# Patient Record
Sex: FEMALE | Race: WHITE | NOT HISPANIC OR LATINO | Employment: OTHER | URBAN - METROPOLITAN AREA
[De-identification: names, ages, dates, MRNs, and addresses within clinical notes are randomized per-mention and may not be internally consistent; named-entity substitution may affect disease eponyms.]

---

## 2020-04-01 DIAGNOSIS — J44.9 CHRONIC OBSTRUCTIVE PULMONARY DISEASE, UNSPECIFIED COPD TYPE (HCC): Primary | ICD-10-CM

## 2020-04-01 DIAGNOSIS — J44.9 CHRONIC OBSTRUCTIVE PULMONARY DISEASE, UNSPECIFIED COPD TYPE (HCC): ICD-10-CM

## 2020-04-01 RX ORDER — BUDESONIDE 1 MG/2ML
1 INHALANT ORAL 2 TIMES DAILY
Qty: 360 ML | Refills: 1 | Status: SHIPPED | OUTPATIENT
Start: 2020-04-01 | End: 2020-04-01 | Stop reason: SDUPTHER

## 2020-04-01 RX ORDER — IPRATROPIUM BROMIDE AND ALBUTEROL SULFATE 2.5; .5 MG/3ML; MG/3ML
3 SOLUTION RESPIRATORY (INHALATION) EVERY 6 HOURS PRN
Qty: 300 VIAL | Refills: 1 | Status: SHIPPED | OUTPATIENT
Start: 2020-04-01 | End: 2022-03-22 | Stop reason: SDUPTHER

## 2020-04-01 RX ORDER — BUDESONIDE 1 MG/2ML
1 INHALANT ORAL 2 TIMES DAILY
Qty: 360 ML | Refills: 1 | Status: SHIPPED | OUTPATIENT
Start: 2020-04-01 | End: 2020-04-01

## 2020-04-01 RX ORDER — BUDESONIDE 0.5 MG/2ML
0.5 INHALANT ORAL 2 TIMES DAILY
Qty: 360 ML | Refills: 1 | Status: SHIPPED | OUTPATIENT
Start: 2020-04-01 | End: 2020-09-03

## 2020-04-01 RX ORDER — IPRATROPIUM BROMIDE AND ALBUTEROL SULFATE 2.5; .5 MG/3ML; MG/3ML
SOLUTION RESPIRATORY (INHALATION)
OUTPATIENT
Start: 2020-04-01

## 2020-04-01 RX ORDER — IPRATROPIUM BROMIDE AND ALBUTEROL SULFATE 2.5; .5 MG/3ML; MG/3ML
3 SOLUTION RESPIRATORY (INHALATION) EVERY 6 HOURS PRN
Qty: 300 VIAL | Refills: 1 | Status: SHIPPED | OUTPATIENT
Start: 2020-04-01 | End: 2020-04-01 | Stop reason: SDUPTHER

## 2020-04-01 RX ORDER — FORMOTEROL FUMARATE 20 UG/2ML
20 SOLUTION RESPIRATORY (INHALATION) 2 TIMES DAILY
Qty: 180 VIAL | Refills: 1 | Status: SHIPPED | OUTPATIENT
Start: 2020-04-01 | End: 2022-03-22 | Stop reason: SDUPTHER

## 2020-04-01 RX ORDER — FORMOTEROL FUMARATE 20 UG/2ML
20 SOLUTION RESPIRATORY (INHALATION) 2 TIMES DAILY
Qty: 180 VIAL | Refills: 1 | Status: SHIPPED | OUTPATIENT
Start: 2020-04-01 | End: 2020-04-01 | Stop reason: SDUPTHER

## 2020-06-28 DIAGNOSIS — I10 ESSENTIAL HYPERTENSION: Primary | ICD-10-CM

## 2020-06-29 RX ORDER — AMLODIPINE BESYLATE 5 MG/1
TABLET ORAL
Qty: 90 TABLET | Refills: 1 | Status: SHIPPED | OUTPATIENT
Start: 2020-06-29 | End: 2021-01-04 | Stop reason: SDUPTHER

## 2020-08-26 RX ORDER — CITALOPRAM 10 MG/1
TABLET ORAL
COMMUNITY
Start: 2020-05-22 | End: 2022-03-22 | Stop reason: SDUPTHER

## 2020-08-26 RX ORDER — CLOPIDOGREL BISULFATE 75 MG/1
TABLET ORAL
COMMUNITY
Start: 2020-08-12

## 2020-08-26 RX ORDER — TIMOLOL MALEATE 5 MG/ML
SOLUTION/ DROPS OPHTHALMIC
COMMUNITY
Start: 2020-06-24

## 2020-08-26 RX ORDER — ROSUVASTATIN CALCIUM 20 MG/1
TABLET, COATED ORAL
COMMUNITY
Start: 2020-05-22

## 2020-08-27 ENCOUNTER — OFFICE VISIT (OUTPATIENT)
Dept: FAMILY MEDICINE CLINIC | Facility: CLINIC | Age: 80
End: 2020-08-27
Payer: MEDICARE

## 2020-08-27 VITALS
DIASTOLIC BLOOD PRESSURE: 80 MMHG | HEART RATE: 81 BPM | OXYGEN SATURATION: 80 % | HEIGHT: 62 IN | SYSTOLIC BLOOD PRESSURE: 138 MMHG | WEIGHT: 189 LBS | TEMPERATURE: 98.9 F | BODY MASS INDEX: 34.78 KG/M2 | RESPIRATION RATE: 18 BRPM

## 2020-08-27 DIAGNOSIS — C34.92 NON-SMALL CELL CANCER OF LEFT LUNG (HCC): ICD-10-CM

## 2020-08-27 DIAGNOSIS — E53.8 B12 DEFICIENCY: ICD-10-CM

## 2020-08-27 DIAGNOSIS — F33.41 RECURRENT MAJOR DEPRESSIVE DISORDER, IN PARTIAL REMISSION (HCC): ICD-10-CM

## 2020-08-27 DIAGNOSIS — J44.9 CHRONIC OBSTRUCTIVE PULMONARY DISEASE, UNSPECIFIED COPD TYPE (HCC): ICD-10-CM

## 2020-08-27 DIAGNOSIS — E78.5 HYPERLIPIDEMIA, UNSPECIFIED HYPERLIPIDEMIA TYPE: Primary | ICD-10-CM

## 2020-08-27 DIAGNOSIS — Z79.899 OTHER LONG TERM (CURRENT) DRUG THERAPY: ICD-10-CM

## 2020-08-27 DIAGNOSIS — I10 ESSENTIAL HYPERTENSION: ICD-10-CM

## 2020-08-27 DIAGNOSIS — G47.33 OSA ON CPAP: ICD-10-CM

## 2020-08-27 DIAGNOSIS — Z99.89 OSA ON CPAP: ICD-10-CM

## 2020-08-27 DIAGNOSIS — E55.9 VITAMIN D DEFICIENCY: ICD-10-CM

## 2020-08-27 DIAGNOSIS — R73.03 PREDIABETES: ICD-10-CM

## 2020-08-27 PROCEDURE — 99214 OFFICE O/P EST MOD 30 MIN: CPT | Performed by: FAMILY MEDICINE

## 2020-08-27 PROCEDURE — 1160F RVW MEDS BY RX/DR IN RCRD: CPT | Performed by: FAMILY MEDICINE

## 2020-08-27 PROCEDURE — 3079F DIAST BP 80-89 MM HG: CPT | Performed by: FAMILY MEDICINE

## 2020-08-27 PROCEDURE — 3075F SYST BP GE 130 - 139MM HG: CPT | Performed by: FAMILY MEDICINE

## 2020-08-27 PROCEDURE — 3008F BODY MASS INDEX DOCD: CPT | Performed by: FAMILY MEDICINE

## 2020-08-27 PROCEDURE — 4040F PNEUMOC VAC/ADMIN/RCVD: CPT | Performed by: FAMILY MEDICINE

## 2020-08-27 PROCEDURE — 1036F TOBACCO NON-USER: CPT | Performed by: FAMILY MEDICINE

## 2020-08-27 RX ORDER — OMEGA-3S/DHA/EPA/FISH OIL/D3 300MG-1000
400 CAPSULE ORAL DAILY
COMMUNITY
End: 2021-01-27 | Stop reason: ALTCHOICE

## 2020-08-27 RX ORDER — RIBOFLAVIN (VITAMIN B2) 100 MG
100 TABLET ORAL DAILY
COMMUNITY

## 2020-08-27 RX ORDER — CHLORAL HYDRATE 500 MG
1000 CAPSULE ORAL DAILY
COMMUNITY

## 2020-08-27 RX ORDER — ASPIRIN 81 MG/1
81 TABLET, CHEWABLE ORAL DAILY
COMMUNITY
End: 2021-01-27 | Stop reason: ALTCHOICE

## 2020-08-27 NOTE — PROGRESS NOTES
Assessment/Plan:    1  Hyperlipidemia, unspecified hyperlipidemia type  -     TSH, 3rd generation with Free T4 reflex; Future; Expected date: 08/27/2020    2  Essential hypertension  -     Lipid panel; Future; Expected date: 08/27/2020  -     Comprehensive metabolic panel; Future; Expected date: 08/27/2020  -     CBC; Future; Expected date: 08/27/2020  -     Hemoglobin A1C; Future; Expected date: 08/27/2020  -     NT-BNP PRO; Future    3  Chronic obstructive pulmonary disease, unspecified COPD type ([place] [address] )    4  JULIO on CPAP    5  Recurrent major depressive disorder, in partial remission ([place] [address] )    6  Non-small cell cancer of left lung ([place] [address] )    7  Prediabetes    8  B12 deficiency  -     Vitamin B12; Future; Expected date: 08/27/2020    9  Vitamin D deficiency  -     Vitamin D 25 hydroxy; Future; Expected date: 08/27/2020    10  Other long term (current) drug therapy   -     Hemoglobin A1C; Future; Expected date: 08/27/2020         Subjective:      Patient ID: Cody Jung is a 79 y o  female  HPI       copd: 2L NC x 2012 about 2 yrs now cont  keeping around 92% nebs bid no recent steroid  turned up to 3L today to 5 with portable     hld; crestor 20mg    HTN: could not take losartan 25mg or lisinopril 10mg gave norvasc 5mg currently    right shoulder pain    anxiety: on celexa 10mg    Cardio: heart is fine sees [facility] and just had stenting of the BL LE now on plavix    Glaucoma: demco    saw [facility]: Thony lobectomy for non Small cell Lung cancer s/p chemo and angelco - and [facility]    diverticulosis: seen by geremias - takes imodium prn last flair up years ago  taking airborne daily    vit D vit E and fishoil [id number]        The following portions of the patient's history were reviewed and updated as appropriate: allergies, current medications, past family history, past medical history, past social history, past surgical history and problem list     Review of Systems   Constitutional: Positive for unexpected weight change  Negative for fever  HENT: Negative for nosebleeds and trouble swallowing  Eyes: Negative for visual disturbance  Respiratory: Positive for shortness of breath  Negative for chest tightness  Cardiovascular: Negative for chest pain, palpitations and leg swelling  Gastrointestinal: Positive for nausea  Negative for abdominal pain, constipation and diarrhea  Endocrine: Negative for cold intolerance  Genitourinary: Negative for dysuria and urgency  Musculoskeletal: Negative for joint swelling and myalgias  Skin: Negative for rash  Neurological: Positive for weakness  Negative for tremors, seizures and syncope  Hematological: Does not bruise/bleed easily  Psychiatric/Behavioral: Negative for hallucinations and suicidal ideas  The patient is nervous/anxious  Objective:      /80 (BP Location: Left arm, Patient Position: Sitting, Cuff Size: Large)   Pulse 81   Temp 98 9 °F (37 2 °C) (Tympanic)   Resp 18   Ht 5' 2" (1 575 m)   Wt 85 7 kg (189 lb)   SpO2 (!) 80% Comment: has to keep her oxygen on 5 (portable)  BMI 34 57 kg/m²     No visits with results within 2 Week(s) from this visit  Latest known visit with results is:   No results found for any previous visit  Physical Exam  Vitals signs and nursing note reviewed  Constitutional:       Appearance: She is well-developed  Comments: 3L NC    HENT:      Head: Normocephalic and atraumatic  Neck:      Musculoskeletal: Normal range of motion and neck supple  Cardiovascular:      Rate and Rhythm: Normal rate and regular rhythm  Heart sounds: Normal heart sounds  No murmur  Pulmonary:      Effort: Pulmonary effort is normal       Breath sounds: Normal breath sounds  No wheezing or rales  Abdominal:      General: Bowel sounds are normal  There is no distension  Palpations: Abdomen is soft  Tenderness: There is no abdominal tenderness  Musculoskeletal: Normal range of motion           General: No tenderness  Lymphadenopathy:      Cervical: No cervical adenopathy  Skin:     General: Skin is warm and dry  Capillary Refill: Capillary refill takes less than 2 seconds  Findings: No rash  Neurological:      Mental Status: She is alert and oriented to person, place, and time  Cranial Nerves: No cranial nerve deficit  Sensory: No sensory deficit  Motor: No abnormal muscle tone  Psychiatric:         Behavior: Behavior normal          Thought Content: Thought content normal          Judgment: Judgment normal            BMI Counseling: Body mass index is 34 57 kg/m²  The BMI is above normal  Nutrition recommendations include decreasing portion sizes, encouraging healthy choices of fruits and vegetables and limiting drinks that contain sugar  Exercise recommendations include moderate physical activity 150 minutes/week  No pharmacotherapy was ordered  Falls Plan of Care: balance, strength, and gait training instructions were provided  Medications that increase falls were reviewed         Brain MD Karen  [place] [id number]

## 2020-09-03 ENCOUNTER — APPOINTMENT (EMERGENCY)
Dept: RADIOLOGY | Facility: HOSPITAL | Age: 80
End: 2020-09-03
Payer: MEDICARE

## 2020-09-03 ENCOUNTER — APPOINTMENT (OUTPATIENT)
Dept: VASCULAR ULTRASOUND | Facility: HOSPITAL | Age: 80
End: 2020-09-03
Payer: MEDICARE

## 2020-09-03 ENCOUNTER — HOSPITAL ENCOUNTER (OUTPATIENT)
Facility: HOSPITAL | Age: 80
Setting detail: OBSERVATION
Discharge: HOME | End: 2020-09-04
Admitting: INTERNAL MEDICINE
Payer: MEDICARE

## 2020-09-03 ENCOUNTER — APPOINTMENT (EMERGENCY)
Dept: CT IMAGING | Facility: HOSPITAL | Age: 80
End: 2020-09-03
Payer: MEDICARE

## 2020-09-03 DIAGNOSIS — I50.9 CONGESTIVE HEART FAILURE, UNSPECIFIED HF CHRONICITY, UNSPECIFIED HEART FAILURE TYPE (HCC): ICD-10-CM

## 2020-09-03 DIAGNOSIS — Z99.89 OSA ON CPAP: ICD-10-CM

## 2020-09-03 DIAGNOSIS — J44.1 COPD EXACERBATION (HCC): Primary | ICD-10-CM

## 2020-09-03 DIAGNOSIS — G47.33 OSA ON CPAP: ICD-10-CM

## 2020-09-03 LAB
ALBUMIN SERPL BCP-MCNC: 4.4 G/DL (ref 3.4–4.8)
ALP SERPL-CCNC: 42.4 U/L (ref 35–140)
ALT SERPL W P-5'-P-CCNC: 16 U/L (ref 5–54)
ANION GAP SERPL CALCULATED.3IONS-SCNC: 7 MMOL/L (ref 4–13)
AST SERPL W P-5'-P-CCNC: 17 U/L (ref 15–41)
BASE EXCESS BLDA CALC-SCNC: 4 MMOL/L (ref -2–3)
BASE EXCESS BLDA CALC-SCNC: 5 MMOL/L (ref -2–3)
BASOPHILS # BLD AUTO: 0.02 THOUSANDS/ΜL (ref 0–0.1)
BASOPHILS NFR BLD AUTO: 0 % (ref 0–1)
BILIRUB SERPL-MCNC: 0.54 MG/DL (ref 0.3–1.2)
BNP SERPL-MCNC: 216.6 PG/ML (ref 1–100)
BUN SERPL-MCNC: 14 MG/DL (ref 6–20)
CA-I BLD-SCNC: 1.32 MMOL/L (ref 1.12–1.32)
CA-I BLD-SCNC: 1.36 MMOL/L (ref 1.12–1.32)
CALCIUM SERPL-MCNC: 9.9 MG/DL (ref 8.4–10.2)
CHLORIDE SERPL-SCNC: 102 MMOL/L (ref 96–108)
CO2 SERPL-SCNC: 33 MMOL/L (ref 22–33)
CREAT SERPL-MCNC: 0.74 MG/DL (ref 0.4–1.1)
D DIMER PPP FEU-MCNC: 0.55 MG/L FEU (ref 0.19–0.49)
EOSINOPHIL # BLD AUTO: 0.14 THOUSAND/ΜL (ref 0–0.61)
EOSINOPHIL NFR BLD AUTO: 2 % (ref 0–6)
ERYTHROCYTE [DISTWIDTH] IN BLOOD BY AUTOMATED COUNT: 13.9 % (ref 11.6–15.1)
FIO2 GAS DIL.REBREATH: 32 L
FIO2 GAS DIL.REBREATH: 75 L
GFR SERPL CREATININE-BSD FRML MDRD: 76 ML/MIN/1.73SQ M
GLUCOSE SERPL-MCNC: 146 MG/DL (ref 65–140)
GLUCOSE SERPL-MCNC: 153 MG/DL (ref 65–140)
GLUCOSE SERPL-MCNC: 182 MG/DL (ref 65–140)
HCO3 BLDA-SCNC: 31.4 MMOL/L (ref 22–28)
HCO3 BLDA-SCNC: 34.4 MMOL/L (ref 22–28)
HCT VFR BLD AUTO: 44.4 % (ref 34.8–46.1)
HCT VFR BLD CALC: 40 % (ref 34.8–46.1)
HCT VFR BLD CALC: 42 % (ref 34.8–46.1)
HGB BLD-MCNC: 14.6 G/DL (ref 11.5–15.4)
HGB BLDA-MCNC: 13.6 G/DL (ref 11.5–15.4)
HGB BLDA-MCNC: 14.3 G/DL (ref 11.5–15.4)
IMM GRANULOCYTES # BLD AUTO: 0.02 THOUSAND/UL (ref 0–0.2)
IMM GRANULOCYTES NFR BLD AUTO: 0 % (ref 0–2)
LYMPHOCYTES # BLD AUTO: 0.86 THOUSANDS/ΜL (ref 0.6–4.47)
LYMPHOCYTES NFR BLD AUTO: 12 % (ref 14–44)
MCH RBC QN AUTO: 33.3 PG (ref 26.8–34.3)
MCHC RBC AUTO-ENTMCNC: 32.9 G/DL (ref 31.4–37.4)
MCV RBC AUTO: 101 FL (ref 82–98)
MONOCYTES # BLD AUTO: 0.52 THOUSAND/ΜL (ref 0.17–1.22)
MONOCYTES NFR BLD AUTO: 7 % (ref 4–12)
NEUTROPHILS # BLD AUTO: 5.52 THOUSANDS/ΜL (ref 1.85–7.62)
NEUTS SEG NFR BLD AUTO: 79 % (ref 43–75)
PCO2 BLD: 33 MMOL/L (ref 21–32)
PCO2 BLD: 37 MMOL/L (ref 21–32)
PCO2 BLD: 372 MM HG
PCO2 BLD: 58.3 MM HG (ref 36–44)
PCO2 BLD: 62 MM HG
PCO2 BLD: 75.1 MM HG (ref 36–44)
PCO2 BLDA: 57.1 MM HG
PCO2 BLDA: 73.8 MM HG
PH BLD: 7.27 [PH] (ref 7.35–7.45)
PH BLD: 7.28 [PH]
PH BLD: 7.34 [PH] (ref 7.35–7.45)
PH BLD: 7.35 [PH]
PLATELET # BLD AUTO: 209 THOUSANDS/UL (ref 149–390)
PMV BLD AUTO: 9.4 FL (ref 8.9–12.7)
PO2 BLD: 374 MM HG (ref 75–129)
PO2 BLD: 64 MM HG (ref 75–129)
POTASSIUM BLD-SCNC: 4.3 MMOL/L (ref 3.5–5.3)
POTASSIUM BLD-SCNC: 4.4 MMOL/L (ref 3.5–5.3)
POTASSIUM SERPL-SCNC: 4.8 MMOL/L (ref 3.5–5)
PROT SERPL-MCNC: 7.6 G/DL (ref 6.4–8.3)
RBC # BLD AUTO: 4.38 MILLION/UL (ref 3.81–5.12)
SAO2 % BLD FROM PO2: 100 % (ref 60–85)
SAO2 % BLD FROM PO2: 90 % (ref 60–85)
SODIUM BLD-SCNC: 139 MMOL/L (ref 136–145)
SODIUM BLD-SCNC: 142 MMOL/L (ref 136–145)
SODIUM SERPL-SCNC: 142 MMOL/L (ref 133–145)
SPECIMEN SOURCE: ABNORMAL
SPECIMEN SOURCE: ABNORMAL
TROPONIN I SERPL-MCNC: <0.03 NG/ML (ref 0–0.07)
WBC # BLD AUTO: 7.08 THOUSAND/UL (ref 4.31–10.16)

## 2020-09-03 PROCEDURE — 99220 PR INITIAL OBSERVATION CARE/DAY 70 MINUTES: CPT | Performed by: INTERNAL MEDICINE

## 2020-09-03 PROCEDURE — 84484 ASSAY OF TROPONIN QUANT: CPT

## 2020-09-03 PROCEDURE — 36600 WITHDRAWAL OF ARTERIAL BLOOD: CPT

## 2020-09-03 PROCEDURE — 93970 EXTREMITY STUDY: CPT

## 2020-09-03 PROCEDURE — 82947 ASSAY GLUCOSE BLOOD QUANT: CPT

## 2020-09-03 PROCEDURE — 94760 N-INVAS EAR/PLS OXIMETRY 1: CPT

## 2020-09-03 PROCEDURE — 85025 COMPLETE CBC W/AUTO DIFF WBC: CPT

## 2020-09-03 PROCEDURE — 94640 AIRWAY INHALATION TREATMENT: CPT

## 2020-09-03 PROCEDURE — 82803 BLOOD GASES ANY COMBINATION: CPT

## 2020-09-03 PROCEDURE — 99223 1ST HOSP IP/OBS HIGH 75: CPT | Performed by: INTERNAL MEDICINE

## 2020-09-03 PROCEDURE — 99285 EMERGENCY DEPT VISIT HI MDM: CPT

## 2020-09-03 PROCEDURE — 84295 ASSAY OF SERUM SODIUM: CPT

## 2020-09-03 PROCEDURE — 82330 ASSAY OF CALCIUM: CPT

## 2020-09-03 PROCEDURE — 93005 ELECTROCARDIOGRAM TRACING: CPT

## 2020-09-03 PROCEDURE — 96374 THER/PROPH/DIAG INJ IV PUSH: CPT

## 2020-09-03 PROCEDURE — 71045 X-RAY EXAM CHEST 1 VIEW: CPT

## 2020-09-03 PROCEDURE — 94664 DEMO&/EVAL PT USE INHALER: CPT

## 2020-09-03 PROCEDURE — 36415 COLL VENOUS BLD VENIPUNCTURE: CPT

## 2020-09-03 PROCEDURE — 71275 CT ANGIOGRAPHY CHEST: CPT

## 2020-09-03 PROCEDURE — 80053 COMPREHEN METABOLIC PANEL: CPT

## 2020-09-03 PROCEDURE — 94660 CPAP INITIATION&MGMT: CPT

## 2020-09-03 PROCEDURE — 1124F ACP DISCUSS-NO DSCNMKR DOCD: CPT

## 2020-09-03 PROCEDURE — 85379 FIBRIN DEGRADATION QUANT: CPT

## 2020-09-03 PROCEDURE — 83880 ASSAY OF NATRIURETIC PEPTIDE: CPT

## 2020-09-03 PROCEDURE — G1004 CDSM NDSC: HCPCS

## 2020-09-03 PROCEDURE — 84132 ASSAY OF SERUM POTASSIUM: CPT

## 2020-09-03 PROCEDURE — 93970 EXTREMITY STUDY: CPT | Performed by: SURGERY

## 2020-09-03 PROCEDURE — 85014 HEMATOCRIT: CPT

## 2020-09-03 RX ORDER — ASPIRIN 81 MG/1
81 TABLET, CHEWABLE ORAL DAILY
Status: DISCONTINUED | OUTPATIENT
Start: 2020-09-03 | End: 2020-09-04 | Stop reason: HOSPADM

## 2020-09-03 RX ORDER — IPRATROPIUM BROMIDE AND ALBUTEROL SULFATE 2.5; .5 MG/3ML; MG/3ML
SOLUTION RESPIRATORY (INHALATION)
Status: COMPLETED
Start: 2020-09-03 | End: 2020-09-03

## 2020-09-03 RX ORDER — LEVALBUTEROL 1.25 MG/.5ML
1.25 SOLUTION, CONCENTRATE RESPIRATORY (INHALATION) EVERY 6 HOURS PRN
Status: DISCONTINUED | OUTPATIENT
Start: 2020-09-03 | End: 2020-09-03

## 2020-09-03 RX ORDER — TERBUTALINE SULFATE 1 MG/ML
1 INJECTION, SOLUTION SUBCUTANEOUS ONCE
Status: COMPLETED | OUTPATIENT
Start: 2020-09-03 | End: 2020-09-03

## 2020-09-03 RX ORDER — SODIUM CHLORIDE FOR INHALATION 0.9 %
3 VIAL, NEBULIZER (ML) INHALATION EVERY 6 HOURS PRN
Status: DISCONTINUED | OUTPATIENT
Start: 2020-09-03 | End: 2020-09-03

## 2020-09-03 RX ORDER — METHYLPREDNISOLONE SODIUM SUCCINATE 125 MG/2ML
60 INJECTION, POWDER, LYOPHILIZED, FOR SOLUTION INTRAMUSCULAR; INTRAVENOUS EVERY 12 HOURS SCHEDULED
Status: DISCONTINUED | OUTPATIENT
Start: 2020-09-03 | End: 2020-09-04 | Stop reason: HOSPADM

## 2020-09-03 RX ORDER — LEVALBUTEROL 1.25 MG/.5ML
1.25 SOLUTION, CONCENTRATE RESPIRATORY (INHALATION)
Status: DISCONTINUED | OUTPATIENT
Start: 2020-09-03 | End: 2020-09-04 | Stop reason: HOSPADM

## 2020-09-03 RX ORDER — METHYLPREDNISOLONE SOD SUCC 125 MG
1 VIAL (EA) INJECTION ONCE
Status: COMPLETED | OUTPATIENT
Start: 2020-09-03 | End: 2020-09-03

## 2020-09-03 RX ORDER — ATORVASTATIN CALCIUM 40 MG/1
40 TABLET, FILM COATED ORAL
Status: DISCONTINUED | OUTPATIENT
Start: 2020-09-03 | End: 2020-09-04 | Stop reason: HOSPADM

## 2020-09-03 RX ORDER — AMLODIPINE BESYLATE 5 MG/1
5 TABLET ORAL DAILY
Status: DISCONTINUED | OUTPATIENT
Start: 2020-09-03 | End: 2020-09-04 | Stop reason: HOSPADM

## 2020-09-03 RX ORDER — TIMOLOL MALEATE 5 MG/ML
1 SOLUTION/ DROPS OPHTHALMIC 2 TIMES DAILY
Status: DISCONTINUED | OUTPATIENT
Start: 2020-09-03 | End: 2020-09-04 | Stop reason: HOSPADM

## 2020-09-03 RX ORDER — TIMOLOL MALEATE 5 MG/ML
1 SOLUTION/ DROPS OPHTHALMIC DAILY
Status: DISCONTINUED | OUTPATIENT
Start: 2020-09-03 | End: 2020-09-03

## 2020-09-03 RX ORDER — CEFTRIAXONE 1 G/50ML
1000 INJECTION, SOLUTION INTRAVENOUS EVERY 24 HOURS
Status: DISCONTINUED | OUTPATIENT
Start: 2020-09-03 | End: 2020-09-04 | Stop reason: HOSPADM

## 2020-09-03 RX ORDER — FUROSEMIDE 10 MG/ML
20 INJECTION INTRAMUSCULAR; INTRAVENOUS ONCE
Status: COMPLETED | OUTPATIENT
Start: 2020-09-03 | End: 2020-09-03

## 2020-09-03 RX ORDER — IPRATROPIUM BROMIDE AND ALBUTEROL SULFATE 2.5; .5 MG/3ML; MG/3ML
3 SOLUTION RESPIRATORY (INHALATION)
Status: DISCONTINUED | OUTPATIENT
Start: 2020-09-03 | End: 2020-09-03

## 2020-09-03 RX ORDER — POLYETHYLENE GLYCOL 3350 17 G/17G
17 POWDER, FOR SOLUTION ORAL DAILY
Status: DISCONTINUED | OUTPATIENT
Start: 2020-09-03 | End: 2020-09-04 | Stop reason: HOSPADM

## 2020-09-03 RX ORDER — CLOPIDOGREL BISULFATE 75 MG/1
75 TABLET ORAL DAILY
Status: DISCONTINUED | OUTPATIENT
Start: 2020-09-03 | End: 2020-09-04 | Stop reason: HOSPADM

## 2020-09-03 RX ADMIN — CLOPIDOGREL BISULFATE 75 MG: 75 TABLET ORAL at 16:10

## 2020-09-03 RX ADMIN — TIMOLOL MALEATE 1 DROP: 5 SOLUTION OPHTHALMIC at 20:08

## 2020-09-03 RX ADMIN — NITROGLYCERIN 0.5 INCH: 20 OINTMENT TOPICAL at 11:25

## 2020-09-03 RX ADMIN — METHYLPREDNISOLONE SODIUM SUCCINATE 60 MG: 125 INJECTION, POWDER, FOR SOLUTION INTRAMUSCULAR; INTRAVENOUS at 20:08

## 2020-09-03 RX ADMIN — CEFTRIAXONE 1000 MG: 1 INJECTION, SOLUTION INTRAVENOUS at 16:09

## 2020-09-03 RX ADMIN — LEVALBUTEROL HYDROCHLORIDE 1.25 MG: 1.25 SOLUTION, CONCENTRATE RESPIRATORY (INHALATION) at 19:47

## 2020-09-03 RX ADMIN — IPRATROPIUM BROMIDE AND ALBUTEROL SULFATE 3 ML: 2.5; .5 SOLUTION RESPIRATORY (INHALATION) at 10:57

## 2020-09-03 RX ADMIN — IPRATROPIUM BROMIDE AND ALBUTEROL SULFATE 3 ML: 2.5; .5 SOLUTION RESPIRATORY (INHALATION) at 13:53

## 2020-09-03 RX ADMIN — ATORVASTATIN CALCIUM 40 MG: 40 TABLET, FILM COATED ORAL at 16:10

## 2020-09-03 RX ADMIN — IPRATROPIUM BROMIDE 0.5 MG: 0.5 SOLUTION RESPIRATORY (INHALATION) at 19:47

## 2020-09-03 RX ADMIN — FUROSEMIDE 20 MG: 10 INJECTION, SOLUTION INTRAMUSCULAR; INTRAVENOUS at 11:25

## 2020-09-03 RX ADMIN — IODIXANOL 100 ML: 320 INJECTION, SOLUTION INTRAVASCULAR at 13:29

## 2020-09-03 RX ADMIN — AMLODIPINE BESYLATE 5 MG: 5 TABLET ORAL at 16:10

## 2020-09-03 RX ADMIN — AZITHROMYCIN MONOHYDRATE 500 MG: 500 INJECTION, POWDER, LYOPHILIZED, FOR SOLUTION INTRAVENOUS at 17:52

## 2020-09-03 RX ADMIN — ASPIRIN 81 MG 81 MG: 81 TABLET ORAL at 16:10

## 2020-09-03 RX ADMIN — ENOXAPARIN SODIUM 40 MG: 40 INJECTION SUBCUTANEOUS at 16:11

## 2020-09-03 NOTE — ED NOTES
Patient assisted onto bedpan, reported feeling the need to pass her bowels  Patient did not have a BM, stated she only passed gas  Soiled linens with urine, sheets changed, patient cleansed, and brief and purewick applied  Patient repositioned in bed, be in low and locked position, call bell within reach        Rylee Khan RN  09/03/20 [id number]

## 2020-09-03 NOTE — CONSULTS
Consultation - Pulmonary Medicine   Nahun Morocho 79 y o  female MRN: [id number]  Unit/Bed#: -01 Encounter: [id number]      Assessment:  1  Acute on chronic hypoxic and hypercarbic respiratory failure secondary to exacerbation of COPD  The patient has been on home oxygen at 3 liters/minute  Benefited from BiPAP support  PH has improved in pCO2 has come down  She is a chronic CO2 retainer  Continue oxygen supplementation  BiPAP p r n  [place] [employer] and at night  2  Acute exacerbation of COPD most likely precipitated by acute tracheobronchitis:  Currently doing much better  Continue intravenous Solu-Medrol and nebulized bronchodilators  Continue ceftriaxone and azithromycin pending cultures  Check sputum Gram stain and culture  The CT scan shows no evidence of pneumonia  3  Obstructive sleep apnea:  Previously diagnosed obstructive sleep apnea on CPAP 9 cm of water  Continue BiPAP support for now  She would need oxygen supplementation with positive airway pressure therapy  4   History of left upper lobe non-small cell lung cancer:  Status post wedge resection chemotherapy and radiation therapy  Has been cancer free since 5 years  I had a long discussion with her and answered all her questions  Plan:   As above  Continue oxygen supplementation nebulized bronchodilators and empiric antibiotic coverage and IV Solu-Medrol  BiPAP p r n  And at night  Spoke to Dr Madai Selby the hospitalist and her team     History of Present Illness   Physician Requesting Consult: Michele Joyner MD  Reason for Consult / Principal Problem:  Acute on chronic respiratory failure  Hx and PE limited by:  None    HPI: Mariel Bahena is a 79y o  year old female who presents with worsening shortness of breath, cough with phlegm and wheezing since 5 days  She was diagnosed with moderately severe COPD emphysema from her previous smoking, though she quit more than 15 years back    Her usual exercise tolerance is about 200 ft on level ground and she uses 3 liters/minute of oxygen supplementation  She uses  nebulized ipratropium and albuterol about 4 times a day  Her PFT showed moderately severe airflow obstruction with severe diffusion defect  She was previously on budesonide nebulization as well  She has been feeling increasingly short of breath since 5 days and felt even worse this morning  She called the EMS and they give her IV Solu-Medrol done beautifully in  In the emergency room she was found to be severely short of breath and was placed on BiPAP support  Her pCO2 was elevated at 72 and her pH was 7 27 on arterial blood gas  She was also given nebulizer treatment and was empirically started on ceftriaxone and azithromycin for COPD exacerbation  She admitted productive cough with occasional yellow color  She also mentioned increasing wheezing  No fever or chills  No hoarseness of voice or dysphagia  No chest pain or palpitations  After being placed on BiPAP she improved over the next 2 hours and her repeat ABG showed improvement in pH to 7 34 with pCO2 down to 58  She has been subsequently started on nasal cannula oxygen and is feeling much better  Her CT angiogram was reviewed and showed no pulmonary embolism but showed bilateral significant emphysematous changes  She also has some fibrotic changes at both lung bases right more than the left PE  She is status post left upper lobe wedge resection  She has previous history of obstructive sleep apnea and has been on CPAP therapy at 9 cm of water  She states that she uses the CPAP every night and is comfortable with the mask and pressure  She has no significant daytime sleepiness or tiredness  She was diagnosed with left upper lobe non-small cell lung cancer and was underwent wedge resection by Dr Elsa Reich about 9 years back  She had subsequent radiation and chemotherapy  She has been cancer free for 5 years    She follows with   [facility]     She was a smoker for about 35 years 1 pack per day  She quit 15 years back  She has family history of lung cancer in her father who was also a smoker  Consults    Review of Systems   Constitutional: Negative for activity change, appetite change, chills and fever  HENT: Negative for congestion, hearing loss, postnasal drip, rhinorrhea, sneezing, sore throat, trouble swallowing and voice change  Eyes: Negative for visual disturbance  Respiratory: Positive for cough, shortness of breath and wheezing  Negative for chest tightness and stridor  Gastrointestinal: Positive for diarrhea  Negative for abdominal pain, constipation, nausea and vomiting  Endocrine: Negative for polyuria  Genitourinary: Positive for urgency  Negative for difficulty urinating and frequency  Musculoskeletal: Negative for arthralgias, gait problem and joint swelling  Skin: Negative for rash  Allergic/Immunologic: Negative for environmental allergies  Neurological: Negative for dizziness, speech difficulty and headaches  Psychiatric/Behavioral: Negative for agitation and sleep disturbance  The patient is nervous/anxious          Historical Information   Past Medical History:   Diagnosis Date    Chronic pain     [facility]ch[facility] obstructive pulmonary disease) ([facility] )     Hyperlipidemia     Hypertension     Lung cancer ([facility] )     JULIO on CPAP     [facility]peripheral [facility]     Leg     Past Surgical History:   Procedure Laterality Date    APPENDECTOMY      COLONOSCOPY  2018    repeat 2023    HYSTERECTOMY      LUNG CANCER SURGERY Left     wedge reseection, Dr Juan David Otto History   Social History     Substance and Sexual Activity   Alcohol Use Not Currently     Social History     Substance and Sexual Activity   Drug Use Not Currently     E-Cigarette/Vaping    E-Cigarette Use Never User      E-Cigarette/Vaping Substances     Social History     Tobacco Use   Smoking Status Former Smoker    Packs/day: 1 50    Years: 35 00    Pack years: 52 50   Smokeless Tobacco Never Used     Occupational History:  No history of exposure to chemicals or asbestos    Family History: Father had lung cancer; was a smoker  Meds/Allergies   all current active meds have been reviewed    Allergies   Allergen Reactions    Baclofen     Lisinopril     Losartan     Naproxen     Zinc Acetate        Objective   Vitals: Blood pressure 148/75, pulse 74, temperature 98 3 °F (36 8 °C), temperature source Tympanic, resp  rate 22, height 5' 2" (1 575 m), weight 84 4 kg (186 lb), SpO2 92 %  ,Body mass index is 34 02 kg/m²  No intake or output data in the 24 hours ending 09/03/20 1848  Invasive Devices     Peripheral Intravenous Line            Peripheral IV 09/03/20 Left;Ventral (anterior) Forearm less than 1 day    Peripheral IV 09/03/20 Right Antecubital less than 1 day                Physical Exam  Vitals signs reviewed  Constitutional:       General: She is not in acute distress  Appearance: She is obese  She is ill-appearing  She is not toxic-appearing  Interventions: She is not intubated  HENT:      Head: Normocephalic  Neck:      Musculoskeletal: Neck supple  Thyroid: No thyromegaly  Trachea: No tracheal deviation  Cardiovascular:      Rate and Rhythm: Normal rate and regular rhythm  Heart sounds: No murmur  Pulmonary:      Effort: Pulmonary effort is normal  No tachypnea, accessory muscle usage or respiratory distress  She is not intubated  Breath sounds: Examination of the right-lower field reveals rales  Examination of the left-lower field reveals rales  Rales (Occasional coarse inspiratory crackles at both lung bases) present  No decreased breath sounds, wheezing or rhonchi  Chest:      Chest wall: No deformity or tenderness  Abdominal:      General: Bowel sounds are normal       Palpations: Abdomen is soft  Tenderness: There is no abdominal tenderness     Musculoskeletal: Right lower leg: No edema  Left lower leg: No edema  Lymphadenopathy:      Cervical: No cervical adenopathy  Skin:     Coloration: Skin is not cyanotic or pale  Findings: No ecchymosis  Neurological:      Mental Status: She is alert and oriented to person, place, and time  Psychiatric:         Mood and Affect: Mood normal  Mood is not anxious  Behavior: Behavior normal  Behavior is not agitated  Lab Results: I have personally reviewed pertinent lab results  Imaging Studies: I have personally reviewed pertinent reports  EKG, Pathology, and Other Studies: I have personally reviewed pertinent reports  VTE Prophylaxis: Fondaparinux (Arixtra) and Enoxaparin (Lovenox)    Code Status: Level 1 - Full Code  Advance Directive and Living Will:      Power of :    POLST:      Counseling/Coordination of Care: Total floor / unit time spent today 45 minutes  Greater than 50% of total time was spent with the patient and / or family counseling and / or coordination of care  A description of the counseling / coordination of care: COPD sleep apnea, acute on chronic respiratory failure     Thank you for allowing me to participate in the care of the patient

## 2020-09-03 NOTE — ED PROVIDER NOTES
History  Chief Complaint   Patient presents with    Shortness of Breath     pt presents to ER with resp distress from EMS given 125 solumedrol and  25 terbutaline put on CPAP prior to arrival  Pt known COPD'er     54-year-old female history of hypertension COPD coronary artery disease glaucoma brought in by EMS secondary to increased respiratory distress this morning  Patient was found at her home and monitor severe respiratory distress pulse ox was low diaphoretic despite her home O2  Patient is placed on CPAP patient received terbutaline IM as well as Solu-Medrol IV patient did improve clinically with this on arrival she is still having some respiratory distress but was mild in nature  Patient states that the she has been having a cough and she has had some tightness in her chest   Patient denies any nausea or vomiting  Patient claims compliance with her home medications  Prior to Admission Medications   Prescriptions Last Dose Informant Patient Reported? Taking?    Ascorbic Acid (vitamin C) 100 MG tablet   Yes Yes   Sig: Take 100 mg by mouth daily   Multiple Vitamins-Minerals (AIR[place]ORNE GUMMIES PO)   Yes Yes   Sig: Take by mouth   Omega-3 Fatty Acids (fish oil) 1,000 mg   Yes Yes   Sig: Take 1,000 mg by mouth daily   amLODIPine (NORVASC) 5 mg tablet   No Yes   Sig: TAKE 1 TABLET BY MOUTH EVERY DAY   aspirin 81 mg chewable tablet   Yes Yes   Sig: Chew 81 mg daily   cholecalciferol (VITAMIN D3) 400 units tablet   Yes Yes   Sig: Take 400 Units by mouth daily   citalopram (CeleXA) 10 mg tablet   Yes Yes   Sig: TK 1 T PO D   clopidogrel (PLAVIX) 75 mg tablet   Yes Yes   Sig: TK 1 T PO D   co-enzyme Q-10 30 MG capsule   Yes Yes   Sig: Take 30 mg by mouth 3 (three) times a day   formoterol (Perforomist) 20 MCG/2ML nebulizer solution   No Yes   Sig: Take 1 vial (20 mcg total) by nebulization 2 (two) times a day icd 10 code J44 9   ipratropium-albuterol (DUO-NEB) 0 5-2 5 mg/3 mL nebulizer solution   No Yes Sig: Take 1 vial (3 mL total) by nebulization every 6 (six) hours as needed for wheezing or shortness of breath ICD 10 J44 9   rosuvastatin (CRESTOR) 20 MG tablet   Yes Yes   Sig: TK 1 T PO QHS   timolol (TIMOPTIC) 0 5 % ophthalmic solution   Yes No   Sig: INT 1 GTT IN OU BID      Facility-Administered Medications: None       Past Medical History:   Diagnosis Date    Chronic pain     COPD (chronic obstructive pulmonary disease) (HCC)     Hyperlipidemia     Hypertension     Lung cancer ([facility] )     JULIO on CPAP     PAD (peripheral artery disease) ([facility] )     Leg       Past Surgical History:   Procedure Laterality Date    APPENDECTOMY      COLONOSCOPY  2018    repeat 2023    HYSTERECTOMY      LUNG CANCER SURGERY Left     wedge reseection, Dr Jay Ayon       Family History   Problem Relation Age of Onset    Colon cancer Father     Heart disease Sister     Lung cancer Daughter      I have reviewed and agree with the history as documented  E-Cigarette/Vaping    E-Cigarette Use Never User      E-Cigarette/Vaping Substances     Social History     Tobacco Use    Smoking status: Former Smoker     Packs/day: 1 50     Years: 35 00     Pack years: 52 50    Smokeless tobacco: Never Used   Substance Use Topics    Alcohol use: Not Currently    Drug use: Not Currently       Review of Systems   Constitutional: Positive for diaphoresis  Negative for chills and fever  HENT: Negative for congestion  Eyes: Negative for visual disturbance  Respiratory: Positive for chest tightness and shortness of breath  Cardiovascular: Negative for chest pain  Gastrointestinal: Positive for nausea  Negative for abdominal pain  Endocrine: Negative for cold intolerance  Genitourinary: Negative for frequency  Musculoskeletal: Negative for gait problem  Skin: Negative for rash  Neurological: Positive for weakness  Negative for dizziness  Psychiatric/Behavioral: Negative for behavioral problems and confusion  Physical Exam  Physical Exam  Vitals signs and nursing note reviewed  Constitutional:       Appearance: She is well-developed  She is diaphoretic  HENT:      Head: Normocephalic and atraumatic  Eyes:      Conjunctiva/sclera: Conjunctivae normal       Pupils: Pupils are equal, round, and reactive to light  Neck:      Musculoskeletal: Normal range of motion and neck supple  Cardiovascular:      Rate and Rhythm: Normal rate and regular rhythm  Heart sounds: Normal heart sounds  Pulmonary:      Effort: Pulmonary effort is normal  Tachypnea present  Breath sounds: Examination of the right-lower field reveals rales  Examination of the left-lower field reveals rales  Rales present  Abdominal:      General: Bowel sounds are normal       Palpations: Abdomen is soft  Musculoskeletal: Normal range of motion  Skin:     General: Skin is warm  Capillary Refill: Capillary refill takes less than 2 seconds  Neurological:      Mental Status: She is alert and oriented to person, place, and time     Psychiatric:         Behavior: Behavior normal          Vital Signs  ED Triage Vitals   Temperature Pulse Respirations Blood Pressure SpO2   09/03/20 1109 09/03/20 1045 09/03/20 1045 09/03/20 1045 09/03/20 1045   97 9 °F (36 6 °C) 72 (!) 24 (!) 172/74 93 %      Temp Source Heart Rate Source Patient Position - Orthostatic VS BP Location FiO2 (%)   09/03/20 1109 09/03/20 1045 09/03/20 1045 09/03/20 1045 --   Tympanic Monitor Lying Right arm       Pain Score       --                  Vitals:    09/03/20 1045 09/03/20 1131 09/03/20 1233 09/03/20 1350   BP: (!) 172/74 (!) 169/113 (!) 190/62 158/62   Pulse: 72 70 66 78   Patient Position - Orthostatic VS: Lying Lying Lying Lying         Visual Acuity      ED Medications  Medications   ipratropium-albuterol (DUO-NEB) 0 5-2 5 mg/3 mL inhalation solution 3 mL (3 mL Nebulization Given 9/3/20 1353)   terbutaline (FOR EMS ONLY) (BRETHINE) injection 1 each (0 each Does not apply Given to EMS 9/3/20 1106)   methylPREDNISolone sodium succinate (FOR EMS ONLY) (Solu-MEDROL) 125 MG injection 125 mg (0 mg Does not apply Given to EMS 9/3/20 1045)   ipratropium-albuterol (DUO-NEB) 0 5-2 5 mg/3 mL inhalation solution **ADS Override Pull** (3 mL  Given 9/3/20 1057)   furosemide (LASIX) injection 20 mg (20 mg Intravenous Given 9/3/20 1125)   nitroglycerin (NITRO-BID) 2 % TD ointment 0 5 inch (0 5 inches Topical Given 9/3/20 1125)   iodixanol (VISIPAQUE) 320 MG/ML injection 100 mL (100 mL Intravenous Given 9/3/20 1329)       Diagnostic Studies  Results Reviewed     Procedure Component Value Units Date/Time    D-dimer, quantitative [[id number]]  (Abnormal) Collected:  09/03/20 1309    Lab Status:  Final result Specimen:  Blood from Arm, Left Updated:  09/03/20 1352     D-Dimer, Quant  0 55 mg/L U     Comprehensive metabolic panel [[id number]]  (Abnormal) Collected:  09/03/20 1201    Lab Status:  Final result Specimen:  Blood from Arm, Right Updated:  09/03/20 1223     Sodium 142 mmol/L      Potassium 4 8 mmol/L      Chloride 102 mmol/L      CO2 33 mmol/L      ANION GAP 7 mmol/L      BUN 14 mg/dL      Creatinine 0 74 mg/dL      Glucose 146 mg/dL      Calcium 9 9 mg/dL      AST 17 U/L      ALT 16 U/L      Alkaline Phosphatase 42 4 U/L      Total Protein 7 6 g/dL      Albumin 4 4 g/dL      Total Bilirubin 0 54 mg/dL      eGFR 76 ml/min/1 73sq m     Narrative:       [place] guidelines for Chronic Kidney Disease (CKD):     Stage 1 with normal or high GFR (GFR > 90 mL/min/1 73 square meters)    Stage 2 Mild CKD (GFR = 60-89 mL/min/1 73 square meters)    Stage 3A Moderate CKD (GFR = 45-59 mL/min/1 73 square meters)    Stage 3B Moderate CKD (GFR = 30-44 mL/min/1 73 square meters)    Stage 4 Severe CKD (GFR = 15-29 mL/min/1 73 square meters)    Stage 5 End Stage CKD (GFR <15 mL/min/1 73 square meters)  Note: GFR calculation is accurate only with a steady state creatinine    B-Type Natriuretic Peptide [facility] ONLY) [[id number]]  (Abnormal) Collected:  09/03/20 1058    Lab Status:  Final result Specimen:  Blood from Arm, Right Updated:  09/03/20 1149      6 pg/mL     Troponin I [[id number]]  (Normal) Collected:  09/03/20 1058    Lab Status:  Final result Specimen:  Blood from Arm, Right Updated:  09/03/20 1129     Troponin I <0 03 ng/mL     CBC and differential [[id number]]  (Abnormal) Collected:  09/03/20 1058    Lab Status:  Final result Specimen:  Blood from Arm, Right Updated:  09/03/20 1116     WBC 7 08 Thousand/uL      RBC 4 38 Million/uL      Hemoglobin 14 6 g/dL      Hematocrit 44 4 %       fL      MCH 33 3 pg      MCHC 32 9 g/dL      RDW 13 9 %      MPV 9 4 fL      Platelets 206 Thousands/uL      Neutrophils Relative 79 %      Immat GRANS % 0 %      Lymphocytes Relative 12 %      Monocytes Relative 7 %      Eosinophils Relative 2 %      Basophils Relative 0 %      Neutrophils Absolute 5 52 Thousands/µL      Immature Grans Absolute 0 02 Thousand/uL      Lymphocytes Absolute 0 86 Thousands/µL      Monocytes Absolute 0 52 Thousand/µL      Eosinophils Absolute 0 14 Thousand/µL      Basophils Absolute 0 02 Thousands/µL     POCT Blood Gas (CG8+) [[id number]]  (Abnormal) Collected:  09/03/20 1110    Lab Status:  Final result Specimen:  Arterial Updated:  09/03/20 1115     pH, Art i-STAT 7 270     pH, i-STAT Temp Corrected 7 275     pCO2, Art i-STAT 75 1 mm HG      pCO2, i-STAT TC 73 8 mm HG      pO2, ART i-STAT 374 0 mm HG      pO2, i-STAT  mm HG      BE, i-STAT 5 mmol/L      HCO3, Art i-STAT 34 4 mmol/L      CO2, i-STAT 37 mmol/L      O2 Sat, i-STAT 100 %      SODIUM, I-STAT 142 mmol/l      Potassium, i-STAT 4 4 mmol/L      Calcium, Ionized i-STAT 1 36 mmol/L      Hct, i-STAT 40 %      Hgb, i-STAT 13 6 g/dl      Glucose, i-STAT 153 mg/dl      POC FIO2 75 L      Specimen Type ARTERIAL                 CTA ED chest PE Study   Final Result by Real Padilla MD (09/03 1342)      Moderate emphysema  Postoperative changes  Workstation performed: [id number]         XR chest 1 view portable   ED Interpretation by Desirae Méndez MD (09/03 1117)   Bilaterale pulmonary interstitial infiltrates  Cardiomegaly  Final Result by Philomena Lawson DO (09/03 1238)      Mild left apical scarring  Workstation performed: [id number]         VAS lower limb venous duplex study, unilateral/limited    (Results Pending)              Procedures  Procedures         ED Course       US AUDIT      Most Recent Value   Initial Alcohol Screen: US AUDIT-C    1  How often do you have a drink containing alcohol?  0 Filed at: 09/03/2020 1047   2  How many drinks containing alcohol do you have on a typical day you are drinking? 0 Filed at: 09/03/2020 1047   3a  Male UNDER 65: How often do you have five or more drinks on one occasion? 0 Filed at: 09/03/2020 1047   3b  FEMALE Any Age, or MALE 65+: How often do you have 4 or more drinks on one occassion? 0 Filed at: 09/03/2020 1047   Audit-C Score  0 Filed at: 09/03/2020 1047                  LEIF/DAST-10      Most Recent Value   How many times in the past year have you    Used an illegal drug or used a prescription medication for non-medical reasons?   Never Filed at: 09/03/2020 1048                    Wells' Criteria for PE      Most Recent Value   Wells' Criteria for PE   Clinical signs and symptoms of DVT     PE is primary diagnosis or equally likely  3 Filed at: 09/03/2020 1307   HR >100  1 5 Filed at: 09/03/2020 1307   Immobilization at least 3 days or Surgery in the previous 4 weeks  0 Filed at: 09/03/2020 1307   Previous, objectively diagnosed PE or DVT  0 Filed at: 09/03/2020 1307   Hemoptysis  0 Filed at: 09/03/2020 1307   Malignancy with treatment within 6 months or palliative  0 Filed at: 09/03/2020 1307   Wells' Criteria Total  4 5 Filed at: 09/03/2020 1307 MDM  Number of Diagnoses or Management Options  Congestive heart failure, unspecified HF chronicity, unspecified heart failure type ([place] [id number] ):   COPD exacerbation [facility]):   Diagnosis management comments: Patient is monitored in the emergency department workup demonstrated EKG no acute ST T wave abnormalities sinus rhythm patient did have chest x-ray which demonstrated interstitial changes possible early congestive heart failure patient's BNP was slightly elevated patient arrived on CPAP and when placed on room air on this her pulse ox dropped down into the 70 she became diaphoretic more short of breath issues placed onto BiPAP  10/575% of O2 and she responded well to this  Patient was treated with Lasix 20 mg IV she was also given nitro paste 1/2 inch anterior chest wall secondary to her hypertension associated with the symptoms and clinical presentation  Patient improved over the next hour so was able to be weaned down to 3 L nasal cannula  Patient has CTA of the chest no PE was noted    Plan will be to admit the patient to tele observation to the medical team for cardiac rule out as well as treatment of her COPD exasperation and monitoring for any further deterioration congestive heart failure with Cardiology consultation        Disposition  Final diagnoses:   COPD exacerbation ([place] [id number] )   Congestive heart failure, unspecified HF chronicity, unspecified heart failure type ([place] [id number] )     Time reflects when diagnosis was documented in both MDM as applicable and the Disposition within this note     Time User Action Codes Description Comment    9/3/2020  1:58 PM Pearletha Angelucci Add [J44 1] COPD exacerbation ([place] [id number] )     9/3/2020  1:59 PM Pearletha Angelucci Add [I50 9] Congestive heart failure, unspecified HF chronicity, unspecified heart failure type [facility])       ED Disposition     ED Disposition Condition Date/Time Comment    Admit Stable Thu Sep 3, 2020  1:58 PM Case was discussed with Hospitalist and the patient's admission status was agreed to be Admission Status: observation status to the service of Dr Aster Ordoñez   Follow-up Information    None         Patient's Medications   Discharge Prescriptions    No medications on file     No discharge procedures on file      PDMP Review     None          ED Provider  Electronically Signed by           Halie Collado MD  09/03/20 7694

## 2020-09-03 NOTE — PLAN OF CARE
Problem: Potential for Falls  Goal: Patient will remain free of falls  Description: INTERVENTIONS:  - Assess patient frequently for physical needs  -  Identify cognitive and physical deficits and behaviors that affect risk of falls  -  [place] fall precautions as indicated by assessment   - Educate patient/family on patient safety including physical limitations  - Instruct patient to call for assistance with activity based on assessment  - Modify environment to reduce risk of injury  - Consider OT/PT consult to assist with strengthening/mobility  Outcome: Progressing     Problem: PAIN - ADULT  Goal: Verbalizes/displays adequate comfort level or baseline comfort level  Description: Interventions:  - Encourage patient to monitor pain and request assistance  - Assess pain using appropriate pain scale  - Administer analgesics based on type and severity of pain and evaluate response  - Implement non-pharmacological measures as appropriate and evaluate response  - Consider cultural and social influences on pain and pain management  - Notify physician/advanced practitioner if interventions unsuccessful or patient reports new pain  Outcome: Progressing     Problem: SAFETY ADULT  Goal: Patient will remain free of falls  Description: INTERVENTIONS:  - Assess patient frequently for physical needs  -  Identify cognitive and physical deficits and behaviors that affect risk of falls    -  [place] fall precautions as indicated by assessment   - Educate patient/family on patient safety including physical limitations  - Instruct patient to call for assistance with activity based on assessment  - Modify environment to reduce risk of injury  - Consider OT/PT consult to assist with strengthening/mobility  Outcome: Progressing  Goal: Maintain or return to baseline ADL function  Description: INTERVENTIONS:  -  Assess patient's ability to carry out ADLs; assess patient's baseline for ADL function and identify physical deficits which impact ability to perform ADLs (bathing, care of mouth/teeth, toileting, grooming, dressing, etc )  - Assess/evaluate cause of self-care deficits   - Assess range of motion  - Assess patient's mobility; develop plan if impaired  - Assess patient's need for assistive devices and provide as appropriate  - Encourage maximum independence but intervene and supervise when necessary  - Involve family in performance of ADLs  - Assess for home care needs following discharge   - Consider OT consult to assist with ADL evaluation and planning for discharge  - Provide patient education as appropriate  Outcome: Progressing  Goal: Maintain or return mobility status to optimal level  Description: INTERVENTIONS:  - Assess patient's baseline mobility status (ambulation, transfers, stairs, etc )    - Identify cognitive and physical deficits and behaviors that affect mobility  - Identify mobility aids required to assist with transfers and/or ambulation (gait belt, sit-to-stand, lift, walker, cane, etc )  - [place] fall precautions as indicated by assessment  - Record patient progress and toleration of activity level on Mobility SBAR; progress patient to next Phase/Stage  - Instruct patient to call for assistance with activity based on assessment  - Consider rehabilitation consult to assist with strengthening/weightbearing, etc   Outcome: Progressing     Problem: DISCHARGE PLANNING  Goal: Discharge to home or other facility with appropriate resources  Description: INTERVENTIONS:  - Identify barriers to discharge w/patient and caregiver  - Arrange for needed discharge resources and transportation as appropriate  - Identify discharge learning needs (meds, wound care, etc )  - Arrange for interpretive services to assist at discharge as needed  - Refer to Case Management Department for coordinating discharge planning if the patient needs post-hospital services based on physician/advanced practitioner order or complex needs related to functional status, cognitive ability, or social support system  Outcome: Progressing     Problem: Knowledge Deficit  Goal: Patient/family/caregiver demonstrates understanding of disease process, treatment plan, medications, and discharge instructions  Description: Complete learning assessment and assess knowledge base    Interventions:  - Provide teaching at level of understanding  - Provide teaching via preferred learning methods  Outcome: Progressing     Problem: RESPIRATORY - ADULT  Goal: Achieves optimal ventilation and oxygenation  Description: INTERVENTIONS:  - Assess for changes in respiratory status  - Assess for changes in mentation and behavior  - Position to facilitate oxygenation and minimize respiratory effort  - Oxygen administered by appropriate delivery if ordered  - Initiate smoking cessation education as indicated  - Encourage broncho-pulmonary hygiene including cough, deep breathe, Incentive Spirometry  - Assess the need for suctioning and aspirate as needed  - Assess and instruct to report SOB or any respiratory difficulty  - Respiratory Therapy support as indicated  Outcome: Progressing

## 2020-09-03 NOTE — H&P
History & Physical - SOD      PATIENT INFORMATION      Mil San 79 y o  female MRN: [id number]  Unit/Bed#: -01 Encounter: [id number]  Admitting Physician: Kavya Wagner MD  PCP: Berta Bronson MD  Date of Admission:  09/03/20      ASSESSMENTS & PLAN       1  Acute on chronic hypoxic hypercapnic respiratory failure secondary to COPD exacerbation  · Presented with increased work of breathing, increased cough, sputum production and purulence  · Tachypneic, hypoxic on presentation, showing acute on chronic respiratory acidosis on ABG required BiPAP  · Continue BiPAP p r n -oxygen for saturation 88-92%  · Images reviewed  · PE ruled out-emphysematous changes on the CT scan  · Continue bronchodilator nebs  · Continue Solu-Medrol 60 b i d   · Empiric ceftriaxone as a stroke-because of increased purulence of sputum, worsening cough, SOB  · For sputum cultures, Gram stain  · Bedside PFT      2  Hypertensive urgency  · Contineu Amlodipine 5 mg QD &     3  Hyperlipidemia, hypertension, JULIO, PAD, Non-small Cell Lung cancer, CAD? · Continue Statin, Aspirin, Plavix, CPAP QHS  VTE Pharmacologic Prophylaxis: Enoxaparin (Lovenox)   VTE Mechanical Prophylaxis: SCD's      CHIEF COMPLAINT      SOB      HISTORY OF PRESENT ILLNESS      Patient is a 79-year-old pleasant female with past medical history of COPD on 3 L of oxygen at home, CAD, hypertension, hyperlipidemia obstructive sleep apnea on a CPAP machine present to the ER with a chief complaint of acute onset of shortness of breath found to be in resp distress by the EMS given 125 mg Solu-Medrol with terbutaline through  the CPAP prior to presentation  Patient reported worsening cough frequency with increased sputum production and purulence  Associated wheezing sounds from her chest   Associated diaphoresis despite increasing her oxygen  Patient was put on BiPAP in the ER with significant improvement      Denies any chest pain, palpitation, lower extremity swelling, syncope, fever, chills, nausea, vomiting, dizziness, lightheadedness, headache, blurry vision, bowel habits changes/urinary symptoms  Upon presentation to the ER patient was saturating a higher 80s , blood pressure 172/74  Respiratory rate of 24 per minute  EKG: Sinus rhythm  Review of patient blood work shows initial ABG consistant with respiratory acidosis with pCO2 of 75 unknown baseline status  Patient was put on BiPAP  ABG for about 2 hours with repeat ABG shows pH of 7 34 and pCO2 of 58  Patient reports significant improvement in her breathing status after using the BiPAP  Patient had normal electrolytes but noted to have chronic elevated bicarbonate 33 likely due to chronic hypercapnic respiratory failure  Normal kidney function  BNP of 269- neg troponin  Normal CBC  D-dimer of 550 was negative CT angio for any PE but advanced emphysematous changes  Patient was given Solu-Medrol, bronchodilator nebs in the ER  REVIEW OF SYSTEMS      ROS: A complete 12 point ROS is negative other than that noted in the HPI      PAST MEDICAL & SURGICAL HISTORY      Past Medical History:   Diagnosis Date    Chronic pain     COPD (chronic obstructive pulmonary disease) (HCC)     Hyperlipidemia     Hypertension     Lung cancer ([facility] [address] )     JULIO on CPAP     PAD (peripheral artery disease) (HCC)     Leg       Past Surgical History:   Procedure Laterality Date    APPENDECTOMY      COLONOSCOPY  2018    repeat 2023    HYSTERECTOMY      LUNG CANCER SURGERY Left     wedge reseection, Dr Jaxon Guevara     Prior to Admission medications    Medication Sig Start Date End Date Taking?  Authorizing Provider   amLODIPine (NORVASC) 5 mg tablet TAKE 1 TABLET BY MOUTH EVERY DAY 6/29/20  Yes Vicenta Walsh MD   Ascorbic Acid (vitamin C) 100 MG tablet Take 100 mg by mouth daily   Yes Historical Provider, MD   aspirin 81 mg chewable tablet Chew 81 mg daily   Yes Historical Provider, MD   cholecalciferol (VITAMIN D3) 400 units tablet Take 400 Units by mouth daily   Yes Historical Provider, MD   citalopram (CeleXA) 10 mg tablet TK 1 T PO D 5/22/20  Yes Historical Provider, MD   clopidogrel (PLAVIX) 75 mg tablet TK 1 T PO D 8/12/20  Yes Historical Provider, MD   co-enzyme Q-10 30 MG capsule Take 30 mg by mouth 3 (three) times a day   Yes Historical Provider, MD   formoterol (Perforomist) 20 MCG/2ML nebulizer solution Take 1 vial (20 mcg total) by nebulization 2 (two) times a day icd 10 code J44 9 4/1/20  Yes Saeid Hernadez MD   ipratropium-albuterol (DUO-NEB) 0 5-2 5 mg/3 mL nebulizer solution Take 1 vial (3 mL total) by nebulization every 6 (six) hours as needed for wheezing or shortness of breath ICD 10 J44 9 4/1/20  Yes Saeid Hernadez MD   Multiple Vitamins-Minerals (AIRBORNE GUMMIES PO) Take by mouth   Yes Historical Provider, MD   Omega-3 Fatty Acids (fish oil) 1,000 mg Take 1,000 mg by mouth daily   Yes Historical Provider, MD   rosuvastatin (CRESTOR) 20 MG tablet TK 1 T PO QHS 5/22/20  Yes Historical Provider, MD   timolol (TIMOPTIC) 0 5 % ophthalmic solution INT 1 GTT IN OU BID 6/24/20   Historical Provider, MD   budesonide (PULMICORT) 0 5 mg/2 mL nebulizer solution Take 1 vial (0 5 mg total) by nebulization 2 (two) times a day j44 9 4/1/20 9/3/20  Saeid Hernadez MD         Allergies:    Allergies   Allergen Reactions    Baclofen     Lisinopril     Losartan     Naproxen     Zinc Acetate          SOCIAL HISTORY      Substance Use History:   Social History     Substance and Sexual Activity   Alcohol Use Not Currently     Social History     Tobacco Use   Smoking Status Former Smoker    Packs/day: 1 50    Years: 35 00    Pack years: 52 50   Smokeless Tobacco Never Used     Social History     Substance and Sexual Activity   Drug Use Not Currently         FAMILY HISTORY      Family History   Problem Relation Age of Onset    Colon cancer Father     Heart disease Sister     Lung cancer Daughter        PHYSICAL EXAM      Vitals:   Blood Pressure: 158/62 (09/03/20 1350)  Pulse: 78 (09/03/20 1350)  Temperature: 97 9 °F (36 6 °C) (09/03/20 1109)  Temp Source: Tympanic (09/03/20 1109)  Respirations: (!) 26 (09/03/20 1350)  SpO2: 94 % (09/03/20 1350)      GENERAL: NAD  HEENT:  NC/AT, PERRL, EOMI, MMM, no scleral icterus  CARDIAC:  RRR, +S1/S2, no m/g/r, no S3/S4 heard  PULMONARY:  Decreased air movement bilaterally  Scattered wheezes  ABDOMEN:  Soft, NT/ND, +BS, no rebound/guarding/rigidity  Extremities:  2+ Pulses in DP/PT  No edema, cyanosis, or clubbing  NEUROLOGIC:  Alert/oriented x3  No motor or sensory deficits  SKIN:  No rashes or erythema      ADDITIONAL DATA     Lab Results:     Results from last 7 days   Lab Units 09/03/20  1351  09/03/20  1058   WBC Thousand/uL  --   --  7 08   HEMOGLOBIN g/dL  --   --  14 6   I STAT HEMOGLOBIN g/dl 14 3   < >  --    HEMATOCRIT %  --   --  44 4   HEMATOCRIT, ISTAT % 42   < >  --    PLATELETS Thousands/uL  --   --  209   NEUTROS PCT %  --   --  79*   LYMPHS PCT %  --   --  12*   MONOS PCT %  --   --  7   EOS PCT %  --   --  2    < > = values in this interval not displayed  Results from last 7 days   Lab Units 09/03/20  1351 09/03/20  1201   POTASSIUM mmol/L  --  4 8   CHLORIDE mmol/L  --  102   CO2 mmol/L  --  33   CO2, I-STAT mmol/L 33*  --    BUN mg/dL  --  14   CREATININE mg/dL  --  0 74   CALCIUM mg/dL  --  9 9   ALK PHOS U/L  --  42 4   ALT U/L  --  16   AST U/L  --  17   GLUCOSE, ISTAT mg/dl 182*  --            Imaging:     Xr Chest 1 View Portable    Result Date: 9/3/2020  Narrative: CHEST INDICATION:   dyspnea  COMPARISON:  4/28/2011 EXAM PERFORMED/VIEWS:  XR CHEST PORTABLE FINDINGS: Stable calcification of the aortic arch  No effusions  No congestive failure  No pneumothorax  Mild scarring within the left apex with improved airspace disease compared to prior remote x-ray  Osseous structures appear within normal limits for patient age  Impression: Mild left apical scarring  Workstation performed: [id number]     Cta Ed Chest Pe Study    Result Date: 9/3/2020  Narrative: CTA - CHEST WITH IV CONTRAST - PULMONARY ANGIOGRAM INDICATION:   PE suspected, intermediate prob, neg D-dimer  COMPARISON: Head CT June 24, 2011 TECHNIQUE: CTA examination of the chest was performed using angiographic technique according to a protocol specifically tailored to evaluate for pulmonary embolism  Axial, sagittal, and coronal 2D reformatted images were created from the source data and  submitted for interpretation  In addition, coronal 3D MIP postprocessing was performed on the acquisition scanner  Radiation dose length product (DLP) for this visit:  424 7 mGy-cm   This examination, like all CT scans performed in the [facility], was performed utilizing techniques to minimize radiation dose exposure, including the use of iterative reconstruction and automated exposure control  IV Contrast:  100 mL of iodixanol (VISIPAQUE)  FINDINGS: PULMONARY ARTERIAL TREE:  No pulmonary embolus is seen  LUNGS:  Moderate centrilobular emphysema  Status post wedge resection of a left upper lobe pulmonary nodule  No endobronchial lesion  Mural defect in the anterior and left lateral tracheal wall likely mucus given its appearance  PLEURA:  Unremarkable  HEART/GREAT VESSELS:  Atherosclerotic changes are noted in thoracic aorta and coronary arteries  MEDIASTINUM AND DREA:  Unremarkable  CHEST WALL AND LOWER NECK:   Unremarkable  VISUALIZED STRUCTURES IN THE UPPER ABDOMEN:  Unremarkable  OSSEOUS STRUCTURES:  No acute fracture or destructive osseous lesion  Impression: Moderate emphysema  Postoperative changes  Workstation performed: [id number]       EKG, Pathology, and Other Studies Reviewed on Admission:   · EKG: Reviewed      Code Status: Level 1 - Full Code  Admission Status: LINA Robison    Internal Medicine PGY-3  9/3/2020 2:57 PM      Total Time for Visit, including Counseling / Coordination of Care: 1 hour total time spent admitting patient  Greater than 50% of this total time spent on direct patient counseling and coordination of care     ** Please Note: This note is constructed using a voice recognition dictation system   **

## 2020-09-04 VITALS
TEMPERATURE: 96.7 F | BODY MASS INDEX: 34.11 KG/M2 | SYSTOLIC BLOOD PRESSURE: 177 MMHG | HEART RATE: 79 BPM | HEIGHT: 62 IN | DIASTOLIC BLOOD PRESSURE: 77 MMHG | WEIGHT: 185.39 LBS | RESPIRATION RATE: 20 BRPM | OXYGEN SATURATION: 92 %

## 2020-09-04 LAB
ANION GAP SERPL CALCULATED.3IONS-SCNC: 9 MMOL/L (ref 4–13)
ATRIAL RATE: 65 BPM
ATRIAL RATE: 67 BPM
BUN SERPL-MCNC: 24 MG/DL (ref 6–20)
CALCIUM SERPL-MCNC: 9.3 MG/DL (ref 8.4–10.2)
CHLORIDE SERPL-SCNC: 99 MMOL/L (ref 96–108)
CO2 SERPL-SCNC: 33 MMOL/L (ref 22–33)
CREAT SERPL-MCNC: 0.8 MG/DL (ref 0.4–1.1)
ERYTHROCYTE [DISTWIDTH] IN BLOOD BY AUTOMATED COUNT: 13.5 % (ref 11.6–15.1)
GFR SERPL CREATININE-BSD FRML MDRD: 69 ML/MIN/1.73SQ M
GLUCOSE P FAST SERPL-MCNC: 203 MG/DL (ref 70–100)
GLUCOSE SERPL-MCNC: 203 MG/DL (ref 65–140)
HCT VFR BLD AUTO: 40.7 % (ref 34.8–46.1)
HGB BLD-MCNC: 12.9 G/DL (ref 11.5–15.4)
MCH RBC QN AUTO: 32.1 PG (ref 26.8–34.3)
MCHC RBC AUTO-ENTMCNC: 31.7 G/DL (ref 31.4–37.4)
MCV RBC AUTO: 101 FL (ref 82–98)
P AXIS: 24 DEGREES
P AXIS: 51 DEGREES
PLATELET # BLD AUTO: 209 THOUSANDS/UL (ref 149–390)
PMV BLD AUTO: 9.9 FL (ref 8.9–12.7)
POTASSIUM SERPL-SCNC: 4.6 MMOL/L (ref 3.5–5)
PR INTERVAL: 170 MS
PR INTERVAL: 182 MS
PROCALCITONIN SERPL-MCNC: <0.05 NG/ML
QRS AXIS: 49 DEGREES
QRS AXIS: 79 DEGREES
QRSD INTERVAL: 101 MS
QRSD INTERVAL: 97 MS
QT INTERVAL: 409 MS
QT INTERVAL: 433 MS
QTC INTERVAL: 432 MS
QTC INTERVAL: 451 MS
RBC # BLD AUTO: 4.02 MILLION/UL (ref 3.81–5.12)
SODIUM SERPL-SCNC: 141 MMOL/L (ref 133–145)
T WAVE AXIS: 74 DEGREES
T WAVE AXIS: 89 DEGREES
VENTRICULAR RATE: 65 BPM
VENTRICULAR RATE: 67 BPM
WBC # BLD AUTO: 6.36 THOUSAND/UL (ref 4.31–10.16)

## 2020-09-04 PROCEDURE — 93005 ELECTROCARDIOGRAM TRACING: CPT

## 2020-09-04 PROCEDURE — 93010 ELECTROCARDIOGRAM REPORT: CPT | Performed by: INTERNAL MEDICINE

## 2020-09-04 PROCEDURE — 94640 AIRWAY INHALATION TREATMENT: CPT

## 2020-09-04 PROCEDURE — 84145 PROCALCITONIN (PCT): CPT | Performed by: INTERNAL MEDICINE

## 2020-09-04 PROCEDURE — 85027 COMPLETE CBC AUTOMATED: CPT | Performed by: INTERNAL MEDICINE

## 2020-09-04 PROCEDURE — 99226 PR SBSQ OBSERVATION CARE/DAY 35 MINUTES: CPT | Performed by: INTERNAL MEDICINE

## 2020-09-04 PROCEDURE — 94760 N-INVAS EAR/PLS OXIMETRY 1: CPT

## 2020-09-04 PROCEDURE — 94668 MNPJ CHEST WALL SBSQ: CPT

## 2020-09-04 PROCEDURE — 94664 DEMO&/EVAL PT USE INHALER: CPT

## 2020-09-04 PROCEDURE — 99232 SBSQ HOSP IP/OBS MODERATE 35: CPT | Performed by: INTERNAL MEDICINE

## 2020-09-04 PROCEDURE — 80048 BASIC METABOLIC PNL TOTAL CA: CPT | Performed by: INTERNAL MEDICINE

## 2020-09-04 PROCEDURE — 99217 PR OBSERVATION CARE DISCHARGE MANAGEMENT: CPT | Performed by: INTERNAL MEDICINE

## 2020-09-04 RX ORDER — AZITHROMYCIN 250 MG/1
TABLET, FILM COATED ORAL
Qty: 6 TABLET | Refills: 0 | Status: SHIPPED | OUTPATIENT
Start: 2020-09-04 | End: 2020-09-08

## 2020-09-04 RX ORDER — AZITHROMYCIN 500 MG/1
500 TABLET, FILM COATED ORAL EVERY 24 HOURS
Status: DISCONTINUED | OUTPATIENT
Start: 2020-09-04 | End: 2020-09-04 | Stop reason: HOSPADM

## 2020-09-04 RX ORDER — PREDNISONE 50 MG/1
50 TABLET ORAL DAILY
Qty: 5 TABLET | Refills: 0 | Status: SHIPPED | OUTPATIENT
Start: 2020-09-04 | End: 2020-09-30 | Stop reason: ALTCHOICE

## 2020-09-04 RX ORDER — POLYETHYLENE GLYCOL 3350 17 G/17G
17 POWDER, FOR SOLUTION ORAL DAILY
Qty: 14 EACH | Refills: 0 | Status: SHIPPED | OUTPATIENT
Start: 2020-09-05 | End: 2020-09-30 | Stop reason: ALTCHOICE

## 2020-09-04 RX ADMIN — IPRATROPIUM BROMIDE 0.5 MG: 0.5 SOLUTION RESPIRATORY (INHALATION) at 08:19

## 2020-09-04 RX ADMIN — LEVALBUTEROL HYDROCHLORIDE 1.25 MG: 1.25 SOLUTION, CONCENTRATE RESPIRATORY (INHALATION) at 08:19

## 2020-09-04 RX ADMIN — LEVALBUTEROL HYDROCHLORIDE 1.25 MG: 1.25 SOLUTION, CONCENTRATE RESPIRATORY (INHALATION) at 13:50

## 2020-09-04 RX ADMIN — IPRATROPIUM BROMIDE 0.5 MG: 0.5 SOLUTION RESPIRATORY (INHALATION) at 02:29

## 2020-09-04 RX ADMIN — LEVALBUTEROL HYDROCHLORIDE 1.25 MG: 1.25 SOLUTION, CONCENTRATE RESPIRATORY (INHALATION) at 02:29

## 2020-09-04 RX ADMIN — TIMOLOL MALEATE 1 DROP: 5 SOLUTION OPHTHALMIC at 09:04

## 2020-09-04 RX ADMIN — AMLODIPINE BESYLATE 5 MG: 5 TABLET ORAL at 08:58

## 2020-09-04 RX ADMIN — IPRATROPIUM BROMIDE 0.5 MG: 0.5 SOLUTION RESPIRATORY (INHALATION) at 13:50

## 2020-09-04 RX ADMIN — ASPIRIN 81 MG 81 MG: 81 TABLET ORAL at 08:59

## 2020-09-04 RX ADMIN — METHYLPREDNISOLONE SODIUM SUCCINATE 60 MG: 125 INJECTION, POWDER, FOR SOLUTION INTRAMUSCULAR; INTRAVENOUS at 08:58

## 2020-09-04 RX ADMIN — ENOXAPARIN SODIUM 40 MG: 40 INJECTION SUBCUTANEOUS at 08:59

## 2020-09-04 RX ADMIN — CLOPIDOGREL BISULFATE 75 MG: 75 TABLET ORAL at 08:58

## 2020-09-04 NOTE — ASSESSMENT & PLAN NOTE
· Presented with increased work of breathing, increased cough, sputum production and purulence  · Tachypneic, hypoxic on presentation, showing acute on chronic respiratory acidosis on ABG required BiPAP  · Continue BiPAP p r n -oxygen for saturation 88-92%  Curretly off BIPAP only 3l NC  · PE ruled out-emphysematous changes on the CT scan  · Continue bronchodilator nebs  · Continue Solu-Medrol 60 b i d   · Empiric ceftriaxone/azithromycin because of increased purulence of sputum, worsening cough, SOB  · Follow sputum cultures, Gram stain  · Follow procalcitonin  · Bedside PFT

## 2020-09-04 NOTE — NURSING NOTE
AVS and medication instructions reviewed in detail with pt and her daughter, both verbalized understanding  Tele box, IV access removed  No vaccine given at discharge  COPD/CHF discharge education given, pt and daughter verbalized understanding  Pt left unit via wheelchair with RN and daughter

## 2020-09-04 NOTE — PROGRESS NOTES
Progress Note - Pulmonary   Daivd Grills 79 y o  female MRN: [id number]  Unit/Bed#: -01 Encounter: [id number]    Assessment:  1  Acute on chronic hypoxic and hypercarbic respiratory failure:  Currently improved  Continue oxygen supplementation at 3 liters/minute  Use BiPAP p r n  Rosa Alderson Change to nasal pillows if available  2   Acute exacerbation of COPD likely precipitated by acute tracheobronchitis:  Currently doing well  Start tapering steroid  Continue nebulized bronchodilators  Discontinue antibiotics see final cultures are negative  3   Obstructive sleep apnea:  Continue CPAP therapy  Patient went to get her own machine and nasal pillows from home to use while in the hospital     Plan:  As above  Continue DVT prophylaxis with Lovenox  Patient advised to follow up in my pulmonary office after discharge  Card given  Discussed with the hospitalist team     Thank you for allowing me to participate in the care of the patient  Chief Complaint:   Shortness of breath; worsening respiratory failure    Subjective:   Shortness of breath has improved significantly  No significant cough or phlegm  Did not sleep well sleep disturbed  Was tried on CPAP with a fullface mask  Could not tolerate because of claustrophobia she uses nasal pillows at home  No fever or chills    Objective:     Vitals: Blood pressure 140/62, pulse 63, temperature (!) 96 6 °F (35 9 °C), temperature source Tympanic, resp  rate 20, height 5' 2" (1 575 m), weight 84 1 kg (185 lb 6 3 oz), SpO2 91 %  ,Body mass index is 33 91 kg/m²        Intake/Output Summary (Last 24 hours) at 9/4/2020 1015  Last data filed at 9/3/2020 2150  Gross per 24 hour   Intake 240 ml   Output 300 ml   Net -60 ml       Invasive Devices     Peripheral Intravenous Line            Peripheral IV 09/03/20 Left;Ventral (anterior) Forearm less than 1 day    Peripheral IV 09/03/20 Right Antecubital less than 1 day                Physical Exam:  On clinical examination, the patient is hemodynamically stable and afebrile  Comfortable on nasal can oxygen supplementation  Not in any distress  Obese  HEENT:  No conjunctival pallor no cyanosis  Neck:  No jugular venous distention  Heart:  S1-S2 heard  Chest:  Air entry present bilaterally  Occasional crackles at both lung bases posteriorly  No significant rhonchi  Abdomen:  Soft and nontender bowel sounds audible  Neuro:  Awake alert oriented  Extremities:  No clubbing no edema      Labs: Venous Doppler negative for DVT  No leukocytosis  Has elevated D-dimer  CT angiogram negative for PE  Imaging and other studies: I have personally reviewed pertinent reports

## 2020-09-04 NOTE — UTILIZATION REVIEW
Initial Clinical Review    Admission: Date/Time/Statement:   Admission Orders (From admission, onward)     Ordered        09/03/20 1400  Place in Observation (expected length of stay for this patient is less than two midnights)  Once                   Orders Placed This Encounter   Procedures    Place in Observation (expected length of stay for this patient is less than two midnights)     Standing Status:   Standing     Number of Occurrences:   1     Order Specific Question:   Admitting Physician     Answer:   Celeste Prather     Order Specific Question:   Level of Care     Answer:   Med Surg [16]     ED Arrival Information     Expected Arrival Acuity Means of Arrival Escorted By Service Admission Type    - 9/3/2020 10:41 [facility] EMS Hospitalist Emergency    Arrival Complaint    SOB        Chief Complaint   Patient presents with    Shortness of Breath     pt presents to ER with resp distress from EMS given 125 solumedrol and  25 terbutaline put on CPAP prior to arrival  Pt known COPD'er     Assessment/Plan: 67-year-old female history of hypertension COPD coronary artery disease glaucoma brought in by EMS secondary to increased respiratory distress this morning  Patient was found at her home and monitor severe respiratory distress pulse ox was low diaphoretic despite her home O2 uses 3 L NC at home   Patient is placed on CPAP patient received terbutaline IM as well as Solu-Medrol IV patient did improve clinically with this on arrival she is still having some respiratory distress but was mild in nature  Patient states that the she has been having a cough and she has had some tightness in her chest   Patient denies any nausea or vomiting    Patient claims compliance with her home medications            ED Triage Vitals   Temperature Pulse Respirations Blood Pressure SpO2   09/03/20 1109 09/03/20 1045 09/03/20 1045 09/03/20 1045 09/03/20 1045   97 9 °F (36 6 °C) 72 (!) 24 (!) 172/74 93 %      Temp Source Heart Rate Source Patient Position - Orthostatic VS BP Location FiO2 (%)   09/03/20 1109 09/03/20 1045 09/03/20 1045 09/03/20 1045 --   Tympanic Monitor Lying Right arm       Pain Score       09/03/20 1350       No Pain          Wt Readings from Last 1 Encounters:   09/04/20 84 1 kg (185 lb 6 3 oz)     Additional Vital Signs:   Pertinent Labs/Diagnostic Test Results:       Results from last 7 days   Lab Units 09/04/20  0619 09/03/20  1351 09/03/20  1110 09/03/20  1058   WBC Thousand/uL 6 36  --   --  7 08   HEMOGLOBIN g/dL 12 9  --   --  14 6   I STAT HEMOGLOBIN g/dl  --  14 3 13 6  --    HEMATOCRIT % 40 7  --   --  44 4   HEMATOCRIT, ISTAT %  --  42 40  --    PLATELETS Thousands/uL 209  --   --  209   NEUTROS ABS Thousands/µL  --   --   --  5 52         Results from last 7 days   Lab Units 09/04/20  0619 09/03/20  1351 09/03/20  1201 09/03/20  1110   SODIUM mmol/L 141  --  142  --    POTASSIUM mmol/L 4 6  --  4 8  --    CHLORIDE mmol/L 99  --  102  --    CO2 mmol/L 33  --  33  --    CO2, I-STAT mmol/L  --  33*  --  37*   ANION GAP mmol/L 9  --  7  --    BUN mg/dL 24*  --  14  --    CREATININE mg/dL 0 80  --  0 74  --    EGFR ml/min/1 73sq m 69  --  76  --    CALCIUM mg/dL 9 3  --  9 9  --    CALCIUM, IONIZED, ISTAT mmol/L  --  1 32  --  1 36*     Results from last 7 days   Lab Units 09/03/20  1201   AST U/L 17   ALT U/L 16   ALK PHOS U/L 42 4   TOTAL PROTEIN g/dL 7 6   ALBUMIN g/dL 4 4   TOTAL BILIRUBIN mg/dL 0 54     Results from last 7 days   Lab Units 09/04/20  0619 09/03/20  1201   GLUCOSE RANDOM mg/dL 203* 146*     Results from last 7 days   Lab Units 09/03/20  1351 09/03/20  1110   I STAT BASE EXC mmol/L 4* 5*   I STAT O2 SAT % 90* 100*   ISTAT PH ART  7 338* 7 270*   I STAT ART PCO2 mm HG 58 3* 75 1*   I STAT ART PO2 mm HG 64 0* 374 0*   I STAT ART HCO3 mmol/L 31 4* 34 4*         Results from last 7 days   Lab Units 09/03/20  1058   TROPONIN I ng/mL <0 03     Results from last 7 days   Lab Units 09/03/20  1309   D-DIMER QUANTITATIVE mg/L FEU 0 55*     Results from last 7 days   Lab Units 09/03/20  1058   BNP pg/mL 216 6*     CTA chest PE study   09-03-20  Moderate emphysema  Postoperative changes  CXR 09-03-20  Mild left apical scarring       EKG  09-03-20  Sinus rhythm  Nonspecific T abnrm, anterolateral leads     ED Treatment:   Medication Administration from 09/03/2020 1040 to 09/03/2020 1455       Date/Time Order Dose Route Action     09/03/2020 1353 ipratropium-albuterol (DUO-NEB) 0 5-2 5 mg/3 mL inhalation solution 3 mL 3 mL Nebulization Given     09/03/2020 1057 ipratropium-albuterol (DUO-NEB) 0 5-2 5 mg/3 mL inhalation solution **ADS Override Pull** 3 mL  Given     09/03/2020 1125 furosemide (LASIX) injection 20 mg 20 mg Intravenous Given     09/03/2020 1125 nitroglycerin (NITRO-BID) 2 % TD ointment 0 5 inch 0 5 inch Topical Given     09/03/2020 1329 iodixanol (VISIPAQUE) 320 MG/ML injection 100 mL 100 mL Intravenous Given        Past Medical History:   Diagnosis Date    Chronic pain     [facility]     Hyperlipidemia     Hypertension     Lung cancer ([place] [address] )     JULIO on CPAP     PAD (peripheral artery disease) ([facility])     Leg     Present on Admission:   COPD exacerbation ([place] [address] )   Hypertensive Urgency      Admitting Diagnosis: SOB (shortness of breath) [R06 02]  COPD exacerbation ([facility]) [J44 1]  Congestive heart failure, unspecified HF chronicity, unspecified heart failure type ([place] [address] ) [I50 9]  Age/Sex: 79 y o  female  Admission Orders:  Scheduled Medications:  amLODIPine, 5 mg, Oral, Daily  aspirin, 81 mg, Oral, Daily  atorvastatin, 40 mg, Oral, Daily With Dinner  azithromycin, 500 mg, Intravenous, Q24H  cefTRIAXone, 1,000 mg, Intravenous, Q24H  clopidogrel, 75 mg, Oral, Daily  enoxaparin, 40 mg, Subcutaneous, Daily  ipratropium, 0 5 mg, Nebulization, Q6H  levalbuterol, 1 25 mg, Nebulization, Q6H  methylPREDNISolone sodium succinate, 60 mg, Intravenous, Q12H [facility]  polyethylene glycol, 17 g, Oral, Daily  timolol, 1 drop, Both Eyes, BID      Continuous IV Infusions:     PRN Meds:       IP CONSULT TO PULMONOLOGY    Network Utilization Review Department  [email or web address]  ATTENTION: Please call with any questions or concerns to [phone] and carefully listen to the prompts so that you are directed to the right person  All voicemails are confidential   Annie Aragon all requests for admission clinical reviews, approved or denied determinations and any other requests to dedicated fax number below belonging to the campus where the patient is receiving treatment   List of dedicated fax numbers for the Facilities:  [address] DENIALS (Administrative/Medical Necessity) [phone]   [address] (Maternity/NICU/Pediatrics) [phone]   David Valles [phone]   Jose Delay [phone]   Susie Sitter [phone]   Mikaela Sanfordnd [phone]   [address] [phone]   [facility]  [phone]   [address] [facility] [address]  [address] [facility] [address] [facility] [phone]

## 2020-09-04 NOTE — PROGRESS NOTES
Progress Note - Bhavesh Ibanez 1940, 79 y o  female MRN: [id number]    Unit/Bed#: -01 Encounter: [id number]    Primary Care Provider: Maricel Daniel MD   Date and time admitted to hospital: 9/3/2020 10:41 AM        * COPD exacerbation ([address] )  Assessment & Plan  · Presented with increased work of breathing, increased cough, sputum production and purulence  · Tachypneic, hypoxic on presentation, showing acute on chronic respiratory acidosis on ABG required BiPAP  · Continue BiPAP p r n -oxygen for saturation 88-92%  Curretly off BIPAP only 3l NC  · PE ruled out-emphysematous changes on the CT scan  · Continue bronchodilator nebs  · Continue Solu-Medrol 60 b i d   · Empiric ceftriaxone/azithromycin because of increased purulence of sputum, worsening cough, SOB  · Follow sputum cultures, Gram stain  · Follow procalcitonin  · Bedside PFT    JULIO on CPAP  Assessment & Plan  · Continue CPAP at night    Hypertensive Urgency  Assessment & Plan  · Initially patient presented with hypertensive urgency  ·  currently blood pressure is better controlled continue treatment with amlodipine 5 mg daily        VTE Pharmacologic Prophylaxis:   Pharmacologic: Enoxaparin (Lovenox)  Mechanical VTE Prophylaxis in Place: Yes    Current Length of Stay: 0 day(s)    Current Patient Status: Observation       Code Status: Level 1 - Full Code      Subjective:   Patient was seen and evaluated by me at bedside today  During this morning the patient was resting in bed  She did not seem in acute respiratory distress although she remains tachypneic  Patient reports improvement of the shortness of breath, she was breathing comfortably with 3 L nasal cannula with a saturation of 95% which is around patient's baseline  Denies any chest pain, fever, increasing cough or sputum production  She does not have any further complaints  The daughter will bring the CPAP machine from home  As per the nurse no overnight events    Objective: Vitals:   Temp (24hrs), Av 1 °F (36 7 °C), Min:97 9 °F (36 6 °C), Max:98 3 °F (36 8 °C)    Temp:  [97 9 °F (36 6 °C)-98 3 °F (36 8 °C)] 98 3 °F (36 8 °C)  HR:  [66-78] 74  Resp:  [22-30] 22  BP: (148-190)/() 148/75  SpO2:  [88 %-100 %] 95 %  Body mass index is 34 02 kg/m²  Input and Output Summary (last 24 hours): Intake/Output Summary (Last 24 hours) at 2020 0036  Last data filed at 9/3/2020 2150  Gross per 24 hour   Intake 240 ml   Output 300 ml   Net -60 ml       Physical Exam:     Physical Exam  Constitutional:       Appearance: Normal appearance  She is obese  HENT:      Head: Normocephalic and atraumatic  Eyes:      Pupils: Pupils are equal, round, and reactive to light  Neck:      Musculoskeletal: Normal range of motion  Cardiovascular:      Rate and Rhythm: Normal rate and regular rhythm  Pulses: Normal pulses  Heart sounds: Normal heart sounds  No murmur  No gallop  Pulmonary:      Effort: Pulmonary effort is normal       Breath sounds: Rhonchi present  Comments: Decreased air entry bilaterally, patient reminds tachypneic week a superficial respiration  Abdominal:      General: Abdomen is flat  Bowel sounds are normal       Palpations: Abdomen is soft  Tenderness: There is no abdominal tenderness  Musculoskeletal: Normal range of motion  Skin:     General: Skin is warm  Capillary Refill: Capillary refill takes less than 2 seconds  Coloration: Skin is not pale  Findings: No erythema or rash  Neurological:      General: No focal deficit present  Mental Status: She is alert and oriented to person, place, and time               Additional Data:      Labs:    Results from last 7 days   Lab Units 20  1351  20  1058   WBC Thousand/uL  --   --  7 08   HEMOGLOBIN g/dL  --   --  14 6   I STAT HEMOGLOBIN g/dl 14 3   < >  --    HEMATOCRIT %  --   --  44 4   HEMATOCRIT, ISTAT % 42   < >  --    PLATELETS Thousands/uL  --   -- 209   NEUTROS PCT %  --   --  79*   LYMPHS PCT %  --   --  12*   MONOS PCT %  --   --  7   EOS PCT %  --   --  2    < > = values in this interval not displayed  Results from last 7 days   Lab Units 09/03/20  1351 09/03/20  1201   POTASSIUM mmol/L  --  4 8   CHLORIDE mmol/L  --  102   CO2 mmol/L  --  33   CO2, I-STAT mmol/L 33*  --    BUN mg/dL  --  14   CREATININE mg/dL  --  0 74   CALCIUM mg/dL  --  9 9   ALK PHOS U/L  --  42 4   ALT U/L  --  16   AST U/L  --  17   GLUCOSE, ISTAT mg/dl 182*  --            * I Have Reviewed All Lab Data Listed Above  * Additional Pertinent Lab Tests Reviewed: Felix [address] Admission Reviewed    Imaging:    Recent Cultures (last 7 days):           Last 24 Hours Medication List:   Current Facility-Administered Medications   Medication Dose Route Frequency Provider Last Rate    amLODIPine  5 mg Oral Daily Bruce Gleason MD      aspirin  81 mg Oral Daily Bruce Gleason MD      atorvastatin  40 mg Oral Daily With Ester Stark MD      azithromycin  500 mg Intravenous Q24H Bruce Gleason  mg (09/03/20 1752)    cefTRIAXone  1,000 mg Intravenous Q24H Bruce Gleason MD 1,000 mg (09/03/20 1609)    clopidogrel  75 mg Oral Daily Bruce Gleason MD      enoxaparin  40 mg Subcutaneous Daily Bruce Gleason MD      ipratropium  0 5 mg Nebulization Q6H Saleem Echeverria MD      levalbuterol  1 25 mg Nebulization Q6H Saleem Echeverria MD      methylPREDNISolone sodium succinate  60 mg Intravenous Q12H [address] Bruce Gleason MD      polyethylene glycol  17 g Oral Daily Dominguez Peralta MD      timolol  1 drop Both Eyes BID Dominguez Peralta MD          Today, Patient Was Seen By: Cosme Quintana MD    ** Please Note: This note has been constructed using a voice recognition system   **

## 2020-09-04 NOTE — ASSESSMENT & PLAN NOTE
· Initially patient presented with hypertensive urgency  ·  currently blood pressure is better controlled continue treatment with amlodipine 5 mg daily

## 2020-09-04 NOTE — PLAN OF CARE
Problem: Potential for Falls  Goal: Patient will remain free of falls  Description: INTERVENTIONS:  - Assess patient frequently for physical needs  -  Identify cognitive and physical deficits and behaviors that affect risk of falls    -  [place] fall precautions as indicated by assessment   - Educate patient/family on patient safety including physical limitations  - Instruct patient to call for assistance with activity based on assessment  - Modify environment to reduce risk of injury  - Consider OT/PT consult to assist with strengthening/mobility  Outcome: Progressing   Call bell, bed alarm frequent rounding,   Problem: RESPIRATORY - ADULT  Goal: Achieves optimal ventilation and oxygenation  Description: INTERVENTIONS:  - Assess for changes in respiratory status  - Assess for changes in mentation and behavior  - Position to facilitate oxygenation and minimize respiratory effort  - Oxygen administered by appropriate delivery if ordered  - Initiate smoking cessation education as indicated  - Encourage broncho-pulmonary hygiene including cough, deep breathe, Incentive Spirometry  - Assess the need for suctioning and aspirate as needed  - Assess and instruct to report SOB or any respiratory difficulty  - Respiratory Therapy support as indicated  Outcome: Progressing   Oxygen, IV steriods,

## 2020-09-04 NOTE — PROGRESS NOTES
The azithromycin has been converted to Oral per [facility] IV-to-PO Auto-Conversion Protocol for Adults as approved by the Pharmacy and Therapeutics Committee  The patient met all eligible criteria:  3 Age = 25years old   2) Received at least one dose of the IV form   3) Receiving at least one other scheduled oral/enteral medication   4) Tolerating an oral/enteral diet   and did not have any exclusions:   1) Critical care patient   2) Active GI bleed (IF assessing H2RAs or PPIs)   3) Continuous tube feeding (IF assessing cipro, doxycycline, levofloxacin, minocycline, rifampin, or voriconazole)   4) Receiving PO vancomycin (IF assessing metronidazole)   5) Persistent nausea and/or vomiting   6) Ileus or gastrointestinal obstruction   7) Kenny/nasogastric tube set for continuous suction   8) Specific order not to automatically convert to PO (in the order's comments or if discussed in the most recent Infectious Disease or primary team's progress notes)

## 2020-09-04 NOTE — NURSING NOTE
Patient on 3L NC-SPO2 92%:  walked to bathroom in room and back to bed -SPO 90%  Walked to bathroom  And back to bed second time SPO@ 84%  Patient sat on edge of bed and did not appear to be in respiratory distress  After 30 seconds SPO2 began to climb and lauren to 90% after 130 seconds

## 2020-09-04 NOTE — SOCIAL WORK
LOS: 1 GMLOS: N/A    Pt is not a 30 day readmission or a bundle  Pt admitted under observation for COPD exacerbation  SW met with pt and her dtr to complete assessment  Pt confirms  Marlene Pan ([phone]) and dtr Merlin Nice ([phone]) are listed as emergency contacts  Pt and her  are both retired  They live in a [address] with 1st floor set-up  Bathroom has tub/shower with chair available  Pt owns a RW or SPC but does not use either for ambulation  Pt is independent with self-care tasks  She drives but dtr also assists as needed  Pt denies hx of in-home services, SNF admissions, MH dx, or substance use  Pt has hx of smoking but quit 30 years ago  Pt does have home O2 (3LNC) supplied through [address]  Portable tank available in room  Pt also has a CPAP at home supplied through [address]  PCP is Dr Vicenta Walsh  Pt states she has a Pulmonologist named Dr Kristy Beatty  Preferred pharmacy is [facility] [employer]  When medically cleared for discharge dtr to transport home  No questions or concerns from pt or dtr at this time  Pt is very eager for discharge

## 2020-09-05 NOTE — DISCHARGE SUMMARY
Discharge Summary - Flora Lozano 79 y o  female MRN: [id number]    Unit/Bed#: -01 Encounter: [id number]    Admission Date:   Admission Orders (From admission, onward)     Ordered        09/03/20 1400  Place in Observation (expected length of stay for this patient is less than two midnights)  Once                     Admitting Diagnosis: SOB (shortness of breath) [R06 02]  COPD exacerbation (HCC) [J44 1]  Congestive heart failure, unspecified HF chronicity, unspecified heart failure type ([address] ) [I50 9]    HPI:   Patient is a 65-year-old pleasant female with past medical history of COPD on 3 L of oxygen at home, CAD, hypertension, hyperlipidemia obstructive sleep apnea on a CPAP machine present to the ER with a chief complaint of acute onset of shortness of breath found to be in resp distress by the EMS given 125 mg Solu-Medrol with terbutaline through  the CPAP prior to presentation      Patient reported worsening cough frequency with increased sputum production and purulence  Associated wheezing sounds from her chest   Associated diaphoresis despite increasing her oxygen  Patient was put on BiPAP in the ER with significant improvement      Denies any chest pain, palpitation, lower extremity swelling, syncope, fever, chills, nausea, vomiting, dizziness, lightheadedness, headache, blurry vision, bowel habits changes/urinary symptoms      Upon presentation to the ER patient was saturating a higher 80s , blood pressure 172/74  Respiratory rate of 24 per minute  EKG: Sinus rhythm  Review of patient blood work shows initial ABG consistant with respiratory acidosis with pCO2 of 75 unknown baseline status  Patient was put on BiPAP  ABG for about 2 hours with repeat ABG shows pH of 7 34 and pCO2 of 58  Patient reports significant improvement in her breathing status after using the BiPAP    Patient had normal electrolytes but noted to have chronic elevated bicarbonate 33 likely due to chronic hypercapnic respiratory failure  Normal kidney function  BNP of 269- neg troponin  Normal CBC  D-dimer of 550 was negative CT angio for any PE but advanced emphysematous changes  Patient was given Solu-Medrol, bronchodilator nebs in the ER  Procedures Performed: No orders of the defined types were placed in this encounter  Summary of Hospital Course:   Patient was admitted for COPD exacerbation and treated with IV steroids  She was initially on BiPAP was taken off shortly after admission  She showed gradual improvement  Pulmonary service consulted  D-dimer slightly elevated  CT a negative for PE  Lower extremity duplex ultrasound also negative for DVT  Patient is given prednisone 50 mg once a day for 5 days and  azithromycin on discharge  Patient is advised to follow-up with her primary care physician as well as pulmonology as outpatient  Patient is stable for discharge    Significant Findings, Care, Treatment and Services Provided:  CTA, venous duplex ultrasound    Complications: none    Discharge Diagnosis:  Acute on chronic hypoxic hypercapnic respiratory failure, COPD exacerbation    Resolved Problems  Date Reviewed: 9/4/2020    None          Condition at Discharge: good         Discharge instructions/Information to patient and family:   See after visit summary for information provided to patient and family  Provisions for Follow-Up Care:  See after visit summary for information related to follow-up care and any pertinent home health orders  PCP: Mai Thorpe MD    Disposition: Home    Planned Readmission: No      Discharge Statement   I spent 45 minutes discharging the patient  This time was spent on the day of discharge  I had direct contact with the patient on the day of discharge  Additional documentation is required if more than 30 minutes were spent on discharge  Discharge Medications:  See after visit summary for reconciled discharge medications provided to patient and family

## 2020-09-23 ENCOUNTER — OFFICE VISIT (OUTPATIENT)
Dept: PULMONOLOGY | Facility: CLINIC | Age: 80
End: 2020-09-23
Payer: MEDICARE

## 2020-09-23 VITALS
SYSTOLIC BLOOD PRESSURE: 128 MMHG | TEMPERATURE: 97.8 F | OXYGEN SATURATION: 95 % | HEIGHT: 61 IN | DIASTOLIC BLOOD PRESSURE: 70 MMHG | HEART RATE: 63 BPM | WEIGHT: 187.13 LBS | BODY MASS INDEX: 35.33 KG/M2

## 2020-09-23 DIAGNOSIS — J44.9 CHRONIC OBSTRUCTIVE PULMONARY DISEASE, UNSPECIFIED COPD TYPE (HCC): Primary | ICD-10-CM

## 2020-09-23 DIAGNOSIS — I10 ESSENTIAL HYPERTENSION: ICD-10-CM

## 2020-09-23 DIAGNOSIS — G47.33 OSA (OBSTRUCTIVE SLEEP APNEA): ICD-10-CM

## 2020-09-23 DIAGNOSIS — Z23 INFLUENZA VACCINE NEEDED: ICD-10-CM

## 2020-09-23 DIAGNOSIS — C34.92 NON-SMALL CELL CANCER OF LEFT LUNG (HCC): ICD-10-CM

## 2020-09-23 DIAGNOSIS — J96.11 CHRONIC HYPOXEMIC RESPIRATORY FAILURE (HCC): ICD-10-CM

## 2020-09-23 PROCEDURE — G0008 ADMIN INFLUENZA VIRUS VAC: HCPCS | Performed by: INTERNAL MEDICINE

## 2020-09-23 PROCEDURE — 90662 IIV NO PRSV INCREASED AG IM: CPT | Performed by: INTERNAL MEDICINE

## 2020-09-23 PROCEDURE — 99214 OFFICE O/P EST MOD 30 MIN: CPT | Performed by: INTERNAL MEDICINE

## 2020-09-23 RX ORDER — BUDESONIDE 0.5 MG/2ML
0.5 INHALANT ORAL 2 TIMES DAILY
Qty: 60 VIAL | Refills: 0 | Status: SHIPPED | OUTPATIENT
Start: 2020-09-23 | End: 2022-03-22 | Stop reason: SDUPTHER

## 2020-09-23 RX ORDER — ALBUTEROL SULFATE 90 UG/1
AEROSOL, METERED RESPIRATORY (INHALATION)
COMMUNITY

## 2020-09-23 NOTE — ASSESSMENT & PLAN NOTE
She has obstructive sleep apnea and has been on CPAP therapy with oxygen supplementation at 3 L/m  She had a CPAP titration study in February 2016 which showed a CPAP pressure requirement of 9 cm and was using nasal pillows  She states that she is using the CPAP every night and that she is comfortable with the mask and pressure  She has no significant daytime sleepiness or morning headache  Her sleep is not disturbed  Her CPAP compliance records are excellent The [employer] is her DME  On clinical examination, she is obese    I have advised her to continue with CPAP therapy as before  I had a long discussion with her and her  and answered all their questions

## 2020-09-23 NOTE — LETTER
September 23, 2020     Coby [place], [address] [facility]    Patient: Jed Ceballos   YOB: 1940   Date of Visit: 9/23/2020       Dear Dr Apple Boyer: Thank you for referring Amanda Og to me for evaluation  Below are my notes for this consultation  If you have questions, please do not hesitate to call me  I look forward to following your patient along with you  Sincerely,        Antonio Lopez MD        CC: No Recipients  Antonio Lopez MD  9/23/2020 11:13 AM  Sign when Signing Visit  Assessment/Plan:    COPD (chronic obstructive pulmonary disease) [facility])  Mrs Amanda Og hasCOPD for more than 15 years and is currently on home oxygen therapy at 3 L/m for chronic hypoxemic respiratory failure  She has shortness of breath on exertion and her exercise tolerance is about 200 feet  She was on treatment with nebulized budesonide, albuterol and ipratropium  She was a smoker for a long time for about 35 years smoking 1-1/2 packs per day  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  She quit smoking 15 years back  On clinical examination she was saturating 90% on 5 L of oxygen  Her chest auscultation revealed bilateral basilar inspiratory coarse crackles  Her previous CT scan from 2015 showed bilateral extensive emphysematous changes  Her high-resolution CT scan of the chest showed bilateral emphysematous changes and mild bronchiectasis  Her PFT showed moderately severe airflow obstruction with severe uncorrected diffusion defect  she is currently doing well  She was admitted to [facility] recently with acute exacerbation from which she has recovered  I have advised her to continue on nebulized albuterol  and ipratropium  I have restarted her on budesonide nebulizer  I reminded her to gargle to help after using budesonide  I had a long discussion with her and I have answered all their questions        JULIO (obstructive sleep apnea)  She has obstructive sleep apnea and has been on CPAP therapy with oxygen supplementation at 3 L/m  She had a CPAP titration study in February 2016 which showed a CPAP pressure requirement of 9 cm and was using nasal pillows  She states that she is using the CPAP every night and that she is comfortable with the mask and pressure  She has no significant daytime sleepiness or morning headache  Her sleep is not disturbed  Her CPAP compliance records are excellent The [address] is her DME  On clinical examination, she is obese    I have advised her to continue with CPAP therapy as before  I had a long discussion with her and her  and answered all their questions  Chronic hypoxemic respiratory failure (HCC)  chronic hypoxemic respiratory failure  She has chronic hypoxemic respiratory failure  She is on continuous oxygen supplementation 3 LPM during rest and 4 LPM during exertion  She is with the [address]  She has a oxygen conserving device which she is not keen to use  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)  She was diagnosed with non-small cell lung cancer 7 years back and had underwent wedge resection by Dr Saundra Rubin  She also received chemotherapy  She has been remaining cancer free  She follows with Dr Katiuska Christine from oncology  HTN (hypertension)  She has history of hypertension and has been on treatment with amlodipine       Diagnoses and all orders for this visit:    Chronic obstructive pulmonary disease, unspecified COPD type ([facility] )  -     budesonide (PULMICORT) 0 5 mg/2 mL nebulizer solution; Take 1 vial (0 5 mg total) by nebulization 2 (two) times a day Rinse mouth after use      JULIO (obstructive sleep apnea)  -     Ambulatory referral to Pulmonology    Chronic hypoxemic respiratory failure (HCC)    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)    Essential hypertension    Influenza vaccine needed  -     influenza vaccine, high-dose, PF 0 7 mL (FLUZONE HIGH-DOSE)    Other orders  -     albuterol (ProAir HFA) 90 mcg/act inhaler; ProAir HFA 90 mcg/actuation aerosol inhaler          Subjective:      Patient ID: Yg Dumont is a 79 y o  female  Mrs Pascale Sanchez was admitted last month [facility] with acute exacerbation of her COPD and was treated with nebulized bronchodilators and IV Solu-Medrol  She has been subsequently discharged home on oxygen supplementation at 3 liters/minute during rest and 4 liters/minute during exertion  She was previously on oxygen supplementation  Currently her exercise tolerance is about 200 ft  She has occasional cough in the morning which is productive of whitish phlegm  No wheezing no chest pain or palpitations  No swelling of feet  She is not on any diuretic therapy  She has no hoarseness of voice or dysphagia  She uses nebulized albuterol and ipratropium 3 times a day  She does not use any other inhaler other than rescue albuterol which she has in hand  She has no fever or chills  She has obstructive sleep apnea and has been on CPAP therapy  She uses it every night and is comfortable with the mask and pressure she has no significant daytime sleepiness or morning headache  She is very motivated to continue on CPAP therapy  She has been getting the supplies regularly  I reviewed her CPAP compliance records and they are excellent  Her residual AHI is only 1 3  No significant amount of leak  She is obese and understands the need for weight reduction  She has history of hypertension and has been on treatment  She is on amlodipine  She had partial left lung resection for non-small cell lung cancer  She follows with Dr Sarath Cantu  The following portions of the patient's history were reviewed and updated as appropriate: allergies, current medications, past family history, past medical history, past social history, past surgical history and problem list     Review of Systems   Constitutional: Negative for activity change, fatigue and fever  HENT: Negative for congestion, hearing loss, rhinorrhea, sore throat, trouble swallowing and voice change  Eyes: Negative for visual disturbance (glaucoma)  Respiratory: Positive for shortness of breath  Negative for chest tightness and wheezing  Cardiovascular: Negative for chest pain, palpitations and leg swelling  Gastrointestinal: Positive for rectal pain (diverticulitis)  Negative for constipation, nausea and vomiting  Endocrine: Negative for polyuria  Genitourinary: Positive for frequency  Negative for dysuria and urgency  Musculoskeletal: Negative for arthralgias and joint swelling  Skin: Negative for rash  Neurological: Negative for dizziness, speech difficulty, light-headedness and headaches  Psychiatric/Behavioral: Positive for sleep disturbance  Negative for confusion  The patient is not nervous/anxious  Objective:      /70 (BP Location: Left arm, Patient Position: Sitting, Cuff Size: Large)   Pulse 63   Temp 97 8 °F (36 6 °C) (Tympanic)   Ht 5' 1" (1 549 m)   Wt 84 9 kg (187 lb 2 oz)   SpO2 95% Comment: 5 liters  BMI 35 36 kg/m²          Physical Exam  Vitals signs reviewed  Constitutional:       General: She is not in acute distress  Appearance: She is obese  She is not ill-appearing or toxic-appearing  HENT:      Head: Normocephalic  Eyes:      General: No scleral icterus  Conjunctiva/sclera: Conjunctivae normal    Neck:      Musculoskeletal: No neck rigidity or muscular tenderness  Cardiovascular:      Rate and Rhythm: Normal rate and regular rhythm  Heart sounds: Normal heart sounds  No murmur  Pulmonary:      Effort: No respiratory distress  Breath sounds: No stridor  No wheezing, rhonchi or rales  Abdominal:      General: Bowel sounds are normal  There is no distension  Palpations: Abdomen is soft  Tenderness: There is no abdominal tenderness  Musculoskeletal:         General: No deformity        Right lower leg: No edema  Left lower leg: No edema  Lymphadenopathy:      Cervical: No cervical adenopathy  Skin:     General: Skin is warm and dry  Coloration: Skin is not jaundiced or pale  Neurological:      Mental Status: She is alert and oriented to person, place, and time  Gait: Gait normal    Psychiatric:         Mood and Affect: Mood normal          Behavior: Behavior normal          Thought Content:  Thought content normal          Judgment: Judgment normal

## 2020-09-23 NOTE — ASSESSMENT & PLAN NOTE
She was diagnosed with non-small cell lung cancer 7 years back and had underwent wedge resection by Dr Yennifer Sánchez  She also received chemotherapy  She has been remaining cancer free  She follows with Dr Teetee Rowell from oncology

## 2020-09-23 NOTE — PROGRESS NOTES
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  She quit smoking 15 years back  On clinical examination she was saturating 90% on 5 L of oxygen  Her chest auscultation revealed bilateral basilar inspiratory coarse crackles  Her previous CT scan from 2015 showed bilateral extensive emphysematous changes  Her high-resolution CT scan of the chest showed bilateral emphysematous changes and mild bronchiectasis  Her PFT showed moderately severe airflow obstruction with severe uncorrected diffusion defect  she is currently doing well  She was admitted to [facility] recently with acute exacerbation from which she has recovered  I have advised her to continue on nebulized albuterol  and ipratropium  I have restarted her on budesonide nebulizer  I reminded her to gargle to help after using budesonide  I had a long discussion with her and I have answered all their questions  JULIO (obstructive sleep apnea)  She has obstructive sleep apnea and has been on CPAP therapy with oxygen supplementation at 3 L/m  She had a CPAP titration study in February 2016 which showed a CPAP pressure requirement of 9 cm and was using nasal pillows  She states that she is using the CPAP every night and that she is comfortable with the mask and pressure  She has no significant daytime sleepiness or morning headache  Her sleep is not disturbed  Her CPAP compliance records are excellent [employer] [address] is her DME  On clinical examination, she is obese    I have advised her to continue with CPAP therapy as before  I had a long discussion with her and her  and answered all their questions  Chronic hypoxemic respiratory failure (HCC)  chronic hypoxemic respiratory failure  She has chronic hypoxemic respiratory failure  She is on continuous oxygen supplementation 3 LPM during rest and 4 LPM during exertion  She is with the [address]  She has a oxygen conserving device which she is not keen to use  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)  She was diagnosed with non-small cell lung cancer 7 years back and had underwent wedge resection by Dr Tavo Hirsch  She also received chemotherapy  She has been remaining cancer free  She follows with Dr Karla Litten from oncology  HTN (hypertension)  She has history of hypertension and has been on treatment with amlodipine       Diagnoses and all orders for this visit:    Chronic obstructive pulmonary disease, unspecified COPD type ([facility] [address] )  -     budesonide (PULMICORT) 0 5 mg/2 mL nebulizer solution; Take 1 vial (0 5 mg total) by nebulization 2 (two) times a day Rinse mouth after use  JULIO (obstructive sleep apnea)  -     Ambulatory referral to Pulmonology    Chronic hypoxemic respiratory failure (HCC)    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)    Essential hypertension    Influenza vaccine needed  -     influenza vaccine, high-dose, PF 0 7 mL (FLUZONE HIGH-DOSE)    Other orders  -     albuterol (ProAir HFA) 90 mcg/act inhaler; ProAir HFA 90 mcg/actuation aerosol inhaler          Subjective:      Patient ID: Mil San is a 79 y o  female  Mrs Drake Going was admitted last month [facility] with acute exacerbation of her COPD and was treated with nebulized bronchodilators and IV Solu-Medrol  She has been subsequently discharged home on oxygen supplementation at 3 liters/minute during rest and 4 liters/minute during exertion  She was previously on oxygen supplementation    Currently her exercise tolerance is about 200 ft  She has occasional cough in the morning which is productive of whitish phlegm  No wheezing no chest pain or palpitations  No swelling of feet  She is not on any diuretic therapy  She has no hoarseness of voice or dysphagia  She uses nebulized albuterol and ipratropium 3 times a day  She does not use any other inhaler other than rescue albuterol which she has in hand  She has no fever or chills  She has obstructive sleep apnea and has been on CPAP therapy  She uses it every night and is comfortable with the mask and pressure she has no significant daytime sleepiness or morning headache  She is very motivated to continue on CPAP therapy  She has been getting the supplies regularly  I reviewed her CPAP compliance records and they are excellent  Her residual AHI is only 1 3  No significant amount of leak  She is obese and understands the need for weight reduction  She has history of hypertension and has been on treatment  She is on amlodipine  She had partial left lung resection for non-small cell lung cancer  She follows with Dr Karla Litten  The following portions of the patient's history were reviewed and updated as appropriate: allergies, current medications, past family history, past medical history, past social history, past surgical history and problem list     Review of Systems   Constitutional: Negative for activity change, fatigue and fever  HENT: Negative for congestion, hearing loss, rhinorrhea, sore throat, trouble swallowing and voice change  Eyes: Negative for visual disturbance (glaucoma)  Respiratory: Positive for shortness of breath  Negative for chest tightness and wheezing  Cardiovascular: Negative for chest pain, palpitations and leg swelling  Gastrointestinal: Positive for rectal pain (diverticulitis)  Negative for constipation, nausea and vomiting  Endocrine: Negative for polyuria  Genitourinary: Positive for frequency   Negative for dysuria and urgency  Musculoskeletal: Negative for arthralgias and joint swelling  Skin: Negative for rash  Neurological: Negative for dizziness, speech difficulty, light-headedness and headaches  Psychiatric/Behavioral: Positive for sleep disturbance  Negative for confusion  The patient is not nervous/anxious  Objective:      /70 (BP Location: Left arm, Patient Position: Sitting, Cuff Size: Large)   Pulse 63   Temp 97 8 °F (36 6 °C) (Tympanic)   Ht 5' 1" (1 549 m)   Wt 84 9 kg (187 lb 2 oz)   SpO2 95% Comment: 5 liters  BMI 35 36 kg/m²          Physical Exam  Vitals signs reviewed  Constitutional:       General: She is not in acute distress  Appearance: She is obese  She is not ill-appearing or toxic-appearing  HENT:      Head: Normocephalic  Eyes:      General: No scleral icterus  Conjunctiva/sclera: Conjunctivae normal    Neck:      Musculoskeletal: No neck rigidity or muscular tenderness  Cardiovascular:      Rate and Rhythm: Normal rate and regular rhythm  Heart sounds: Normal heart sounds  No murmur  Pulmonary:      Effort: No respiratory distress  Breath sounds: No stridor  No wheezing, rhonchi or rales  Abdominal:      General: Bowel sounds are normal  There is no distension  Palpations: Abdomen is soft  Tenderness: There is no abdominal tenderness  Musculoskeletal:         General: No deformity  Right lower leg: No edema  Left lower leg: No edema  Lymphadenopathy:      Cervical: No cervical adenopathy  Skin:     General: Skin is warm and dry  Coloration: Skin is not jaundiced or pale  Neurological:      Mental Status: She is alert and oriented to person, place, and time  Gait: Gait normal    Psychiatric:         Mood and Affect: Mood normal          Behavior: Behavior normal          Thought Content:  Thought content normal          Judgment: Judgment normal

## 2020-09-23 NOTE — ASSESSMENT & PLAN NOTE
chronic hypoxemic respiratory failure  She has chronic hypoxemic respiratory failure  She is on continuous oxygen supplementation 3 LPM during rest and 4 LPM during exertion  She is with the [employer]  She has a oxygen conserving device which she is not keen to use

## 2020-09-23 NOTE — ASSESSMENT & PLAN NOTE
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  She quit smoking 15 years back  On clinical examination she was saturating 90% on 5 L of oxygen  Her chest auscultation revealed bilateral basilar inspiratory coarse crackles  Her previous CT scan from 2015 showed bilateral extensive emphysematous changes  Her high-resolution CT scan of the chest showed bilateral emphysematous changes and mild bronchiectasis  Her PFT showed moderately severe airflow obstruction with severe uncorrected diffusion defect  she is currently doing well  She was admitted to [facility] recently with acute exacerbation from which she has recovered  I have advised her to continue on nebulized albuterol  and ipratropium  I have restarted her on budesonide nebulizer  I reminded her to gargle to help after using budesonide  I had a long discussion with her and I have answered all their questions

## 2020-09-30 ENCOUNTER — OFFICE VISIT (OUTPATIENT)
Dept: FAMILY MEDICINE CLINIC | Facility: CLINIC | Age: 80
End: 2020-09-30
Payer: MEDICARE

## 2020-09-30 VITALS
OXYGEN SATURATION: 94 % | SYSTOLIC BLOOD PRESSURE: 142 MMHG | DIASTOLIC BLOOD PRESSURE: 76 MMHG | RESPIRATION RATE: 16 BRPM | BODY MASS INDEX: 35.3 KG/M2 | WEIGHT: 187 LBS | HEIGHT: 61 IN | HEART RATE: 75 BPM | TEMPERATURE: 98.7 F

## 2020-09-30 DIAGNOSIS — I10 ESSENTIAL HYPERTENSION: ICD-10-CM

## 2020-09-30 DIAGNOSIS — N32.81 OAB (OVERACTIVE BLADDER): ICD-10-CM

## 2020-09-30 DIAGNOSIS — J44.9 CHRONIC OBSTRUCTIVE PULMONARY DISEASE, UNSPECIFIED COPD TYPE (HCC): ICD-10-CM

## 2020-09-30 DIAGNOSIS — Z00.00 WELL ADULT EXAM: Primary | ICD-10-CM

## 2020-09-30 DIAGNOSIS — F33.41 RECURRENT MAJOR DEPRESSIVE DISORDER, IN PARTIAL REMISSION (HCC): ICD-10-CM

## 2020-09-30 DIAGNOSIS — E78.5 HYPERLIPIDEMIA, UNSPECIFIED HYPERLIPIDEMIA TYPE: ICD-10-CM

## 2020-09-30 PROCEDURE — G0439 PPPS, SUBSEQ VISIT: HCPCS | Performed by: FAMILY MEDICINE

## 2020-09-30 PROCEDURE — 99214 OFFICE O/P EST MOD 30 MIN: CPT | Performed by: FAMILY MEDICINE

## 2020-09-30 NOTE — PATIENT INSTRUCTIONS
Medicare Preventive Visit Patient Instructions  Thank you for completing your Welcome to Medicare Visit or Medicare Annual Wellness Visit today  Your next wellness visit will be due in one year (9/30/2021)  The screening/preventive services that you may require over the next 5-10 years are detailed below  Some tests may not apply to you based off risk factors and/or age  Screening tests ordered at today's visit but not completed yet may show as past due  Also, please note that scanned in results may not display below  Preventive Screenings:  Service Recommendations Previous Testing/Comments   Colorectal Cancer Screening  * Colonoscopy    * Fecal Occult Blood Test (FOBT)/Fecal Immunochemical Test (FIT)  * Fecal DNA/Cologuard Test  * Flexible Sigmoidoscopy Age: 54-65 years old   Colonoscopy: every 10 years (may be performed more frequently if at higher risk)  OR  FOBT/FIT: every 1 year  OR  Cologuard: every 3 years  OR  Sigmoidoscopy: every 5 years  Screening may be recommended earlier than age 48 if at higher risk for colorectal cancer  Also, an individualized decision between you and your healthcare provider will decide whether screening between the ages of 74-80 would be appropriate  Colonoscopy: Not on file  FOBT/FIT: Not on file  Cologuard: Not on file  Sigmoidoscopy: Not on file         Breast Cancer Screening Age: 36 years old  Frequency: every 1-2 years  Not required if history of left and right mastectomy Mammogram: Not on file       Cervical Cancer Screening Between the ages of 21-29, pap smear recommended once every 3 years  Between the ages of 33-67, can perform pap smear with HPV co-testing every 5 years     Recommendations may differ for women with a history of total hysterectomy, cervical cancer, or abnormal pap smears in past  Pap Smear: Not on file    Screening Not Indicated   Hepatitis C Screening Once for adults born between [facility]  More frequently in patients at high risk for Hepatitis C Hep C Antibody: Not on file       Diabetes Screening 1-2 times per year if you're at risk for diabetes or have pre-diabetes Fasting glucose: 203 mg/dL   A1C: No results in last 5 years    Screening Current   Cholesterol Screening Once every 5 years if you don't have a lipid disorder  May order more often based on risk factors  Lipid panel: Not on file    Screening Not Indicated  History Lipid Disorder     Other Preventive Screenings Covered by Medicare:  1  Abdominal Aortic Aneurysm (AAA) Screening: covered once if your at risk  You're considered to be at risk if you have a family history of AAA  2  Lung Cancer Screening: covers low dose CT scan once per year if you meet all of the following conditions: (1) Age 50-69; (2) No signs or symptoms of lung cancer; (3) Current smoker or have quit smoking within the last 15 years; (4) You have a tobacco smoking history of at least 30 pack years (packs per day multiplied by number of years you smoked); (5) You get a written order from a healthcare provider  3  Glaucoma Screening: covered annually if you're considered high risk: (1) You have diabetes OR (2) Family history of glaucoma OR (3)  aged 48 and older OR (3)  American aged 72 and older  3  Osteoporosis Screening: covered every 2 years if you meet one of the following conditions: (1) You're estrogen deficient and at risk for osteoporosis based off medical history and other findings; (2) Have a vertebral abnormality; (3) On glucocorticoid therapy for more than 3 months; (4) Have primary hyperparathyroidism; (5) On osteoporosis medications and need to assess response to drug therapy  · Last bone density test (DXA Scan): Not on file  5  HIV Screening: covered annually if you're between the age of 12-76  Also covered annually if you are younger than 13 and older than 72 with risk factors for HIV infection   For pregnant patients, it is covered up to 3 times per pregnancy  Immunizations:  Immunization Recommendations   Influenza Vaccine Annual influenza vaccination during flu season is recommended for all persons aged >= 6 months who do not have contraindications   Pneumococcal Vaccine (Prevnar and Pneumovax)  * Prevnar = PCV13  * Pneumovax = PPSV23   Adults 25-60 years old: 1-3 doses may be recommended based on certain risk factors  Adults 72 years old: Prevnar (PCV13) vaccine recommended followed by Pneumovax (PPSV23) vaccine  If already received PPSV23 since turning 65, then PCV13 recommended at least one year after PPSV23 dose  Hepatitis B Vaccine 3 dose series if at intermediate or high risk (ex: diabetes, end stage renal disease, liver disease)   Tetanus (Td) Vaccine - COST NOT COVERED BY MEDICARE PART B Following completion of primary series, a booster dose should be given every 10 years to maintain immunity against tetanus  Td may also be given as tetanus wound prophylaxis  Tdap Vaccine - COST NOT COVERED BY MEDICARE PART B Recommended at least once for all adults  For pregnant patients, recommended with each pregnancy  Shingles Vaccine (Shingrix) - COST NOT COVERED BY MEDICARE PART B  2 shot series recommended in those aged 48 and above     Health Maintenance Due:  There are no preventive care reminders to display for this patient  Immunizations Due:  There are no preventive care reminders to display for this patient  Advance Directives   What are advance directives? Advance directives are legal documents that state your wishes and plans for medical care  These plans are made ahead of time in case you lose your ability to make decisions for yourself  Advance directives can apply to any medical decision, such as the treatments you want, and if you want to donate organs  What are the types of advance directives? There are many types of advance directives, and each state has rules about how to use them   You may choose a combination of any of the following:  · Living will: This is a written record of the treatment you want  You can also choose which treatments you do not want, which to limit, and which to stop at a certain time  This includes surgery, medicine, IV fluid, and tube feedings  · Durable power of  for healthcare [place] [facility]): This is a written record that states who you want to make healthcare choices for you when you are unable to make them for yourself  This person, called a proxy, is usually a family member or a friend  You may choose more than 1 proxy  · Do not resuscitate (DNR) order:  A DNR order is used in case your heart stops beating or you stop breathing  It is a request not to have certain forms of treatment, such as CPR  A DNR order may be included in other types of advance directives  · Medical directive: This covers the care that you want if you are in a coma, near death, or unable to make decisions for yourself  You can list the treatments you want for each condition  Treatment may include pain medicine, surgery, blood transfusions, dialysis, IV or tube feedings, and a ventilator (breathing machine)  · Values history: This document has questions about your views, beliefs, and how you feel and think about life  This information can help others choose the care that you would choose  Why are advance directives important? An advance directive helps you control your care  Although spoken wishes may be used, it is better to have your wishes written down  Spoken wishes can be misunderstood, or not followed  Treatments may be given even if you do not want them  An advance directive may make it easier for your family to make difficult choices about your care  Urinary Incontinence   Urinary incontinence (UI)  is when you lose control of your bladder  UI develops because your bladder cannot store or empty urine properly  The 3 most common types of UI are stress incontinence, urge incontinence, or both    Medicines:   · May be given to help strengthen your bladder control  Report any side effects of medication to your healthcare provider  Do pelvic muscle exercises often:  Your pelvic muscles help you stop urinating  Squeeze these muscles tight for 5 seconds, then relax for 5 seconds  Gradually work up to squeezing for 10 seconds  Do 3 sets of 15 repetitions a day, or as directed  This will help strengthen your pelvic muscles and improve bladder control  Train your bladder:  Go to the bathroom at set times, such as every 2 hours, even if you do not feel the urge to go  You can also try to hold your urine when you feel the urge to go  For example, hold your urine for 5 minutes when you feel the urge to go  As that becomes easier, hold your urine for 10 minutes  Self-care:   · Keep a UI record  Write down how often you leak urine and how much you leak  Make a note of what you were doing when you leaked urine  · Drink liquids as directed  You may need to limit the amount of liquid you drink to help control your urine leakage  Do not drink any liquid right before you go to bed  Limit or do not have drinks that contain caffeine or alcohol  · Prevent constipation  Eat a variety of high-fiber foods  Good examples are high-fiber cereals, beans, vegetables, and whole-grain breads  Walking is the best way to trigger your intestines to have a bowel movement  · Exercise regularly and maintain a healthy weight  Weight loss and exercise will decrease pressure on your bladder and help you control your leakage  · Use a catheter as directed  to help empty your bladder  A catheter is a tiny, plastic tube that is put into your bladder to drain your urine  · Go to behavior therapy as directed  Behavior therapy may be used to help you learn to control your urge to urinate      Weight Management   Why it is important to manage your weight:  Being overweight increases your risk of health conditions such as heart disease, high blood pressure, type 2 diabetes, and certain types of cancer  It can also increase your risk for osteoarthritis, sleep apnea, and other respiratory problems  Aim for a slow, steady weight loss  Even a small amount of weight loss can lower your risk of health problems  How to lose weight safely:  A safe and healthy way to lose weight is to eat fewer calories and get regular exercise  You can lose up about 1 pound a week by decreasing the number of calories you eat by 500 calories each day  Healthy meal plan for weight management:  A healthy meal plan includes a variety of foods, contains fewer calories, and helps you stay healthy  A healthy meal plan includes the following:  · Eat whole-grain foods more often  A healthy meal plan should contain fiber  Fiber is the part of grains, fruits, and vegetables that is not broken down by your body  Whole-grain foods are healthy and provide extra fiber in your diet  Some examples of whole-grain foods are whole-wheat breads and pastas, oatmeal, brown rice, and bulgur  · Eat a variety of vegetables every day  Include dark, leafy greens such as spinach, kale, martin greens, and mustard greens  Eat yellow and orange vegetables such as carrots, sweet potatoes, and winter squash  · Eat a variety of fruits every day  Choose fresh or canned fruit (canned in its own juice or light syrup) instead of juice  Fruit juice has very little or no fiber  · Eat low-fat dairy foods  Drink fat-free (skim) milk or 1% milk  Eat fat-free yogurt and low-fat cottage cheese  Try low-fat cheeses such as mozzarella and other reduced-fat cheeses  · Choose meat and other protein foods that are low in fat  Choose beans or other legumes such as split peas or lentils  Choose fish, skinless poultry (chicken or turkey), or lean cuts of red meat (beef or pork)  Before you cook meat or poultry, cut off any visible fat  · Use less fat and oil  Try baking foods instead of frying them   Add less fat, such as margarine, sour cream, regular salad dressing and mayonnaise to foods  Eat fewer high-fat foods  Some examples of high-fat foods include french fries, doughnuts, ice cream, and cakes  · Eat fewer sweets  Limit foods and drinks that are high in sugar  This includes candy, cookies, regular soda, and sweetened drinks  Exercise:  Exercise at least 30 minutes per day on most days of the week  Some examples of exercise include walking, biking, dancing, and swimming  You can also fit in more physical activity by taking the stairs instead of the elevator or parking farther away from stores  Ask your healthcare provider about the best exercise plan for you  © Copyright [employer] 2018 Information is for End User's use only and may not be sold, redistributed or otherwise used for commercial purposes   All illustrations and images included in CareNotes® are the copyrighted property of A [employer] A M , [employer]  or [address] [place] [address]

## 2020-09-30 NOTE — PROGRESS NOTES
Assessment/Plan:    1  Well adult exam    2  OAB (overactive bladder)  -     Mirabegron ER 50 MG TB24; Take 1 tablet (50 mg total) by mouth daily    3  Chronic obstructive pulmonary disease, unspecified COPD type ([address] )    4  Essential hypertension    5  Hyperlipidemia, unspecified hyperlipidemia type    6  Recurrent major depressive disorder, in partial remission (HCC)         Subjective:      Patient ID: Shelby Alicea is a 79 y o  female  HPI       copd: 2L NC x 2012 about 2 yrs now cont  keeping around 92% nebs bid no recent steroid  turned up to 3L today to 5 with portable     hld; crestor 20mg    HTN: could not take losartan 25mg or lisinopril 10mg gave norvasc 5mg currently    right shoulder pain    anxiety: on celexa 10mg    Cardio: heart is fine sees Chanda Hendrix and just had stenting of the BL LE now on plavix    Glaucoma: lukasz    saw Sierra: Thony lobectomy for non Small cell Lung cancer s/p chemo and angelco - and [facility]    diverticulosis: seen by geremias - takes imodium prn last flair up years ago  taking airborne daily    vit D vit E and fishoil [id number]    Increased urinary frequency     The following portions of the patient's history were reviewed and updated as appropriate: allergies, current medications, past family history, past medical history, past social history, past surgical history and problem list     Review of Systems   Constitutional: Positive for unexpected weight change  Negative for fever  HENT: Negative for nosebleeds and trouble swallowing  Eyes: Negative for visual disturbance  Respiratory: Positive for shortness of breath  Negative for chest tightness  Cardiovascular: Negative for chest pain, palpitations and leg swelling  Gastrointestinal: Positive for nausea  Negative for abdominal pain, constipation and diarrhea  Endocrine: Negative for cold intolerance  Genitourinary: Negative for dysuria and urgency  Musculoskeletal: Negative for joint swelling and myalgias     Skin: Negative for rash  Neurological: Positive for weakness  Negative for tremors, seizures and syncope  Hematological: Does not bruise/bleed easily  Psychiatric/Behavioral: Negative for hallucinations and suicidal ideas  The patient is nervous/anxious  Objective:      /76 (BP Location: Right arm, Patient Position: Sitting, Cuff Size: Large)   Pulse 75   Temp 98 7 °F (37 1 °C) (Tympanic)   Resp 16   Ht 5' 1" (1 549 m)   Wt 84 8 kg (187 lb)   SpO2 94%   BMI 35 33 kg/m²     No visits with results within 2 Week(s) from this visit     Latest known visit with results is:   Admission on 09/03/2020, Discharged on 09/04/2020   Component Date Value    WBC 09/03/2020 7 08     RBC 09/03/2020 4 38     Hemoglobin 09/03/2020 14 6     Hematocrit 09/03/2020 44 4     MCV 09/03/2020 101*    MCH 09/03/2020 33 3     MCHC 09/03/2020 32 9     RDW 09/03/2020 13 9     MPV 09/03/2020 9 4     Platelets 50/24/8281 209     Neutrophils Relative 09/03/2020 79*    Immat GRANS % 09/03/2020 0     Lymphocytes Relative 09/03/2020 12*    Monocytes Relative 09/03/2020 7     Eosinophils Relative 09/03/2020 2     Basophils Relative 09/03/2020 0     Neutrophils Absolute 09/03/2020 5 52     Immature Grans Absolute 09/03/2020 0 02     Lymphocytes Absolute 09/03/2020 0 86     Monocytes Absolute 09/03/2020 0 52     Eosinophils Absolute 09/03/2020 0 14     Basophils Absolute 09/03/2020 0 02     Sodium 09/03/2020 142     Potassium 09/03/2020 4 8     Chloride 09/03/2020 102     CO2 09/03/2020 33     ANION GAP 09/03/2020 7     BUN 09/03/2020 14     Creatinine 09/03/2020 0 74     Glucose 09/03/2020 146*    Calcium 09/03/2020 9 9     AST 09/03/2020 17     ALT 09/03/2020 16     Alkaline Phosphatase 09/03/2020 42 4     Total Protein 09/03/2020 7 6     Albumin 09/03/2020 4 4     Total Bilirubin 09/03/2020 0 54     eGFR 09/03/2020 76     Troponin I 09/03/2020 <0 03     BNP 09/03/2020 216 6*    pH, Art i-STAT 09/03/2020 7 270*    pH, i-STAT Temp Corrected 09/03/2020 7 275     pCO2, Art i-STAT 09/03/2020 75 1*    pCO2, i-STAT TC 09/03/2020 73 8     pO2, ART i-STAT 09/03/2020 374 0*    pO2, i-STAT TC 09/03/2020 372     BE, i-STAT 09/03/2020 5*    HCO3, Art i-STAT 09/03/2020 34 4*    CO2, i-STAT 09/03/2020 37*    O2 Sat, i-STAT 09/03/2020 100*    SODIUM, I-STAT 09/03/2020 142     Potassium, i-STAT 09/03/2020 4 4     Calcium, Ionized i-STAT 09/03/2020 1 36*    Hct, i-STAT 09/03/2020 40     Hgb, i-STAT 09/03/2020 13 6     Glucose, i-STAT 09/03/2020 153*    POC FIO2 09/03/2020 75     Specimen Type 09/03/2020 ARTERIAL     D-Dimer, Quant  09/03/2020 0 55*    pH, Art i-STAT 09/03/2020 7 338*    pH, i-STAT Temp Corrected 09/03/2020 7  346     pCO2, Art i-STAT 09/03/2020 58 3*    pCO2, i-STAT TC 09/03/2020 57 1     pO2, ART i-STAT 09/03/2020 64 0*    pO2, i-STAT TC 09/03/2020 62     BE, i-STAT 09/03/2020 4*    HCO3, Art i-STAT 09/03/2020 31 4*    CO2, i-STAT 09/03/2020 33*    O2 Sat, i-STAT 09/03/2020 90*    SODIUM, I-STAT 09/03/2020 139     Potassium, i-STAT 09/03/2020 4 3     Calcium, Ionized i-STAT 09/03/2020 1 32     Hct, i-STAT 09/03/2020 42     Hgb, i-STAT 09/03/2020 14 3     Glucose, i-STAT 09/03/2020 182*    POC FIO2 09/03/2020 32     Specimen Type 09/03/2020 ARTERIAL     Procalcitonin 09/04/2020 <0 05     WBC 09/04/2020 6 36     RBC 09/04/2020 4 02     Hemoglobin 09/04/2020 12 9     Hematocrit 09/04/2020 40 7     MCV 09/04/2020 101*    MCH 09/04/2020 32 1     MCHC 09/04/2020 31 7     RDW 09/04/2020 13 5     Platelets 69/48/6795 209     MPV 09/04/2020 9 9     Sodium 09/04/2020 141     Potassium 09/04/2020 4 6     Chloride 09/04/2020 99     CO2 09/04/2020 33     ANION GAP 09/04/2020 9     BUN 09/04/2020 24*    Creatinine 09/04/2020 0 80     Glucose 09/04/2020 203*    Glucose, Fasting 09/04/2020 203*    Calcium 09/04/2020 9 3     eGFR 09/04/2020 69     Ventricular Rate 09/03/2020 67     Atrial Rate 09/03/2020 67     [place] Interval 09/03/2020 170     QRSD Interval 09/03/2020 97     QT Interval 09/03/2020 409     QTC Interval 09/03/2020 432     P Axis 09/03/2020 51     QRS Axis 09/03/2020 79     T Wave Axis 09/03/2020 89     Ventricular Rate 09/04/2020 65     Atrial Rate 09/04/2020 65     W[place] Interval 09/04/2020 182     QRSD Interval 09/04/2020 101     QT Interval 09/04/2020 433     QTC Interval 09/04/2020 451     P Axis 09/04/2020 24     QRS Axis 09/04/2020 49     T Wave Axis 09/04/2020 74           Physical Exam  Vitals signs and nursing note reviewed  Constitutional:       Appearance: She is well-developed  Comments: 3L NC    HENT:      Head: Normocephalic and atraumatic  Neck:      Musculoskeletal: Normal range of motion and neck supple  Cardiovascular:      Rate and Rhythm: Normal rate and regular rhythm  Heart sounds: Normal heart sounds  No murmur  Pulmonary:      Effort: Pulmonary effort is normal       Breath sounds: Normal breath sounds  No wheezing or rales  Abdominal:      General: Bowel sounds are normal  There is no distension  Palpations: Abdomen is soft  Tenderness: There is no abdominal tenderness  Musculoskeletal: Normal range of motion  General: No tenderness  Lymphadenopathy:      Cervical: No cervical adenopathy  Skin:     General: Skin is warm and dry  Capillary Refill: Capillary refill takes less than 2 seconds  Findings: No rash  Neurological:      Mental Status: She is alert and oriented to person, place, and time  Cranial Nerves: No cranial nerve deficit  Sensory: No sensory deficit  Motor: No abnormal muscle tone  Psychiatric:         Behavior: Behavior normal          Thought Content: Thought content normal          Judgment: Judgment normal            BMI Counseling: Body mass index is 35 33 kg/m²   The BMI is above normal  Nutrition recommendations include decreasing portion sizes, encouraging healthy choices of fruits and vegetables and limiting drinks that contain sugar  Exercise recommendations include moderate physical activity 150 minutes/week  No pharmacotherapy was ordered  Falls Plan of Care: balance, strength, and gait training instructions were provided  Medications that increase falls were reviewed         Colon with 2018 geremias  No more mammo  Bone density - [place] [id number]

## 2020-09-30 NOTE — PROGRESS NOTES
Assessment and Plan:     Problem List Items Addressed This Visit        Respiratory    COPD (chronic obstructive pulmonary disease) ([facility] )       Cardiovascular and Mediastinum    HTN (hypertension)       Other    Hyperlipidemia    Recurrent major depressive disorder, in partial remission ([facility] )      Other Visit Diagnoses     Well adult exam    -  Primary    OAB (overactive bladder)        Relevant Medications    Mirabegron ER 50 MG TB24           Preventive health issues were discussed with patient, and age appropriate screening tests were ordered as noted in patient's After Visit Summary  Personalized health advice and appropriate referrals for health education or preventive services given if needed, as noted in patient's After Visit Summary       History of Present Illness:     Patient presents for Medicare Annual Wellness visit    Patient Care Team:  Arnoldo Calix MD as PCP - General (Family Medicine)     Problem List:     Patient Active Problem List   Diagnosis    JULIO (obstructive sleep apnea)    COPD (chronic obstructive pulmonary disease) ([facility] )    HTN (hypertension)    Hyperlipidemia    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)    Recurrent major depressive disorder, in partial remission ([facility] )    Chronic hypoxemic respiratory failure ([facility] )    PAD (peripheral artery disease) ([facility] )      Past Medical and Surgical History:     Past Medical History:   Diagnosis Date    Back problem     Chronic pain     COPD (chronic obstructive pulmonary disease) ([facility] )     High blood pressure     Hyperlipidemia     Hypertension     Lung cancer ([facility] )     Left Lung cancer; wedge resection     JULIO on CPAP     PAD (peripheral artery disease) ([facility] )     Leg     Past Surgical History:   Procedure Laterality Date    APPENDECTOMY      COLONOSCOPY  2018    repeat 2023    HYSTERECTOMY      LUNG CANCER SURGERY Left     wedge reseection, Dr Sridhar Moran      Family History:     Family History   Problem Relation Age of Onset    Colon cancer Father     Heart disease Sister     Lung cancer Daughter       Social History:        Social History     Socioeconomic History    Marital status: /Civil Union     Spouse name: None    Number of children: 4    Years of education: 15    Highest education level: None   Occupational History    Occupation: Retired -    Social Needs    Financial resource strain: None    Food insecurity     Worry: None     Inability: None    Transportation needs     Medical: None     Non-medical: None   Tobacco Use    Smoking status: Former Smoker     Packs/day: 1 50     Years: 35 00     Pack years: 52 50     Types: Cigarettes    Smokeless tobacco: Never Used    Tobacco comment: Has smoked since age:   14 - As per [place]    Substance and Sexual Activity    Alcohol use: Not Currently     Comment: Alcohol intake:   None - As per [place] Drug use: Not Currently     Comment: Illicit drugs:   Denied - As per [place] Sexual activity: None   Lifestyle    Physical activity     Days per week: None     Minutes per session: None    Stress: None   Relationships    Social connections     Talks on phone: None     Gets together: None     Attends Hindu service: None     Active member of club or organization: None     Attends meetings of clubs or organizations: None     Relationship status: None    Intimate partner violence     Fear of current or ex partner: None     Emotionally abused: None     Physically abused: None     Forced sexual activity: None   Other Topics Concern    None   Social History Narrative    Most recent tobacco use screenin2020    Do you currently or have you served in the [employer]: No    Were you activated, into active duty, as a member of the [employer] or as a Reservist: No    Occupation: Retired;     Exercise level: None    Has smoked since age: 14    Alcohol intake: None    Caffeine intake: Occasional    Chewing tobacco: none    Marital status:     Illicit drugs: Denied    Diet: Cardiac    Seat belts used routinely: Yes    Sexual orientation: Heterosexual    Smoke alarm in home: Yes    Advance directive: No    General stress level: Medium    Salt Intake: HTN Diet    Sunscreen used routinely: Yes    Has the Patient had a mammogram to screen for breast cancer within 24 months: No    Guns present in home: No    Single or multi-level home/work: single level home    Live alone or with others: with others    Number of children: 4    Presence of domestic violence: No    Are you currently employed: No    Asbestos exposure: No    TB exposure: No    Environmental exposure: No    Animal exposure: Yes    Hard of hearing or deaf in one or both ears: No    Legally blind in one or both eyes: No    has cpap, oxygen and nebulizer-[employer]     - As per [place]       Medications and Allergies:     Current Outpatient Medications   Medication Sig Dispense Refill    albuterol (ProAir HFA) 90 mcg/act inhaler ProAir HFA 90 mcg/actuation aerosol inhaler      amLODIPine (NORVASC) 5 mg tablet TAKE 1 TABLET BY MOUTH EVERY DAY 90 tablet 1    Ascorbic Acid (vitamin C) 100 MG tablet Take 100 mg by mouth daily      aspirin 81 mg chewable tablet Chew 81 mg daily      budesonide (PULMICORT) 0 5 mg/2 mL nebulizer solution Take 1 vial (0 5 mg total) by nebulization 2 (two) times a day Rinse mouth after use   60 vial 0    cholecalciferol (VITAMIN D3) 400 units tablet Take 400 Units by mouth daily      citalopram (CeleXA) 10 mg tablet TK 1 T PO D      clopidogrel (PLAVIX) 75 mg tablet TK 1 T PO D      co-enzyme Q-10 30 MG capsule Take 30 mg by mouth 3 (three) times a day      formoterol (Perforomist) 20 MCG/2ML nebulizer solution Take 1 vial (20 mcg total) by nebulization 2 (two) times a day icd 10 code J44 9 180 vial 1    ipratropium-albuterol (DUO-NEB) 0 5-2 5 mg/3 mL nebulizer solution Take 1 vial (3 mL total) by nebulization every 6 (six) hours as needed for wheezing or shortness of breath ICD 10 J44 9 300 vial 1    Multiple Vitamins-Minerals (AIRBORNE GUMMIES PO) Take by mouth      Omega-3 Fatty Acids (fish oil) 1,000 mg Take 1,000 mg by mouth daily      rosuvastatin (CRESTOR) 20 MG tablet TK 1 T PO QHS      timolol (TIMOPTIC) 0 5 % ophthalmic solution INT 1 GTT IN OU BID      Mirabegron ER 50 MG TB24 Take 1 tablet (50 mg total) by mouth daily 30 tablet 2     No current facility-administered medications for this visit  Allergies   Allergen Reactions    Baclofen     Lisinopril     Losartan     Naproxen     Zinc Acetate       Immunizations:     Immunization History   Administered Date(s) Administered    DTaP 08/01/2018    Influenza, high dose seasonal 0 7 mL 09/23/2020    Pneumococcal Conjugate 13-Valent 10/01/2015    Pneumococcal Polysaccharide PPV23 01/11/2010      Health Maintenance: There are no preventive care reminders to display for this patient  There are no preventive care reminders to display for this patient  Medicare Health Risk Assessment:     /76 (BP Location: Right arm, Patient Position: Sitting, Cuff Size: Large)   Pulse 75   Temp 98 7 °F (37 1 °C) (Tympanic)   Resp 16   Ht 5' 1" (1 549 m)   Wt 84 8 kg (187 lb)   SpO2 94%   BMI 35 33 kg/m²      Benji Costa is here for her Subsequent Wellness visit  Last Medicare Wellness visit information reviewed, patient interviewed and updates made to the record today  Health Risk Assessment:   Patient rates overall health as poor  Patient feels that their physical health rating is much worse  Eyesight was rated as same  Hearing was rated as same  Patient feels that their emotional and mental health rating is same  Pain experienced in the last 7 days has been none  Patient states that she has experienced no weight loss or gain in last 6 months  Fall Risk Screening:    In the past year, patient has experienced: no history of falling in past year      Urinary Incontinence Screening:   Patient has leaked urine accidently in the last six months  Home Safety:  Patient has trouble with stairs inside or outside of their home  Patient has working smoke alarms and has no working carbon monoxide detector  Home safety hazards include: none  Nutrition:   Current diet is Regular  Medications:   Patient is currently taking over-the-counter supplements  OTC medications include: see medication list  Patient is able to manage medications  Activities of Daily Living (ADLs)/Instrumental Activities of Daily Living (IADLs):   Walk and transfer into and out of bed and chair?: Yes  Dress and groom yourself?: Yes    Bathe or shower yourself?: Yes    Feed yourself?  Yes  Do your laundry/housekeeping?: Yes  Manage your money, pay your bills and track your expenses?: Yes  Make your own meals?: Yes    Do your own shopping?: Yes    Previous Hospitalizations:   Any hospitalizations or ED visits within the last 12 months?: Yes    How many hospitalizations have you had in the last year?: 1-2    Hospitalization Comments: Patient had a flare up of COPD    Advance Care Planning:   Living will: No    Durable POA for healthcare: No    Advanced directive: No    Five wishes given: Yes      Cognitive Screening:   Provider or family/friend/caregiver concerned regarding cognition?: No    PREVENTIVE SCREENINGS      Cardiovascular Screening:    General: Screening Not Indicated and History Lipid Disorder    Due for: Lipid Panel      Diabetes Screening:     General: Screening Current      Breast Cancer Screening:     General: Screening Not Indicated      Cervical Cancer Screening:    General: Screening Not Indicated      Lung Cancer Screening:     General: Screening Not Indicated and History Lung Cancer    dipilito plan was colon 25'   Dejah Whitfield MD

## 2020-10-06 ENCOUNTER — APPOINTMENT (OUTPATIENT)
Dept: LAB | Facility: CLINIC | Age: 80
End: 2020-10-06
Payer: MEDICARE

## 2020-10-06 ENCOUNTER — TRANSCRIBE ORDERS (OUTPATIENT)
Dept: LAB | Facility: CLINIC | Age: 80
End: 2020-10-06

## 2020-10-06 DIAGNOSIS — E55.9 VITAMIN D DEFICIENCY: ICD-10-CM

## 2020-10-06 DIAGNOSIS — E53.8 B12 DEFICIENCY: ICD-10-CM

## 2020-10-06 DIAGNOSIS — Z79.899 OTHER LONG TERM (CURRENT) DRUG THERAPY: ICD-10-CM

## 2020-10-06 DIAGNOSIS — E78.5 HYPERLIPIDEMIA, UNSPECIFIED HYPERLIPIDEMIA TYPE: ICD-10-CM

## 2020-10-06 DIAGNOSIS — I10 ESSENTIAL HYPERTENSION: ICD-10-CM

## 2020-10-06 LAB
25(OH)D3 SERPL-MCNC: 42 NG/ML (ref 30–100)
ALBUMIN SERPL BCP-MCNC: 3.9 G/DL (ref 3.5–5)
ALP SERPL-CCNC: 45 U/L (ref 46–116)
ALT SERPL W P-5'-P-CCNC: 22 U/L (ref 12–78)
ANION GAP SERPL CALCULATED.3IONS-SCNC: -5 MMOL/L (ref 4–13)
AST SERPL W P-5'-P-CCNC: 6 U/L (ref 5–45)
BILIRUB SERPL-MCNC: 0.53 MG/DL (ref 0.2–1)
BUN SERPL-MCNC: 20 MG/DL (ref 5–25)
CALCIUM SERPL-MCNC: 9.8 MG/DL (ref 8.3–10.1)
CHLORIDE SERPL-SCNC: 108 MMOL/L (ref 100–108)
CHOLEST SERPL-MCNC: 138 MG/DL (ref 50–200)
CO2 SERPL-SCNC: 32 MMOL/L (ref 21–32)
CREAT SERPL-MCNC: 0.94 MG/DL (ref 0.6–1.3)
ERYTHROCYTE [DISTWIDTH] IN BLOOD BY AUTOMATED COUNT: 14.5 % (ref 11.6–15.1)
EST. AVERAGE GLUCOSE BLD GHB EST-MCNC: 120 MG/DL
GFR SERPL CREATININE-BSD FRML MDRD: 57 ML/MIN/1.73SQ M
GLUCOSE P FAST SERPL-MCNC: 135 MG/DL (ref 65–99)
HBA1C MFR BLD: 5.8 %
HCT VFR BLD AUTO: 43 % (ref 34.8–46.1)
HDLC SERPL-MCNC: 41 MG/DL
HGB BLD-MCNC: 13.6 G/DL (ref 11.5–15.4)
LDLC SERPL CALC-MCNC: 58 MG/DL (ref 0–100)
MCH RBC QN AUTO: 33.3 PG (ref 26.8–34.3)
MCHC RBC AUTO-ENTMCNC: 31.6 G/DL (ref 31.4–37.4)
MCV RBC AUTO: 105 FL (ref 82–98)
NONHDLC SERPL-MCNC: 97 MG/DL
NT-PROBNP SERPL-MCNC: 251 PG/ML
PLATELET # BLD AUTO: 219 THOUSANDS/UL (ref 149–390)
PMV BLD AUTO: 11.7 FL (ref 8.9–12.7)
POTASSIUM SERPL-SCNC: 4.6 MMOL/L (ref 3.5–5.3)
PROT SERPL-MCNC: 7.1 G/DL (ref 6.4–8.2)
RBC # BLD AUTO: 4.09 MILLION/UL (ref 3.81–5.12)
SODIUM SERPL-SCNC: 135 MMOL/L (ref 136–145)
TRIGL SERPL-MCNC: 194 MG/DL
TSH SERPL DL<=0.05 MIU/L-ACNC: 0.81 UIU/ML (ref 0.36–3.74)
VIT B12 SERPL-MCNC: >6000 PG/ML (ref 100–900)
WBC # BLD AUTO: 4 THOUSAND/UL (ref 4.31–10.16)

## 2020-10-06 PROCEDURE — 82607 VITAMIN B-12: CPT

## 2020-10-06 PROCEDURE — 82306 VITAMIN D 25 HYDROXY: CPT

## 2020-10-06 PROCEDURE — 83880 ASSAY OF NATRIURETIC PEPTIDE: CPT

## 2020-10-06 PROCEDURE — 84443 ASSAY THYROID STIM HORMONE: CPT

## 2020-10-06 PROCEDURE — 85027 COMPLETE CBC AUTOMATED: CPT

## 2020-10-06 PROCEDURE — 80053 COMPREHEN METABOLIC PANEL: CPT

## 2020-10-06 PROCEDURE — 36415 COLL VENOUS BLD VENIPUNCTURE: CPT

## 2020-10-06 PROCEDURE — 80061 LIPID PANEL: CPT

## 2020-10-06 PROCEDURE — 83036 HEMOGLOBIN GLYCOSYLATED A1C: CPT

## 2020-10-07 DIAGNOSIS — I10 ESSENTIAL HYPERTENSION: Primary | ICD-10-CM

## 2020-11-09 ENCOUNTER — LAB (OUTPATIENT)
Dept: LAB | Facility: CLINIC | Age: 80
End: 2020-11-09
Payer: MEDICARE

## 2020-11-09 DIAGNOSIS — I10 ESSENTIAL HYPERTENSION: ICD-10-CM

## 2020-11-09 LAB
ANION GAP SERPL CALCULATED.3IONS-SCNC: 2 MMOL/L (ref 4–13)
BUN SERPL-MCNC: 20 MG/DL (ref 5–25)
CALCIUM SERPL-MCNC: 10.2 MG/DL (ref 8.3–10.1)
CHLORIDE SERPL-SCNC: 106 MMOL/L (ref 100–108)
CO2 SERPL-SCNC: 35 MMOL/L (ref 21–32)
CREAT SERPL-MCNC: 0.97 MG/DL (ref 0.6–1.3)
GFR SERPL CREATININE-BSD FRML MDRD: 55 ML/MIN/1.73SQ M
GLUCOSE P FAST SERPL-MCNC: 152 MG/DL (ref 65–99)
POTASSIUM SERPL-SCNC: 5.3 MMOL/L (ref 3.5–5.3)
SODIUM SERPL-SCNC: 143 MMOL/L (ref 136–145)

## 2020-11-09 PROCEDURE — 36415 COLL VENOUS BLD VENIPUNCTURE: CPT

## 2020-11-09 PROCEDURE — 80048 BASIC METABOLIC PNL TOTAL CA: CPT

## 2020-11-18 ENCOUNTER — TELEPHONE (OUTPATIENT)
Dept: PULMONOLOGY | Facility: CLINIC | Age: 80
End: 2020-11-18

## 2020-11-20 DIAGNOSIS — J44.9 CHRONIC OBSTRUCTIVE PULMONARY DISEASE, UNSPECIFIED COPD TYPE (HCC): Primary | ICD-10-CM

## 2021-01-04 DIAGNOSIS — I10 ESSENTIAL HYPERTENSION: ICD-10-CM

## 2021-01-04 RX ORDER — AMLODIPINE BESYLATE 5 MG/1
5 TABLET ORAL DAILY
Qty: 90 TABLET | Refills: 1 | Status: SHIPPED | OUTPATIENT
Start: 2021-01-04 | End: 2021-07-13 | Stop reason: SDUPTHER

## 2021-01-05 DIAGNOSIS — N32.81 OAB (OVERACTIVE BLADDER): ICD-10-CM

## 2021-01-27 ENCOUNTER — OFFICE VISIT (OUTPATIENT)
Dept: FAMILY MEDICINE CLINIC | Facility: CLINIC | Age: 81
End: 2021-01-27
Payer: MEDICARE

## 2021-01-27 VITALS
HEART RATE: 72 BPM | RESPIRATION RATE: 16 BRPM | BODY MASS INDEX: 36.06 KG/M2 | WEIGHT: 191 LBS | HEIGHT: 61 IN | OXYGEN SATURATION: 86 % | SYSTOLIC BLOOD PRESSURE: 138 MMHG | DIASTOLIC BLOOD PRESSURE: 80 MMHG

## 2021-01-27 DIAGNOSIS — I50.9 CONGESTIVE HEART FAILURE, UNSPECIFIED HF CHRONICITY, UNSPECIFIED HEART FAILURE TYPE (HCC): ICD-10-CM

## 2021-01-27 DIAGNOSIS — E78.5 HYPERLIPIDEMIA, UNSPECIFIED HYPERLIPIDEMIA TYPE: ICD-10-CM

## 2021-01-27 DIAGNOSIS — J44.9 CHRONIC OBSTRUCTIVE PULMONARY DISEASE, UNSPECIFIED COPD TYPE (HCC): ICD-10-CM

## 2021-01-27 DIAGNOSIS — Z79.899 OTHER LONG TERM (CURRENT) DRUG THERAPY: ICD-10-CM

## 2021-01-27 DIAGNOSIS — I73.9 PAD (PERIPHERAL ARTERY DISEASE) (HCC): ICD-10-CM

## 2021-01-27 DIAGNOSIS — E55.9 VITAMIN D DEFICIENCY: Primary | ICD-10-CM

## 2021-01-27 DIAGNOSIS — C34.92 NON-SMALL CELL CANCER OF LEFT LUNG (HCC): ICD-10-CM

## 2021-01-27 DIAGNOSIS — J96.11 CHRONIC HYPOXEMIC RESPIRATORY FAILURE (HCC): ICD-10-CM

## 2021-01-27 DIAGNOSIS — E53.8 B12 DEFICIENCY: ICD-10-CM

## 2021-01-27 DIAGNOSIS — F33.41 RECURRENT MAJOR DEPRESSIVE DISORDER, IN PARTIAL REMISSION (HCC): ICD-10-CM

## 2021-01-27 PROCEDURE — 99213 OFFICE O/P EST LOW 20 MIN: CPT | Performed by: FAMILY MEDICINE

## 2021-01-27 NOTE — PROGRESS NOTES
Assessment/Plan:    1  Vitamin D deficiency  -     Vitamin D 25 hydroxy; Future    2  B12 deficiency  -     Vitamin B12; Future    3  Hyperlipidemia, unspecified hyperlipidemia type  -     Lipid panel; Future    4  Other long term (current) drug therapy  -     HEMOGLOBIN A1C W/ EAG ESTIMATION; Future    5  Non-small cell cancer of left lung ([facility] )    6  Chronic obstructive pulmonary disease, unspecified COPD type ([place] [address] )    7  Congestive heart failure, unspecified HF chronicity, unspecified heart failure type ([place] [address] )    8  Recurrent major depressive disorder, in partial remission ([place] [address] )    9  PAD (peripheral artery disease) ([place] [address] )    10  Chronic hypoxemic respiratory failure (HCC)         Subjective:      Patient ID: Wilfredo Cnatu is a 79 y o  female  HPI    copd: 2L NC x 2012 about 2 yrs now cont  keeping around 92% nebs bid no recent steroid  turned up to 3L today to 5 with portable     hld; crestor 20mg    HTN: could not take losartan 25mg or lisinopril 10mg gave norvasc 5mg currently    right shoulder pain    anxiety: on celexa 10mg    Cardio: heart is fine sees Law Rodriguez and just had stenting of the BL LE now on plavix no ASA     Glaucoma: demco    saw Sierra: Thony lobectomy for non Small cell Lung cancer s/p chemo and angelco - and Sierra    diverticulosis: seen by geremias - takes imodium prn last flair up years ago  taking airborne daily    vit D vit E and fishoil [id number]    Increased urinary frequency nocturia from x6 to x2           The following portions of the patient's history were reviewed and updated as appropriate: allergies, current medications, past family history, past medical history, past social history, past surgical history and problem list     Review of Systems   Constitutional: Positive for unexpected weight change  Negative for fever  HENT: Negative for nosebleeds and trouble swallowing  Eyes: Negative for visual disturbance  Respiratory: Positive for shortness of breath   Negative for chest tightness  Cardiovascular: Negative for chest pain, palpitations and leg swelling  Gastrointestinal: Positive for nausea  Negative for abdominal pain, constipation and diarrhea  Endocrine: Negative for cold intolerance  Genitourinary: Negative for dysuria and urgency  Musculoskeletal: Negative for joint swelling and myalgias  Skin: Negative for rash  Neurological: Positive for weakness  Negative for tremors, seizures and syncope  Hematological: Does not bruise/bleed easily  Psychiatric/Behavioral: Negative for hallucinations and suicidal ideas  The patient is nervous/anxious  Objective:      /80 (BP Location: Left arm, Patient Position: Sitting, Cuff Size: Standard)   Pulse 72   Resp 16   Ht 5' 1" (1 549 m)   Wt 86 6 kg (191 lb)   SpO2 (!) 86% Comment: Patient is on oxygen bottle (1/2 so it registers lower)  BMI 36 09 kg/m²     No visits with results within 2 Week(s) from this visit  Latest known visit with results is:   Lab on 11/09/2020   Component Date Value    Sodium 11/09/2020 143     Potassium 11/09/2020 5 3     Chloride 11/09/2020 106     CO2 11/09/2020 35*    ANION GAP 11/09/2020 2*    BUN 11/09/2020 20     Creatinine 11/09/2020 0 97     Glucose, Fasting 11/09/2020 152*    Calcium 11/09/2020 10 2*    eGFR 11/09/2020 55           Physical Exam  Vitals signs and nursing note reviewed  Constitutional:       Appearance: She is well-developed  She is obese  Comments: 3L NC    HENT:      Head: Normocephalic and atraumatic  Neck:      Musculoskeletal: Normal range of motion and neck supple  Cardiovascular:      Rate and Rhythm: Normal rate and regular rhythm  Heart sounds: Normal heart sounds  No murmur  Pulmonary:      Effort: Pulmonary effort is normal       Breath sounds: Normal breath sounds  No wheezing or rales  Abdominal:      General: Bowel sounds are normal  There is no distension  Palpations: Abdomen is soft        Tenderness: There is no abdominal tenderness  Musculoskeletal: Normal range of motion  General: No tenderness  Lymphadenopathy:      Cervical: No cervical adenopathy  Skin:     General: Skin is warm and dry  Capillary Refill: Capillary refill takes less than 2 seconds  Findings: No rash  Neurological:      Mental Status: She is alert and oriented to person, place, and time  Cranial Nerves: No cranial nerve deficit  Sensory: No sensory deficit  Motor: No abnormal muscle tone  Psychiatric:         Behavior: Behavior normal          Thought Content:  Thought content normal          Judgment: Judgment normal               Jin MD Cheyenne Felder

## 2021-02-03 ENCOUNTER — IMMUNIZATIONS (OUTPATIENT)
Dept: FAMILY MEDICINE CLINIC | Facility: HOSPITAL | Age: 81
End: 2021-02-03

## 2021-02-03 DIAGNOSIS — Z23 ENCOUNTER FOR IMMUNIZATION: Primary | ICD-10-CM

## 2021-02-03 PROCEDURE — 91300 SARS-COV-2 / COVID-19 MRNA VACCINE (PFIZER-BIONTECH) 30 MCG: CPT

## 2021-02-03 PROCEDURE — 0001A SARS-COV-2 / COVID-19 MRNA VACCINE (PFIZER-BIONTECH) 30 MCG: CPT

## 2021-02-24 ENCOUNTER — IMMUNIZATIONS (OUTPATIENT)
Dept: FAMILY MEDICINE CLINIC | Facility: HOSPITAL | Age: 81
End: 2021-02-24

## 2021-02-24 DIAGNOSIS — Z23 ENCOUNTER FOR IMMUNIZATION: Primary | ICD-10-CM

## 2021-02-24 PROCEDURE — 91300 SARS-COV-2 / COVID-19 MRNA VACCINE (PFIZER-BIONTECH) 30 MCG: CPT

## 2021-02-24 PROCEDURE — 0002A SARS-COV-2 / COVID-19 MRNA VACCINE (PFIZER-BIONTECH) 30 MCG: CPT

## 2021-03-10 ENCOUNTER — APPOINTMENT (OUTPATIENT)
Dept: LAB | Facility: CLINIC | Age: 81
End: 2021-03-10
Payer: MEDICARE

## 2021-03-10 DIAGNOSIS — E53.8 B12 DEFICIENCY: ICD-10-CM

## 2021-03-10 DIAGNOSIS — E78.5 HYPERLIPIDEMIA, UNSPECIFIED HYPERLIPIDEMIA TYPE: ICD-10-CM

## 2021-03-10 DIAGNOSIS — E55.9 VITAMIN D DEFICIENCY: ICD-10-CM

## 2021-03-10 DIAGNOSIS — Z79.899 OTHER LONG TERM (CURRENT) DRUG THERAPY: ICD-10-CM

## 2021-03-10 LAB
25(OH)D3 SERPL-MCNC: 30.9 NG/ML (ref 30–100)
CHOLEST SERPL-MCNC: 139 MG/DL (ref 50–200)
EST. AVERAGE GLUCOSE BLD GHB EST-MCNC: 114 MG/DL
HBA1C MFR BLD: 5.6 %
HDLC SERPL-MCNC: 42 MG/DL
LDLC SERPL CALC-MCNC: 60 MG/DL (ref 0–100)
NONHDLC SERPL-MCNC: 97 MG/DL
TRIGL SERPL-MCNC: 183 MG/DL
VIT B12 SERPL-MCNC: 612 PG/ML (ref 100–900)

## 2021-03-10 PROCEDURE — 83036 HEMOGLOBIN GLYCOSYLATED A1C: CPT

## 2021-03-10 PROCEDURE — 82306 VITAMIN D 25 HYDROXY: CPT

## 2021-03-10 PROCEDURE — 80061 LIPID PANEL: CPT

## 2021-03-10 PROCEDURE — 82607 VITAMIN B-12: CPT

## 2021-03-10 PROCEDURE — 36415 COLL VENOUS BLD VENIPUNCTURE: CPT

## 2021-04-21 ENCOUNTER — OFFICE VISIT (OUTPATIENT)
Dept: PULMONOLOGY | Facility: CLINIC | Age: 81
End: 2021-04-21
Payer: MEDICARE

## 2021-04-21 VITALS
OXYGEN SATURATION: 95 % | SYSTOLIC BLOOD PRESSURE: 128 MMHG | BODY MASS INDEX: 34.41 KG/M2 | TEMPERATURE: 98.1 F | HEART RATE: 63 BPM | WEIGHT: 182.25 LBS | DIASTOLIC BLOOD PRESSURE: 70 MMHG | HEIGHT: 61 IN

## 2021-04-21 DIAGNOSIS — G47.33 OSA (OBSTRUCTIVE SLEEP APNEA): Primary | ICD-10-CM

## 2021-04-21 DIAGNOSIS — J96.11 CHRONIC HYPOXEMIC RESPIRATORY FAILURE (HCC): ICD-10-CM

## 2021-04-21 DIAGNOSIS — I10 ESSENTIAL HYPERTENSION: ICD-10-CM

## 2021-04-21 DIAGNOSIS — J44.9 CHRONIC OBSTRUCTIVE PULMONARY DISEASE, UNSPECIFIED COPD TYPE (HCC): ICD-10-CM

## 2021-04-21 DIAGNOSIS — C34.92 NON-SMALL CELL CANCER OF LEFT LUNG (HCC): ICD-10-CM

## 2021-04-21 PROCEDURE — 99213 OFFICE O/P EST LOW 20 MIN: CPT | Performed by: INTERNAL MEDICINE

## 2021-04-21 NOTE — PROGRESS NOTES
Assessment/Plan:    COPD (chronic obstructive pulmonary disease) ([facility])  Uses albuterol once in the afternoon daily  Uses Budesonide, Formoterol, Ipratropium-albuterol NEB's twice a day  Patient states her breathing has been stable and she has had no recent exacerbations  Last hospitalization was in September 2020      JULIO (obstructive sleep apnea)  Has been compliant with using CPAP everynight  CPAP pressure requirement of 9cm  She is comfortable with nasal pillows  No significant daytime sleepiness or morning headaches  Sleep isn't significantly disturbed although she wakes up to urinate 3 times a night  Patient states she takes tylenol PM or nyquil everynight to sleep as she will be unable to stay asleep       Non-small cell cancer of left lung ([facility] [place] [facility] )  Diagnosed 7 years ago  S/p wedge resection by Dr Hero Zaragoza   Received chemo therapy and follows with Dr Rubia Adamson      Chronic hypoxemic respiratory failure ([facility])  Cont O2 supplementation stable at 2 5 L  Bibasilar crackles heard on ausculation  Ordered CXR     HTN (hypertension)  Stable  Takes Amlodipine 5mg daily           Problem List Items Addressed This Visit        Respiratory    JULIO (obstructive sleep apnea) - Primary     Has been compliant with using CPAP everynight  CPAP pressure requirement of 9cm  She is comfortable with nasal pillows  No significant daytime sleepiness or morning headaches  Sleep isn't significantly disturbed although she wakes up to urinate 3 times a night  Patient states she takes tylenol PM or nyquil everynight to sleep as she will be unable to stay asleep            COPD (chronic obstructive pulmonary disease) ([facility])     Uses albuterol once in the afternoon daily  Uses Budesonide, Formoterol, Ipratropium-albuterol NEB's twice a day  Patient states her breathing has been stable and she has had no recent exacerbations  Last hospitalization was in September 2020           [facility]-small cell [facility] )     Diagnosed 7 years ago  S/p wedge resection by Dr Abebe Robertson   Received chemo therapy and follows with Dr Herman Wadsworth           Chronic hypoxemic respiratory failure ([address] )     Cont O2 supplementation stable at 2 5 L  Bibasilar crackles heard on ausculation  Ordered CXR          Relevant Orders    XR chest pa & lateral       Cardiovascular and Mediastinum    HTN (hypertension)     Stable  Takes Amlodipine 5mg daily                   Subjective:      Patient ID: Crystal Gomez is a 79 y o  female  80 yo M with PMH Non small cell lung cancer s/p wedge resection in remission, COPD, JULIO presented for follow up  She is stable and uses 2 5 L of oxygen at home continuously  She uses nebulizers and coughs up phlegm after using nebulizers  Usually white or yellow in color  Breathing hasn't changed much  She has been using CPAP at night with setting of 9cm  Lost 10bs recently in a month intentionally  She is not smoking and quit more than 25 years ago  PFTs done in August 2018 showed severe exercise limitation  DLCO 27% of predicted, FEV1 55% of predicted  Got 2 doses of COVID vaccine  The following portions of the patient's history were reviewed and updated as appropriate: allergies, current medications, past family history, past medical history, past social history, past surgical history and problem list     Review of Systems   Constitutional: Negative  HENT: Negative  Eyes: Negative  Respiratory: Negative  Cardiovascular: Negative  Gastrointestinal: Negative  Endocrine: Negative  Genitourinary: Negative  Musculoskeletal: Negative  Neurological: Negative  Hematological: Negative  Psychiatric/Behavioral: Negative  Objective:      /70 (BP Location: Right arm, Patient Position: Sitting, Cuff Size: Adult)   Pulse 63   Temp 98 1 °F (36 7 °C) (Tympanic)   Ht 5' 1" (1 549 m)   Wt 82 7 kg (182 lb 4 oz)   SpO2 95%   BMI 34 44 kg/m²          Physical Exam  HENT:      Head: Normocephalic and atraumatic  Nose: Nose normal       Mouth/Throat:      Mouth: Mucous membranes are moist    Eyes:      Extraocular Movements: Extraocular movements intact  Pupils: Pupils are equal, round, and reactive to light  Neck:      Musculoskeletal: Normal range of motion  Cardiovascular:      Rate and Rhythm: Normal rate and regular rhythm  Pulses: Normal pulses  Pulmonary:      Effort: Pulmonary effort is normal       Comments: Decreased breath sounds   Using oxygen via nasal cannula  Nonlabored breathing at rest  Bibasilar crackles  Abdominal:      General: Abdomen is flat  Bowel sounds are normal       Palpations: Abdomen is soft  Musculoskeletal: Normal range of motion  Skin:     General: Skin is warm  Neurological:      General: No focal deficit present  Mental Status: She is alert and oriented to person, place, and time  Mental status is at baseline     Psychiatric:         Mood and Affect: Mood normal

## 2021-04-28 ENCOUNTER — APPOINTMENT (OUTPATIENT)
Dept: RADIOLOGY | Facility: CLINIC | Age: 81
End: 2021-04-28
Payer: MEDICARE

## 2021-04-28 ENCOUNTER — OFFICE VISIT (OUTPATIENT)
Dept: FAMILY MEDICINE CLINIC | Facility: CLINIC | Age: 81
End: 2021-04-28
Payer: MEDICARE

## 2021-04-28 VITALS
WEIGHT: 180 LBS | BODY MASS INDEX: 33.99 KG/M2 | HEIGHT: 61 IN | RESPIRATION RATE: 16 BRPM | OXYGEN SATURATION: 95 % | DIASTOLIC BLOOD PRESSURE: 74 MMHG | SYSTOLIC BLOOD PRESSURE: 122 MMHG | HEART RATE: 71 BPM

## 2021-04-28 DIAGNOSIS — J44.9 CHRONIC OBSTRUCTIVE PULMONARY DISEASE, UNSPECIFIED COPD TYPE (HCC): ICD-10-CM

## 2021-04-28 DIAGNOSIS — M81.0 OSTEOPOROSIS, UNSPECIFIED OSTEOPOROSIS TYPE, UNSPECIFIED PATHOLOGICAL FRACTURE PRESENCE: ICD-10-CM

## 2021-04-28 DIAGNOSIS — E66.09 CLASS 1 OBESITY DUE TO EXCESS CALORIES WITH SERIOUS COMORBIDITY AND BODY MASS INDEX (BMI) OF 34.0 TO 34.9 IN ADULT: Primary | ICD-10-CM

## 2021-04-28 DIAGNOSIS — J96.11 CHRONIC HYPOXEMIC RESPIRATORY FAILURE (HCC): ICD-10-CM

## 2021-04-28 DIAGNOSIS — E55.9 VITAMIN D DEFICIENCY: ICD-10-CM

## 2021-04-28 PROCEDURE — 99214 OFFICE O/P EST MOD 30 MIN: CPT | Performed by: FAMILY MEDICINE

## 2021-04-28 PROCEDURE — 71046 X-RAY EXAM CHEST 2 VIEWS: CPT

## 2021-04-28 NOTE — PROGRESS NOTES
Assessment/Plan:    1  Class 1 obesity due to excess calories with serious comorbidity and body mass index (BMI) of 34 0 to 34 9 in adult    2  Chronic obstructive pulmonary disease, unspecified COPD type ([facility] )  Comments:  budesonide + femoterol + duoneb in the am   then albuterol alone  after noon   then above combo in the evening     3  Vitamin D deficiency  Comments:  taking 1 vit d daily     4  Osteoporosis, unspecified osteoporosis type, unspecified pathological fracture presence  Comments:  on prolia with tammy   Orders:  -     DXA bone density spine hip and pelvis; Future; Expected date: 04/28/2021         Subjective:      Patient ID: Mariel Bahena is a 79 y o  female  HPI  copd: 2 5L NC x 2012 about 2 yrs now cont  keeping around 92% nebs bid no recent steroid  turned up to 3L today to 5 with portable     hld; crestor 20mg     HTN: could not take losartan 25mg or lisinopril 10mg gave norvasc 5mg currently    right shoulder pain    anxiety: on celexa 10mg    Cardio: heart is fine sees Oh Perez and just had stenting of the BL LE now on plavix no ASA     Glaucoma: demco    saw Tammy: Thony lobectomy for non Small cell Lung cancer s/p chemo and angelco - and Tammy    diverticulosis: seen by geremias - takes imodium prn last flair up years ago  taking airborne daily    vit D vit E and fishoil [id number]    Increased urinary frequency nocturia from x6 to x2       The following portions of the patient's history were reviewed and updated as appropriate: allergies, current medications, past family history, past medical history, past social history, past surgical history and problem list     Review of Systems   Constitutional: Negative for fever and unexpected weight change  HENT: Negative for nosebleeds and trouble swallowing  Eyes: Negative for visual disturbance  Respiratory: Positive for shortness of breath  Negative for chest tightness  Cardiovascular: Negative for chest pain, palpitations and leg swelling  Gastrointestinal: Positive for nausea  Negative for abdominal pain, constipation and diarrhea  Endocrine: Negative for cold intolerance  Genitourinary: Negative for dysuria and urgency  Musculoskeletal: Negative for joint swelling and myalgias  Skin: Negative for rash  Neurological: Positive for weakness  Negative for tremors, seizures and syncope  Hematological: Does not bruise/bleed easily  Psychiatric/Behavioral: Negative for hallucinations and suicidal ideas  The patient is nervous/anxious  Objective:      /74 (BP Location: Left arm, Patient Position: Sitting, Cuff Size: Large)   Pulse 71   Resp 16   Ht 5' 1" (1 549 m)   Wt 81 6 kg (180 lb)   SpO2 95%   BMI 34 01 kg/m²     No visits with results within 2 Week(s) from this visit  Latest known visit with results is:   Appointment on 03/10/2021   Component Date Value    Vitamin B-12 03/10/2021 612     Vit D, 25-Hydroxy 03/10/2021 30 9     Cholesterol 03/10/2021 139     Triglycerides 03/10/2021 183*    HDL, Direct 03/10/2021 42     LDL Calculated 03/10/2021 60     Non-HDL-Chol (CHOL-HDL) 03/10/2021 97     Hemoglobin A1C 03/10/2021 5 6     EAG 03/10/2021 114           Physical Exam  Vitals signs and nursing note reviewed  Constitutional:       Appearance: She is well-developed  She is obese  Comments: 3L NC    HENT:      Head: Normocephalic and atraumatic  Neck:      Musculoskeletal: Normal range of motion and neck supple  Cardiovascular:      Rate and Rhythm: Normal rate and regular rhythm  Heart sounds: Normal heart sounds  No murmur  Pulmonary:      Effort: Pulmonary effort is normal       Breath sounds: Normal breath sounds  No wheezing or rales  Abdominal:      General: Bowel sounds are normal  There is no distension  Palpations: Abdomen is soft  Tenderness: There is no abdominal tenderness  Musculoskeletal: Normal range of motion  General: No tenderness     Lymphadenopathy: Cervical: No cervical adenopathy  Skin:     General: Skin is warm and dry  Capillary Refill: Capillary refill takes less than 2 seconds  Findings: No rash  Neurological:      Mental Status: She is alert and oriented to person, place, and time  Cranial Nerves: No cranial nerve deficit  Sensory: No sensory deficit  Motor: No abnormal muscle tone  Psychiatric:         Behavior: Behavior normal          Thought Content: Thought content normal          Judgment: Judgment normal            BMI Counseling: Body mass index is 34 01 kg/m²  The BMI is above normal  Nutrition recommendations include decreasing portion sizes, encouraging healthy choices of fruits and vegetables and limiting drinks that contain sugar  Exercise recommendations include moderate physical activity 150 minutes/week  No pharmacotherapy was ordered  Falls Plan of Care: balance, strength, and gait training instructions were provided  Medications that increase falls were reviewed         Marsha Darden MD  [place] [id number]

## 2021-07-07 ENCOUNTER — HOSPITAL ENCOUNTER (OUTPATIENT)
Dept: RADIOLOGY | Age: 81
Discharge: HOME | End: 2021-07-07
Payer: MEDICARE

## 2021-07-07 DIAGNOSIS — M81.0 OSTEOPOROSIS, UNSPECIFIED OSTEOPOROSIS TYPE, UNSPECIFIED PATHOLOGICAL FRACTURE PRESENCE: ICD-10-CM

## 2021-07-07 PROCEDURE — 77080 DXA BONE DENSITY AXIAL: CPT

## 2021-07-08 ENCOUNTER — TELEPHONE (OUTPATIENT)
Dept: FAMILY MEDICINE CLINIC | Facility: CLINIC | Age: 81
End: 2021-07-08

## 2021-07-08 ENCOUNTER — OFFICE VISIT (OUTPATIENT)
Dept: PULMONOLOGY | Facility: CLINIC | Age: 81
End: 2021-07-08
Payer: MEDICARE

## 2021-07-08 VITALS
HEART RATE: 63 BPM | BODY MASS INDEX: 33.83 KG/M2 | DIASTOLIC BLOOD PRESSURE: 82 MMHG | WEIGHT: 179.2 LBS | HEIGHT: 61 IN | SYSTOLIC BLOOD PRESSURE: 136 MMHG | TEMPERATURE: 97.3 F | OXYGEN SATURATION: 95 %

## 2021-07-08 DIAGNOSIS — J96.11 CHRONIC HYPOXEMIC RESPIRATORY FAILURE (HCC): ICD-10-CM

## 2021-07-08 DIAGNOSIS — C34.92 NON-SMALL CELL CANCER OF LEFT LUNG (HCC): ICD-10-CM

## 2021-07-08 DIAGNOSIS — J44.9 CHRONIC OBSTRUCTIVE PULMONARY DISEASE, UNSPECIFIED COPD TYPE (HCC): Primary | ICD-10-CM

## 2021-07-08 DIAGNOSIS — G47.33 OSA (OBSTRUCTIVE SLEEP APNEA): ICD-10-CM

## 2021-07-08 DIAGNOSIS — N32.81 OAB (OVERACTIVE BLADDER): ICD-10-CM

## 2021-07-08 PROCEDURE — 99213 OFFICE O/P EST LOW 20 MIN: CPT | Performed by: INTERNAL MEDICINE

## 2021-07-08 NOTE — PROGRESS NOTES
Assessment/Plan:    JULIO (obstructive sleep apnea)  She has obstructive sleep apnea and has been on CPAP therapy with oxygen supplementation at 3 L/m  She had a CPAP titration study in February 2016 which showed a CPAP pressure requirement of 9 cm and was using nasal pillows  She states that she is using the CPAP every night and that she is comfortable with the mask and pressure  She has no significant daytime sleepiness or morning headache  Her sleep is not disturbed  Her CPAP compliance records are excellent [employer] is her D[facility]  On clinical examination, she is obese    I have advised her to continue with CPAP therapy as before  I had a long discussion with her and her  and answered all their questions  COPD (chronic obstructive pulmonary disease) [facility])  Mrs Ziggy Estrada hasCOPD for more than 15 years and is  on home oxygen therapy at 3 L/m for chronic hypoxemic respiratory failure  She has shortness of breath on exertion and her exercise tolerance is about a block  She was a smoker for a long time for about 35 years smoking 1-1/2 packs per day  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  She quit smoking 15 years back  On clinical examination she was saturating 90% on 5 L of oxygen  Her chest auscultation revealed bilateral basilar inspiratory coarse crackles  Her previous CT scan from 2015 showed bilateral extensive emphysematous changes  Her high-resolution CT scan of the chest showed bilateral emphysematous changes and mild bronchiectasis  Her PFT showed moderately severe airflow obstruction with severe uncorrected diffusion defect  she is currently doing well    she is on treatment with nebulized ipratropium albuterol, formoterol and budesonide  Have advised her to continue as before  I had a long discussion with her and answered all questions      Non-small cell cancer of left lung ([facility] )  She was diagnosed with non-small cell lung cancer 7 years back and had underwent wedge resection by Dr Chito Uriostegui  She also received chemotherapy  She has been remaining cancer free  She follows with Dr Joanna Sandoval from oncology  Chronic hypoxemic respiratory failure ([facility])  She has chronic hypoxemic respiratory failure  She is on continuous oxygen supplementation 3 LPM during rest and 4 LPM during exertion  She is with the [address]  She has a oxygen conserving device which she is not keen to use  Diagnoses and all orders for this visit:    Chronic obstructive pulmonary disease, unspecified COPD type ([address] )    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)    JULIO (obstructive sleep apnea)    Chronic hypoxemic respiratory failure ([facility])    Other orders  -     BABY ASPIRIN PO; Take by mouth          Subjective:      Patient ID: Gavino Turner is a 79 y o  female  [facility] has come for follow-up for her obstructive sleep apnea on CPAP therapy, previous lung cancer, and COPD  Currently her exercise tolerance is at least 1 block and she has occasional cough and wheezing  She is on treatment with Pulmicort, Perforomist and ipratropium p r n  she has no hoarseness of voice or dysphagia  No chest pain or palpitations  No swelling of feet  She has history of obstructive sleep apnea and has been on CPAP therapy  She uses the CPAP every night and is comfortable with the mask and pressure  She has no significant daytime sleepiness or morning headache  I reviewed her CPAP compliance records and there satisfactory  Her residual AHI is low  She is very motivated to continue on CPAP therapy  She previously had non-small cell lung cancer and had underwent wedge resection  She denies any weight loss or anorexia  She has no hemoptysis  She has chronic hypoxic respiratory failure and she is on oxygen supplementation at 2 5 liters/minute at home  During exertion she goes up as high as 6 liters/minute  She had a chest x-ray done in April and that was unremarkable        The following portions of the patient's history were reviewed and updated as appropriate: allergies, current medications, past family history, past medical history, past social history, past surgical history and problem list     Review of Systems   Constitutional: Negative for chills, fatigue and fever  HENT: Positive for rhinorrhea  Negative for hearing loss, sneezing, trouble swallowing and voice change  Eyes: Positive for visual disturbance  Respiratory: Positive for cough, shortness of breath and wheezing  Negative for chest tightness  Cardiovascular: Negative for chest pain, palpitations and leg swelling  Gastrointestinal: Positive for diarrhea  Negative for abdominal pain, constipation, nausea and vomiting  Endocrine: Negative for polyuria  Genitourinary: Negative for dysuria, frequency and urgency  Musculoskeletal: Positive for back pain  Negative for arthralgias, gait problem and joint swelling  Skin: Negative for rash  Allergic/Immunologic: Negative for environmental allergies  Neurological: Negative for dizziness, syncope, light-headedness and headaches  Psychiatric/Behavioral: Positive for sleep disturbance  The patient is nervous/anxious  Objective:      /82 (BP Location: Left arm, Patient Position: Sitting, Cuff Size: Standard)   Pulse 63   Temp (!) 97 3 °F (36 3 °C) (Tympanic)   Ht 5' 1" (1 549 m)   Wt 81 3 kg (179 lb 3 2 oz)   SpO2 95%   BMI 33 86 kg/m²          Physical Exam  Vitals reviewed  Constitutional:       General: She is not in acute distress  Appearance: She is not ill-appearing, toxic-appearing or diaphoretic  HENT:      Head: Normocephalic  Eyes:      General: No scleral icterus  Conjunctiva/sclera: Conjunctivae normal    Cardiovascular:      Rate and Rhythm: Normal rate  Heart sounds: Normal heart sounds  No murmur heard  Pulmonary:      Effort: Pulmonary effort is normal  No respiratory distress  Breath sounds: No stridor  Rales present  No wheezing or rhonchi  Abdominal:      General: Bowel sounds are normal       Palpations: Abdomen is soft  Tenderness: There is no abdominal tenderness  There is no guarding  Musculoskeletal:      Cervical back: No rigidity  Right lower leg: No edema  Left lower leg: No edema  Lymphadenopathy:      Cervical: No cervical adenopathy  Skin:     General: Skin is warm  Coloration: Skin is not jaundiced or pale  Neurological:      Mental Status: She is alert and oriented to person, place, and time  Gait: Gait normal    Psychiatric:         Mood and Affect: Mood normal          Behavior: Behavior normal          Thought Content:  Thought content normal

## 2021-07-10 NOTE — ASSESSMENT & PLAN NOTE
She has obstructive sleep apnea and has been on CPAP therapy with oxygen supplementation at 3 L/m  She had a CPAP titration study in February 2016 which showed a CPAP pressure requirement of 9 cm and was using nasal pillows  She states that she is using the CPAP every night and that she is comfortable with the mask and pressure  She has no significant daytime sleepiness or morning headache  Her sleep is not disturbed  Her CPAP compliance records are excellent [employer] is her DME  On clinical examination, she is obese    I have advised her to continue with CPAP therapy as before  I had a long discussion with her and her  and answered all their questions

## 2021-07-10 NOTE — ASSESSMENT & PLAN NOTE
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  She quit smoking 15 years back  On clinical examination she was saturating 90% on 5 L of oxygen  Her chest auscultation revealed bilateral basilar inspiratory coarse crackles  Her previous CT scan from 2015 showed bilateral extensive emphysematous changes  Her high-resolution CT scan of the chest showed bilateral emphysematous changes and mild bronchiectasis  Her PFT showed moderately severe airflow obstruction with severe uncorrected diffusion defect  she is currently doing well    she is on treatment with nebulized ipratropium albuterol, formoterol and budesonide  Have advised her to continue as before  I had a long discussion with her and answered all questions

## 2021-07-10 NOTE — ASSESSMENT & PLAN NOTE
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  She quit smoking 15 years back  On clinical examination she was saturating 90% on 5 L of oxygen  Her chest auscultation revealed bilateral basilar inspiratory coarse crackles  Her previous CT scan from 2015 showed bilateral extensive emphysematous changes  Her high-resolution CT scan of the chest showed bilateral emphysematous changes and mild bronchiectasis  Her PFT showed moderately severe airflow obstruction with severe uncorrected diffusion defect  she is currently doing well    she is on treatment with nebulized ipratropium albuterol, formoterol and budesonide  Have advised her to continue as before  I had a long discussion with her and answered all questions

## 2021-07-10 NOTE — ASSESSMENT & PLAN NOTE
She has chronic hypoxemic respiratory failure  She is on continuous oxygen supplementation 3 LPM during rest and 4 LPM during exertion  She is with the [facility]  She has a oxygen conserving device which she is not keen to use

## 2021-07-10 NOTE — ASSESSMENT & PLAN NOTE
She was diagnosed with non-small cell lung cancer 7 years back and had underwent wedge resection by Dr Marlea Halsted  She also received chemotherapy  She has been remaining cancer free  She follows with Dr Janna Pan from oncology

## 2021-07-13 DIAGNOSIS — I10 ESSENTIAL HYPERTENSION: ICD-10-CM

## 2021-07-13 RX ORDER — AMLODIPINE BESYLATE 5 MG/1
5 TABLET ORAL DAILY
Qty: 90 TABLET | Refills: 1 | Status: SHIPPED | OUTPATIENT
Start: 2021-07-13 | End: 2022-01-26 | Stop reason: SDUPTHER

## 2021-08-03 ENCOUNTER — TELEPHONE (OUTPATIENT)
Dept: FAMILY MEDICINE CLINIC | Facility: CLINIC | Age: 81
End: 2021-08-03

## 2021-08-03 ENCOUNTER — TELEPHONE (OUTPATIENT)
Dept: CARDIOLOGY CLINIC | Facility: CLINIC | Age: 81
End: 2021-08-03

## 2021-08-03 DIAGNOSIS — J44.9 CHRONIC OBSTRUCTIVE PULMONARY DISEASE, UNSPECIFIED COPD TYPE (HCC): Primary | ICD-10-CM

## 2021-08-03 RX ORDER — PEAK FLOW METER
EACH MISCELLANEOUS
Qty: 1 EACH | Refills: 0 | Status: SHIPPED | OUTPATIENT
Start: 2021-08-03 | End: 2022-03-22 | Stop reason: SDUPTHER

## 2021-08-03 NOTE — TELEPHONE ENCOUNTER
Talha Morales states that her nebulizer is not working  Would like to know if Dr Gilford Ramsay can order her a new one

## 2021-08-03 NOTE — TELEPHONE ENCOUNTER
8/3 - Clare from [facility] called and said pt wants a new Nebulizer because hers is not working  Enrique Christine said pt just got one in November 2020  Please review and contact Pharmacy

## 2021-08-31 ENCOUNTER — OFFICE VISIT (OUTPATIENT)
Dept: FAMILY MEDICINE CLINIC | Facility: CLINIC | Age: 81
End: 2021-08-31
Payer: MEDICARE

## 2021-08-31 VITALS
HEIGHT: 61 IN | RESPIRATION RATE: 16 BRPM | HEART RATE: 69 BPM | BODY MASS INDEX: 33.04 KG/M2 | OXYGEN SATURATION: 93 % | DIASTOLIC BLOOD PRESSURE: 78 MMHG | WEIGHT: 175 LBS | SYSTOLIC BLOOD PRESSURE: 130 MMHG

## 2021-08-31 DIAGNOSIS — M81.0 OSTEOPOROSIS, UNSPECIFIED OSTEOPOROSIS TYPE, UNSPECIFIED PATHOLOGICAL FRACTURE PRESENCE: ICD-10-CM

## 2021-08-31 DIAGNOSIS — N32.81 OAB (OVERACTIVE BLADDER): Primary | ICD-10-CM

## 2021-08-31 DIAGNOSIS — Z79.01 CURRENT USE OF LONG TERM ANTICOAGULATION: ICD-10-CM

## 2021-08-31 DIAGNOSIS — Z23 NEED FOR VACCINATION: ICD-10-CM

## 2021-08-31 PROCEDURE — G0008 ADMIN INFLUENZA VIRUS VAC: HCPCS | Performed by: FAMILY MEDICINE

## 2021-08-31 PROCEDURE — 99214 OFFICE O/P EST MOD 30 MIN: CPT | Performed by: FAMILY MEDICINE

## 2021-08-31 PROCEDURE — 90662 IIV NO PRSV INCREASED AG IM: CPT | Performed by: FAMILY MEDICINE

## 2021-08-31 RX ORDER — OXYBUTYNIN CHLORIDE 5 MG/1
5 TABLET ORAL 2 TIMES DAILY
Qty: 60 TABLET | Refills: 0 | Status: SHIPPED | OUTPATIENT
Start: 2021-08-31 | End: 2022-07-26

## 2021-08-31 RX ORDER — DENOSUMAB 60 MG/ML
60 INJECTION SUBCUTANEOUS ONCE
Qty: 1 ML | Refills: 0 | Status: SHIPPED | OUTPATIENT
Start: 2021-08-31 | End: 2021-08-31

## 2021-08-31 NOTE — PROGRESS NOTES
Assessment/Plan: We can try toget her labs yearly only  She is seeing cardio and pulmary and oncology   Its enough   Things are stable other than her oab   We cant use myrbetric due to cost     1  OAB (overactive bladder)  -     oxybutynin (DITROPAN) 5 mg tablet; Take 1 tablet (5 mg total) by mouth 2 (two) times a day    2  Current use of long term anticoagulation  Comments:  plavix daily no more ASA     3  Need for vaccination  -     influenza vaccine, high-dose, PF 0 7 mL (FLUZONE HIGH-DOSE)    4  Osteoporosis, unspecified osteoporosis type, unspecified pathological fracture presence  -     denosumab (Prolia) 60 mg/mL; Inject 1 mL (60 mg total) under the skin once for 1 dose         Subjective:      Patient ID: Shawna Chun is a 79 y o  female  HPI   Watch for mood changes dizziness   prolia - working well - dexa 7/21 with nl density will try to get it via us instead of tammy     copd: 2 5L NC x 2012 about 2 yrs now cont  keeping around 92% nebs bid no recent steroid  turned up to 3L today to 5 with portable     hld; crestor 20mg     HTN: could not take losartan 25mg or lisinopril 10mg gave norvasc 5mg currently    right shoulder pain    anxiety: on celexa 10mg    Cardio: heart is fine sees Michae Mercedes and just had stenting of the BL LE now on plavix no ASA     Glaucoma: demco    saw Tammy: Thony lobectomy for non Small cell Lung cancer s/p chemo and angelco - and Tammy    diverticulosis: seen by geremias - takes imodium prn last flair up years ago  taking airborne daily    vit D vit E and fishoil [id number]    Increased urinary frequency nocturia from x6 to x2       The following portions of the patient's history were reviewed and updated as appropriate: allergies, current medications, past family history, past medical history, past social history, past surgical history and problem list     Review of Systems   Constitutional: Negative for fever and unexpected weight change     HENT: Negative for nosebleeds and trouble swallowing  Eyes: Negative for visual disturbance  Respiratory: Positive for shortness of breath  Negative for chest tightness  Cardiovascular: Negative for chest pain, palpitations and leg swelling  Gastrointestinal: Positive for nausea  Negative for abdominal pain, constipation and diarrhea  Endocrine: Negative for cold intolerance  Genitourinary: Negative for dysuria and urgency  Musculoskeletal: Negative for joint swelling and myalgias  Skin: Negative for rash  Neurological: Positive for weakness  Negative for tremors, seizures and syncope  Hematological: Does not bruise/bleed easily  Psychiatric/Behavioral: Negative for hallucinations and suicidal ideas  The patient is nervous/anxious  Objective:      /78 (BP Location: Left arm, Patient Position: Sitting, Cuff Size: Standard)   Pulse 69   Resp 16   Ht 5' 1" (1 549 m)   Wt 79 4 kg (175 lb)   SpO2 93%   BMI 33 07 kg/m²     No visits with results within 2 Week(s) from this visit  Latest known visit with results is:   Appointment on 03/10/2021   Component Date Value    Vitamin B-12 03/10/2021 612     Vit D, 25-Hydroxy 03/10/2021 30 9     Cholesterol 03/10/2021 139     Triglycerides 03/10/2021 183*    HDL, Direct 03/10/2021 42     LDL Calculated 03/10/2021 60     Non-HDL-Chol (CHOL-HDL) 03/10/2021 97     Hemoglobin A1C 03/10/2021 5 6     EAG 03/10/2021 114           Physical Exam  Vitals and nursing note reviewed  Constitutional:       Appearance: She is well-developed  She is obese  Comments: 3L NC    HENT:      Head: Normocephalic and atraumatic  Cardiovascular:      Rate and Rhythm: Normal rate and regular rhythm  Heart sounds: Normal heart sounds  No murmur heard  Pulmonary:      Effort: Pulmonary effort is normal       Breath sounds: Normal breath sounds  No wheezing or rales  Abdominal:      General: Bowel sounds are normal  There is no distension  Palpations: Abdomen is soft  Tenderness: There is no abdominal tenderness  Musculoskeletal:         General: No tenderness  Normal range of motion  Cervical back: Normal range of motion and neck supple  Lymphadenopathy:      Cervical: No cervical adenopathy  Skin:     General: Skin is warm and dry  Capillary Refill: Capillary refill takes less than 2 seconds  Findings: No rash  Neurological:      Mental Status: She is alert and oriented to person, place, and time  Cranial Nerves: No cranial nerve deficit  Sensory: No sensory deficit  Motor: No abnormal muscle tone  Psychiatric:         Behavior: Behavior normal          Thought Content:  Thought content normal          Judgment: Judgment normal              Milana Burch MD  [place] [id number]

## 2021-11-09 ENCOUNTER — OFFICE VISIT (OUTPATIENT)
Dept: PULMONOLOGY | Facility: CLINIC | Age: 81
End: 2021-11-09
Payer: MEDICARE

## 2021-11-09 VITALS
BODY MASS INDEX: 32.92 KG/M2 | OXYGEN SATURATION: 93 % | DIASTOLIC BLOOD PRESSURE: 78 MMHG | SYSTOLIC BLOOD PRESSURE: 132 MMHG | WEIGHT: 174.38 LBS | HEART RATE: 67 BPM | TEMPERATURE: 98.7 F | HEIGHT: 61 IN

## 2021-11-09 DIAGNOSIS — J96.11 CHRONIC HYPOXEMIC RESPIRATORY FAILURE (HCC): Primary | ICD-10-CM

## 2021-11-09 DIAGNOSIS — C34.92 NON-SMALL CELL CANCER OF LEFT LUNG (HCC): ICD-10-CM

## 2021-11-09 DIAGNOSIS — G47.33 OSA (OBSTRUCTIVE SLEEP APNEA): ICD-10-CM

## 2021-11-09 DIAGNOSIS — J44.9 CHRONIC OBSTRUCTIVE PULMONARY DISEASE, UNSPECIFIED COPD TYPE (HCC): ICD-10-CM

## 2021-11-09 PROCEDURE — 99214 OFFICE O/P EST MOD 30 MIN: CPT | Performed by: INTERNAL MEDICINE

## 2021-11-17 ENCOUNTER — HOSPITAL ENCOUNTER (OUTPATIENT)
Dept: CT IMAGING | Facility: HOSPITAL | Age: 81
Discharge: HOME | End: 2021-11-17
Attending: INTERNAL MEDICINE
Payer: MEDICARE

## 2021-11-17 DIAGNOSIS — J96.11 CHRONIC HYPOXEMIC RESPIRATORY FAILURE (HCC): ICD-10-CM

## 2021-11-17 DIAGNOSIS — J44.9 CHRONIC OBSTRUCTIVE PULMONARY DISEASE, UNSPECIFIED COPD TYPE (HCC): ICD-10-CM

## 2021-11-17 PROCEDURE — G1004 CDSM NDSC: HCPCS

## 2021-11-17 PROCEDURE — 71250 CT THORAX DX C-: CPT

## 2021-11-20 ENCOUNTER — IMMUNIZATIONS (OUTPATIENT)
Dept: FAMILY MEDICINE CLINIC | Facility: HOSPITAL | Age: 81
End: 2021-11-20

## 2021-11-20 DIAGNOSIS — Z23 ENCOUNTER FOR IMMUNIZATION: Primary | ICD-10-CM

## 2021-11-20 PROCEDURE — 91300 COVID-19 PFIZER VACC 0.3 ML: CPT

## 2021-11-20 PROCEDURE — 0001A COVID-19 PFIZER VACC 0.3 ML: CPT

## 2021-11-30 ENCOUNTER — APPOINTMENT (OUTPATIENT)
Dept: LAB | Facility: CLINIC | Age: 81
End: 2021-11-30
Payer: MEDICARE

## 2021-11-30 ENCOUNTER — OFFICE VISIT (OUTPATIENT)
Dept: FAMILY MEDICINE CLINIC | Facility: CLINIC | Age: 81
End: 2021-11-30
Payer: MEDICARE

## 2021-11-30 VITALS
OXYGEN SATURATION: 83 % | BODY MASS INDEX: 33.42 KG/M2 | DIASTOLIC BLOOD PRESSURE: 74 MMHG | HEIGHT: 61 IN | HEART RATE: 73 BPM | WEIGHT: 177 LBS | SYSTOLIC BLOOD PRESSURE: 124 MMHG | RESPIRATION RATE: 16 BRPM

## 2021-11-30 DIAGNOSIS — N18.31 STAGE 3A CHRONIC KIDNEY DISEASE (HCC): ICD-10-CM

## 2021-11-30 DIAGNOSIS — Z00.00 WELL ADULT EXAM: Primary | ICD-10-CM

## 2021-11-30 DIAGNOSIS — E04.1 THYROID NODULE: ICD-10-CM

## 2021-11-30 DIAGNOSIS — L30.9 ACUTE DERMATITIS: ICD-10-CM

## 2021-11-30 LAB — TSH SERPL DL<=0.05 MIU/L-ACNC: 1.19 UIU/ML (ref 0.36–3.74)

## 2021-11-30 PROCEDURE — G0439 PPPS, SUBSEQ VISIT: HCPCS | Performed by: FAMILY MEDICINE

## 2021-11-30 PROCEDURE — 99214 OFFICE O/P EST MOD 30 MIN: CPT | Performed by: FAMILY MEDICINE

## 2021-11-30 PROCEDURE — 86376 MICROSOMAL ANTIBODY EACH: CPT

## 2021-11-30 PROCEDURE — 36415 COLL VENOUS BLD VENIPUNCTURE: CPT

## 2021-11-30 PROCEDURE — 84443 ASSAY THYROID STIM HORMONE: CPT

## 2021-11-30 PROCEDURE — 86800 THYROGLOBULIN ANTIBODY: CPT

## 2021-11-30 PROCEDURE — 84445 ASSAY OF TSI GLOBULIN: CPT

## 2021-11-30 PROCEDURE — 1123F ACP DISCUSS/DSCN MKR DOCD: CPT | Performed by: FAMILY MEDICINE

## 2021-12-01 LAB
THYROGLOB AB SERPL-ACNC: <1 IU/ML (ref 0–0.9)
THYROPEROXIDASE AB SERPL-ACNC: 13 IU/ML (ref 0–34)
TSI SER-ACNC: <0.1 IU/L (ref 0–0.55)

## 2021-12-03 ENCOUNTER — HOSPITAL ENCOUNTER (OUTPATIENT)
Dept: ULTRASOUND IMAGING | Facility: HOSPITAL | Age: 81
Discharge: HOME | End: 2021-12-03
Payer: MEDICARE

## 2021-12-03 DIAGNOSIS — E04.1 THYROID NODULE: ICD-10-CM

## 2021-12-03 PROCEDURE — 76536 US EXAM OF HEAD AND NECK: CPT

## 2022-01-24 ENCOUNTER — HOSPITAL ENCOUNTER (OUTPATIENT)
Dept: RADIOLOGY | Facility: HOSPITAL | Age: 82
Discharge: HOME | End: 2022-01-24

## 2022-01-24 DIAGNOSIS — E04.1 THYROID NODULE: ICD-10-CM

## 2022-01-26 DIAGNOSIS — I10 ESSENTIAL HYPERTENSION: ICD-10-CM

## 2022-01-26 RX ORDER — AMLODIPINE BESYLATE 5 MG/1
5 TABLET ORAL DAILY
Qty: 90 TABLET | Refills: 0 | Status: SHIPPED | OUTPATIENT
Start: 2022-01-26 | End: 2022-05-09 | Stop reason: SDUPTHER

## 2022-02-16 ENCOUNTER — OFFICE VISIT (OUTPATIENT)
Dept: PULMONOLOGY | Facility: CLINIC | Age: 82
End: 2022-02-16
Payer: MEDICARE

## 2022-02-16 ENCOUNTER — HOSPITAL ENCOUNTER (EMERGENCY)
Facility: HOSPITAL | Age: 82
Discharge: HOME | End: 2022-02-16
Attending: EMERGENCY MEDICINE
Payer: MEDICARE

## 2022-02-16 ENCOUNTER — APPOINTMENT (EMERGENCY)
Dept: RADIOLOGY | Facility: HOSPITAL | Age: 82
End: 2022-02-16
Payer: MEDICARE

## 2022-02-16 ENCOUNTER — APPOINTMENT (EMERGENCY)
Dept: CT IMAGING | Facility: HOSPITAL | Age: 82
End: 2022-02-16
Payer: MEDICARE

## 2022-02-16 VITALS
DIASTOLIC BLOOD PRESSURE: 59 MMHG | RESPIRATION RATE: 20 BRPM | OXYGEN SATURATION: 95 % | TEMPERATURE: 98.3 F | WEIGHT: 177 LBS | SYSTOLIC BLOOD PRESSURE: 153 MMHG | BODY MASS INDEX: 33.44 KG/M2 | HEART RATE: 77 BPM

## 2022-02-16 VITALS
HEART RATE: 81 BPM | SYSTOLIC BLOOD PRESSURE: 158 MMHG | DIASTOLIC BLOOD PRESSURE: 88 MMHG | OXYGEN SATURATION: 69 % | RESPIRATION RATE: 24 BRPM
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
## 2022-02-16 DIAGNOSIS — R29.898 WEAKNESS OF BOTH LOWER EXTREMITIES: Primary | ICD-10-CM

## 2022-02-16 DIAGNOSIS — J96.11 CHRONIC HYPOXEMIC RESPIRATORY FAILURE (HCC): Primary | ICD-10-CM

## 2022-02-16 DIAGNOSIS — C34.92 NON-SMALL CELL CANCER OF LEFT LUNG (HCC): ICD-10-CM

## 2022-02-16 DIAGNOSIS — R29.6 RECURRENT FALLS: ICD-10-CM

## 2022-02-16 DIAGNOSIS — J44.9 CHRONIC OBSTRUCTIVE PULMONARY DISEASE, UNSPECIFIED COPD TYPE (HCC): ICD-10-CM

## 2022-02-16 DIAGNOSIS — R91.8 MULTIPLE LUNG NODULES: ICD-10-CM

## 2022-02-16 DIAGNOSIS — S80.211A ABRASION OF RIGHT KNEE, INITIAL ENCOUNTER: ICD-10-CM

## 2022-02-16 DIAGNOSIS — G47.33 OSA (OBSTRUCTIVE SLEEP APNEA): ICD-10-CM

## 2022-02-16 PROBLEM — E66.811 CLASS 1 OBESITY DUE TO EXCESS CALORIES WITH BODY MASS INDEX (BMI) OF 33.0 TO 33.9 IN ADULT: Status: ACTIVE | Noted: 2022-02-16

## 2022-02-16 PROBLEM — E66.09 CLASS 1 OBESITY DUE TO EXCESS CALORIES WITH BODY MASS INDEX (BMI) OF 33.0 TO 33.9 IN ADULT: Status: ACTIVE | Noted: 2022-02-16

## 2022-02-16 LAB
2HR DELTA HS TROPONIN: -1 NG/L
ALBUMIN SERPL BCP-MCNC: 4.1 G/DL (ref 3.4–4.8)
ALP SERPL-CCNC: 37.3 U/L (ref 35–140)
ALT SERPL W P-5'-P-CCNC: 9 U/L (ref 5–54)
ANION GAP SERPL CALCULATED.3IONS-SCNC: 4 MMOL/L (ref 4–13)
AST SERPL W P-5'-P-CCNC: 11 U/L (ref 15–41)
ATRIAL RATE: 62 BPM
BASOPHILS # BLD AUTO: 0.01 THOUSANDS/ΜL (ref 0–0.1)
BASOPHILS NFR BLD AUTO: 0 % (ref 0–1)
BILIRUB SERPL-MCNC: 0.54 MG/DL (ref 0.3–1.2)
BUN SERPL-MCNC: 19 MG/DL (ref 6–20)
CALCIUM SERPL-MCNC: 10 MG/DL (ref 8.4–10.2)
CARDIAC TROPONIN I PNL SERPL HS: 7 NG/L
CARDIAC TROPONIN I PNL SERPL HS: 8 NG/L
CHLORIDE SERPL-SCNC: 101 MMOL/L (ref 96–108)
CO2 SERPL-SCNC: 36 MMOL/L (ref 22–33)
CREAT SERPL-MCNC: 0.72 MG/DL (ref 0.4–1.1)
EOSINOPHIL # BLD AUTO: 0.1 THOUSAND/ΜL (ref 0–0.61)
EOSINOPHIL NFR BLD AUTO: 2 % (ref 0–6)
ERYTHROCYTE [DISTWIDTH] IN BLOOD BY AUTOMATED COUNT: 13.7 % (ref 11.6–15.1)
GFR SERPL CREATININE-BSD FRML MDRD: 78 ML/MIN/1.73SQ M
GLUCOSE SERPL-MCNC: 134 MG/DL (ref 65–140)
HCT VFR BLD AUTO: 44.6 % (ref 34.8–46.1)
HGB BLD-MCNC: 13.8 G/DL (ref 11.5–15.4)
IMM GRANULOCYTES # BLD AUTO: 0.02 THOUSAND/UL (ref 0–0.2)
IMM GRANULOCYTES NFR BLD AUTO: 0 % (ref 0–2)
LYMPHOCYTES # BLD AUTO: 0.46 THOUSANDS/ΜL (ref 0.6–4.47)
LYMPHOCYTES NFR BLD AUTO: 10 % (ref 14–44)
MCH RBC QN AUTO: 31.7 PG (ref 26.8–34.3)
MCHC RBC AUTO-ENTMCNC: 30.9 G/DL (ref 31.4–37.4)
MCV RBC AUTO: 103 FL (ref 82–98)
MONOCYTES # BLD AUTO: 0.31 THOUSAND/ΜL (ref 0.17–1.22)
MONOCYTES NFR BLD AUTO: 7 % (ref 4–12)
NEUTROPHILS # BLD AUTO: 3.66 THOUSANDS/ΜL (ref 1.85–7.62)
NEUTS SEG NFR BLD AUTO: 81 % (ref 43–75)
NRBC BLD AUTO-RTO: 0 /100 WBCS
P AXIS: 29 DEGREES
PLATELET # BLD AUTO: 225 THOUSANDS/UL (ref 149–390)
PMV BLD AUTO: 9.2 FL (ref 8.9–12.7)
POTASSIUM SERPL-SCNC: 4.6 MMOL/L (ref 3.5–5)
PR INTERVAL: 164 MS
PROT SERPL-MCNC: 7.6 G/DL (ref 6.4–8.3)
QRS AXIS: 38 DEGREES
QRSD INTERVAL: 103 MS
QT INTERVAL: 413 MS
QTC INTERVAL: 416 MS
RBC # BLD AUTO: 4.35 MILLION/UL (ref 3.81–5.12)
SODIUM SERPL-SCNC: 141 MMOL/L (ref 133–145)
T WAVE AXIS: 59 DEGREES
VENTRICULAR RATE: 61 BPM
WBC # BLD AUTO: 4.56 THOUSAND/UL (ref 4.31–10.16)

## 2022-02-16 PROCEDURE — 93005 ELECTROCARDIOGRAM TRACING: CPT

## 2022-02-16 PROCEDURE — 99285 EMERGENCY DEPT VISIT HI MDM: CPT | Performed by: PHYSICIAN ASSISTANT

## 2022-02-16 PROCEDURE — 93010 ELECTROCARDIOGRAM REPORT: CPT | Performed by: INTERNAL MEDICINE

## 2022-02-16 PROCEDURE — 84484 ASSAY OF TROPONIN QUANT: CPT | Performed by: PHYSICIAN ASSISTANT

## 2022-02-16 PROCEDURE — 36415 COLL VENOUS BLD VENIPUNCTURE: CPT | Performed by: PHYSICIAN ASSISTANT

## 2022-02-16 PROCEDURE — 99215 OFFICE O/P EST HI 40 MIN: CPT | Performed by: INTERNAL MEDICINE

## 2022-02-16 PROCEDURE — 71045 X-RAY EXAM CHEST 1 VIEW: CPT

## 2022-02-16 PROCEDURE — 99285 EMERGENCY DEPT VISIT HI MDM: CPT

## 2022-02-16 PROCEDURE — 80053 COMPREHEN METABOLIC PANEL: CPT | Performed by: PHYSICIAN ASSISTANT

## 2022-02-16 PROCEDURE — 85025 COMPLETE CBC W/AUTO DIFF WBC: CPT | Performed by: PHYSICIAN ASSISTANT

## 2022-02-16 PROCEDURE — 70450 CT HEAD/BRAIN W/O DYE: CPT

## 2022-02-16 PROCEDURE — G1004 CDSM NDSC: HCPCS

## 2022-02-16 NOTE — ASSESSMENT & PLAN NOTE
She has been having recurrent falls over the past 2 months  She felt today on the way to our office, and this was associated with loss of consciousness  Currently she denies any significant headache nausea or vomiting  She is awake alert and oriented  She has an abrasion over the right kneecap  She needed to be further evaluated and we persuaded her to go to the emergency room at [facility]   We informed the ER staff

## 2022-02-16 NOTE — ED PROVIDER NOTES
History  Chief Complaint   Patient presents with    Syncope     Pt was at internal medicine, had a brief syncopal episode  Pt reports this has happened 4 times over the last 2 months  Pt had 69% oxygen in the office, found her home oxygen bottle empty  Pt placed on oxygen and sat increased to 97% on 2L  Pt did hit head over right eyebrow, is on blood thinner  Pt with Past Medical History: Chronic pain, COPD, HTN, Hyperlipidemia, Left Lung cancer, non small cell; wedge resection, JULIO on CPAP, PAD   Past Surgical History: APPENDECTOMY, COLONOSCOPY, HYSTERECTOMY, Left wedge resection, Dr Guevara Marker    Was at pulmonologist office for check up for periorbital redness and edema, from CPAP machine, but fell down outside their office today and she did not know really what happened, but pt states did not actually have +LOC  Pt states her knees gave out and fell onto knees  She has an abrasion on the right knee and she feels unsteady on standing up, states bumped her head on sidewalk, while trying to get up  Pt states has been having ongoing falls over the past 2 or 3 months, stats her legs get weak and she falls  Pt denies fever, no cp, no HA, no dizziness, no prodromal sx prior to fall, except legs feel weak, no abd pain, no NVD, no urinary complaints, has been eating/drinking  Has been ambulatory since  Her previous CT scan showed evidence of emphysema and mild bronchiectasis  She was also found to have multiple lung nodules  she also had thyroid nodules  She uses 3 LPM of oxygen at rest and up to 6 L with exertion  Tetanus UTD 2018            Prior to Admission Medications   Prescriptions Last Dose Informant Patient Reported? Taking?    Ascorbic Acid (vitamin C) 100 MG tablet  Self Yes No   Sig: Take 100 mg by mouth daily   Multiple Vitamins-Minerals (AIRBORNE GUMMIES PO)  Self Yes No   Sig: Take by mouth   Nebulizers (Vios LC Sprint) MISC   No No   Sig: USE AS DIRECTED   Omega-3 Fatty Acids (fish oil) 1,000 mg  Self Yes No   Sig: Take 1,000 mg by mouth daily   albuterol (ProAir HFA) 90 mcg/act inhaler  Self Yes No   Sig: ProAir HFA 90 mcg/actuation aerosol inhaler   amLODIPine (NORVASC) 5 mg tablet   No No   Sig: Take 1 tablet (5 mg total) by mouth daily   budesonide (PULMICORT) 0 5 mg/2 mL nebulizer solution   No No   Sig: Take 1 vial (0 5 mg total) by nebulization 2 (two) times a day Rinse mouth after use     citalopram (CeleXA) 10 mg tablet  Self Yes No   Sig: TK 1 T PO D   clopidogrel (PLAVIX) 75 mg tablet  Self Yes No   Sig: TK 1 T PO D   co-enzyme Q-10 30 MG capsule  Self Yes No   Sig: Take 30 mg by mouth 3 (three) times a day   denosumab (Prolia) 60 mg/mL   No No   Sig: Inject 1 mL (60 mg total) under the skin once for 1 dose   formoterol (Perforomist) 20 MCG/2ML nebulizer solution  Self No No   Sig: Take 1 vial (20 mcg total) by nebulization 2 (two) times a day icd 10 code J44 9   ipratropium-albuterol (DUO-NEB) 0 5-2 5 mg/3 mL nebulizer solution  Self No No   Sig: Take 1 vial (3 mL total) by nebulization every 6 (six) hours as needed for wheezing or shortness of breath ICD 10 J44 9   oxybutynin (DITROPAN) 5 mg tablet   No No   Sig: Take 1 tablet (5 mg total) by mouth 2 (two) times a day   rosuvastatin (CRESTOR) 20 MG tablet  Self Yes No   Sig: TK 1 T PO QHS   timolol (TIMOPTIC) 0 5 % ophthalmic solution  Self Yes No   Sig: INT 1 GTT IN OU BID      Facility-Administered Medications: None       Past Medical History:   Diagnosis Date    Back problem     Chronic pain     COPD (chronic obstructive pulmonary disease) (HCC)     High blood pressure     Hyperlipidemia     Hypertension     Lung cancer (HCC)     Left Lung cancer; wedge resection     JULIO on CPAP     PAD (peripheral artery disease) (HCC)     Leg       Past Surgical History:   Procedure Laterality Date    APPENDECTOMY      COLONOSCOPY  2018    repeat 2023    HYSTERECTOMY      LUNG CANCER SURGERY Left     wedge reseection, Dr Sridhar Moran Family History   Problem Relation Age of Onset    Colon cancer Father     Heart disease Sister     Lung cancer Daughter      I have reviewed and agree with the history as documented  E-Cigarette/Vaping    E-Cigarette Use Never User      E-Cigarette/Vaping Substances     Social History     Tobacco Use    Smoking status: Former Smoker     Packs/day: 1 50     Years: 35 00     Pack years: 52 50     Types: Cigarettes    Smokeless tobacco: Never Used    Tobacco comment: Has smoked since age:   14 - As per [employer]    Vaping Use    Vaping Use: Never used   Substance Use Topics    Alcohol use: Not Currently     Comment: Alcohol intake:   None - As per Myra Dad Drug use: Not Currently     Comment: Illicit drugs:   Denied - As per [employer]        Review of Systems   Constitutional: Negative for chills and fever  HENT: Negative for ear pain, hearing loss, sore throat and trouble swallowing  Eyes: Negative for visual disturbance  Respiratory: Positive for shortness of breath and wheezing  Negative for cough  Cardiovascular: Negative for chest pain and leg swelling  Gastrointestinal: Negative for abdominal pain, diarrhea and vomiting  Genitourinary: Negative for dysuria and frequency  Musculoskeletal: Positive for gait problem  Negative for arthralgias and myalgias  Skin: Positive for wound  Negative for pallor  Neurological: Positive for weakness  Negative for dizziness and headaches  Psychiatric/Behavioral: Negative for behavioral problems  All other systems reviewed and are negative  Physical Exam  Physical Exam  Vitals and nursing note reviewed  Constitutional:       General: She is not in acute distress  Appearance: She is well-developed  She is obese  HENT:      Head: Normocephalic        Comments: Mild tenderness, slight raised, erythema noted to right frontal scalp     Right Ear: External ear normal       Left Ear: External ear normal       Nose: Nose normal  Mouth/Throat:      Mouth: Mucous membranes are moist       Pharynx: Oropharynx is clear  Eyes:      Conjunctiva/sclera: Conjunctivae normal    Cardiovascular:      Rate and Rhythm: Normal rate and regular rhythm  Pulmonary:      Effort: Pulmonary effort is normal       Breath sounds: Normal breath sounds  Abdominal:      General: Bowel sounds are normal       Palpations: Abdomen is soft  Musculoskeletal:         General: Tenderness and signs of injury present  No swelling  Normal range of motion  Cervical back: Normal range of motion and neck supple  No tenderness  Lymphadenopathy:      Cervical: No cervical adenopathy  Skin:     General: Skin is warm and dry  Findings: Lesion present  Comments: + 2cm superficial flap laceration noted to right inferior knee, FROM   Neurological:      Mental Status: She is alert and oriented to person, place, and time     Psychiatric:         Behavior: Behavior normal          Vital Signs  ED Triage Vitals   Temperature Pulse Respirations Blood Pressure SpO2   02/16/22 1045 02/16/22 1042 02/16/22 1042 02/16/22 1045 02/16/22 1045   98 3 °F (36 8 °C) 65 18 167/71 94 %      Temp Source Heart Rate Source Patient Position - Orthostatic VS BP Location FiO2 (%)   02/16/22 1045 02/16/22 1042 -- -- --   Oral Monitor         Pain Score       02/16/22 1045       No Pain           Vitals:    02/16/22 1042 02/16/22 1045 02/16/22 1123 02/16/22 1317   BP:  167/71 137/52 153/59   Pulse: 65  63 77         Visual Acuity      ED Medications  Medications - No data to display    Diagnostic Studies  Results Reviewed     Procedure Component Value Units Date/Time    HS Troponin I 2hr [[id number]]  (Normal) Collected: 02/16/22 1317    Lab Status: Final result Specimen: Blood from Arm, Right Updated: 02/16/22 1352     hs TnI 2hr 7 ng/L      Delta 2hr hsTnI -1 ng/L     HS Troponin I 4hr [[id number]]     Lab Status: No result Specimen: Blood     HS Troponin 0hr (reflex protocol) [[id number]]  (Normal) Collected: 02/16/22 1115    Lab Status: Final result Specimen: Blood from Arm, Right Updated: 02/16/22 1147     hs TnI 0hr 8 ng/L     Comprehensive metabolic panel [[id number]]  (Abnormal) Collected: 02/16/22 1115    Lab Status: Final result Specimen: Blood from Arm, Right Updated: 02/16/22 1141     Sodium 141 mmol/L      Potassium 4 6 mmol/L      Chloride 101 mmol/L      CO2 36 mmol/L      ANION GAP 4 mmol/L      BUN 19 mg/dL      Creatinine 0 72 mg/dL      Glucose 134 mg/dL      Calcium 10 0 mg/dL      AST 11 U/L      ALT 9 U/L      Alkaline Phosphatase 37 3 U/L      Total Protein 7 6 g/dL      Albumin 4 1 g/dL      Total Bilirubin 0 54 mg/dL      eGFR 78 ml/min/1 73sq m     Narrative:      National Kidney Disease Foundation guidelines for Chronic Kidney Disease (CKD):     Stage 1 with normal or high GFR (GFR > 90 mL/min/1 73 square meters)    Stage 2 Mild CKD (GFR = 60-89 mL/min/1 73 square meters)    Stage 3A Moderate CKD (GFR = 45-59 mL/min/1 73 square meters)    Stage 3B Moderate CKD (GFR = 30-44 mL/min/1 73 square meters)    Stage 4 Severe CKD (GFR = 15-29 mL/min/1 73 square meters)    Stage 5 End Stage CKD (GFR <15 mL/min/1 73 square meters)  Note: GFR calculation is accurate only with a steady state creatinine    CBC and differential [[id number]]  (Abnormal) Collected: 02/16/22 1115    Lab Status: Final result Specimen: Blood from Arm, Right Updated: 02/16/22 1120     WBC 4 56 Thousand/uL      RBC 4 35 Million/uL      Hemoglobin 13 8 g/dL      Hematocrit 44 6 %       fL      MCH 31 7 pg      MCHC 30 9 g/dL      RDW 13 7 %      MPV 9 2 fL      Platelets 002 Thousands/uL      nRBC 0 /100 WBCs      Neutrophils Relative 81 %      Immat GRANS % 0 %      Lymphocytes Relative 10 %      Monocytes Relative 7 %      Eosinophils Relative 2 %      Basophils Relative 0 %      Neutrophils Absolute 3 66 Thousands/µL      Immature Grans Absolute 0 02 Thousand/uL      Lymphocytes Absolute 0 46 Thousands/µL      Monocytes Absolute 0 31 Thousand/µL      Eosinophils Absolute 0 10 Thousand/µL      Basophils Absolute 0 01 Thousands/µL                  CT head without contrast   Final Result by Sonia Ramon MD (02/16 1225)      No acute intracranial abnormality  Workstation performed: [id number]         XR chest 1 view portable   ED Interpretation by Alfredo Yeboah PA-C (02/16 1202)   Chronic changes, CM, no change compared to old      Final Result by Lindsey Mack MD (02/16 6096)   Mild cardiomegaly      No acute cardiopulmonary disease  Findings are stable            Workstation performed: [id number]                    Procedures  ECG 12 Lead Documentation Only    Date/Time: 2/16/2022 12:21 PM  Performed by: Alfredo Yeboah PA-C  Authorized by: Alfredo Yeboah PA-C     Indications / Diagnosis:  ?syncope  ECG reviewed by me, the ED Provider: yes    Patient location:  ED  Previous ECG:     Comparison to cardiac monitor: Yes    Interpretation:     Interpretation: normal    Rate:     ECG rate:  61    ECG rate assessment: normal    Rhythm:     Rhythm: sinus rhythm    Comments:      No acute ischemic changes             ED Course  ED Course as of 02/16/22 1412   Wed Feb 16, 2022   1201 hs TnI 0hr: 8                                             MDM  Number of Diagnoses or Management Options  Abrasion of right knee, initial encounter  Fall, initial encounter  Weakness of both lower extremities  Diagnosis management comments: Abrasion cleaned, edges aligned, mepitel dressing placed to tack down wound and bandaid placed  No acute cause of leg weakness, likely deconditioning, pt also possibly transiently hypoxic, better when she wears her O2, is supposed to wear 3L normal up to 6 L with exertion    PT improved and stable for dc       Amount and/or Complexity of Data Reviewed  Clinical lab tests: ordered and reviewed  Tests in the radiology section of CPT®: ordered and reviewed  Review and summarize past medical records: yes        Disposition  Final diagnoses:   Weakness of both lower extremities   Fall, initial encounter   Abrasion of right knee, initial encounter     Time reflects when diagnosis was documented in both MDM as applicable and the Disposition within this note     Time User Action Codes Description Comment    2/16/2022  1:53 PM Chel Ashford Add [[id number]] Weakness of both lower extremities     2/16/2022  1:53 PM Chris Foremanela [[id number]  [id number]] Fall, initial encounter     2/16/2022  1:53 PM Chel Ashford Add [[id number]] Abrasion of right knee, initial encounter       ED Disposition     ED Disposition Condition Date/Time Comment    Discharge Stable Wed Feb 16, 2022  1:53  W  [facility] discharge to home/self care  Follow-up Information     Follow up With Specialties Details Why Contact Info    Lebron Dial MD Family Medicine  As needed [address] [place] [address] [facility]  [phone]            Patient's Medications   Discharge Prescriptions    No medications on file       No discharge procedures on file      PDMP Review     None          ED Provider  Electronically Signed by           Paul Ramon PA-C  02/16/22 0319

## 2022-02-16 NOTE — ASSESSMENT & PLAN NOTE
She has obstructive sleep apnea and has been on CPAP therapy with oxygen supplementation at 3 L/m  She had a CPAP titration study in February 2016 which showed a CPAP pressure requirement of 9 cm and was using nasal pillows  She states that she is using the CPAP every night and that she is comfortable with the mask and pressure  She has no significant daytime sleepiness or morning headache  Her sleep is not disturbed  Her CPAP compliance records are excellent The [employer] is her DME  On clinical examination, she is obese     Currently she is not using CPAP and I have advised her to hold off the CPAP therapy for now  Likely her mask is not fitting properly and she may may be having significant amount of leak  She may  need a mask fit/repeat study  We have also advised her to register her CPAP to get a new machine  I spoke to the patient and her friend, and answered all their questions

## 2022-02-16 NOTE — ASSESSMENT & PLAN NOTE
She has chronic hypoxemic respiratory failure  She is on continuous oxygen supplementation 3 LPM during rest and up to 6 LPM during exertion  She is with the Normal

## 2022-02-16 NOTE — PROGRESS NOTES
Assessment/Plan:    COPD (chronic obstructive pulmonary disease) 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  She quit smoking 15 years back  On clinical examination she was saturating well with oxygen supplementation  Her chest auscultation revealed bilateral basilar inspiratory coarse crackles  Her previous CT scan from 2015 showed bilateral extensive emphysematous changes  Her high-resolution CT scan of the chest showed bilateral emphysematous changes and mild bronchiectasis  Her PFT showed moderately severe airflow obstruction with severe uncorrected diffusion defect  she was doing well    she is on treatment with nebulized ipratropium albuterol, formoterol and budesonide  I had a long discussion with her and answered all questions  Chronic hypoxemic respiratory failure (HCC)  She has chronic hypoxemic respiratory failure  She is on continuous oxygen supplementation 3 LPM during rest and up to 6 LPM during exertion  She is with the [address]  JULIO (obstructive sleep apnea)  She has obstructive sleep apnea and has been on CPAP therapy with oxygen supplementation at 3 L/m  She had a CPAP titration study in February 2016 which showed a CPAP pressure requirement of 9 cm and was using nasal pillows  She states that she is using the CPAP every night and that she is comfortable with the mask and pressure  She has no significant daytime sleepiness or morning headache  Her sleep is not disturbed  Her CPAP compliance records are excellent The [address] is her DME  On clinical examination, she is obese     Currently she is not using CPAP and I have advised her to hold off the CPAP therapy for now  Likely her mask is not fitting properly and she may may be having significant amount of leak  She may  need a mask fit/repeat study  We have also advised her to register her CPAP to get a new machine  I spoke to the patient and her friend, and answered all their questions  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)  She was diagnosed with non-small cell lung cancer 7 years back and had underwent wedge resection by Dr Sylvie Pulido  She also received chemotherapy  She has been remaining cancer free  She follows with Dr Venetta Mohs from oncology        Recurrent falls  She has been having recurrent falls over the past 2 months  She felt today on the way to our office, and this was associated with loss of consciousness  Currently she denies any significant headache nausea or vomiting  She is awake alert and oriented  She has an abrasion over the right kneecap  She needed to be further evaluated and we persuaded her to go to the emergency room at [facility]   We informed the ER staff  Multiple lung nodules  Her previous CT scan of the chest showed multiple calcified granuloma, scarring and lung nodules  These need to be followed up  She was also found to have thyroid nodules  Class 1 obesity due to excess calories with body mass index (BMI) of 33 0 to 33 9 in adult  She has mild obesity and understands the need for weight reduction  Diagnoses and all orders for this visit:    Chronic hypoxemic respiratory failure (HCC)    Chronic obstructive pulmonary disease, unspecified COPD type (HCC)    JULIO (obstructive sleep apnea)    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)    Recurrent falls    Multiple lung nodules          Subjective:      Patient ID: Montez Merida is a 79 y o  female  Ivet Merino has history of obstructive sleep apnea, COPD chronic respiratory failure, non-small cell lung cancer and obesity  She was using CPAP regularly and was getting benefit from CPAP therapy    However over the past 2 months she has been noting swelling around the eyes and this had happened 3 or 4 times according to her friend who brought her here  She has a recalled [employer] machine  She stated that her mouth and no gets dry and she has headache in the morning when see wakes up  She showed me some pictures which showed some periorbital redness and edema  She has not been using the CPAP for 2-3 days now and currently her swelling around the eyes have almost disappeared  He denies any current headache or dizziness  However she fell down outside our office today and she did not know really what happened  According to her friend she has been doing this over broke the past 2 or 3 months and had happened about few times  It is not sure whether she hit her head  She has an abrasion on the right knee and she feels unsteady on standing up  I saw her in the office in a wheelchair  She has shortness of breath on exertion and occasional nonproductive cough and no wheezing  She has been on Pulmicort, and albuterol and ipratropium nebulizer treatment for her COPD  Her previous CT scan showed evidence of emphysema and mild bronchiectasis  She was also found to have multiple lung nodules  she also had thyroid nodules  She uses 3 LPM of oxygen at rest and up to 6 L    during exertion  I spoke to the EMS personnel  primary symptoms  Associated symptoms include coughing and headaches  Pertinent negatives include no abdominal pain, chest pain, chills, fever, myalgias, nausea, sore throat or vomiting  The following portions of the patient's history were reviewed and updated as appropriate: allergies, current medications, past family history, past medical history, past social history, past surgical history and problem list     Review of Systems   Constitutional: Positive for appetite change  Negative for chills and fever  HENT: Positive for ear pain and sneezing   Negative for hearing loss, postnasal drip, sore throat, trouble swallowing and voice change  Eyes: Negative for visual disturbance  Respiratory: Positive for cough and shortness of breath  Negative for chest tightness, wheezing and stridor  Cardiovascular: Negative for chest pain, palpitations and leg swelling  Gastrointestinal: Negative for abdominal pain, constipation, diarrhea, nausea and vomiting  Genitourinary: Negative for dysuria and urgency  Musculoskeletal: Positive for gait problem  Negative for myalgias  Skin: Positive for wound  Negative for pallor  Abrasion over right knee following fall  Mild bleeding initially which has settled  She can extend her knee  Allergic/Immunologic: Negative for environmental allergies  Neurological: Positive for dizziness, syncope, light-headedness and headaches  Psychiatric/Behavioral: Positive for sleep disturbance  Negative for agitation  The patient is nervous/anxious  Objective:      /88 (BP Location: Right arm) Comment: vitals taken by Agnes BALLARD   Pulse 81 Comment: vitals taken by Richar Chaidez  Resp (!) 24 Comment: vitals taken by Agnes BALLARD   SpO2 (!) 69% Comment: vitals taken by Richar Chaidez  Physical Exam  Vitals reviewed  Constitutional:       General: She is not in acute distress  Appearance: She is ill-appearing  She is not toxic-appearing or diaphoretic  HENT:      Head: Normocephalic  Mouth/Throat:      Mouth: Mucous membranes are moist    Eyes:      General: No scleral icterus  Conjunctiva/sclera: Conjunctivae normal       Comments: Mild edema around right eye   Cardiovascular:      Rate and Rhythm: Normal rate  Heart sounds: Normal heart sounds  No murmur heard  Pulmonary:      Effort: Pulmonary effort is normal  No respiratory distress  Breath sounds: No stridor  Rales (Bilateral coarse inspiratory crackles at lung bases) present  No wheezing or rhonchi     Abdominal:      General: Bowel sounds are normal  Palpations: Abdomen is soft  Tenderness: There is no abdominal tenderness  There is no guarding  Musculoskeletal:      Cervical back: No rigidity  Right lower leg: No edema  Left lower leg: No edema  Comments: Abrasion over right kneecap  Mild bleeding which subsequently settled  The patient can not extend the right knee   Lymphadenopathy:      Cervical: No cervical adenopathy  Skin:     Coloration: Skin is not jaundiced or pale  Findings: No rash  Neurological:      Mental Status: She is alert and oriented to person, place, and time  Gait: Gait abnormal (Currently using wheelchair  Has ambulatory dysfunction)  Psychiatric:         Mood and Affect: Mood normal          Behavior: Behavior normal          Thought Content: Thought content normal          Judgment: Judgment normal          Answers for HPI/ROS submitted by the patient on 2/15/2022  Do you have difficulty breathing?: Yes  Chronicity: new  When did you first notice your symptoms?: more than 1 month ago  How often do your symptoms occur?: every several days  Since you first noticed this problem, how has it changed?: gradually improving  Do you have shortness of breath that occurs with effort or exertion?: Yes  Do you have ear congestion?: Yes  Do you have heartburn?: No  Do you have fatigue?: Yes  Do you have nasal congestion?: No  Do you have shortness of breath when lying flat?: Yes  Do you have shortness of breath when you wake up?: No  Do you have sweats?: Yes  Have you experienced weight loss?: No  Which of the following makes your symptoms worse?: nothing  Which of the following makes your symptoms better?: cold air, rest    I spent 40 minutes of time taking care of this patient with multiple medical problems and who also fell down in front of our office  The patient was seen initially in the reception area by me and subsequently in the examination room    The majority of this time was spent directly with the patient counseling as well as coordinating care

## 2022-02-16 NOTE — DISCHARGE INSTRUCTIONS
Use Tylenol every 4 hours or Motrin every 6 hours; you can alternate the 2 medications taking something every 3 hours for pain  Follow-up with your doctor in next few days

## 2022-02-16 NOTE — ASSESSMENT & PLAN NOTE
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  She quit smoking 15 years back  On clinical examination she was saturating well with oxygen supplementation  Her chest auscultation revealed bilateral basilar inspiratory coarse crackles  Her previous CT scan from 2015 showed bilateral extensive emphysematous changes  Her high-resolution CT scan of the chest showed bilateral emphysematous changes and mild bronchiectasis  Her PFT showed moderately severe airflow obstruction with severe uncorrected diffusion defect  she was doing well    she is on treatment with nebulized ipratropium albuterol, formoterol and budesonide  I had a long discussion with her and answered all questions

## 2022-02-16 NOTE — ASSESSMENT & PLAN NOTE
She was diagnosed with non-small cell lung cancer 7 years back and had underwent wedge resection by Dr Jay Ayon  She also received chemotherapy  She has been remaining cancer free   She follows with Dr Mirlande Penn from oncology

## 2022-02-16 NOTE — ASSESSMENT & PLAN NOTE
Her previous CT scan of the chest showed multiple calcified granuloma, scarring and lung nodules  These need to be followed up  She was also found to have thyroid nodules

## 2022-02-24 ENCOUNTER — TELEPHONE (OUTPATIENT)
Dept: PULMONOLOGY | Facility: CLINIC | Age: 82
End: 2022-02-24

## 2022-02-24 NOTE — TELEPHONE ENCOUNTER
Alena from [employer] called and LM 2/23/22 asking for fax number so she can send over a document for Dr Hétcor Gann to sign and date

## 2022-03-16 ENCOUNTER — OFFICE VISIT (OUTPATIENT)
Dept: PULMONOLOGY | Facility: CLINIC | Age: 82
End: 2022-03-16
Payer: MEDICARE

## 2022-03-16 VITALS
TEMPERATURE: 97.1 F | OXYGEN SATURATION: 96 % | SYSTOLIC BLOOD PRESSURE: 137 MMHG | HEIGHT: 61 IN | BODY MASS INDEX: 32.47 KG/M2 | DIASTOLIC BLOOD PRESSURE: 85 MMHG | HEART RATE: 66 BPM | WEIGHT: 172 LBS

## 2022-03-16 DIAGNOSIS — R91.8 MULTIPLE LUNG NODULES: ICD-10-CM

## 2022-03-16 DIAGNOSIS — G47.33 OSA (OBSTRUCTIVE SLEEP APNEA): Primary | ICD-10-CM

## 2022-03-16 DIAGNOSIS — C34.92 NON-SMALL CELL CANCER OF LEFT LUNG (HCC): ICD-10-CM

## 2022-03-16 DIAGNOSIS — J44.9 CHRONIC OBSTRUCTIVE PULMONARY DISEASE, UNSPECIFIED COPD TYPE (HCC): ICD-10-CM

## 2022-03-16 DIAGNOSIS — E66.09 CLASS 1 OBESITY DUE TO EXCESS CALORIES WITH SERIOUS COMORBIDITY AND BODY MASS INDEX (BMI) OF 33.0 TO 33.9 IN ADULT: ICD-10-CM

## 2022-03-16 DIAGNOSIS — J96.11 CHRONIC HYPOXEMIC RESPIRATORY FAILURE (HCC): ICD-10-CM

## 2022-03-16 PROCEDURE — 99214 OFFICE O/P EST MOD 30 MIN: CPT | Performed by: INTERNAL MEDICINE

## 2022-03-16 RX ORDER — OLOPATADINE HYDROCHLORIDE 2 MG/ML
1 SOLUTION/ DROPS OPHTHALMIC AS NEEDED
COMMUNITY

## 2022-03-16 NOTE — PROGRESS NOTES
Assessment/Plan:    JULIO (obstructive sleep apnea)  She has obstructive sleep apnea and has been on CPAP therapy with oxygen supplementation at 3 L/m  She had a CPAP titration study in February 2016 which showed a CPAP pressure requirement of 9 cm and was using nasal pillows  She was using the CPAP every night and was comfortable with the mask and pressure  [employer] is her DME  On clinical examination, she is obese     Currently she is not using CPAP after she had significant leak resulting in swelling around the eye  She was using nasal pillows  She needs a mask fit  Her sleep is disturbed currently  she is awaiting a replacement for her CPAP machine after the recent recall  I spoke to the patient and her friend, and answered all their questions  COPD (chronic obstructive pulmonary disease) [facility])  Mrs Doug Hampton hasCOPD for more ZYAZ 48 years and is  on home oxygen therapy at 3 L/m for chronic hypoxemic respiratory failure  She has shortness of breath on exertion and her exercise tolerance is about a block    She was a smoker for a long time for about 35 years smoking 1-1/2 packs per day  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  She quit smoking 15 years back  On clinical examination she was saturating well with oxygen supplementation  Her chest auscultation revealed bilateral basilar inspiratory coarse crackles  Her previous CT scan from 2015 showed bilateral extensive emphysematous changes  Her high-resolution CT scan of the chest showed bilateral emphysematous changes and mild bronchiectasis  Her PFT showed moderately severe airflow obstruction with severe uncorrected diffusion defect  she was doing well    she is on treatment with nebulized ipratropium albuterol, formoterol and budesonide   I had a long discussion with her and answered all questions      Non-small cell cancer of left lung ([address] )  She was diagnosed with non-small cell lung cancer 7 years back and had underwent wedge resection by Dr Hero Zaragoza  She also received chemotherapy  She has been remaining cancer free  She follows with Dr Rubia Adamson from oncology  Class 1 obesity due to excess calories with body mass index (BMI) of 33 0 to 33 9 in adult  She is obese and understands the need for weight reduction  Multiple lung nodules  Her previous CT scan of the chest showed multiple calcified granuloma, scarring and lung nodules  These need to be followed up  She was also found to have thyroid nodules    Chronic hypoxemic respiratory failure ([facility] )  She has chronic hypoxemic respiratory failure  She is on continuous oxygen supplementation 3 LPM during rest and up to 6 LPM during exertion  She is with the [facility] and all orders for this visit:    JULIO (obstructive sleep apnea)  -     Mask fitting only; Future    Chronic obstructive pulmonary disease, unspecified COPD type ([facility] )    Chronic hypoxemic respiratory failure (HCC)    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)    Multiple lung nodules    Class 1 obesity due to excess calories with serious comorbidity and body mass index (BMI) of 33 0 to 33 9 in adult    Other orders  -     olopatadine HCl (PATADAY) 0 2 % opth drops; Administer 1 drop to both eyes as needed (only if itchy eyes)          Subjective:      Patient ID: Reeves Cogan is a 79 y o  female  Diana Conte came for follow-up for her chronic hypoxic respiratory failure on oxygen supplementation, COPD and obstructive sleep apnea  Currently her exercise tolerance is less than half a block and she has cough with clear phlegm  She has no significant wheezing  She has been on treatment with Perforomist and Pulmicort  Her previous CT scan from November 2021 showed bilateral lung nodules largest being 5 mm in size  She did not have any evidence of interstitial lung disease  Her previous chest auscultation had revealed bilateral coarse crackles    He has been on obstructive sleep apnea treatment with CPAP  However she was having eye swelling and she was feeling unsteady on her feet after using CPAP using nasal pillows  This is most likely secondary to leak from nasal pillows and she has stopped using the nasal pillows and CPAP  She however has snoring and her sleep is disturbed  She would benefit from CPAP therapy  She needs a mask fit and she is being referred to [facility] for the same  She has a recalled [employer] machine and is awaiting replacement  She is obese and understands the need for weight reduction  The following portions of the patient's history were reviewed and updated as appropriate: allergies, current medications, past family history, past medical history, past social history, past surgical history and problem list     Review of Systems   Constitutional: Positive for fatigue  Negative for appetite change, chills and fever  HENT: Positive for hearing loss, rhinorrhea and sneezing  Eyes: Negative for visual disturbance  Respiratory: Positive for cough and shortness of breath  Negative for chest tightness and wheezing  Cardiovascular: Negative for chest pain, palpitations and leg swelling  Gastrointestinal: Positive for diarrhea  Negative for abdominal pain, constipation, nausea and vomiting  Genitourinary: Positive for urgency  Negative for dysuria and frequency  Musculoskeletal: Negative for arthralgias and gait problem  Skin: Negative for rash  Allergic/Immunologic: Negative for environmental allergies  Neurological: Negative for dizziness, syncope, light-headedness and headaches  Psychiatric/Behavioral: Positive for sleep disturbance  Negative for agitation  The patient is not nervous/anxious            Objective:      /85 (BP Location: Left arm, Patient Position: Sitting, Cuff Size: Adult)   Pulse 66   Temp (!) 97 1 °F (36 2 °C) (Tympanic)   Ht 5' 1" (1 549 m)   Wt 78 kg (172 lb)   SpO2 96%   BMI 32 50 kg/m²          Physical Exam  Vitals reviewed  Constitutional:       General: She is not in acute distress  Appearance: She is obese  She is not ill-appearing, toxic-appearing or diaphoretic  HENT:      Head: Normocephalic  Mouth/Throat:      Mouth: Mucous membranes are moist    Eyes:      General: No scleral icterus  Conjunctiva/sclera: Conjunctivae normal    Cardiovascular:      Rate and Rhythm: Normal rate  Heart sounds: Normal heart sounds  No murmur heard  Pulmonary:      Effort: Pulmonary effort is normal  No respiratory distress  Breath sounds: Normal breath sounds  No stridor  No wheezing, rhonchi or rales  Chest:      Chest wall: No tenderness  Abdominal:      General: Bowel sounds are normal       Palpations: Abdomen is soft  Tenderness: There is no abdominal tenderness  There is no guarding  Musculoskeletal:      Cervical back: No rigidity  Right lower leg: No edema  Left lower leg: No edema  Lymphadenopathy:      Cervical: No cervical adenopathy  Skin:     Coloration: Skin is not jaundiced or pale  Findings: No rash  Neurological:      Mental Status: She is alert and oriented to person, place, and time  Gait: Gait normal    Psychiatric:         Mood and Affect: Mood normal          Behavior: Behavior normal          Thought Content: Thought content normal          Judgment: Judgment normal        I spent 30 minutes taking care of this patient with multiple pulmonary issues  The majority of this time was spent directly with the patient counseling as well as coordinating care

## 2022-03-18 NOTE — ASSESSMENT & PLAN NOTE
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  She quit smoking 15 years back  On clinical examination she was saturating well with oxygen supplementation  Her chest auscultation revealed bilateral basilar inspiratory coarse crackles  Her previous CT scan from 2015 showed bilateral extensive emphysematous changes  Her high-resolution CT scan of the chest showed bilateral emphysematous changes and mild bronchiectasis  Her PFT showed moderately severe airflow obstruction with severe uncorrected diffusion defect  she was doing well    she is on treatment with nebulized ipratropium albuterol, formoterol and budesonide   I had a long discussion with her and answered all questions

## 2022-03-18 NOTE — ASSESSMENT & PLAN NOTE
She was diagnosed with non-small cell lung cancer 7 years back and had underwent wedge resection by Dr Speedy Philippe  She also received chemotherapy  She has been remaining cancer free  She follows with Dr Haroldo Moore from oncology

## 2022-03-18 NOTE — ASSESSMENT & PLAN NOTE
She has obstructive sleep apnea and has been on CPAP therapy with oxygen supplementation at 3 L/m  She had a CPAP titration study in February 2016 which showed a CPAP pressure requirement of 9 cm and was using nasal pillows  She was using the CPAP every night and was comfortable with the mask and pressure  [employer] is her DME  On clinical examination, she is obese     Currently she is not using CPAP after she had significant leak resulting in swelling around the eye  She was using nasal pillows  She needs a mask fit  Her sleep is disturbed currently  she is awaiting a replacement for her CPAP machine after the recent recall  I spoke to the patient and her friend, and answered all their questions

## 2022-03-18 NOTE — ASSESSMENT & PLAN NOTE
She has chronic hypoxemic respiratory failure  She is on continuous oxygen supplementation 3 LPM during rest and up to 6 LPM during exertion   She is with the [address]

## 2022-03-22 ENCOUNTER — OFFICE VISIT (OUTPATIENT)
Dept: FAMILY MEDICINE CLINIC | Facility: CLINIC | Age: 82
End: 2022-03-22
Payer: MEDICARE

## 2022-03-22 VITALS
HEART RATE: 66 BPM | HEIGHT: 61 IN | WEIGHT: 179 LBS | BODY MASS INDEX: 33.79 KG/M2 | RESPIRATION RATE: 16 BRPM | SYSTOLIC BLOOD PRESSURE: 130 MMHG | OXYGEN SATURATION: 95 % | DIASTOLIC BLOOD PRESSURE: 80 MMHG

## 2022-03-22 DIAGNOSIS — I50.9 CONGESTIVE HEART FAILURE, UNSPECIFIED HF CHRONICITY, UNSPECIFIED HEART FAILURE TYPE (HCC): ICD-10-CM

## 2022-03-22 DIAGNOSIS — E55.9 VITAMIN D DEFICIENCY: ICD-10-CM

## 2022-03-22 DIAGNOSIS — I73.9 PAD (PERIPHERAL ARTERY DISEASE) (HCC): ICD-10-CM

## 2022-03-22 DIAGNOSIS — N18.31 STAGE 3A CHRONIC KIDNEY DISEASE (HCC): ICD-10-CM

## 2022-03-22 DIAGNOSIS — Z79.899 OTHER LONG TERM (CURRENT) DRUG THERAPY: ICD-10-CM

## 2022-03-22 DIAGNOSIS — F33.41 RECURRENT MAJOR DEPRESSIVE DISORDER, IN PARTIAL REMISSION (HCC): ICD-10-CM

## 2022-03-22 DIAGNOSIS — J44.9 CHRONIC OBSTRUCTIVE PULMONARY DISEASE, UNSPECIFIED COPD TYPE (HCC): ICD-10-CM

## 2022-03-22 DIAGNOSIS — E53.8 B12 DEFICIENCY: ICD-10-CM

## 2022-03-22 DIAGNOSIS — R73.03 PREDIABETES: ICD-10-CM

## 2022-03-22 DIAGNOSIS — E78.2 MIXED HYPERLIPIDEMIA: ICD-10-CM

## 2022-03-22 DIAGNOSIS — R45.1 AGITATION: Primary | ICD-10-CM

## 2022-03-22 PROCEDURE — 99214 OFFICE O/P EST MOD 30 MIN: CPT | Performed by: FAMILY MEDICINE

## 2022-03-22 RX ORDER — BUDESONIDE 0.5 MG/2ML
0.5 INHALANT ORAL 2 TIMES DAILY
Qty: 120 ML | Refills: 0 | Status: SHIPPED | OUTPATIENT
Start: 2022-03-22 | End: 2022-03-25 | Stop reason: SDUPTHER

## 2022-03-22 RX ORDER — HYDROXYZINE PAMOATE 25 MG/1
25 CAPSULE ORAL 3 TIMES DAILY PRN
Qty: 30 CAPSULE | Refills: 1 | Status: SHIPPED | OUTPATIENT
Start: 2022-03-22 | End: 2022-07-26

## 2022-03-22 RX ORDER — IPRATROPIUM BROMIDE AND ALBUTEROL SULFATE 2.5; .5 MG/3ML; MG/3ML
3 SOLUTION RESPIRATORY (INHALATION) EVERY 6 HOURS PRN
Qty: 300 ML | Refills: 1 | Status: SHIPPED | OUTPATIENT
Start: 2022-03-22 | End: 2022-03-25 | Stop reason: SDUPTHER

## 2022-03-22 RX ORDER — PEAK FLOW METER
EACH MISCELLANEOUS
Qty: 1 EACH | Refills: 0 | Status: SHIPPED | OUTPATIENT
Start: 2022-03-22

## 2022-03-22 RX ORDER — FORMOTEROL FUMARATE 20 UG/2ML
20 SOLUTION RESPIRATORY (INHALATION) 2 TIMES DAILY
Qty: 180 ML | Refills: 1 | Status: SHIPPED | OUTPATIENT
Start: 2022-03-22 | End: 2022-03-25 | Stop reason: SDUPTHER

## 2022-03-22 RX ORDER — CITALOPRAM 10 MG/1
10 TABLET ORAL DAILY
Qty: 90 TABLET | Refills: 1 | Status: SHIPPED | OUTPATIENT
Start: 2022-03-22

## 2022-03-22 NOTE — PROGRESS NOTES
Assessment/Plan:  Feel more on edge her  and daughter says   She notices it too   She has not been on the celexa   It was on her med list   We can just restart it   Add in hydrox for prn till it works   Cont with copd meds and oxgyen 24 7   Run labs also       1  Agitation  -     citalopram (CeleXA) 10 mg tablet; Take 1 tablet (10 mg total) by mouth daily  -     hydrOXYzine pamoate (VISTARIL) 25 mg capsule; Take 1 capsule (25 mg total) by mouth 3 (three) times a day as needed for anxiety    2  Chronic obstructive pulmonary disease, unspecified COPD type ([facility])  -     budesonide (PULMICORT) 0 5 mg/2 mL nebulizer solution; Take 2 mL (0 5 mg total) by nebulization 2 (two) times a day Rinse mouth after use  -     formoterol (Perforomist) 20 MCG/2ML nebulizer solution; Take 2 mL (20 mcg total) by nebulization 2 (two) times a day icd 10 code J44 9  -     ipratropium-albuterol (DUO-NEB) 0 5-2 5 mg/3 mL nebulizer solution; Take 3 mL by nebulization every 6 (six) hours as needed for wheezing or shortness of breath ICD 10 J44 9  -     Nebulizers (Vios LC Sprint) MISC; Use as directed    3  Congestive heart failure, unspecified HF chronicity, unspecified heart failure type ([place] [address] )    4  Recurrent major depressive disorder, in partial remission ([facility] )    5  PAD (peripheral artery disease) ([facility])    6  Stage 3a chronic kidney disease ([facility] )    7  Mixed hyperlipidemia  -     Lipid Panel with Direct LDL reflex; Future    8  Prediabetes  -     HEMOGLOBIN A1C W/ EAG ESTIMATION; Future    9  Other long term (current) drug therapy  -     HEMOGLOBIN A1C W/ EAG ESTIMATION; Future    10  Vitamin D deficiency  -     Vitamin D 25 hydroxy; Future    11  B12 deficiency  -     Vitamin B12; Future         Subjective:      Patient ID: Bhavesh Ibanez is a 79 y o  female      HPI   Here for follow up on chronic issues and mood change    needs her help more and wants it to be done on his time not hers   She is a bit more snappy     The following portions of the patient's history were reviewed and updated as appropriate: allergies, current medications, past family history, past medical history, past social history, past surgical history and problem list     Review of Systems   Constitutional: Negative for fever and unexpected weight change  HENT: Negative for nosebleeds and trouble swallowing  Eyes: Negative for visual disturbance  Respiratory: Positive for shortness of breath  Negative for chest tightness  Cardiovascular: Negative for chest pain, palpitations and leg swelling  Gastrointestinal: Positive for nausea  Negative for abdominal pain, constipation and diarrhea  Endocrine: Negative for cold intolerance  Genitourinary: Negative for dysuria and urgency  Musculoskeletal: Negative for joint swelling and myalgias  Skin: Positive for rash  Neurological: Positive for weakness  Negative for tremors, seizures and syncope  Hematological: Does not bruise/bleed easily  Psychiatric/Behavioral: Positive for agitation and behavioral problems  Negative for hallucinations and suicidal ideas  The patient is nervous/anxious  Objective:      /80 (BP Location: Left arm, Patient Position: Sitting, Cuff Size: Large)   Pulse 66   Resp 16   Ht 5' 1" (1 549 m)   Wt 81 2 kg (179 lb)   SpO2 95%   BMI 33 82 kg/m²     No visits with results within 2 Week(s) from this visit     Latest known visit with results is:   Admission on 02/16/2022, Discharged on 02/16/2022   Component Date Value    Ventricular Rate 02/16/2022 61     Atrial Rate 02/16/2022 62     WA Interval 02/16/2022 164     QRSD Interval 02/16/2022 103     QT Interval 02/16/2022 413     QTC Interval 02/16/2022 416     P Axis 02/16/2022 29     QRS Axis 02/16/2022 38     T Wave Axis 02/16/2022 59     WBC 02/16/2022 4 56     RBC 02/16/2022 4 35     Hemoglobin 02/16/2022 13 8     Hematocrit 02/16/2022 44 6     MCV 02/16/2022 103*    MCH 02/16/2022 31 7  MCHC 02/16/2022 30 9*    RDW 02/16/2022 13 7     MPV 02/16/2022 9 2     Platelets 04/21/7942 225     nRBC 02/16/2022 0     Neutrophils Relative 02/16/2022 81*    Immat GRANS % 02/16/2022 0     Lymphocytes Relative 02/16/2022 10*    Monocytes Relative 02/16/2022 7     Eosinophils Relative 02/16/2022 2     Basophils Relative 02/16/2022 0     Neutrophils Absolute 02/16/2022 3 66     Immature Grans Absolute 02/16/2022 0 02     Lymphocytes Absolute 02/16/2022 0 46*    Monocytes Absolute 02/16/2022 0 31     Eosinophils Absolute 02/16/2022 0 10     Basophils Absolute 02/16/2022 0 01     Sodium 02/16/2022 141     Potassium 02/16/2022 4 6     Chloride 02/16/2022 101     CO2 02/16/2022 36*    ANION GAP 02/16/2022 4     BUN 02/16/2022 19     Creatinine 02/16/2022 0 72     Glucose 02/16/2022 134     Calcium 02/16/2022 10 0     AST 02/16/2022 11*    ALT 02/16/2022 9     Alkaline Phosphatase 02/16/2022 37 3     Total Protein 02/16/2022 7 6     Albumin 02/16/2022 4 1     Total Bilirubin 02/16/2022 0 54     eGFR 02/16/2022 78     hs TnI 0hr 02/16/2022 8     hs TnI 2hr 02/16/2022 7     Delta 2hr hsTnI 02/16/2022 -1           Physical Exam  Vitals and nursing note reviewed  Constitutional:       Appearance: She is well-developed  She is obese  Comments: 3L NC    HENT:      Head: Normocephalic and atraumatic  Cardiovascular:      Rate and Rhythm: Normal rate and regular rhythm  Heart sounds: Normal heart sounds  No murmur heard  Pulmonary:      Effort: Pulmonary effort is normal       Breath sounds: Normal breath sounds  No wheezing or rales  Abdominal:      General: Bowel sounds are normal  There is no distension  Palpations: Abdomen is soft  Tenderness: There is no abdominal tenderness  Musculoskeletal:         General: No tenderness  Normal range of motion  Cervical back: Normal range of motion and neck supple     Lymphadenopathy:      Cervical: No cervical adenopathy  Skin:     General: Skin is warm and dry  Capillary Refill: Capillary refill takes less than 2 seconds  Findings: Erythema and rash present  Neurological:      Mental Status: She is alert and oriented to person, place, and time  Cranial Nerves: No cranial nerve deficit  Sensory: No sensory deficit  Motor: No abnormal muscle tone  Psychiatric:         Behavior: Behavior normal          Thought Content:  Thought content normal          Judgment: Judgment normal              Lebron Dial MD  [place] [address]

## 2022-03-30 ENCOUNTER — APPOINTMENT (OUTPATIENT)
Dept: LAB | Facility: CLINIC | Age: 82
End: 2022-03-30
Payer: MEDICARE

## 2022-03-30 DIAGNOSIS — E78.2 MIXED HYPERLIPIDEMIA: ICD-10-CM

## 2022-03-30 DIAGNOSIS — E53.8 B12 DEFICIENCY: ICD-10-CM

## 2022-03-30 DIAGNOSIS — Z79.899 OTHER LONG TERM (CURRENT) DRUG THERAPY: ICD-10-CM

## 2022-03-30 DIAGNOSIS — R73.03 PREDIABETES: ICD-10-CM

## 2022-03-30 DIAGNOSIS — E55.9 VITAMIN D DEFICIENCY: ICD-10-CM

## 2022-03-30 LAB
25(OH)D3 SERPL-MCNC: 66 NG/ML (ref 30–100)
CHOLEST SERPL-MCNC: 155 MG/DL
EST. AVERAGE GLUCOSE BLD GHB EST-MCNC: 117 MG/DL
HBA1C MFR BLD: 5.7 %
HDLC SERPL-MCNC: 41 MG/DL
LDLC SERPL CALC-MCNC: 69 MG/DL (ref 0–100)
TRIGL SERPL-MCNC: 227 MG/DL
VIT B12 SERPL-MCNC: 644 PG/ML (ref 100–900)

## 2022-03-30 PROCEDURE — 36415 COLL VENOUS BLD VENIPUNCTURE: CPT

## 2022-03-30 PROCEDURE — 80061 LIPID PANEL: CPT

## 2022-03-30 PROCEDURE — 82607 VITAMIN B-12: CPT

## 2022-03-30 PROCEDURE — 83036 HEMOGLOBIN GLYCOSYLATED A1C: CPT

## 2022-03-30 PROCEDURE — 82306 VITAMIN D 25 HYDROXY: CPT

## 2022-04-08 DIAGNOSIS — J44.9 CHRONIC OBSTRUCTIVE PULMONARY DISEASE, UNSPECIFIED COPD TYPE (HCC): ICD-10-CM

## 2022-04-08 RX ORDER — IPRATROPIUM BROMIDE AND ALBUTEROL SULFATE 2.5; .5 MG/3ML; MG/3ML
3 SOLUTION RESPIRATORY (INHALATION) EVERY 6 HOURS PRN
Qty: 300 ML | Refills: 0 | Status: SHIPPED | OUTPATIENT
Start: 2022-04-08

## 2022-04-27 ENCOUNTER — APPOINTMENT (OUTPATIENT)
Dept: LAB | Facility: CLINIC | Age: 82
End: 2022-04-27
Payer: MEDICARE

## 2022-04-27 ENCOUNTER — TRANSCRIBE ORDERS (OUTPATIENT)
Dept: LAB | Facility: CLINIC | Age: 82
End: 2022-04-27

## 2022-04-27 DIAGNOSIS — E78.00 PURE HYPERCHOLESTEROLEMIA: ICD-10-CM

## 2022-04-27 DIAGNOSIS — I25.119 ATHEROSCLEROSIS OF NATIVE CORONARY ARTERY WITH ANGINA PECTORIS, UNSPECIFIED WHETHER NATIVE OR TRANSPLANTED HEART (HCC): ICD-10-CM

## 2022-04-27 DIAGNOSIS — I10 ESSENTIAL HYPERTENSION, MALIGNANT: ICD-10-CM

## 2022-04-27 DIAGNOSIS — E78.00 PURE HYPERCHOLESTEROLEMIA: Primary | ICD-10-CM

## 2022-04-27 DIAGNOSIS — Z79.899 ENCOUNTER FOR LONG-TERM (CURRENT) USE OF OTHER MEDICATIONS: ICD-10-CM

## 2022-04-27 LAB
ANION GAP SERPL CALCULATED.3IONS-SCNC: 3 MMOL/L (ref 4–13)
BUN SERPL-MCNC: 19 MG/DL (ref 5–25)
CALCIUM SERPL-MCNC: 9.4 MG/DL (ref 8.3–10.1)
CHLORIDE SERPL-SCNC: 104 MMOL/L (ref 100–108)
CO2 SERPL-SCNC: 33 MMOL/L (ref 21–32)
CREAT SERPL-MCNC: 0.88 MG/DL (ref 0.6–1.3)
GFR SERPL CREATININE-BSD FRML MDRD: 61 ML/MIN/1.73SQ M
GLUCOSE SERPL-MCNC: 241 MG/DL (ref 65–140)
NT-PROBNP SERPL-MCNC: 377 PG/ML
POTASSIUM SERPL-SCNC: 4.6 MMOL/L (ref 3.5–5.3)
SODIUM SERPL-SCNC: 140 MMOL/L (ref 136–145)

## 2022-04-27 PROCEDURE — 80048 BASIC METABOLIC PNL TOTAL CA: CPT

## 2022-04-27 PROCEDURE — 83880 ASSAY OF NATRIURETIC PEPTIDE: CPT

## 2022-04-27 PROCEDURE — 36415 COLL VENOUS BLD VENIPUNCTURE: CPT

## 2022-05-05 DIAGNOSIS — J44.9 CHRONIC OBSTRUCTIVE PULMONARY DISEASE, UNSPECIFIED COPD TYPE (HCC): Primary | ICD-10-CM

## 2022-05-09 DIAGNOSIS — I10 ESSENTIAL HYPERTENSION: ICD-10-CM

## 2022-05-09 RX ORDER — AMLODIPINE BESYLATE 5 MG/1
5 TABLET ORAL DAILY
Qty: 90 TABLET | Refills: 0 | Status: SHIPPED | OUTPATIENT
Start: 2022-05-09

## 2022-05-18 DIAGNOSIS — G47.33 OSA (OBSTRUCTIVE SLEEP APNEA): Primary | ICD-10-CM

## 2022-05-31 ENCOUNTER — TELEPHONE (OUTPATIENT)
Dept: PULMONOLOGY | Facility: CLINIC | Age: 82
End: 2022-05-31

## 2022-05-31 NOTE — TELEPHONE ENCOUNTER
Kasey Terry from [facility], called asking about appt notes for this pt regarding a CPAP script that was sent in   Fax # is [phone]

## 2022-06-08 ENCOUNTER — OFFICE VISIT (OUTPATIENT)
Dept: PULMONOLOGY | Facility: CLINIC | Age: 82
End: 2022-06-08
Payer: MEDICARE

## 2022-06-08 VITALS
OXYGEN SATURATION: 80 % | HEIGHT: 61 IN | TEMPERATURE: 98 F | HEART RATE: 66 BPM | SYSTOLIC BLOOD PRESSURE: 138 MMHG | WEIGHT: 171.4 LBS | BODY MASS INDEX: 32.36 KG/M2 | DIASTOLIC BLOOD PRESSURE: 60 MMHG

## 2022-06-08 DIAGNOSIS — C34.92 NON-SMALL CELL CANCER OF LEFT LUNG (HCC): ICD-10-CM

## 2022-06-08 DIAGNOSIS — R91.8 MULTIPLE LUNG NODULES: ICD-10-CM

## 2022-06-08 DIAGNOSIS — I50.9 CONGESTIVE HEART FAILURE, UNSPECIFIED HF CHRONICITY, UNSPECIFIED HEART FAILURE TYPE (HCC): ICD-10-CM

## 2022-06-08 DIAGNOSIS — J44.9 CHRONIC OBSTRUCTIVE PULMONARY DISEASE, UNSPECIFIED COPD TYPE (HCC): ICD-10-CM

## 2022-06-08 DIAGNOSIS — G47.33 OSA (OBSTRUCTIVE SLEEP APNEA): Primary | ICD-10-CM

## 2022-06-08 PROCEDURE — 99214 OFFICE O/P EST MOD 30 MIN: CPT | Performed by: INTERNAL MEDICINE

## 2022-06-08 NOTE — ASSESSMENT & PLAN NOTE
She was diagnosed with non-small cell lung cancer 7 years back and had underwent wedge resection by Dr Carmela Pedraza  She also received chemotherapy  She has been remaining cancer free  She follows with Dr Annie Olmos from oncology

## 2022-06-08 NOTE — ASSESSMENT & PLAN NOTE
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  She quit smoking 15 years back  On clinical examination she was saturating well with oxygen supplementation  Her chest auscultation revealed bilateral basilar inspiratory coarse crackles  Her previous CT scan from 2015 showed bilateral extensive emphysematous changes  Her high-resolution CT scan of the chest showed bilateral emphysematous changes and mild bronchiectasis  Her PFT showed moderately severe airflow obstruction with severe uncorrected diffusion defect  she was doing well    she is on treatment with nebulized ipratropium albuterol, formoterol and budesonide   I had a long discussion with her and answered all questions

## 2022-06-08 NOTE — ASSESSMENT & PLAN NOTE
She has obstructive sleep apnea and has been on CPAP therapy with oxygen supplementation at 3 L/m  She had a CPAP titration study in February 2016 which showed a CPAP pressure requirement of 9 cm and was using nasal pillows  She was using the CPAP every night and was comfortable with the mask and pressure  [employer] is her DME  On clinical examination, she is obese  She was awaiting a replacement for her CPAP machine after the recent recall  she got a new machine but that also developed problem  She is awaiting a replacement of this  Meanwhile she is using her 's old machine  She is feeling comfortable on that  She has no significant daytime sleepiness or morning headache  I spoke to the patient and answered all their questions

## 2022-06-08 NOTE — ASSESSMENT & PLAN NOTE
Her previous CT scan of the chest showed multiple calcified granuloma, scarring and lung nodules   These need to be followed up  Ryder Naranjo was also found to have thyroid nodules    Her CT scan from November 2021 showed multiple stable lung nodules

## 2022-07-20 ENCOUNTER — RA CDI HCC (OUTPATIENT)
Dept: OTHER | Facility: HOSPITAL | Age: 82
End: 2022-07-20

## 2022-07-20 NOTE — PROGRESS NOTES
I13 0  [facility]  coding opportunities          Chart Reviewed number of suggestions sent to Provider: 1     Patients Insurance     Medicare Insurance: [employer]

## 2022-07-26 ENCOUNTER — OFFICE VISIT (OUTPATIENT)
Dept: FAMILY MEDICINE CLINIC | Facility: CLINIC | Age: 82
End: 2022-07-26
Payer: MEDICARE

## 2022-07-26 VITALS
WEIGHT: 169 LBS | RESPIRATION RATE: 16 BRPM | SYSTOLIC BLOOD PRESSURE: 124 MMHG | HEIGHT: 61 IN | HEART RATE: 71 BPM | BODY MASS INDEX: 31.91 KG/M2 | OXYGEN SATURATION: 97 % | DIASTOLIC BLOOD PRESSURE: 76 MMHG

## 2022-07-26 DIAGNOSIS — J96.11 CHRONIC HYPOXEMIC RESPIRATORY FAILURE (HCC): ICD-10-CM

## 2022-07-26 DIAGNOSIS — R73.03 PREDIABETES: ICD-10-CM

## 2022-07-26 DIAGNOSIS — F33.41 RECURRENT MAJOR DEPRESSIVE DISORDER, IN PARTIAL REMISSION (HCC): ICD-10-CM

## 2022-07-26 DIAGNOSIS — G47.33 OSA (OBSTRUCTIVE SLEEP APNEA): Primary | ICD-10-CM

## 2022-07-26 DIAGNOSIS — E78.2 MIXED HYPERLIPIDEMIA: ICD-10-CM

## 2022-07-26 PROCEDURE — 99214 OFFICE O/P EST MOD 30 MIN: CPT | Performed by: FAMILY MEDICINE

## 2022-07-26 NOTE — PROGRESS NOTES
Assessment/Plan:    1  JULIO (obstructive sleep apnea)  Comments:  better with resmed     2  Chronic hypoxemic respiratory failure (HCC)  Comments:  no change in requirements   Orders:  -     Basic metabolic panel; Future  -     HEMOGLOBIN A1C W/ EAG ESTIMATION; Future  -     Lipid Panel with Direct LDL reflex; Future  -     CBC and differential; Future    3  Recurrent major depressive disorder, in partial remission ([facility] )  Comments:  stable on celexa     4  Mixed hyperlipidemia  Comments:  fishoil Trigs >150   Orders:  -     Basic metabolic panel; Future  -     Lipid Panel with Direct LDL reflex; Future  -     CBC and differential; Future    5  Prediabetes  Comments:  less than 6 0   Orders:  -     Basic metabolic panel; Future  -     HEMOGLOBIN A1C W/ EAG ESTIMATION; Future  -     Lipid Panel with Direct LDL reflex; Future  -     CBC and differential; Future       Subjective:      Patient ID: Gloria Rosas is a 79 y o  female  HPI  Here to go over chronic issues and labs / imaging studies if applicable  To go over meds and vitamins       The following portions of the patient's history were reviewed and updated as appropriate: allergies, current medications, past family history, past medical history, past social history, past surgical history and problem list     Review of Systems   Constitutional: Negative for fever and unexpected weight change  HENT: Negative for nosebleeds and trouble swallowing  Eyes: Negative for visual disturbance  Respiratory: Positive for shortness of breath  Negative for chest tightness  Cardiovascular: Negative for chest pain, palpitations and leg swelling  Gastrointestinal: Negative for abdominal pain, constipation, diarrhea and nausea  Endocrine: Negative for cold intolerance  Genitourinary: Negative for dysuria and urgency  Musculoskeletal: Negative for joint swelling and myalgias  Skin: Negative for rash     Neurological: Negative for tremors, seizures, syncope and weakness  Hematological: Does not bruise/bleed easily  Psychiatric/Behavioral: Negative for agitation, behavioral problems, hallucinations and suicidal ideas  The patient is nervous/anxious  Objective:      /76 (BP Location: Left arm, Patient Position: Sitting, Cuff Size: Standard)   Pulse 71   Resp 16   Ht 5' 1" (1 549 m)   Wt 76 7 kg (169 lb)   SpO2 97%   BMI 31 93 kg/m²     No visits with results within 2 Week(s) from this visit  Latest known visit with results is:   Appointment on 04/27/2022   Component Date Value    Sodium 04/27/2022 140     Potassium 04/27/2022 4 6     Chloride 04/27/2022 104     CO2 04/27/2022 33 (A)    ANION GAP 04/27/2022 3 (A)    BUN 04/27/2022 19     Creatinine 04/27/2022 0 88     Glucose 04/27/2022 241 (A)    Calcium 04/27/2022 9 4     eGFR 04/27/2022 61     NT-proBNP 04/27/2022 377           Physical Exam  Vitals and nursing note reviewed  Constitutional:       Appearance: She is well-developed  She is obese  Comments: 3L NC    HENT:      Head: Normocephalic and atraumatic  Cardiovascular:      Rate and Rhythm: Normal rate and regular rhythm  Heart sounds: Normal heart sounds  No murmur heard  Pulmonary:      Effort: Pulmonary effort is normal       Breath sounds: Normal breath sounds  No wheezing or rales  Abdominal:      General: Bowel sounds are normal  There is no distension  Palpations: Abdomen is soft  Tenderness: There is no abdominal tenderness  Musculoskeletal:         General: No tenderness  Normal range of motion  Cervical back: Normal range of motion and neck supple  Lymphadenopathy:      Cervical: No cervical adenopathy  Skin:     General: Skin is warm and dry  Capillary Refill: Capillary refill takes less than 2 seconds  Findings: No erythema or rash  Neurological:      Mental Status: She is alert and oriented to person, place, and time        Cranial Nerves: No cranial nerve deficit  Sensory: No sensory deficit  Motor: No abnormal muscle tone  Psychiatric:         Behavior: Behavior normal          Thought Content: Thought content normal          Judgment: Judgment normal          BMI Counseling: Body mass index is 31 93 kg/m²  The BMI is above normal  Nutrition recommendations include decreasing portion sizes, encouraging healthy choices of fruits and vegetables, decreasing fast food intake, consuming healthier snacks and limiting drinks that contain sugar  Exercise recommendations include exercising 3-5 times per week  No pharmacotherapy was ordered  Rationale for BMI follow-up plan is due to patient being overweight or obese  Falls Plan of Care: balance, strength, and gait training instructions were provided  Medications that increase falls were reviewed         Thalia Easton MD  [place] [address]

## 2022-08-03 ENCOUNTER — APPOINTMENT (OUTPATIENT)
Dept: LAB | Facility: CLINIC | Age: 82
End: 2022-08-03
Payer: MEDICARE

## 2022-08-03 DIAGNOSIS — R73.03 PREDIABETES: ICD-10-CM

## 2022-08-03 DIAGNOSIS — E78.2 MIXED HYPERLIPIDEMIA: ICD-10-CM

## 2022-08-03 DIAGNOSIS — J96.11 CHRONIC HYPOXEMIC RESPIRATORY FAILURE (HCC): ICD-10-CM

## 2022-08-03 LAB
ANION GAP SERPL CALCULATED.3IONS-SCNC: 6 MMOL/L (ref 4–13)
BASOPHILS # BLD AUTO: 0.01 THOUSANDS/ΜL (ref 0–0.1)
BASOPHILS NFR BLD AUTO: 0 % (ref 0–1)
BUN SERPL-MCNC: 21 MG/DL (ref 5–25)
CALCIUM SERPL-MCNC: 9.8 MG/DL (ref 8.3–10.1)
CHLORIDE SERPL-SCNC: 108 MMOL/L (ref 96–108)
CHOLEST SERPL-MCNC: 143 MG/DL
CO2 SERPL-SCNC: 28 MMOL/L (ref 21–32)
CREAT SERPL-MCNC: 0.98 MG/DL (ref 0.6–1.3)
EOSINOPHIL # BLD AUTO: 0.13 THOUSAND/ΜL (ref 0–0.61)
EOSINOPHIL NFR BLD AUTO: 3 % (ref 0–6)
ERYTHROCYTE [DISTWIDTH] IN BLOOD BY AUTOMATED COUNT: 13.7 % (ref 11.6–15.1)
EST. AVERAGE GLUCOSE BLD GHB EST-MCNC: 114 MG/DL
GFR SERPL CREATININE-BSD FRML MDRD: 53 ML/MIN/1.73SQ M
GLUCOSE P FAST SERPL-MCNC: 123 MG/DL (ref 65–99)
HBA1C MFR BLD: 5.6 %
HCT VFR BLD AUTO: 48.2 % (ref 34.8–46.1)
HDLC SERPL-MCNC: 43 MG/DL
HGB BLD-MCNC: 15.4 G/DL (ref 11.5–15.4)
IMM GRANULOCYTES # BLD AUTO: 0.01 THOUSAND/UL (ref 0–0.2)
IMM GRANULOCYTES NFR BLD AUTO: 0 % (ref 0–2)
LDLC SERPL CALC-MCNC: 61 MG/DL (ref 0–100)
LYMPHOCYTES # BLD AUTO: 0.82 THOUSANDS/ΜL (ref 0.6–4.47)
LYMPHOCYTES NFR BLD AUTO: 20 % (ref 14–44)
MCH RBC QN AUTO: 33.3 PG (ref 26.8–34.3)
MCHC RBC AUTO-ENTMCNC: 32 G/DL (ref 31.4–37.4)
MCV RBC AUTO: 104 FL (ref 82–98)
MONOCYTES # BLD AUTO: 0.34 THOUSAND/ΜL (ref 0.17–1.22)
MONOCYTES NFR BLD AUTO: 8 % (ref 4–12)
NEUTROPHILS # BLD AUTO: 2.81 THOUSANDS/ΜL (ref 1.85–7.62)
NEUTS SEG NFR BLD AUTO: 69 % (ref 43–75)
NRBC BLD AUTO-RTO: 0 /100 WBCS
PLATELET # BLD AUTO: 230 THOUSANDS/UL (ref 149–390)
PMV BLD AUTO: 10.2 FL (ref 8.9–12.7)
POTASSIUM SERPL-SCNC: 4.9 MMOL/L (ref 3.5–5.3)
RBC # BLD AUTO: 4.62 MILLION/UL (ref 3.81–5.12)
SODIUM SERPL-SCNC: 142 MMOL/L (ref 135–147)
TRIGL SERPL-MCNC: 197 MG/DL
WBC # BLD AUTO: 4.12 THOUSAND/UL (ref 4.31–10.16)

## 2022-08-03 PROCEDURE — 80061 LIPID PANEL: CPT

## 2022-08-03 PROCEDURE — 36415 COLL VENOUS BLD VENIPUNCTURE: CPT

## 2022-08-03 PROCEDURE — 83036 HEMOGLOBIN GLYCOSYLATED A1C: CPT

## 2022-08-03 PROCEDURE — 80048 BASIC METABOLIC PNL TOTAL CA: CPT

## 2022-08-03 PROCEDURE — 85025 COMPLETE CBC W/AUTO DIFF WBC: CPT

## 2022-08-08 ENCOUNTER — TELEPHONE (OUTPATIENT)
Dept: FAMILY MEDICINE CLINIC | Facility: CLINIC | Age: 82
End: 2022-08-08

## 2022-08-08 NOTE — TELEPHONE ENCOUNTER
----- Message from Vicenta Walsh MD sent at 8/5/2022  3:12 PM EDT -----  A1c is good   Kidneys are stable  Trigs are up and down but the rest of the cholesterols arefine   Cbc is stable     No changes at this time

## 2022-09-07 ENCOUNTER — TELEPHONE (OUTPATIENT)
Dept: FAMILY MEDICINE CLINIC | Facility: CLINIC | Age: 82
End: 2022-09-07

## 2022-09-07 DIAGNOSIS — G47.00 INSOMNIA, UNSPECIFIED TYPE: Primary | ICD-10-CM

## 2022-09-07 RX ORDER — DOXEPIN HYDROCHLORIDE 10 MG/1
CAPSULE ORAL
Qty: 45 CAPSULE | Refills: 0 | Status: SHIPPED | OUTPATIENT
Start: 2022-09-07 | End: 2022-10-15 | Stop reason: SDUPTHER

## 2022-09-07 NOTE — TELEPHONE ENCOUNTER
Kim Torres called concerned that Davion isn't sleeping and wanted to know if there is anything that can be done  She said it's been going on for about 3 weeks  She either has difficulty falling asleep or doesn't stay asleep for long

## 2022-09-20 ENCOUNTER — OFFICE VISIT (OUTPATIENT)
Dept: PULMONOLOGY | Facility: CLINIC | Age: 82
End: 2022-09-20
Payer: MEDICARE

## 2022-09-20 VITALS
SYSTOLIC BLOOD PRESSURE: 138 MMHG | TEMPERATURE: 97.6 F | BODY MASS INDEX: 31.15 KG/M2 | DIASTOLIC BLOOD PRESSURE: 86 MMHG | HEIGHT: 61 IN | OXYGEN SATURATION: 98 % | HEART RATE: 67 BPM | WEIGHT: 165 LBS

## 2022-09-20 DIAGNOSIS — Z23 IMMUNIZATION DUE: ICD-10-CM

## 2022-09-20 DIAGNOSIS — J96.11 CHRONIC HYPOXEMIC RESPIRATORY FAILURE (HCC): ICD-10-CM

## 2022-09-20 DIAGNOSIS — C34.92 NON-SMALL CELL CANCER OF LEFT LUNG (HCC): ICD-10-CM

## 2022-09-20 DIAGNOSIS — J44.9 CHRONIC OBSTRUCTIVE PULMONARY DISEASE, UNSPECIFIED COPD TYPE (HCC): Primary | ICD-10-CM

## 2022-09-20 DIAGNOSIS — G47.33 OSA (OBSTRUCTIVE SLEEP APNEA): ICD-10-CM

## 2022-09-20 DIAGNOSIS — R91.8 MULTIPLE LUNG NODULES: ICD-10-CM

## 2022-09-20 PROCEDURE — 96372 THER/PROPH/DIAG INJ SC/IM: CPT | Performed by: INTERNAL MEDICINE

## 2022-09-20 PROCEDURE — G0008 ADMIN INFLUENZA VIRUS VAC: HCPCS | Performed by: INTERNAL MEDICINE

## 2022-09-20 PROCEDURE — 99214 OFFICE O/P EST MOD 30 MIN: CPT | Performed by: INTERNAL MEDICINE

## 2022-09-20 PROCEDURE — 90662 IIV NO PRSV INCREASED AG IM: CPT | Performed by: INTERNAL MEDICINE

## 2022-09-20 NOTE — ASSESSMENT & PLAN NOTE
Her previous CT scan of the chest showed multiple calcified granuloma, scarring and lung nodules   These need to be followed up especially with her history of lung cancer in the past   She was also found to have thyroid nodules  Her CT scan from November 2021 showed multiple stable lung nodules  I have ordered a follow-up CT scan

## 2022-09-20 NOTE — ASSESSMENT & PLAN NOTE
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  She quit smoking 15 years back  On clinical examination she was saturating well with oxygen supplementation  Her chest auscultation revealed bilateral basilar inspiratory coarse crackles  Her previous CT scan from 2015 showed bilateral extensive emphysematous changes  Her high-resolution CT scan of the chest showed bilateral emphysematous changes and mild bronchiectasis  Her PFT showed moderately severe airflow obstruction with severe uncorrected diffusion defect  she is on treatment with nebulized ipratropium albuterol, formoterol and budesonide  Currently her symptoms are well controlled and she is doing well   I had a long discussion with her and answered all questions

## 2022-09-20 NOTE — PROGRESS NOTES
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  She quit smoking 15 years back  On clinical examination she was saturating well with oxygen supplementation  Her chest auscultation revealed bilateral basilar inspiratory coarse crackles  Her previous CT scan from 2015 showed bilateral extensive emphysematous changes  Her high-resolution CT scan of the chest showed bilateral emphysematous changes and mild bronchiectasis  Her PFT showed moderately severe airflow obstruction with severe uncorrected diffusion defect  she is on treatment with nebulized ipratropium albuterol, formoterol and budesonide  Currently her symptoms are well controlled and she is doing well   I had a long discussion with her and answered all questions  JULIO (obstructive sleep apnea)  She has obstructive sleep apnea and has been on CPAP therapy with oxygen supplementation at 3 L/m  She had a CPAP titration study in February 2016 which showed a CPAP pressure requirement of 9 cm and was using nasal pillows  She was using the CPAP every night and was comfortable with the mask and pressure   The [employer] is her DME  On clinical examination, she was obese   she got a replacement machine recently  She has been using that regularly  She has no significant daytime sleepiness or morning headache  She is feeling that the machine is helping her tremendously  She is very motivated to continue on CPAP therapy  Her CPAP compliance records are not currently available      Multiple lung nodules  Her previous CT scan of the chest showed multiple calcified granuloma, scarring and lung nodules   These need to be followed up especially with her history of lung cancer in the past   She was also found to have thyroid nodules  Her CT scan from November 2021 showed multiple stable lung nodules  I have ordered a follow-up CT scan  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)  She was diagnosed with non-small cell lung cancer 7 years back and had underwent wedge resection by Dr Savana Kinney  She also received chemotherapy  She has been remaining cancer free  She follows with Dr Alok Nugent from oncology  Chronic hypoxemic respiratory failure (HCC)  She has chronic hypoxemic respiratory failure  She is on continuous oxygen supplementation 3 LPM during rest and up to 6 LPM during exertion  She is with the [address]  Diagnoses and all orders for this visit:    Chronic obstructive pulmonary disease, unspecified COPD type ([facility] )    Non-small cell cancer of left lung ([facility] )  -     Cancel: CT chest without contrast; Future  -     CT chest without contrast; Future    JULIO (obstructive sleep apnea)    Multiple lung nodules  -     Cancel: CT chest without contrast; Future  -     CT chest without contrast; Future    Chronic hypoxemic respiratory failure (HCC)    Immunization due  -     Flu Vaccine High Dose Split Preservative Free IM          Subjective:      Patient ID: Flora Lozano is a 79 y o  female  Dale Chambers came for follow-up for her COPD chronic respiratory failure obstructive sleep apnea and previous non-small cell lung cancer  She has survived lung cancer for 10 years  Currently she does not have any weight loss or anorexia  She follows with Dr Rony Serna  She has shortness of breath on exertion and her exercise tolerance is less than half a block  She uses 3 liters/minute of oxygen supplementation  She has occasional cough no significant wheezing  She has been on treatment with Perforomist, budesonide and albuterol p r n    Has nebulized ipratropium and albuterol to be used on an as-needed basis  She has no hoarseness of voice or dysphagia  No chest pain or palpitations  No swelling of feet  She has obstructive sleep apnea and has been on CPAP  She got a new machine and she has been using this every night  She has no daytime sleepiness or morning headache  She is comfortable with the mask and pressure  Her latest CPAP compliance records are not available  She is obese and understands the need for weight reduction  However she is not able to exercise much  She had multiple lung nodules on her CT scan when it was done last time in November 2021  She needs a repeat CT scan for follow-up  The following portions of the patient's history were reviewed and updated as appropriate: allergies, current medications, past family history, past medical history, past social history, past surgical history and problem list     Review of Systems   Constitutional: Negative for activity change, appetite change, chills and fever  HENT: Positive for rhinorrhea and sneezing  Negative for hearing loss, sore throat and trouble swallowing  Eyes: Negative for visual disturbance  Respiratory: Positive for cough and shortness of breath  Negative for chest tightness and wheezing  Cardiovascular: Negative for chest pain, palpitations and leg swelling  Gastrointestinal: Positive for diarrhea  Negative for abdominal pain, constipation, nausea and vomiting  Diverticulosis   Genitourinary: Negative for dysuria, frequency and urgency  Musculoskeletal: Positive for back pain  Negative for arthralgias and gait problem  Skin: Negative for rash  Allergic/Immunologic: Positive for environmental allergies  Neurological: Negative for dizziness, syncope, light-headedness and headaches  Psychiatric/Behavioral: Negative for agitation, confusion and sleep disturbance  The patient is nervous/anxious            Objective:      /86 (BP Location: Left arm, Patient Position: Sitting, Cuff Size: Adult)   Pulse 67   Temp 97 6 °F (36 4 °C) (Tympanic)   Ht 5' 1" (1 549 m)   Wt 74 8 kg (165 lb)   SpO2 98%   BMI 31 18 kg/m²          Physical Exam  Vitals reviewed  Constitutional:       General: She is not in acute distress  Appearance: She is not ill-appearing, toxic-appearing or diaphoretic  HENT:      Head: Normocephalic  Eyes:      General: No scleral icterus  Conjunctiva/sclera: Conjunctivae normal    Cardiovascular:      Rate and Rhythm: Normal rate and regular rhythm  Heart sounds: Normal heart sounds  No murmur heard  Pulmonary:      Effort: Pulmonary effort is normal  No respiratory distress  Breath sounds: No stridor  Rales (bibasilar coarse crackles) present  No wheezing or rhonchi  Chest:      Chest wall: No tenderness  Abdominal:      General: Bowel sounds are normal       Palpations: Abdomen is soft  Tenderness: There is no abdominal tenderness  There is no guarding  Musculoskeletal:      Cervical back: No rigidity  Right lower leg: No edema  Left lower leg: No edema  Lymphadenopathy:      Cervical: No cervical adenopathy  Skin:     Coloration: Skin is not jaundiced or pale  Findings: No rash  Neurological:      Mental Status: She is alert and oriented to person, place, and time  Gait: Gait normal    Psychiatric:         Mood and Affect: Mood normal          Behavior: Behavior normal          Thought Content: Thought content normal          Judgment: Judgment normal        I spent 30 minutes of time taking care of this patient with multiple complex pulmonary issues  The majority of this time was spent with the patient directly counseling as well as coordinating care

## 2022-09-20 NOTE — ASSESSMENT & PLAN NOTE
She was diagnosed with non-small cell lung cancer 7 years back and had underwent wedge resection by Dr Guevara Marker  She also received chemotherapy  She has been remaining cancer free  She follows with Dr Kalpesh Rausch from oncology

## 2022-09-20 NOTE — ASSESSMENT & PLAN NOTE
She has obstructive sleep apnea and has been on CPAP therapy with oxygen supplementation at 3 L/m  She had a CPAP titration study in February 2016 which showed a CPAP pressure requirement of 9 cm and was using nasal pillows  She was using the CPAP every night and was comfortable with the mask and pressure   [facility] [employer] is her [facility]  On clinical examination, she was obese   she got a replacement machine recently  She has been using that regularly  She has no significant daytime sleepiness or morning headache  She is feeling that the machine is helping her tremendously  She is very motivated to continue on CPAP therapy  Her CPAP compliance records are not currently available

## 2022-10-15 DIAGNOSIS — G47.00 INSOMNIA, UNSPECIFIED TYPE: ICD-10-CM

## 2022-10-17 RX ORDER — DOXEPIN HYDROCHLORIDE 10 MG/1
CAPSULE ORAL
Qty: 45 CAPSULE | Refills: 0 | Status: SHIPPED | OUTPATIENT
Start: 2022-10-17

## 2022-11-15 DIAGNOSIS — G47.00 INSOMNIA, UNSPECIFIED TYPE: ICD-10-CM

## 2022-11-15 RX ORDER — DOXEPIN HYDROCHLORIDE 10 MG/1
CAPSULE ORAL
Qty: 45 CAPSULE | Refills: 0 | Status: SHIPPED | OUTPATIENT
Start: 2022-11-15

## 2022-11-28 ENCOUNTER — OFFICE VISIT (OUTPATIENT)
Dept: FAMILY MEDICINE CLINIC | Facility: CLINIC | Age: 82
End: 2022-11-28

## 2022-11-28 VITALS
WEIGHT: 166 LBS | OXYGEN SATURATION: 77 % | DIASTOLIC BLOOD PRESSURE: 82 MMHG | HEART RATE: 86 BPM | SYSTOLIC BLOOD PRESSURE: 122 MMHG | RESPIRATION RATE: 16 BRPM | HEIGHT: 61 IN | BODY MASS INDEX: 31.34 KG/M2

## 2022-11-28 DIAGNOSIS — R73.03 PREDIABETES: ICD-10-CM

## 2022-11-28 DIAGNOSIS — E66.09 CLASS 1 OBESITY DUE TO EXCESS CALORIES WITH SERIOUS COMORBIDITY AND BODY MASS INDEX (BMI) OF 31.0 TO 31.9 IN ADULT: ICD-10-CM

## 2022-11-28 DIAGNOSIS — Z00.00 WELL ADULT EXAM: Primary | ICD-10-CM

## 2022-11-28 DIAGNOSIS — E78.2 MIXED HYPERLIPIDEMIA: ICD-10-CM

## 2022-11-28 DIAGNOSIS — R32 URINARY INCONTINENCE, UNSPECIFIED TYPE: ICD-10-CM

## 2022-11-28 DIAGNOSIS — I10 ESSENTIAL HYPERTENSION: ICD-10-CM

## 2022-11-28 NOTE — PATIENT INSTRUCTIONS
Medicare Preventive Visit Patient Instructions  Thank you for completing your Welcome to Medicare Visit or Medicare Annual Wellness Visit today  Your next wellness visit will be due in one year (11/29/2023)  The screening/preventive services that you may require over the next 5-10 years are detailed below  Some tests may not apply to you based off risk factors and/or age  Screening tests ordered at today's visit but not completed yet may show as past due  Also, please note that scanned in results may not display below  Preventive Screenings:  Service Recommendations Previous Testing/Comments   Colorectal Cancer Screening  * Colonoscopy    * Fecal Occult Blood Test (FOBT)/Fecal Immunochemical Test (FIT)  * Fecal DNA/Cologuard Test  * Flexible Sigmoidoscopy Age: 39-70 years old   Colonoscopy: every 10 years (may be performed more frequently if at higher risk)  OR  FOBT/FIT: every 1 year  OR  Cologuard: every 3 years  OR  Sigmoidoscopy: every 5 years  Screening may be recommended earlier than age 39 if at higher risk for colorectal cancer  Also, an individualized decision between you and your healthcare provider will decide whether screening between the ages of 74-80 would be appropriate  Colonoscopy: Not on file  FOBT/FIT: Not on file  Cologuard: Not on file  Sigmoidoscopy: Not on file          Breast Cancer Screening Age: 36 years old  Frequency: every 1-2 years  Not required if history of left and right mastectomy Mammogram: 04/25/2019        Cervical Cancer Screening Between the ages of 21-29, pap smear recommended once every 3 years  Between the ages of 33-67, can perform pap smear with HPV co-testing every 5 years     Recommendations may differ for women with a history of total hysterectomy, cervical cancer, or abnormal pap smears in past  Pap Smear: Not on file    Screening Not Indicated   Hepatitis C Screening Once for adults born between [facility]  More frequently in patients at high risk for Hepatitis C Hep C Antibody: Not on file        Diabetes Screening 1-2 times per year if you're at risk for diabetes or have pre-diabetes Fasting glucose: 123 mg/dL (8/3/2022)  A1C: 5 6 % (8/3/2022)  Screening Current   Cholesterol Screening Once every 5 years if you don't have a lipid disorder  May order more often based on risk factors  Lipid panel: 08/03/2022    Screening Not Indicated  History Lipid Disorder     Other Preventive Screenings Covered by Medicare:  1  Abdominal Aortic Aneurysm (AAA) Screening: covered once if your at risk  You're considered to be at risk if you have a family history of AAA  2  Lung Cancer Screening: covers low dose CT scan once per year if you meet all of the following conditions: (1) Age 50-69; (2) No signs or symptoms of lung cancer; (3) Current smoker or have quit smoking within the last 15 years; (4) You have a tobacco smoking history of at least 20 pack years (packs per day multiplied by number of years you smoked); (5) You get a written order from a healthcare provider  3  Glaucoma Screening: covered annually if you're considered high risk: (1) You have diabetes OR (2) Family history of glaucoma OR (3)  aged 48 and older OR (3)  American aged 72 and older  3  Osteoporosis Screening: covered every 2 years if you meet one of the following conditions: (1) You're estrogen deficient and at risk for osteoporosis based off medical history and other findings; (2) Have a vertebral abnormality; (3) On glucocorticoid therapy for more than 3 months; (4) Have primary hyperparathyroidism; (5) On osteoporosis medications and need to assess response to drug therapy  · Last bone density test (DXA Scan): 07/07/2021   5  HIV Screening: covered annually if you're between the age of 15-65  Also covered annually if you are younger than 13 and older than 72 with risk factors for HIV infection   For pregnant patients, it is covered up to 3 times per pregnancy  Immunizations:  Immunization Recommendations   Influenza Vaccine Annual influenza vaccination during flu season is recommended for all persons aged >= 6 months who do not have contraindications   Pneumococcal Vaccine   * Pneumococcal conjugate vaccine = PCV13 (Prevnar 13), PCV15 (Vaxneuvance), PCV20 (Prevnar 20)  * Pneumococcal polysaccharide vaccine = PPSV23 (Pneumovax) Adults 25-60 years old: 1-3 doses may be recommended based on certain risk factors  Adults 72 years old: 1-2 doses may be recommended based off what pneumonia vaccine you previously received   Hepatitis B Vaccine 3 dose series if at intermediate or high risk (ex: diabetes, end stage renal disease, liver disease)   Tetanus (Td) Vaccine - COST NOT COVERED BY MEDICARE PART B Following completion of primary series, a booster dose should be given every 10 years to maintain immunity against tetanus  Td may also be given as tetanus wound prophylaxis  Tdap Vaccine - COST NOT COVERED BY MEDICARE PART B Recommended at least once for all adults  For pregnant patients, recommended with each pregnancy  Shingles Vaccine (Shingrix) - COST NOT COVERED BY MEDICARE PART B  2 shot series recommended in those aged 48 and above     Health Maintenance Due:  There are no preventive care reminders to display for this patient  Immunizations Due:  There are no preventive care reminders to display for this patient  Advance Directives   What are advance directives? Advance directives are legal documents that state your wishes and plans for medical care  These plans are made ahead of time in case you lose your ability to make decisions for yourself  Advance directives can apply to any medical decision, such as the treatments you want, and if you want to donate organs  What are the types of advance directives? There are many types of advance directives, and each state has rules about how to use them   You may choose a combination of any of the following:  · Living will: This is a written record of the treatment you want  You can also choose which treatments you do not want, which to limit, and which to stop at a certain time  This includes surgery, medicine, IV fluid, and tube feedings  · Durable power of  for healthcare [facility]): This is a written record that states who you want to make healthcare choices for you when you are unable to make them for yourself  This person, called a proxy, is usually a family member or a friend  You may choose more than 1 proxy  · Do not resuscitate (DNR) order:  A DNR order is used in case your heart stops beating or you stop breathing  It is a request not to have certain forms of treatment, such as CPR  A DNR order may be included in other types of advance directives  · Medical directive: This covers the care that you want if you are in a coma, near death, or unable to make decisions for yourself  You can list the treatments you want for each condition  Treatment may include pain medicine, surgery, blood transfusions, dialysis, IV or tube feedings, and a ventilator (breathing machine)  · Values history: This document has questions about your views, beliefs, and how you feel and think about life  This information can help others choose the care that you would choose  Why are advance directives important? An advance directive helps you control your care  Although spoken wishes may be used, it is better to have your wishes written down  Spoken wishes can be misunderstood, or not followed  Treatments may be given even if you do not want them  An advance directive may make it easier for your family to make difficult choices about your care  Urinary Incontinence   Urinary incontinence (UI)  is when you lose control of your bladder  UI develops because your bladder cannot store or empty urine properly  The 3 most common types of UI are stress incontinence, urge incontinence, or both    Medicines:   · May be given to help strengthen your bladder control  Report any side effects of medication to your healthcare provider  Do pelvic muscle exercises often:  Your pelvic muscles help you stop urinating  Squeeze these muscles tight for 5 seconds, then relax for 5 seconds  Gradually work up to squeezing for 10 seconds  Do 3 sets of 15 repetitions a day, or as directed  This will help strengthen your pelvic muscles and improve bladder control  Train your bladder:  Go to the bathroom at set times, such as every 2 hours, even if you do not feel the urge to go  You can also try to hold your urine when you feel the urge to go  For example, hold your urine for 5 minutes when you feel the urge to go  As that becomes easier, hold your urine for 10 minutes  Self-care:   · Keep a UI record  Write down how often you leak urine and how much you leak  Make a note of what you were doing when you leaked urine  · Drink liquids as directed  You may need to limit the amount of liquid you drink to help control your urine leakage  Do not drink any liquid right before you go to bed  Limit or do not have drinks that contain caffeine or alcohol  · Prevent constipation  Eat a variety of high-fiber foods  Good examples are high-fiber cereals, beans, vegetables, and whole-grain breads  Walking is the best way to trigger your intestines to have a bowel movement  · Exercise regularly and maintain a healthy weight  Weight loss and exercise will decrease pressure on your bladder and help you control your leakage  · Use a catheter as directed  to help empty your bladder  A catheter is a tiny, plastic tube that is put into your bladder to drain your urine  · Go to behavior therapy as directed  Behavior therapy may be used to help you learn to control your urge to urinate      Weight Management   Why it is important to manage your weight:  Being overweight increases your risk of health conditions such as heart disease, high blood pressure, type 2 diabetes, and certain types of cancer  It can also increase your risk for osteoarthritis, sleep apnea, and other respiratory problems  Aim for a slow, steady weight loss  Even a small amount of weight loss can lower your risk of health problems  How to lose weight safely:  A safe and healthy way to lose weight is to eat fewer calories and get regular exercise  You can lose up about 1 pound a week by decreasing the number of calories you eat by 500 calories each day  Healthy meal plan for weight management:  A healthy meal plan includes a variety of foods, contains fewer calories, and helps you stay healthy  A healthy meal plan includes the following:  · Eat whole-grain foods more often  A healthy meal plan should contain fiber  Fiber is the part of grains, fruits, and vegetables that is not broken down by your body  Whole-grain foods are healthy and provide extra fiber in your diet  Some examples of whole-grain foods are whole-wheat breads and pastas, oatmeal, brown rice, and bulgur  · Eat a variety of vegetables every day  Include dark, leafy greens such as spinach, kale, martin greens, and mustard greens  Eat yellow and orange vegetables such as carrots, sweet potatoes, and winter squash  · Eat a variety of fruits every day  Choose fresh or canned fruit (canned in its own juice or light syrup) instead of juice  Fruit juice has very little or no fiber  · Eat low-fat dairy foods  Drink fat-free (skim) milk or 1% milk  Eat fat-free yogurt and low-fat cottage cheese  Try low-fat cheeses such as mozzarella and other reduced-fat cheeses  · Choose meat and other protein foods that are low in fat  Choose beans or other legumes such as split peas or lentils  Choose fish, skinless poultry (chicken or turkey), or lean cuts of red meat (beef or pork)  Before you cook meat or poultry, cut off any visible fat  · Use less fat and oil  Try baking foods instead of frying them   Add less fat, such as margarine, sour cream, regular salad dressing and mayonnaise to foods  Eat fewer high-fat foods  Some examples of high-fat foods include french fries, doughnuts, ice cream, and cakes  · Eat fewer sweets  Limit foods and drinks that are high in sugar  This includes candy, cookies, regular soda, and sweetened drinks  Exercise:  Exercise at least 30 minutes per day on most days of the week  Some examples of exercise include walking, biking, dancing, and swimming  You can also fit in more physical activity by taking the stairs instead of the elevator or parking farther away from stores  Ask your healthcare provider about the best exercise plan for you  © Copyright [employer] 2018 Information is for End User's use only and may not be sold, redistributed or otherwise used for commercial purposes  All illustrations and images included in CareNotes® are the copyrighted property of [employer]  or Eddie Darling     Urinary Incontinence   AMBULATORY CARE:   Urinary incontinence (UI)  is when you lose control of your bladder  UI develops because your bladder cannot store or empty urine properly  The 3 most common types of UI are stress incontinence, urge incontinence, or both  Common symptoms include the following:   · You feel like your bladder does not empty completely when you urinate  · You urinate often and need to urinate immediately  · You leak urine when you sleep, or you wake up with the urge to urinate  · You leak urine when you cough, sneeze, exercise, or laugh  Call your doctor if:   · You have severe pain  · You are confused or cannot think clearly  · You have a fever  · You see blood in your urine  · You have pain when you urinate  · You have new or worse pain, even after treatment  · Your mouth feels dry or you have vision changes  · Your urine is cloudy or smells bad  · You have questions or concerns about your condition or care      Medicines:   · Medicines  may be given to help strengthen your bladder control  · Take your medicine as directed  Contact your healthcare provider if you think your medicine is not helping or if you have side effects  Tell him or her if you are allergic to any medicine  Keep a list of the medicines, vitamins, and herbs you take  Include the amounts, and when and why you take them  Bring the list or the pill bottles to follow-up visits  Carry your medicine list with you in case of an emergency  Do pelvic muscle exercises often:  Your pelvic muscles help you stop urinating  Squeeze these muscles tight for 5 seconds, then relax for 5 seconds  Gradually work up to squeezing for 10 seconds  Do 3 sets of 15 repetitions a day, or as directed  This will help strengthen your pelvic muscles and improve bladder control  Train your bladder:  Go to the bathroom at set times, such as every 2 hours, even if you do not feel the urge to go  You can also try to hold your urine when you feel the urge to go  For example, hold your urine for 5 minutes when you feel the urge to go  As that becomes easier, hold your urine for 10 minutes  Self-care:   · Keep a UI record  Write down how often you leak urine and how much you leak  Make a note of what you were doing when you leaked urine  · Drink liquids as directed  Ask your healthcare provider how much liquid to drink each day and which liquids are best for you  You may need to limit the amount of liquid you drink to help control your urine leakage  Do not drink any liquid right before you go to bed  Limit or do not have drinks that contain caffeine or alcohol  · Prevent constipation  Eat a variety of high-fiber foods  Good examples are high-fiber cereals, beans, vegetables, and whole-grain breads  Prune juice may help make your bowel movement softer  Walking is the best way to trigger your intestines to have a bowel movement  · Exercise regularly and maintain a healthy weight    Ask your healthcare provider how much you should weigh and about the best exercise plan for you  Weight loss and exercise will decrease pressure on your bladder and help you control your leakage  Ask him or her to help you create a weight loss plan if you are overweight  · Use a catheter as directed  to help empty your bladder  A catheter is a tiny, plastic tube that is put into your bladder to drain your urine  Your healthcare provider may tell you to use a catheter to prevent your bladder from getting too full and leaking urine  · Go to behavior therapy as directed  Behavior therapy may be used to help you learn to control your urge to urinate  Follow up with your doctor as directed:  Write down your questions so you remember to ask them during your visits  © Copyright [employer] 2022 Information is for End User's use only and may not be sold, redistributed or otherwise used for commercial purposes  All illustrations and images included in CareNotes® are the copyrighted property of [employer] Inc  or [employer]   The above information is an  only  It is not intended as medical advice for individual conditions or treatments  Talk to your doctor, nurse or pharmacist before following any medical regimen to see if it is safe and effective for you  Kegel Exercises for Women   AMBULATORY CARE:   Kegel exercises  help strengthen your pelvic muscles  Pelvic muscles hold your pelvic organs, such as your bladder and uterus, in place  Kegel exercises help prevent or control problems with urine incontinence (leakage)  Incontinence may be caused by pregnancy, childbirth, or menopause  Contact your healthcare provider if:   · You cannot feel your muscles tighten or relax  · You continue to leak urine  · You have questions or concerns about your condition or care  Use the correct muscles:  Pelvic muscles are the muscles you use to control urine flow   To target these muscles, stop and start the flow of urine several times  This will help you become familiar with how it feels to tighten and relax these muscles  How to do Kegel exercises:   · Empty your bladder  You may lie down, stand up, or sit down to do these exercises  When you first try to do these exercises, it may be easier if you lie down  Tighten or squeeze your pelvic muscles slowly  It may feel like you are trying to hold back urine or gas  Hold this position for 3 seconds  Relax for 3 seconds  Repeat this cycle 10 times  · Do 10 sets of Kegel exercises, at least 3 times a day  Do not hold your breath when you do Kegel exercises  Keep your stomach, back, and leg muscles relaxed  · As your muscles get stronger, you will be able to hold the squeeze longer  Your healthcare provider may ask that you increase your pelvic muscle squeeze to 10 seconds  After you squeeze for 10 seconds, relax for 10 seconds  What else you should know:   · Once you know how to do Kegel exercises, use different positions  You can do these exercises while you lie on the floor, sit at your desk or watch TV, and while you stand  · You may notice improved bladder control within about 6 weeks  · Tighten your pelvic muscles before you sneeze, cough, or lift to prevent urine leakage  Follow up with your doctor as directed:  Write down your questions so you remember to ask them during your visits  © Copyright [employer] 2022 Information is for End User's use only and may not be sold, redistributed or otherwise used for commercial purposes  All illustrations and images included in CareNotes® are the copyrighted property of A [employer] M , Inc  or [facility] Ortiz Darling   The above information is an  only  It is not intended as medical advice for individual conditions or treatments  Talk to your doctor, nurse or pharmacist before following any medical regimen to see if it is safe and effective for you

## 2022-11-28 NOTE — PROGRESS NOTES
Assessment and Plan:     Problem List Items Addressed This Visit        Other    Hyperlipidemia    Relevant Orders    Lipid Panel with Direct LDL reflex   Other Visit Diagnoses     Well adult exam    -  Primary    Urinary incontinence, unspecified type        Prediabetes        Relevant Orders    Hemoglobin A1C    Essential hypertension        Relevant Orders    CBC and differential    Comprehensive metabolic panel    Class 1 obesity due to excess calories with serious comorbidity and body mass index (BMI) of 31 0 to 31 9 in adult        do a stepper 2 times a day for wt loss and endurence building           Urinary Incontinence Plan of Care: counseling topics discussed: practice Kegel (pelvic floor strengthening) exercises  Preventive health issues were discussed with patient, and age appropriate screening tests were ordered as noted in patient's After Visit Summary  Personalized health advice and appropriate referrals for health education or preventive services given if needed, as noted in patient's After Visit Summary  History of Present Illness:     Patient presents for a Medicare Wellness Visit    Here to go over chronic issues and labs / imaging studies if applicable  Discussed wt loss      Patient Care Team:  Anna Marie Rosas MD as PCP - General (Family Medicine)     Review of Systems:     Review of Systems   Constitutional: Negative for fever and unexpected weight change  HENT: Negative for nosebleeds and trouble swallowing  Eyes: Negative for visual disturbance  Respiratory: Positive for shortness of breath  Negative for chest tightness  Cardiovascular: Negative for chest pain, palpitations and leg swelling  Gastrointestinal: Negative for abdominal pain, constipation, diarrhea and nausea  Endocrine: Negative for cold intolerance  Genitourinary: Negative for dysuria and urgency  Musculoskeletal: Negative for joint swelling and myalgias  Skin: Negative for rash     Neurological: Negative for tremors, seizures, syncope and weakness  Hematological: Does not bruise/bleed easily  Psychiatric/Behavioral: Negative for agitation, behavioral problems, hallucinations and suicidal ideas  The patient is nervous/anxious           Problem List:     Patient Active Problem List   Diagnosis   • JULIO (obstructive sleep apnea)   • COPD (chronic obstructive pulmonary disease) (HCC)   • HTN (hypertension)   • Hyperlipidemia   • 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)   • Recurrent major depressive disorder, in partial remission ([place] [address] )   • Chronic hypoxemic respiratory failure (HCC)   • PAD (peripheral artery disease) (HCC)   • Congestive heart failure (HCC)   • OAB (overactive bladder)   • Osteoporosis   • Stage 3a chronic kidney disease (HCC)   • Multiple lung nodules   • Class 1 obesity due to excess calories with body mass index (BMI) of 33 0 to 33 9 in adult   • Recurrent falls      Past Medical and Surgical History:     Past Medical History:   Diagnosis Date   • Back problem    • Chronic pain    • COPD (chronic obstructive pulmonary disease) (HCC)    • Emphysema of lung (HCC)    • High blood pressure    • Hyperlipidemia    • Hypertension    • Lung cancer ([place] [address] )     Left Lung cancer; wedge resection    • JULIO on CPAP    • PAD (peripheral artery disease) ([place] [address] )     Leg     Past Surgical History:   Procedure Laterality Date   • APPENDECTOMY     • COLONOSCOPY  2018    repeat 2023   • HYSTERECTOMY     • LUNG CANCER SURGERY Left     wedge reseection, Dr Little [place]      Family History:     Family History   Problem Relation Age of Onset   • Colon cancer Father    • Heart disease Sister    • Lung cancer Daughter       Social History:     Social History     Socioeconomic History   • Marital status: /Civil Union     Spouse name: None   • Number of children: 4   • Years of education: 12   • Highest education level: None   Occupational History   • Occupation: Retired -    Tobacco Use   • Smoking status: Former     Packs/day: 1 50     Years: 35 00     Pack years: 52 50     Types: Cigarettes   • Smokeless tobacco: Never   • Tobacco comments:     Has smoked since age:   14 - As per [facility]    Vaping Use   • Vaping Use: Never used   Substance and Sexual Activity   • Alcohol use: Not Currently     Alcohol/week: 0 0 standard drinks     Comment: Alcohol intake:   None - As per [facility]    • Drug use: Not Currently     Comment: Illicit drugs:   Denied - As per [facility]    • Sexual activity: None   Other Topics Concern   • None   Social History Narrative    Most recent tobacco use screenin2020    Do you currently or have you served in [employer]: No    Were you activated, into active duty, as a member of the [employer] or as a Reservist: No    Occupation: Retired;     Exercise level: None    Has smoked since age: 14    Alcohol intake: None    Caffeine intake: Occasional    Chewing tobacco: none    Marital status:     Illicit drugs: Denied    Diet: Cardiac    Seat belts used routinely: Yes    Sexual orientation: Heterosexual    Smoke alarm in home: Yes    Advance directive: No    General stress level: Medium    Salt Intake: HTN Diet    Sunscreen used routinely: Yes    Has the Patient had a mammogram to screen for breast cancer within 24 months: No    Guns present in home: No    Single or multi-level home/work: single level home    Live alone or with others: with others    Number of children: 4    Presence of domestic violence: No    Are you currently employed: No    Asbestos exposure: No    TB exposure: No    Environmental exposure: No    Animal exposure: Yes    Hard of hearing or deaf in one or both ears: No    Legally blind in one or both eyes: No    has cpap, oxygen and nebulizer-[employer]     - As per [employer]     Financial Resource Strain: High Risk   • Difficulty of Paying Living Expenses: Very hard   Food Insecurity: Not on file   Transportation Needs: No Transportation Needs   • Lack of Transportation (Medical): No   • Lack of Transportation (Non-Medical): No   Physical Activity: Not on file   Stress: Not on file   Social Connections: Not on file   Intimate Partner Violence: Not on file   Housing Stability: Not on file      Medications and Allergies:     Current Outpatient Medications   Medication Sig Dispense Refill   • albuterol (PROVENTIL HFA,VENTOLIN HFA) 90 mcg/act inhaler ProAir HFA 90 mcg/actuation aerosol inhaler     • amLODIPine (NORVASC) 5 mg tablet Take 1 tablet (5 mg total) by mouth daily 90 tablet 0   • Ascorbic Acid (vitamin C) 100 MG tablet Take 100 mg by mouth daily     • citalopram (CeleXA) 10 mg tablet Take 1 tablet (10 mg total) by mouth daily 90 tablet 1   • clopidogrel (PLAVIX) 75 mg tablet TK 1 T PO D     • co-enzyme Q-10 30 MG capsule Take 30 mg by mouth 3 (three) times a day     • doxepin (SINEquan) 10 mg capsule Take 1-3 caps qhs prn sleep 45 capsule 0   • formoterol (Perforomist) 20 MCG/2ML nebulizer solution Take 2 mL (20 mcg total) by nebulization 2 (two) times a day icd 10 code J44 9 180 mL 1   • ipratropium-albuterol (DUO-NEB) 0 5-2 5 mg/3 mL nebulizer solution Take 3 mL by nebulization every 6 (six) hours as needed for wheezing or shortness of breath ICD 10 J44 9 300 mL 0   • Multiple Vitamins-Minerals (AIRBORNE GUMMIES PO) Take by mouth     • Nebulizers (Vios LC Sprint) MISC Use as directed 1 each 0   • olopatadine HCl (PATADAY) 0 2 % opth drops Administer 1 drop to both eyes as needed (only if itchy eyes)     • Omega-3 Fatty Acids (fish oil) 1,000 mg Take 1,000 mg by mouth daily     • rosuvastatin (CRESTOR) 20 MG tablet TK 1 T PO QHS     • timolol (TIMOPTIC) 0 5 % ophthalmic solution INT 1 GTT IN OU BID     • budesonide (PULMICORT) 0 5 mg/2 mL nebulizer solution Take 2 mL (0 5 mg total) by nebulization 2 (two) times a day Rinse mouth after use   120 mL 0   • denosumab (Prolia) 60 mg/mL Inject 1 mL (60 mg total) under the skin once for 1 dose 1 mL 0     No current facility-administered medications for this visit  Allergies   Allergen Reactions   • Baclofen Nausea Only and Dizziness   • Lisinopril Nausea Only and Dizziness   • Losartan Nausea Only and Dizziness   • Naproxen Nausea Only and Dizziness   • Zinc Acetate Nausea Only and Dizziness      Immunizations:     Immunization History   Administered Date(s) Administered   • COVID-19 MODERNA VACC 0 5 ML IM 06/01/2022   • COVID-19 PFIZER VACCINE 0 3 ML IM 02/03/2021, 02/24/2021, 11/20/2021   • DTaP 08/01/2018   • INFLUENZA 10/29/2013, 09/17/2014, 09/28/2015, 09/20/2016, 09/01/2017, 08/10/2018, 01/04/2019, 09/20/2022   • Influenza, high dose seasonal 0 7 mL 09/23/2020, 08/31/2021, 09/20/2022   • Pneumococcal Conjugate 13-Valent 10/01/2015   • Pneumococcal Polysaccharide PPV23 01/11/2010   • Tdap 08/01/2018   • Zoster 10/29/2013      Health Maintenance: There are no preventive care reminders to display for this patient  There are no preventive care reminders to display for this patient  Medicare Screening Tests and Risk Assessments:     Karl Shaikh is here for her Subsequent Wellness visit  Last Medicare Wellness visit information reviewed, patient interviewed and updates made to the record today  Health Risk Assessment:   Patient rates overall health as fair  Patient feels that their physical health rating is same  Patient is very satisfied with their life  Eyesight was rated as same  Hearing was rated as same  Patient feels that their emotional and mental health rating is same  Patients states they are never, rarely angry  Patient states they are never, rarely unusually tired/fatigued  Pain experienced in the last 7 days has been none  Patient states that she has experienced no weight loss or gain in last 6 months  Depression Screening:   PHQ-9 Score: 0      Fall Risk Screening:    In the past year, patient has experienced: no history of falling in past year      Urinary Incontinence Screening:   Patient has leaked urine accidently in the last six months  Home Safety:  Patient has trouble with stairs inside or outside of their home  Patient has working smoke alarms and has working carbon monoxide detector  Home safety hazards include: none  Nutrition:   Current diet is Regular  Medications:   Patient is currently taking over-the-counter supplements  OTC medications include: see medication list  Patient is able to manage medications  Activities of Daily Living (ADLs)/Instrumental Activities of Daily Living (IADLs):   Walk and transfer into and out of bed and chair?: Yes  Dress and groom yourself?: Yes    Bathe or shower yourself?: Yes    Feed yourself?  Yes  Do your laundry/housekeeping?: Yes  Manage your money, pay your bills and track your expenses?: Yes  Make your own meals?: Yes    Do your own shopping?: No    Previous Hospitalizations:   Any hospitalizations or ED visits within the last 12 months?: Yes    How many hospitalizations have you had in the last year?: 1-2    Advance Care Planning:   Living will: Yes    Durable POA for healthcare: No    Advanced directive: Yes    Five wishes given: No      Cognitive Screening:   Provider or family/friend/caregiver concerned regarding cognition?: No    PREVENTIVE SCREENINGS      Cardiovascular Screening:    General: Screening Not Indicated and History Lipid Disorder      Diabetes Screening:     General: Screening Current      Colorectal Cancer Screening:     General: Patient Declines      Breast Cancer Screening:     General: Screening Not Indicated      Cervical Cancer Screening:    General: Screening Not Indicated      Osteoporosis Screening:    General: History Osteoporosis and Screening Current      Abdominal Aortic Aneurysm (AAA) Screening:        General: Screening Not Indicated      Lung Cancer Screening:     General: Screening Not Indicated and History Lung Cancer      Hepatitis C Screening:      Hep C Screening Accepted: No Screening, Brief Intervention, and Referral to Treatment (SBIRT)    Screening  Typical number of drinks in a day: 0  Typical number of drinks in a week: 0  Interpretation: Low risk drinking behavior  Single Item Drug Screening:  How often have you used an illegal drug (including marijuana) or a prescription medication for non-medical reasons in the past year? never    Single Item Drug Screen Score: 0  Interpretation: Negative screen for possible drug use disorder    Brief Intervention  Alcohol & drug use screenings were reviewed  No concerns regarding substance use disorder identified  No results found  Physical Exam:     /82 (BP Location: Left arm, Patient Position: Sitting, Cuff Size: Large)   Pulse 86   Resp 16   Ht 5' 1" (1 549 m)   Wt 75 3 kg (166 lb)   SpO2 (!) 77% Comment: on O2 but not continuous when she's out  BMI 31 37 kg/m²     Physical Exam  Vitals and nursing note reviewed  Constitutional:       Appearance: She is well-developed and well-nourished  She is obese  Comments: Oxygen NC   HENT:      Head: Normocephalic and atraumatic  Right Ear: External ear normal       Left Ear: External ear normal       Nose: Nose normal       Mouth/Throat:      Mouth: Oropharynx is clear and moist    Eyes:      Extraocular Movements: EOM normal       Conjunctiva/sclera: Conjunctivae normal       Pupils: Pupils are equal, round, and reactive to light  Cardiovascular:      Rate and Rhythm: Normal rate and regular rhythm  Heart sounds: Normal heart sounds  No murmur heard  Pulmonary:      Effort: Pulmonary effort is normal       Breath sounds: Normal breath sounds  No wheezing  Abdominal:      General: Bowel sounds are normal       Palpations: Abdomen is soft  Musculoskeletal:         General: No tenderness or edema  Normal range of motion  Cervical back: Normal range of motion and neck supple  Lymphadenopathy:      Cervical: No cervical adenopathy     Skin: General: Skin is warm and dry  Capillary Refill: Capillary refill takes less than 2 seconds  Neurological:      Mental Status: She is alert and oriented to person, place, and time  Psychiatric:         Mood and Affect: Mood and affect normal          Behavior: Behavior normal          Thought Content:  Thought content normal          Judgment: Judgment normal           Soheila Teran MD

## 2022-12-05 ENCOUNTER — HOSPITAL ENCOUNTER (OUTPATIENT)
Dept: CT IMAGING | Facility: HOSPITAL | Age: 82
Discharge: HOME | End: 2022-12-05
Attending: INTERNAL MEDICINE

## 2022-12-05 DIAGNOSIS — R91.8 MULTIPLE LUNG NODULES: ICD-10-CM

## 2022-12-05 DIAGNOSIS — C34.92 NON-SMALL CELL CANCER OF LEFT LUNG (HCC): ICD-10-CM

## 2022-12-09 ENCOUNTER — TRANSCRIBE ORDERS (OUTPATIENT)
Dept: LAB | Facility: CLINIC | Age: 82
End: 2022-12-09

## 2022-12-09 ENCOUNTER — APPOINTMENT (OUTPATIENT)
Dept: LAB | Facility: CLINIC | Age: 82
End: 2022-12-09

## 2022-12-09 DIAGNOSIS — I10 ESSENTIAL HYPERTENSION, MALIGNANT: ICD-10-CM

## 2022-12-09 DIAGNOSIS — E78.00 PURE HYPERCHOLESTEROLEMIA: ICD-10-CM

## 2022-12-09 DIAGNOSIS — I25.119 ATHEROSCLEROSIS OF NATIVE CORONARY ARTERY WITH ANGINA PECTORIS, UNSPECIFIED WHETHER NATIVE OR TRANSPLANTED HEART (HCC): ICD-10-CM

## 2022-12-09 DIAGNOSIS — E78.00 PURE HYPERCHOLESTEROLEMIA: Primary | ICD-10-CM

## 2022-12-09 DIAGNOSIS — Z79.899 ENCOUNTER FOR LONG-TERM (CURRENT) USE OF OTHER MEDICATIONS: ICD-10-CM

## 2022-12-09 DIAGNOSIS — I10 ESSENTIAL HYPERTENSION: ICD-10-CM

## 2022-12-09 DIAGNOSIS — G47.00 INSOMNIA, UNSPECIFIED TYPE: ICD-10-CM

## 2022-12-09 LAB
ANION GAP SERPL CALCULATED.3IONS-SCNC: 3 MMOL/L (ref 4–13)
BUN SERPL-MCNC: 24 MG/DL (ref 5–25)
CALCIUM SERPL-MCNC: 10.3 MG/DL (ref 8.3–10.1)
CHLORIDE SERPL-SCNC: 103 MMOL/L (ref 96–108)
CO2 SERPL-SCNC: 32 MMOL/L (ref 21–32)
CREAT SERPL-MCNC: 0.82 MG/DL (ref 0.6–1.3)
GFR SERPL CREATININE-BSD FRML MDRD: 66 ML/MIN/1.73SQ M
GLUCOSE P FAST SERPL-MCNC: 123 MG/DL (ref 65–99)
NT-PROBNP SERPL-MCNC: 391 PG/ML
POTASSIUM SERPL-SCNC: 4.3 MMOL/L (ref 3.5–5.3)
SODIUM SERPL-SCNC: 138 MMOL/L (ref 135–147)

## 2022-12-09 RX ORDER — DOXEPIN HYDROCHLORIDE 10 MG/1
CAPSULE ORAL
Qty: 45 CAPSULE | Refills: 0 | Status: SHIPPED | OUTPATIENT
Start: 2022-12-09

## 2022-12-09 RX ORDER — AMLODIPINE BESYLATE 5 MG/1
5 TABLET ORAL DAILY
Qty: 90 TABLET | Refills: 0 | Status: SHIPPED | OUTPATIENT
Start: 2022-12-09

## 2022-12-12 ENCOUNTER — TELEPHONE (OUTPATIENT)
Dept: PULMONOLOGY | Facility: CLINIC | Age: 82
End: 2022-12-12

## 2022-12-13 DIAGNOSIS — R45.1 AGITATION: ICD-10-CM

## 2022-12-13 RX ORDER — CITALOPRAM 10 MG/1
10 TABLET ORAL DAILY
Qty: 90 TABLET | Refills: 0 | Status: SHIPPED | OUTPATIENT
Start: 2022-12-13

## 2022-12-19 ENCOUNTER — OFFICE VISIT (OUTPATIENT)
Dept: PULMONOLOGY | Facility: CLINIC | Age: 82
End: 2022-12-19

## 2022-12-19 VITALS
TEMPERATURE: 97.6 F | WEIGHT: 164 LBS | SYSTOLIC BLOOD PRESSURE: 120 MMHG | HEART RATE: 69 BPM | OXYGEN SATURATION: 82 % | HEIGHT: 61 IN | DIASTOLIC BLOOD PRESSURE: 70 MMHG | BODY MASS INDEX: 30.96 KG/M2

## 2022-12-19 DIAGNOSIS — R91.8 MULTIPLE LUNG NODULES: Primary | ICD-10-CM

## 2022-12-19 DIAGNOSIS — J43.2 CENTRILOBULAR EMPHYSEMA (HCC): ICD-10-CM

## 2022-12-19 DIAGNOSIS — C34.92 NON-SMALL CELL CANCER OF LEFT LUNG (HCC): ICD-10-CM

## 2022-12-19 DIAGNOSIS — J96.11 CHRONIC HYPOXEMIC RESPIRATORY FAILURE (HCC): ICD-10-CM

## 2022-12-19 NOTE — PROGRESS NOTES
Assessment/Plan:    Multiple lung nodules  Her previous CT scan of the chest showed multiple calcified granuloma, scarring and lung nodules   These need to be followed up especially with her history of lung cancer in the past  Yumiko Prado was also found to have thyroid nodules   Her CT scan from November 2021 showed multiple stable lung nodules  Her repeat CT scan showed enlargement of the right lower lobe lung nodule from 0 5 to 1 1 cm in size  She needs a CT PET scan and this is being arranged  she has previous history of lung cancer  I had a long discussion with her and I have answered all her questions  COPD (chronic obstructive pulmonary disease) [facility])  Mrs Ashley Randhawa hasCOPD for more [facility] 85 years and is  on home oxygen therapy at 3 L/m for chronic hypoxemic respiratory failure  She has shortness of breath on exertion and her exercise tolerance is about a block    She was a smoker for a long time for about 35 years smoking 1-1/2 packs per day  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  She quit smoking 15 years back  On clinical examination she was saturating well with oxygen supplementation  Her chest auscultation revealed bilateral basilar inspiratory coarse crackles  Her previous CT scan from 2015 showed bilateral extensive emphysematous changes  Her high-resolution CT scan of the chest showed bilateral emphysematous changes and mild bronchiectasis  Her PFT showed moderately severe airflow obstruction with severe uncorrected diffusion defect  she is on treatment with nebulized ipratropium albuterol, formoterol and budesonide  Currently her symptoms are well controlled and she is doing well   I had a long discussion with her and answered all questions        Chronic hypoxemic respiratory failure ([facility] [address] )  She has chronic hypoxemic respiratory failure  She is on continuous oxygen supplementation 3 LPM during rest and up to 6 LPM during exertion   She is with the [facility]    Non-small cell cancer of left lung ([facility] [address] )  She was diagnosed with non-small cell lung cancer 7 years back and had underwent wedge resection by Dr Jorge Storey  She also received chemotherapy  She has been remaining cancer free  She follows with Dr Love Summers from oncology  Diagnoses and all orders for this visit:    Multiple lung nodules  -     NM PET CT skull base to mid thigh; Future    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)    Centrilobular emphysema (HCC)    Chronic hypoxemic respiratory failure (HCC)          Subjective:      Patient ID: Arik England is a 79 y o  female  Karina Wagner came for follow-up for her lung nodules  She had a 0 5 mm right lower lobe lung nodule which has increased in size to 1 1 cm  She currently has no significant weight loss or anorexia  She has previous history of lung cancer on the left side  She has chronic hypoxic respiratory failure and has been on oxygen supplementation at 3 5 L/min  She also has COPD emphysema and has been on treatment with Perforomist and budesonide  Also has DuoNeb  She is a previous smoker  But quit many years back  Denied any dizziness or headache  Exercise tolerance is less than half a block  She has occasional cough and occasional wheeze  Chest pain no palpitations  No fever or chills  She previously had thyroid nodules and this was again noted on the recent CT scan  Have been stable  The following portions of the patient's history were reviewed and updated as appropriate: allergies, current medications, past family history, past medical history, past social history, past surgical history and problem list     Review of Systems   Constitutional: Negative for appetite change, chills, fatigue, fever and unexpected weight change  HENT: Positive for rhinorrhea  Negative for hearing loss, sneezing, sore throat and trouble swallowing  Eyes: Negative for visual disturbance (Glaucoma)     Respiratory: Positive for cough and shortness of breath  Negative for chest tightness and wheezing  Cardiovascular: Negative for chest pain, palpitations and leg swelling  Gastrointestinal: Negative for abdominal pain, constipation, diarrhea, nausea and vomiting  Genitourinary: Negative for dysuria, frequency and urgency  Musculoskeletal: Positive for back pain  Negative for arthralgias, gait problem and joint swelling  Skin: Negative for rash  Allergic/Immunologic: Negative for environmental allergies  Neurological: Negative for dizziness, syncope, light-headedness and headaches  Psychiatric/Behavioral: Negative for agitation, confusion and sleep disturbance  Objective:      /70   Pulse 69   Temp 97 6 °F (36 4 °C)   Ht 5' 1" (1 549 m)   Wt 74 4 kg (164 lb)   SpO2 (!) 82%   BMI 30 99 kg/m²          Physical Exam  Vitals reviewed  Constitutional:       General: She is not in acute distress  Appearance: She is not ill-appearing or toxic-appearing  Interventions: She is not intubated  HENT:      Head: Normocephalic  Mouth/Throat:      Mouth: Mucous membranes are moist    Neck:      Vascular: No hepatojugular reflux or JVD  Trachea: No tracheal deviation  Cardiovascular:      Rate and Rhythm: Normal rate and regular rhythm  Heart sounds: Normal heart sounds  No murmur heard  Pulmonary:      Effort: Pulmonary effort is normal  No accessory muscle usage  She is not intubated  Breath sounds: Examination of the right-lower field reveals rales  Examination of the left-lower field reveals rales  Rales present  No decreased breath sounds, wheezing or rhonchi  Chest:      Chest wall: No tenderness  Abdominal:      General: Bowel sounds are normal       Palpations: Abdomen is soft  Tenderness: There is no abdominal tenderness  There is no guarding  Musculoskeletal:      Cervical back: Neck supple  Right lower leg: No edema  Left lower leg: No edema     Lymphadenopathy: Cervical: No cervical adenopathy  Skin:     Coloration: Skin is not cyanotic or pale  Findings: No rash  Nails: There is no clubbing  Neurological:      Mental Status: She is alert and oriented to person, place, and time  Psychiatric:         Mood and Affect: Mood normal          Behavior: Behavior normal  Behavior is not agitated  I spent 30 minutes of time taking care of this patient with complex pulmonary issues  The majority of this time was spent directly with the patient counseling as well as coordinating care

## 2022-12-20 NOTE — ASSESSMENT & PLAN NOTE
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  She quit smoking 15 years back  On clinical examination she was saturating well with oxygen supplementation  Her chest auscultation revealed bilateral basilar inspiratory coarse crackles  Her previous CT scan from 2015 showed bilateral extensive emphysematous changes  Her high-resolution CT scan of the chest showed bilateral emphysematous changes and mild bronchiectasis  Her PFT showed moderately severe airflow obstruction with severe uncorrected diffusion defect  she is on treatment with nebulized ipratropium albuterol, formoterol and budesonide    Currently her symptoms are well controlled and she is doing well   I had a long discussion with her and answered all questions

## 2022-12-20 NOTE — ASSESSMENT & PLAN NOTE
She was diagnosed with non-small cell lung cancer 7 years back and had underwent wedge resection by Dr Yessenia Smith  She also received chemotherapy  She has been remaining cancer free  She follows with Dr Tamara Lindsey from oncology

## 2022-12-20 NOTE — ASSESSMENT & PLAN NOTE
Her previous CT scan of the chest showed multiple calcified granuloma, scarring and lung nodules   These need to be followed up especially with her history of lung cancer in the past  John Wadsworth was also found to have thyroid nodules   Her CT scan from November 2021 showed multiple stable lung nodules  Her repeat CT scan showed enlargement of the right lower lobe lung nodule from 0 5 to 1 1 cm in size  She needs a CT PET scan and this is being arranged  she has previous history of lung cancer  I had a long discussion with her and I have answered all her questions

## 2022-12-28 ENCOUNTER — HOSPITAL ENCOUNTER (OUTPATIENT)
Dept: RADIOLOGY | Age: 82
Discharge: HOME | End: 2022-12-28

## 2023-01-02 DIAGNOSIS — G47.00 INSOMNIA, UNSPECIFIED TYPE: ICD-10-CM

## 2023-01-03 RX ORDER — DOXEPIN HYDROCHLORIDE 10 MG/1
CAPSULE ORAL
Qty: 45 CAPSULE | Refills: 0 | Status: SHIPPED | OUTPATIENT
Start: 2023-01-03

## 2023-01-06 ENCOUNTER — HOSPITAL ENCOUNTER (OUTPATIENT)
Dept: RADIOLOGY | Age: 83
Discharge: HOME | End: 2023-01-06

## 2023-01-06 DIAGNOSIS — D38.1 NEOPLASM OF UNCERTAIN BEHAVIOR OF TRACHEA, BRONCHUS, AND LUNG: ICD-10-CM

## 2023-01-06 DIAGNOSIS — R91.8 MULTIPLE LUNG NODULES: ICD-10-CM

## 2023-01-06 LAB — GLUCOSE SERPL-MCNC: 133 MG/DL (ref 65–140)

## 2023-01-16 ENCOUNTER — OFFICE VISIT (OUTPATIENT)
Dept: PULMONOLOGY | Facility: CLINIC | Age: 83
End: 2023-01-16

## 2023-01-16 VITALS
BODY MASS INDEX: 31.34 KG/M2 | DIASTOLIC BLOOD PRESSURE: 94 MMHG | TEMPERATURE: 97.6 F | HEIGHT: 61 IN | HEART RATE: 70 BPM | SYSTOLIC BLOOD PRESSURE: 134 MMHG | WEIGHT: 166 LBS | OXYGEN SATURATION: 80 %

## 2023-01-16 DIAGNOSIS — J96.11 CHRONIC HYPOXEMIC RESPIRATORY FAILURE (HCC): ICD-10-CM

## 2023-01-16 DIAGNOSIS — G47.33 OSA (OBSTRUCTIVE SLEEP APNEA): ICD-10-CM

## 2023-01-16 DIAGNOSIS — C34.92 NON-SMALL CELL CANCER OF LEFT LUNG (HCC): ICD-10-CM

## 2023-01-16 DIAGNOSIS — J43.9 PULMONARY EMPHYSEMA, UNSPECIFIED EMPHYSEMA TYPE (HCC): ICD-10-CM

## 2023-01-16 DIAGNOSIS — R91.8 MULTIPLE LUNG NODULES: Primary | ICD-10-CM

## 2023-01-16 NOTE — ASSESSMENT & PLAN NOTE
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  She quit smoking 15 years back  On clinical examination she was saturating well with oxygen supplementation  Her chest auscultation revealed bilateral basilar inspiratory coarse crackles  Her previous CT scan from 2015 showed bilateral extensive emphysematous changes  Her high-resolution CT scan of the chest showed bilateral emphysematous changes and mild bronchiectasis  Her PFT showed moderately severe airflow obstruction with severe uncorrected diffusion defect   she is on treatment with nebulized ipratropium albuterol, formoterol and budesonide   Currently her symptoms are well controlled and she is doing well   I had a long discussion with her and answered all questions

## 2023-01-16 NOTE — ASSESSMENT & PLAN NOTE
She was diagnosed with non-small cell lung cancer 7 years back and had underwent wedge resection by Dr Derrek Colin  She also received chemotherapy  She has been remaining cancer free  She follows with Dr Shahrzad Tran from oncology

## 2023-01-16 NOTE — ASSESSMENT & PLAN NOTE
Her previous CT scan of the chest showed multiple calcified granuloma, scarring and lung nodules   These need to be followed up especially with her history of lung cancer in the past  Omaira Peacock was also found to have thyroid nodules   Her CT scan from November 2021 showed multiple stable lung nodules  Her repeat CT scan showed enlargement of the right lower lobe lung nodule from 0 5 to 1 1 cm in size  Her CT PET scan did not show any increased FDG uptake in the lung nodule  Our plan is to observe her at this point with a repeat scan in 3 to 6 months  Incidentally she had a small area of FDG uptake in the hepatic flexure  This would need to be investigated further  I had a long discussion with her and I have answered all her questions

## 2023-01-16 NOTE — PROGRESS NOTES
Assessment/Plan:    Multiple lung nodules  Her previous CT scan of the chest showed multiple calcified granuloma, scarring and lung nodules   These need to be followed up especially with her history of lung cancer in the past  Yumiko Prado was also found to have thyroid nodules   Her CT scan from November 2021 showed multiple stable lung nodules  Her repeat CT scan showed enlargement of the right lower lobe lung nodule from 0 5 to 1 1 cm in size  Her CT PET scan did not show any increased FDG uptake in the lung nodule  Our plan is to observe her at this point with a repeat scan in 3 to 6 months  Incidentally she had a small area of FDG uptake in the hepatic flexure  This would need to be investigated further  I had a long discussion with her and I have answered all her questions  COPD (chronic obstructive pulmonary disease) 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  She quit smoking 15 years back  On clinical examination she was saturating well with oxygen supplementation  Her chest auscultation revealed bilateral basilar inspiratory coarse crackles  Her previous CT scan from 2015 showed bilateral extensive emphysematous changes  Her high-resolution CT scan of the chest showed bilateral emphysematous changes and mild bronchiectasis  Her PFT showed moderately severe airflow obstruction with severe uncorrected diffusion defect   she is on treatment with nebulized ipratropium albuterol, formoterol and budesonide   Currently her symptoms are well controlled and she is doing well   I had a long discussion with her and answered all questions        Non-small cell cancer of left lung [facility])  She was diagnosed with non-small cell lung cancer 7 years back and had underwent wedge resection by Dr Jasso Gentle  She also received chemotherapy  She has been remaining cancer free  She follows with Dr Sabrina Suero from oncology  Chronic hypoxemic respiratory failure (HCC)  She has chronic hypoxemic respiratory failure  She is on continuous oxygen supplementation 3 LPM during rest and up to 6 LPM during exertion  She is with the [employer]    JULIO (obstructive sleep apnea)  She has obstructive sleep apnea and has been on CPAP therapy with oxygen supplementation at 3 L/m  She had a CPAP titration study in February 2016 which showed a CPAP pressure requirement of 9 cm and was using nasal pillows  She was using the CPAP every night and was comfortable with the mask and pressure   The [employer] is her DME  On clinical examination, she was obese   she got a replacement machine  She has been using that regularly  She has no significant daytime sleepiness or morning headache  She is feeling that the machine is helping her tremendously  She is very motivated to continue on CPAP therapy  Her CPAP compliance records are not currently available  Diagnoses and all orders for this visit:    Pulmonary emphysema, unspecified emphysema type ([address] )    Chronic hypoxemic respiratory failure (HCC)    Multiple lung nodules    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)    JULIO (obstructive sleep apnea)          Subjective:      Patient ID: Jaison King is a 79 y o  female  Mrs Betty Castro came for follow-up for her enlarging lung nodule  She has previous history of non-small cell lung cancer  She denied any weight loss anorexia or hemoptysis  She is on oxygen up to 5 L/min  She has cough  She has shortness of breath on exertion  Obstructive sleep apnea and has been using CPAP  Her compliance records are not available  She has COPD and has been on nebulized Perforomist, DuoNeb and budesonide    Has no hoarseness of voice or dysphagia  She denied any chest pain no palpitations or swelling of feet  He had a CT PET scan done recently and it showed that the enlarging lung nodule was not FDG avid  She previously had adenocarcinoma and this does not totally exclude any malignancy  We will do a repeat CT scan in 3 to 6 months  She incidentally had a mild FDG uptake in the hepatic flexure  This would need to be investigated  The following portions of the patient's history were reviewed and updated as appropriate: allergies, current medications, past family history, past medical history, past social history, past surgical history and problem list     Review of Systems   Constitutional: Negative for activity change, appetite change, chills, fatigue and fever  HENT: Positive for sneezing  Negative for hearing loss, postnasal drip, rhinorrhea, sore throat and trouble swallowing  Eyes: Negative for visual disturbance  Respiratory: Positive for cough, shortness of breath and wheezing  Negative for chest tightness  Cardiovascular: Negative for chest pain, palpitations and leg swelling  Gastrointestinal: Positive for diarrhea  Negative for abdominal pain, constipation, nausea and vomiting  Genitourinary: Negative for dysuria, frequency and urgency  Musculoskeletal: Positive for back pain  Negative for joint swelling  Skin: Negative for rash  Allergic/Immunologic: Positive for environmental allergies  Neurological: Negative for dizziness, syncope, light-headedness and headaches  Psychiatric/Behavioral: Negative for agitation, confusion and sleep disturbance  The patient is nervous/anxious  Objective:      /94   Pulse 70   Temp 97 6 °F (36 4 °C)   Ht 5' 1" (1 549 m)   Wt 75 3 kg (166 lb)   SpO2 (!) 80%   BMI 31 37 kg/m²          Physical Exam  Constitutional:       General: She is not in acute distress  Appearance: She is not ill-appearing, toxic-appearing or diaphoretic     HENT:      Head: Normocephalic  Mouth/Throat:      Mouth: Mucous membranes are moist    Eyes:      General: No scleral icterus  Conjunctiva/sclera: Conjunctivae normal    Cardiovascular:      Rate and Rhythm: Normal rate  Heart sounds: Normal heart sounds  No murmur heard  Pulmonary:      Effort: Pulmonary effort is normal  No respiratory distress  Breath sounds: Rales (bilateral basal crackles) present  No wheezing  Abdominal:      General: Bowel sounds are normal       Palpations: Abdomen is soft  Tenderness: There is no abdominal tenderness  There is no guarding  Musculoskeletal:      Cervical back: No rigidity  Right lower leg: No edema  Left lower leg: No edema  Lymphadenopathy:      Cervical: No cervical adenopathy  Skin:     Coloration: Skin is not jaundiced or pale  Findings: No rash  Neurological:      Mental Status: She is alert and oriented to person, place, and time  Gait: Gait normal    Psychiatric:         Mood and Affect: Mood normal          Thought Content: Thought content normal          Judgment: Judgment normal        I spent 30 minutes of time taking care of this patient with complex pulmonary issues  The majority of this time was spent directly with the patient counseling as well as coordinating care

## 2023-01-18 DIAGNOSIS — G47.00 INSOMNIA, UNSPECIFIED TYPE: ICD-10-CM

## 2023-01-18 RX ORDER — DOXEPIN HYDROCHLORIDE 10 MG/1
CAPSULE ORAL
Qty: 45 CAPSULE | Refills: 0 | Status: SHIPPED | OUTPATIENT
Start: 2023-01-18

## 2023-01-18 NOTE — ASSESSMENT & PLAN NOTE
She has obstructive sleep apnea and has been on CPAP therapy with oxygen supplementation at 3 L/m  She had a CPAP titration study in February 2016 which showed a CPAP pressure requirement of 9 cm and was using nasal pillows  She was using the CPAP every night and was comfortable with the mask and pressure   The [employer] is her DME  On clinical examination, she was obese   she got a replacement machine  She has been using that regularly  She has no significant daytime sleepiness or morning headache  She is feeling that the machine is helping her tremendously  She is very motivated to continue on CPAP therapy  Her CPAP compliance records are not currently available

## 2023-01-20 DIAGNOSIS — R94.8 ABNORMAL PET SCAN OF COLON: Primary | ICD-10-CM

## 2023-02-02 ENCOUNTER — CONSULT (OUTPATIENT)
Dept: GASTROENTEROLOGY | Facility: CLINIC | Age: 83
End: 2023-02-02

## 2023-02-02 VITALS
HEIGHT: 61 IN | TEMPERATURE: 97.6 F | WEIGHT: 164 LBS | SYSTOLIC BLOOD PRESSURE: 147 MMHG | DIASTOLIC BLOOD PRESSURE: 86 MMHG | OXYGEN SATURATION: 71 % | BODY MASS INDEX: 30.96 KG/M2 | HEART RATE: 70 BPM

## 2023-02-02 DIAGNOSIS — R94.8 ABNORMAL PET SCAN OF COLON: Primary | ICD-10-CM

## 2023-02-02 NOTE — PROGRESS NOTES
[facility] - Outpatient Consultation  Mehdi Ko 79 y o  female MRN: [id number]  Encounter: [id number]          ASSESSMENT AND PLAN:      1  Abnormal PET scan of colon  Increased FDG uptake noted in the hepatic flexure region on recent PET scan; no alarm symptoms at this time otherwise, but reports she has not had colonoscopy for close to 10 years  Rule out underlying malignancy, she does have history of non-small cell lung cancer    Does also have some risk factors for anesthesia including advanced age, CPAP-dependent JULIO, COPD with 4 L supplemental oxygen dependence, and antiplatelet therapy with Plavix for peripheral artery disease    -Plan for colonoscopy, scheduled to be done in the hospital    -Procedure was explained in detail to the patient and patient's present family at this time including associated risks and benefits, risks including but not limited to infection, perforation and bleeding    -Prescription and instructions provided for colonoscopy prep, will utilize GoLyt[facility] in view of patient's CKD history    -Advised patient to check with her family doctor regarding clearance to hold Plavix for 5 days prior to the procedure    -Patient also had questions about previously seen diverticulosis on imaging, I explained to patient about diverticulosis, she does not appear to have any complications of diverticulosis/diverticulitis clinically at this time    -Regarding her bowel irregularity and occasional stool incontinence issues, I suggested starting with a daily fiber supplement to help bulk the stool, otherwise we will follow-up on her upcoming colonoscopy    ______________________________________________________________________    HPI: 19-year-old female with history of non-small cell lung cancer status post wedge resection and chemotherapy concluded 6 to 7 years ago, also with COPD on 4 L oxygen chronically, obstructive sleep apnea with CPAP dependence, BMI 31 who presents for evaluation; she says that her oncologist had ordered a PET scan for her which was completed early in January, which incidentally noted an increased FDG uptake region in the hepatic flexure  The patient says that her last colonoscopy was done with Dr Daniella Melton about 8 to 10 years ago; she denies any known family history of colon cancer  She says her bowel habits are usually regular but she does occasionally get bouts of urgency and stool incontinence, about once every couple of weeks, and this has been going on for about 3 years  Stool frequency ranges from 3 times in 1 day to once every 2 days  He denies any rectal bleeding or melena, denies any abdominal pain, nausea or vomiting, difficulty with swallowing, or problems with acid reflux or heartburn  REVIEW OF SYSTEMS:    CONSTITUTIONAL: Denies any fever, chills, rigors, and weight loss  HEENT: No earache or tinnitus  Denies hearing loss or visual disturbances  CARDIOVASCULAR: No chest pain or palpitations  RESPIRATORY: Denies any cough, hemoptysis, shortness of breath or dyspnea on exertion  GASTROINTESTINAL: As noted in the History of Present Illness  GENITOURINARY: No problems with urination  Denies any hematuria or dysuria  NEUROLOGIC: No dizziness or vertigo, denies headaches  MUSCULOSKELETAL: Denies any muscle or joint pain  SKIN: Denies skin rashes or itching  ENDOCRINE: Denies excessive thirst  Denies intolerance to heat or cold  PSYCHOSOCIAL: Denies depression or anxiety  Denies any recent memory loss         Historical Information   Past Medical History:   Diagnosis Date   • Back problem    • Chronic pain    • COPD (chronic obstructive pulmonary disease) (HCC)    • Diverticulosis    • Emphysema of lung (HCC)    • High blood pressure    • Hyperlipidemia    • Hypertension    • Lung cancer ([facility] [address] )     Left Lung cancer; wedge resection    • JULIO on CPAP    • PAD (peripheral artery disease) (HCC)     Leg     Past Surgical History: Procedure Laterality Date   • APPENDECTOMY     • COLONOSCOPY  2018    repeat 2023   • HYSTERECTOMY     • LUNG CANCER SURGERY Left     wedge reseection, Dr Castillo Chambers History   Social History     Substance and Sexual Activity   Alcohol Use Not Currently   • Alcohol/week: 0 0 standard drinks    Comment: Alcohol intake:   None - As per [place]      Social History     Substance and Sexual Activity   Drug Use Not Currently    Comment: Illicit drugs:   Denied - As per [place]      Social History     Tobacco Use   Smoking Status Former   • Packs/day: 1 50   • Years: 35 00   • Pack years: 52 50   • Types: Cigarettes   Smokeless Tobacco Never   Tobacco Comments    Has smoked since age:   14 - As per [place]      Family History   Problem Relation Age of Onset   • Colon cancer Father    • Heart disease Sister    • Lung cancer Daughter        Meds/Allergies       Current Outpatient Medications:   •  albuterol (PROVENTIL HFA,VENTOLIN HFA) 90 mcg/act inhaler  •  amLODIPine (NORVASC) 5 mg tablet  •  Ascorbic Acid (vitamin C) 100 MG tablet  •  bisacodyl (DULCOLAX) 5 mg EC tablet  •  budesonide (PULMICORT) 0 5 mg/2 mL nebulizer solution  •  citalopram (CeleXA) 10 mg tablet  •  clopidogrel (PLAVIX) 75 mg tablet  •  co-enzyme Q-10 30 MG capsule  •  doxepin (SINEquan) 10 mg capsule  •  formoterol (Perforomist) 20 MCG/2ML nebulizer solution  •  ipratropium-albuterol (DUO-NEB) 0 5-2 5 mg/3 mL nebulizer solution  •  Multiple Vitamins-Minerals (AIRBORNE GUMMIES PO)  •  Nebulizers (Vios LC Sprint) MISC  •  olopatadine HCl (PATADAY) 0 2 % opth drops  •  Omega-3 Fatty Acids (fish oil) 1,000 mg  •  polyethylene glycol (GOLYTELY) 4000 mL solution  •  rosuvastatin (CRESTOR) 20 MG tablet  •  timolol (TIMOPTIC) 0 5 % ophthalmic solution  •  denosumab (Prolia) 60 mg/mL    Allergies   Allergen Reactions   • Baclofen Nausea Only and Dizziness   • Lisinopril Nausea Only and Dizziness   • Losartan Nausea Only and Dizziness   • Naproxen Nausea Only and Dizziness   • Zinc Acetate Nausea Only and Dizziness           Objective     Blood pressure 147/86, pulse 70, temperature 97 6 °F (36 4 °C), height 5' 1" (1 549 m), weight 74 4 kg (164 lb), SpO2 (!) 71 %  Body mass index is 30 99 kg/m²  PHYSICAL EXAM:      General Appearance:   Alert, cooperative, no distress   HEENT:   Normocephalic, atraumatic, anicteric      Neck:  Supple, symmetrical, trachea midline   Lungs:   Clear to auscultation bilaterally; no rales, rhonchi or wheezing; respirations unlabored    Heart[de-identified]   Regular rate and rhythm; no murmur, rub, or gallop  Abdomen:   Soft, non-tender, non-distended; normal bowel sounds; no masses, no organomegaly    Genitalia:   Deferred    Rectal:   Deferred    Extremities:  No cyanosis, clubbing or edema    Pulses:  2+ and symmetric    Skin:  No jaundice, rashes, or lesions    Lymph nodes:  No palpable cervical lymphadenopathy        Lab Results:   No visits with results within 1 Day(s) from this visit  Latest known visit with results is:   Hospital Outpatient Visit on 01/06/2023   Component Date Value   • POC Glucose 01/06/2023 133          Radiology Results:   NM PET CT skull base to mid thigh    Result Date: 1/6/2023  Narrative: PET/CT SCAN INDICATION: Enlarging right lower lobe nodule  D38 1: Neoplasm of uncertain behavior of trachea, bronchus and lung R91 8: Other nonspecific abnormal finding of lung field MODIFIER: PS COMPARISON: CT chest 12/5/2022, PET/CT examination 10/24/2019 CELL TYPE:  Adenocarcinoma, left upper lobe TECHNIQUE:   10 1 mCi F-18-FDG administered IV  Multiplanar attenuation corrected and non attenuation corrected PET images are available for interpretation, and contiguous, low dose, axial CT sections were obtained from the skull base through the femurs following the administration of oral contrast material (7 5 cc Omnipaque-240 in 300 cc water)  Intravenous contrast material was not utilized   This examination, like all CT scans performed in the [facility], was performed utilizing techniques to minimize radiation dose exposure, including the use of iterative reconstruction and automated exposure control  Fasting serum glucose: 133 mg/dl FINDINGS: VISUALIZED BRAIN:   No acute abnormalities are seen  HEAD/NECK:   There is a physiologic distribution of FDG  No FDG avid cervical adenopathy is seen  CT images: Multinodular thyroid gland without suspicious focal FDG uptake  This has been previously evaluated on thyroid ultrasound of 12/3/2021  CHEST:   No FDG avid soft tissue lesions are seen  No focal FDG uptake in the right lower lobe nodule posterior medially  This is better seen on recent chest CT where it was noted to measure up to 1 1 cm in size  Additional 5 mm pulmonary nodules not well seen on the low-dose CT images  These are not FDG avid but likely too small to characterize by PET  These were noted to be stable  No suspicious focal FDG uptake in the mediastinal or perihilar regions  CT images: Post surgical changes of the left upper lobe  Scattered coronary artery calcifications  Emphysematous changes of the lung fields  ABDOMEN:   There is now focal FDG uptake at the hepatic flexure of the colon, SUV max of 6 4  Colon is underdistended here on CT limiting evaluation  See image 49 series 1202  No FDG avid lymph nodes  CT images: Colonic diverticulosis  Ectatic abdominal aorta  PELVIS: No FDG avid soft tissue lesions are seen  CT images: Status post cholecystectomy  OSSEOUS STRUCTURES: No FDG avid lesions are seen  CT images: Multilevel degenerative spurring of the spine  Mild anterolisthesis L4 on L5  Impression: 1  The enlarging right lower lobe nodule is not FDG avid  This would not exclude a low-grade adenocarcinoma given interval enlargement  Continued surveillance with CT is advised if tissue sampling is not performed  A follow up CT could be performed in 3-6 months   2   Incidental focal FDG uptake at the hepatic flexure of the colon  Underlying colonic lesion should be excluded  Correlate with colonoscopy  The study was marked in EPIC for significant notification   Workstation performed: [id number]

## 2023-02-02 NOTE — H&P (VIEW-ONLY)
[facility] - Outpatient Consultation  David Valdes 79 y o  female MRN: [id number]  Encounter: [id number]          ASSESSMENT AND PLAN:      1  Abnormal PET scan of colon  Increased FDG uptake noted in the hepatic flexure region on recent PET scan; no alarm symptoms at this time otherwise, but reports she has not had colonoscopy for close to 10 years  Rule out underlying malignancy, she does have history of non-small cell lung cancer    Does also have some risk factors for anesthesia including advanced age, CPAP-dependent JULIO, COPD with 4 L supplemental oxygen dependence, and antiplatelet therapy with Plavix for peripheral artery disease    -Plan for colonoscopy, scheduled to be done in the hospital    -Procedure was explained in detail to the patient and patient's present family at this time including associated risks and benefits, risks including but not limited to infection, perforation and bleeding    -Prescription and instructions provided for colonoscopy prep, will utilize GoLytely in view of patient's CKD history    -Advised patient to check with her family doctor regarding clearance to hold Plavix for 5 days prior to the procedure    -Patient also had questions about previously seen diverticulosis on imaging, I explained to patient about diverticulosis, she does not appear to have any complications of diverticulosis/diverticulitis clinically at this time    -Regarding her bowel irregularity and occasional stool incontinence issues, I suggested starting with a daily fiber supplement to help bulk the stool, otherwise we will follow-up on her upcoming colonoscopy    ______________________________________________________________________    HPI: 65-year-old female with history of non-small cell lung cancer status post wedge resection and chemotherapy concluded 6 to 7 years ago, also with COPD on 4 L oxygen chronically, obstructive sleep apnea with CPAP dependence, BMI 31 who presents for evaluation; she says that her oncologist had ordered a PET scan for her which was completed early in January, which incidentally noted an increased FDG uptake region in the hepatic flexure  The patient says that her last colonoscopy was done with Dr Layne Jeans about 8 to 10 years ago; she denies any known family history of colon cancer  She says her bowel habits are usually regular but she does occasionally get bouts of urgency and stool incontinence, about once every couple of weeks, and this has been going on for about 3 years  Stool frequency ranges from 3 times in 1 day to once every 2 days  He denies any rectal bleeding or melena, denies any abdominal pain, nausea or vomiting, difficulty with swallowing, or problems with acid reflux or heartburn  REVIEW OF SYSTEMS:    CONSTITUTIONAL: Denies any fever, chills, rigors, and weight loss  HEENT: No earache or tinnitus  Denies hearing loss or visual disturbances  CARDIOVASCULAR: No chest pain or palpitations  RESPIRATORY: Denies any cough, hemoptysis, shortness of breath or dyspnea on exertion  GASTROINTESTINAL: As noted in the History of Present Illness  GENITOURINARY: No problems with urination  Denies any hematuria or dysuria  NEUROLOGIC: No dizziness or vertigo, denies headaches  MUSCULOSKELETAL: Denies any muscle or joint pain  SKIN: Denies skin rashes or itching  ENDOCRINE: Denies excessive thirst  Denies intolerance to heat or cold  PSYCHOSOCIAL: Denies depression or anxiety  Denies any recent memory loss         Historical Information   Past Medical History:   Diagnosis Date   • Back problem    • Chronic pain    • COPD (chronic obstructive pulmonary disease) (HCC)    • Diverticulosis    • Emphysema of lung (HCC)    • High blood pressure    • Hyperlipidemia    • Hypertension    • Lung cancer ([facility] [address] )     Left Lung cancer; wedge resection    • JULIO on CPAP    • PAD (peripheral artery disease) (HCC)     Leg     Past Surgical History: Procedure Laterality Date   • APPENDECTOMY     • COLONOSCOPY  2018    repeat 2023   • HYSTERECTOMY     • LUNG CANCER SURGERY Left     wedge reseection, Dr Amadeo Carvalho History   Social History     Substance and Sexual Activity   Alcohol Use Not Currently   • Alcohol/week: 0 0 standard drinks    Comment: Alcohol intake:   None - As per [place]      Social History     Substance and Sexual Activity   Drug Use Not Currently    Comment: Illicit drugs:   Denied - [place] per [place]      Social History     Tobacco Use   Smoking Status Former   • Packs/day: 1 50   • Years: 35 00   • Pack years: 52 50   • Types: Cigarettes   Smokeless Tobacco Never   Tobacco Comments    Has smoked since age:   14 - As per [place]      Family History   Problem Relation Age of Onset   • Colon cancer Father    • Heart disease Sister    • Lung cancer Daughter        Meds/Allergies       Current Outpatient Medications:   •  albuterol (PROVENTIL HFA,VENTOLIN HFA) 90 mcg/act inhaler  •  amLODIPine (NORVASC) 5 mg tablet  •  Ascorbic Acid (vitamin C) 100 MG tablet  •  bisacodyl (DULCOLAX) 5 mg EC tablet  •  budesonide (PULMICORT) 0 5 mg/2 mL nebulizer solution  •  citalopram (CeleXA) 10 mg tablet  •  clopidogrel (PLAVIX) 75 mg tablet  •  co-enzyme Q-10 30 MG capsule  •  doxepin (SINEquan) 10 mg capsule  •  formoterol (Perforomist) 20 MCG/2ML nebulizer solution  •  ipratropium-albuterol (DUO-NEB) 0 5-2 5 mg/3 mL nebulizer solution  •  Multiple Vitamins-Minerals (AIRBORNE GUMMIES PO)  •  Nebulizers (Vios LC Sprint) MISC  •  olopatadine HCl (PATADAY) 0 2 % opth drops  •  Omega-3 Fatty Acids (fish oil) 1,000 mg  •  polyethylene glycol (GOLYTELY) 4000 mL solution  •  rosuvastatin (CRESTOR) 20 MG tablet  •  timolol (TIMOPTIC) 0 5 % ophthalmic solution  •  denosumab (Prolia) 60 mg/mL    Allergies   Allergen Reactions   • Baclofen Nausea Only and Dizziness   • Lisinopril Nausea Only and Dizziness   • Losartan Nausea Only and Dizziness   • Naproxen Nausea Only and Dizziness   • Zinc Acetate Nausea Only and Dizziness           Objective     Blood pressure 147/86, pulse 70, temperature 97 6 °F (36 4 °C), height 5' 1" (1 549 m), weight 74 4 kg (164 lb), SpO2 (!) 71 %  Body mass index is 30 99 kg/m²  PHYSICAL EXAM:      General Appearance:   Alert, cooperative, no distress   HEENT:   Normocephalic, atraumatic, anicteric      Neck:  Supple, symmetrical, trachea midline   Lungs:   Clear to auscultation bilaterally; no rales, rhonchi or wheezing; respirations unlabored    Heart[de-identified]   Regular rate and rhythm; no murmur, rub, or gallop  Abdomen:   Soft, non-tender, non-distended; normal bowel sounds; no masses, no organomegaly    Genitalia:   Deferred    Rectal:   Deferred    Extremities:  No cyanosis, clubbing or edema    Pulses:  2+ and symmetric    Skin:  No jaundice, rashes, or lesions    Lymph nodes:  No palpable cervical lymphadenopathy        Lab Results:   No visits with results within 1 Day(s) from this visit  Latest known visit with results is:   Hospital Outpatient Visit on 01/06/2023   Component Date Value   • POC Glucose 01/06/2023 133          Radiology Results:   NM PET CT skull base to mid thigh    Result Date: 1/6/2023  Narrative: PET/CT SCAN INDICATION: Enlarging right lower lobe nodule  D38 1: Neoplasm of uncertain behavior of trachea, bronchus and lung R91 8: Other nonspecific abnormal finding of lung field MODIFIER: PS COMPARISON: CT chest 12/5/2022, PET/CT examination 10/24/2019 CELL TYPE:  Adenocarcinoma, left upper lobe TECHNIQUE:   10 1 mCi F-18-FDG administered IV  Multiplanar attenuation corrected and non attenuation corrected PET images are available for interpretation, and contiguous, low dose, axial CT sections were obtained from the skull base through the femurs following the administration of oral contrast material (7 5 cc Omnipaque-240 in 300 cc water)  Intravenous contrast material was not utilized   This examination, like all CT scans performed in the [facility], was performed utilizing techniques to minimize radiation dose exposure, including the use of iterative reconstruction and automated exposure control  Fasting serum glucose: 133 mg/dl FINDINGS: VISUALIZED BRAIN:   No acute abnormalities are seen  HEAD/NECK:   There is a physiologic distribution of FDG  No FDG avid cervical adenopathy is seen  CT images: Multinodular thyroid gland without suspicious focal FDG uptake  This has been previously evaluated on thyroid ultrasound of 12/3/2021  CHEST:   No FDG avid soft tissue lesions are seen  No focal FDG uptake in the right lower lobe nodule posterior medially  This is better seen on recent chest CT where it was noted to measure up to 1 1 cm in size  Additional 5 mm pulmonary nodules not well seen on the low-dose CT images  These are not FDG avid but likely too small to characterize by PET  These were noted to be stable  No suspicious focal FDG uptake in the mediastinal or perihilar regions  CT images: Post surgical changes of the left upper lobe  Scattered coronary artery calcifications  Emphysematous changes of the lung fields  ABDOMEN:   There is now focal FDG uptake at the hepatic flexure of the colon, SUV max of 6 4  Colon is underdistended here on CT limiting evaluation  See image 49 series 1202  No FDG avid lymph nodes  CT images: Colonic diverticulosis  Ectatic abdominal aorta  PELVIS: No FDG avid soft tissue lesions are seen  CT images: Status post cholecystectomy  OSSEOUS STRUCTURES: No FDG avid lesions are seen  CT images: Multilevel degenerative spurring of the spine  Mild anterolisthesis L4 on L5  Impression: 1  The enlarging right lower lobe nodule is not FDG avid  This would not exclude a low-grade adenocarcinoma given interval enlargement  Continued surveillance with CT is advised if tissue sampling is not performed  A follow up CT could be performed in 3-6 months   2   Incidental focal FDG uptake at the hepatic flexure of the colon  Underlying colonic lesion should be excluded  Correlate with colonoscopy  The study was marked in EPIC for significant notification   Workstation performed: [id number]

## 2023-02-02 NOTE — PATIENT INSTRUCTIONS
Scheduled date of colonoscopy (as of today): 02/27/23  Physician performing colonoscopy: Luis Daniel Hernandez  Location of colonoscopy: [facility]  Bowel prep reviewed with patient: GOLYTELY  Instructions reviewed with patient by: VIR[place]  Clearances: PLAVIX HOLD FAXED TO DR Aleksandr Sotelo

## 2023-02-08 ENCOUNTER — APPOINTMENT (OUTPATIENT)
Dept: LAB | Facility: CLINIC | Age: 83
End: 2023-02-08

## 2023-02-08 DIAGNOSIS — R73.03 PREDIABETES: ICD-10-CM

## 2023-02-08 DIAGNOSIS — E78.2 MIXED HYPERLIPIDEMIA: ICD-10-CM

## 2023-02-08 DIAGNOSIS — I10 ESSENTIAL HYPERTENSION: ICD-10-CM

## 2023-02-08 LAB
ALBUMIN SERPL BCP-MCNC: 3.8 G/DL (ref 3.5–5)
ALP SERPL-CCNC: 43 U/L (ref 46–116)
ALT SERPL W P-5'-P-CCNC: 24 U/L (ref 12–78)
ANION GAP SERPL CALCULATED.3IONS-SCNC: -1 MMOL/L (ref 4–13)
AST SERPL W P-5'-P-CCNC: 13 U/L (ref 5–45)
BASOPHILS # BLD AUTO: 0.01 THOUSANDS/ÂΜL (ref 0–0.1)
BASOPHILS NFR BLD AUTO: 0 % (ref 0–1)
BILIRUB SERPL-MCNC: 0.47 MG/DL (ref 0.2–1)
BUN SERPL-MCNC: 21 MG/DL (ref 5–25)
CALCIUM SERPL-MCNC: 9.4 MG/DL (ref 8.3–10.1)
CHLORIDE SERPL-SCNC: 107 MMOL/L (ref 96–108)
CHOLEST SERPL-MCNC: 144 MG/DL
CO2 SERPL-SCNC: 34 MMOL/L (ref 21–32)
CREAT SERPL-MCNC: 0.74 MG/DL (ref 0.6–1.3)
EOSINOPHIL # BLD AUTO: 0.11 THOUSAND/ÂΜL (ref 0–0.61)
EOSINOPHIL NFR BLD AUTO: 3 % (ref 0–6)
ERYTHROCYTE [DISTWIDTH] IN BLOOD BY AUTOMATED COUNT: 13.1 % (ref 11.6–15.1)
GFR SERPL CREATININE-BSD FRML MDRD: 75 ML/MIN/1.73SQ M
GLUCOSE P FAST SERPL-MCNC: 131 MG/DL (ref 65–99)
HCT VFR BLD AUTO: 43.4 % (ref 34.8–46.1)
HDLC SERPL-MCNC: 40 MG/DL
HGB BLD-MCNC: 13.6 G/DL (ref 11.5–15.4)
IMM GRANULOCYTES # BLD AUTO: 0.01 THOUSAND/UL (ref 0–0.2)
IMM GRANULOCYTES NFR BLD AUTO: 0 % (ref 0–2)
LDLC SERPL CALC-MCNC: 72 MG/DL (ref 0–100)
LYMPHOCYTES # BLD AUTO: 0.58 THOUSANDS/ÂΜL (ref 0.6–4.47)
LYMPHOCYTES NFR BLD AUTO: 15 % (ref 14–44)
MCH RBC QN AUTO: 32.1 PG (ref 26.8–34.3)
MCHC RBC AUTO-ENTMCNC: 31.3 G/DL (ref 31.4–37.4)
MCV RBC AUTO: 102 FL (ref 82–98)
MONOCYTES # BLD AUTO: 0.36 THOUSAND/ÂΜL (ref 0.17–1.22)
MONOCYTES NFR BLD AUTO: 9 % (ref 4–12)
NEUTROPHILS # BLD AUTO: 2.85 THOUSANDS/ÂΜL (ref 1.85–7.62)
NEUTS SEG NFR BLD AUTO: 73 % (ref 43–75)
NRBC BLD AUTO-RTO: 0 /100 WBCS
PLATELET # BLD AUTO: 208 THOUSANDS/UL (ref 149–390)
PMV BLD AUTO: 9.7 FL (ref 8.9–12.7)
POTASSIUM SERPL-SCNC: 4.1 MMOL/L (ref 3.5–5.3)
PROT SERPL-MCNC: 7.2 G/DL (ref 6.4–8.4)
RBC # BLD AUTO: 4.24 MILLION/UL (ref 3.81–5.12)
SODIUM SERPL-SCNC: 140 MMOL/L (ref 135–147)
TRIGL SERPL-MCNC: 161 MG/DL
WBC # BLD AUTO: 3.92 THOUSAND/UL (ref 4.31–10.16)

## 2023-02-09 ENCOUNTER — TELEPHONE (OUTPATIENT)
Dept: FAMILY MEDICINE CLINIC | Facility: CLINIC | Age: 83
End: 2023-02-09

## 2023-02-09 NOTE — TELEPHONE ENCOUNTER
----- Message from Laurel Orellana MD sent at 2/9/2023  1:42 PM EST -----  Things are stable   We can discuss it at the visit

## 2023-02-10 LAB
EST. AVERAGE GLUCOSE BLD GHB EST-MCNC: 105 MG/DL
HBA1C MFR BLD: 5.3 %

## 2023-02-13 DIAGNOSIS — G47.00 INSOMNIA, UNSPECIFIED TYPE: ICD-10-CM

## 2023-02-13 RX ORDER — DOXEPIN HYDROCHLORIDE 10 MG/1
CAPSULE ORAL
Qty: 45 CAPSULE | Refills: 0 | Status: SHIPPED | OUTPATIENT
Start: 2023-02-13

## 2023-02-27 ENCOUNTER — ANESTHESIA (OUTPATIENT)
Dept: PERIOP | Facility: HOSPITAL | Age: 83
End: 2023-02-27

## 2023-02-27 ENCOUNTER — ANESTHESIA EVENT (OUTPATIENT)
Dept: PERIOP | Facility: HOSPITAL | Age: 83
End: 2023-02-27

## 2023-02-27 ENCOUNTER — HOSPITAL ENCOUNTER (OUTPATIENT)
Dept: PERIOP | Facility: HOSPITAL | Age: 83
Setting detail: OUTPATIENT SURGERY
Discharge: HOME | End: 2023-02-27

## 2023-02-27 VITALS
RESPIRATION RATE: 16 BRPM | SYSTOLIC BLOOD PRESSURE: 185 MMHG | HEART RATE: 76 BPM | TEMPERATURE: 97.8 F | DIASTOLIC BLOOD PRESSURE: 92 MMHG | OXYGEN SATURATION: 97 %

## 2023-02-27 DIAGNOSIS — R94.8 ABNORMAL PET SCAN OF COLON: ICD-10-CM

## 2023-02-27 RX ORDER — SODIUM CHLORIDE, SODIUM LACTATE, POTASSIUM CHLORIDE, CALCIUM CHLORIDE 600; 310; 30; 20 MG/100ML; MG/100ML; MG/100ML; MG/100ML
INJECTION, SOLUTION INTRAVENOUS CONTINUOUS PRN
Status: DISCONTINUED | OUTPATIENT
Start: 2023-02-27 | End: 2023-02-27

## 2023-02-27 RX ORDER — PROPOFOL 10 MG/ML
INJECTION, EMULSION INTRAVENOUS AS NEEDED
Status: DISCONTINUED | OUTPATIENT
Start: 2023-02-27 | End: 2023-02-27

## 2023-02-27 RX ORDER — PROPOFOL 10 MG/ML
INJECTION, EMULSION INTRAVENOUS CONTINUOUS PRN
Status: DISCONTINUED | OUTPATIENT
Start: 2023-02-27 | End: 2023-02-27

## 2023-02-27 RX ORDER — LIDOCAINE HYDROCHLORIDE 10 MG/ML
INJECTION, SOLUTION EPIDURAL; INFILTRATION; INTRACAUDAL; PERINEURAL AS NEEDED
Status: DISCONTINUED | OUTPATIENT
Start: 2023-02-27 | End: 2023-02-27

## 2023-02-27 RX ADMIN — LIDOCAINE HYDROCHLORIDE 50 MG: 10 INJECTION, SOLUTION EPIDURAL; INFILTRATION; INTRACAUDAL at 16:05

## 2023-02-27 RX ADMIN — PROPOFOL 50 MCG/KG/MIN: 10 INJECTION, EMULSION INTRAVENOUS at 16:08

## 2023-02-27 RX ADMIN — PROPOFOL 30 MG: 10 INJECTION, EMULSION INTRAVENOUS at 16:20

## 2023-02-27 RX ADMIN — SODIUM CHLORIDE, SODIUM LACTATE, POTASSIUM CHLORIDE, AND CALCIUM CHLORIDE: .6; .31; .03; .02 INJECTION, SOLUTION INTRAVENOUS at 16:03

## 2023-02-27 RX ADMIN — PROPOFOL 80 MG: 10 INJECTION, EMULSION INTRAVENOUS at 16:05

## 2023-02-27 NOTE — ANESTHESIA PREPROCEDURE EVALUATION
Procedure:  COLONOSCOPY    Relevant Problems   ANESTHESIA (within normal limits)      CARDIO   (+) Congestive heart failure (HCC)   (+) HTN (hypertension)   (+) Hyperlipidemia      /RENAL   (+) Stage 3a chronic kidney disease (HCC)      NEURO/PSYCH   (+) Recurrent major depressive disorder, in partial remission (HCC)      PULMONARY   (+) COPD (chronic obstructive pulmonary disease) (HCC)   (+) Chronic hypoxemic respiratory failure (HCC)   (+) JULIO (obstructive sleep apnea)        Physical Exam    Airway    Mallampati score: II  TM Distance: >3 FB  Neck ROM: full     Dental   lower dentures and upper dentures,     Cardiovascular  Rhythm: regular, Rate: normal,     Pulmonary  Breath sounds clear to auscultation,     Other Findings        Anesthesia Plan  ASA Score- 4     Anesthesia Type- IV sedation with anesthesia with ASA Monitors  Additional Monitors:   Airway Plan:           Plan Factors-Exercise tolerance (METS): >4 METS  Chart reviewed  EKG reviewed  Existing labs reviewed  Patient summary reviewed  Patient is not a current smoker  Induction-     Postoperative Plan-     Informed Consent- Anesthetic plan and risks discussed with patient  I personally reviewed this patient with the CRNA  Discussed and agreed on the Anesthesia Plan with the DWIGHT Leon

## 2023-02-27 NOTE — ANESTHESIA POSTPROCEDURE EVALUATION
Post-Op Assessment Note    CV Status:  Stable  Pain Score: 0    Pain management: adequate     Mental Status:  Sleepy and arousable   Hydration Status:  Stable   PONV Controlled:  None   Airway Patency:  Patent      Post Op Vitals Reviewed: Yes      Staff: CRNA         No notable events documented      BP   126/64   Temp      Pulse  74   Resp   15   SpO2   100

## 2023-02-27 NOTE — INTERVAL H&P NOTE
H&P reviewed  After examining the patient I find no changes in the patients condition since the H&P had been written      Vitals:    02/27/23 1412   Pulse: 74   Resp: 18   Temp: 97 8 °F (36 6 °C)   SpO2: 96%

## 2023-03-06 DIAGNOSIS — I10 ESSENTIAL HYPERTENSION: ICD-10-CM

## 2023-03-06 DIAGNOSIS — G47.00 INSOMNIA, UNSPECIFIED TYPE: ICD-10-CM

## 2023-03-06 RX ORDER — DOXEPIN HYDROCHLORIDE 10 MG/1
CAPSULE ORAL
Qty: 45 CAPSULE | Refills: 0 | Status: SHIPPED | OUTPATIENT
Start: 2023-03-06

## 2023-03-06 RX ORDER — AMLODIPINE BESYLATE 5 MG/1
5 TABLET ORAL DAILY
Qty: 90 TABLET | Refills: 0 | Status: SHIPPED | OUTPATIENT
Start: 2023-03-06

## 2023-03-08 ENCOUNTER — PREP FOR PROCEDURE (OUTPATIENT)
Dept: GASTROENTEROLOGY | Facility: AMBULARY SURGERY CENTER | Age: 83
End: 2023-03-08

## 2023-03-08 DIAGNOSIS — R94.8 ABNORMAL PET SCAN OF COLON: Primary | ICD-10-CM

## 2023-03-08 RX ORDER — POLYETHYLENE GLYCOL 3350 17 G/17G
POWDER, FOR SOLUTION ORAL
Qty: 238 G | Refills: 0 | Status: SHIPPED | OUTPATIENT
Start: 2023-03-08

## 2023-03-14 ENCOUNTER — TELEPHONE (OUTPATIENT)
Dept: GASTROENTEROLOGY | Facility: AMBULARY SURGERY CENTER | Age: 83
End: 2023-03-14

## 2023-03-14 DIAGNOSIS — R45.1 AGITATION: ICD-10-CM

## 2023-03-14 DIAGNOSIS — Z86.010 HX OF COLONIC POLYPS: Primary | ICD-10-CM

## 2023-03-14 RX ORDER — CITALOPRAM 10 MG/1
10 TABLET ORAL DAILY
Qty: 90 TABLET | Refills: 0 | Status: SHIPPED | OUTPATIENT
Start: 2023-03-14

## 2023-03-14 NOTE — TELEPHONE ENCOUNTER
Spoke w/ pt's daughter, Sharyle Done   Patient is scheduled for colonoscopy on June 28 , 2023 at [address] [place] [address] with Lacey Thompson MD  Patient is aware of pre-procedure prep of Golytley/Dulcolax and they will be called the day prior between 2 and 6 pm for time to report for procedure  Pre-procedure prep has been given to the patient  via [facility] on March 14 , 2023   Per pt's daughter: aware to Hold PLAVIX 5 days prior to procedure

## 2023-03-14 NOTE — TELEPHONE ENCOUNTER
----- Message from Claudio Tran sent at 3/9/2023  9:06 AM EST -----    ----- Message -----  From: Garret Livingston MD  Sent: 3/8/2023   3:31 PM EST  To: Emily Salamanca, #    Spoke to patient regarding biopsy result and recommended for repeat colonoscopy  Please schedule with plavix hold

## 2023-03-19 ENCOUNTER — RA CDI HCC (OUTPATIENT)
Dept: OTHER | Facility: HOSPITAL | Age: 83
End: 2023-03-19

## 2023-03-19 NOTE — PROGRESS NOTES
[facility]  coding opportunities     I13 0 and I70 213     Chart Reviewed number of suggestions sent to Provider: 2     Patients Insurance     Medicare Insurance: [employer] Ad[employer]age

## 2023-03-24 ENCOUNTER — OFFICE VISIT (OUTPATIENT)
Dept: FAMILY MEDICINE CLINIC | Facility: CLINIC | Age: 83
End: 2023-03-24

## 2023-03-24 VITALS
HEART RATE: 76 BPM | DIASTOLIC BLOOD PRESSURE: 80 MMHG | SYSTOLIC BLOOD PRESSURE: 138 MMHG | BODY MASS INDEX: 32.47 KG/M2 | HEIGHT: 61 IN | WEIGHT: 172 LBS

## 2023-03-24 DIAGNOSIS — N18.31 STAGE 3A CHRONIC KIDNEY DISEASE (HCC): ICD-10-CM

## 2023-03-24 DIAGNOSIS — F33.41 RECURRENT MAJOR DEPRESSIVE DISORDER, IN PARTIAL REMISSION (HCC): ICD-10-CM

## 2023-03-24 DIAGNOSIS — J43.9 PULMONARY EMPHYSEMA, UNSPECIFIED EMPHYSEMA TYPE (HCC): ICD-10-CM

## 2023-03-24 DIAGNOSIS — T78.40XA ALLERGY, INITIAL ENCOUNTER: ICD-10-CM

## 2023-03-24 DIAGNOSIS — J96.11 CHRONIC HYPOXEMIC RESPIRATORY FAILURE (HCC): Primary | ICD-10-CM

## 2023-03-24 DIAGNOSIS — E66.09 CLASS 1 OBESITY DUE TO EXCESS CALORIES WITH SERIOUS COMORBIDITY AND BODY MASS INDEX (BMI) OF 32.0 TO 32.9 IN ADULT: ICD-10-CM

## 2023-03-24 DIAGNOSIS — H61.21 IMPACTED CERUMEN OF RIGHT EAR: ICD-10-CM

## 2023-03-24 DIAGNOSIS — I50.9 CONGESTIVE HEART FAILURE, UNSPECIFIED HF CHRONICITY, UNSPECIFIED HEART FAILURE TYPE (HCC): ICD-10-CM

## 2023-03-24 DIAGNOSIS — G47.33 OSA (OBSTRUCTIVE SLEEP APNEA): ICD-10-CM

## 2023-03-24 DIAGNOSIS — I73.9 PAD (PERIPHERAL ARTERY DISEASE) (HCC): ICD-10-CM

## 2023-03-24 RX ORDER — ALBUTEROL SULFATE 90 UG/1
2 AEROSOL, METERED RESPIRATORY (INHALATION) EVERY 6 HOURS PRN
Qty: 18 G | Refills: 5 | Status: SHIPPED | OUTPATIENT
Start: 2023-03-24

## 2023-03-24 RX ORDER — LEVOCETIRIZINE DIHYDROCHLORIDE 5 MG/1
5 TABLET, FILM COATED ORAL EVERY EVENING
Qty: 30 TABLET | Refills: 1 | Status: SHIPPED | OUTPATIENT
Start: 2023-03-24

## 2023-03-24 RX ORDER — NEOMYCIN SULFATE, POLYMYXIN B SULFATE AND HYDROCORTISONE 10; 3.5; 1 MG/ML; MG/ML; [USP'U]/ML
4 SUSPENSION/ DROPS AURICULAR (OTIC) 4 TIMES DAILY
Qty: 10 ML | Refills: 0 | Status: SHIPPED | OUTPATIENT
Start: 2023-03-24 | End: 2023-03-29

## 2023-03-24 RX ORDER — MONTELUKAST SODIUM 10 MG/1
10 TABLET ORAL
Qty: 30 TABLET | Refills: 1 | Status: SHIPPED | OUTPATIENT
Start: 2023-03-24

## 2023-03-24 NOTE — PROGRESS NOTES
Assessment/Plan:    1  Chronic hypoxemic respiratory failure (HCC)  -     albuterol (PROVENTIL HFA,VENTOLIN HFA) 90 mcg/act inhaler; Inhale 2 puffs every 6 (six) hours as needed for shortness of breath    2  Congestive heart failure, unspecified HF chronicity, unspecified heart failure type ([place] [address] )    3  Recurrent major depressive disorder, in partial remission ([place] [address] )    4  PAD (peripheral artery disease) ([facility])    5  Stage 3a chronic kidney disease ([place] [address] )    6  JULIO (obstructive sleep apnea)    7  Pulmonary emphysema, unspecified emphysema type ([place] [address] )    8  Impacted cerumen of right ear  -     neomycin-polymyxin-hydrocortisone (CORTISPORIN) 0 35%-10,000 units/mL-1% otic suspension; Administer 4 drops to the right ear 4 (four) times a day for 5 days  -     Ear cerumen removal    9  Allergy, initial encounter  -     montelukast (SINGULAIR) 10 mg tablet; Take 1 tablet (10 mg total) by mouth daily at bedtime  -     levocetirizine (XYZAL) 5 MG tablet; Take 1 tablet (5 mg total) by mouth every evening    10  Class 1 obesity due to excess calories with serious comorbidity and body mass index (BMI) of 32 0 to 32 9 in adult       Subjective:      Patient ID: Guille Crabtree is a 79 y o  female  HPI   Sleep is better with albuterol   Wobbly on the feet   And sneezing a lot lately       The following portions of the patient's history were reviewed and updated as appropriate: allergies, current medications, past family history, past medical history, past social history, past surgical history and problem list     Review of Systems   Constitutional: Negative for fever and unexpected weight change  HENT: Positive for ear pain  Negative for nosebleeds and trouble swallowing  Eyes: Negative for visual disturbance  Respiratory: Positive for shortness of breath  Negative for chest tightness  Cardiovascular: Negative for chest pain, palpitations and leg swelling     Gastrointestinal: Negative for abdominal pain, constipation, diarrhea and nausea  Endocrine: Negative for cold intolerance  Genitourinary: Negative for dysuria and urgency  Musculoskeletal: Negative for joint swelling and myalgias  Skin: Negative for rash  Neurological: Negative for tremors, seizures, syncope and weakness  Hematological: Does not bruise/bleed easily  Psychiatric/Behavioral: Negative for agitation, behavioral problems, hallucinations and suicidal ideas  The patient is nervous/anxious  Objective:      /80   Pulse 76   Ht 5' 1" (1 549 m)   Wt 78 kg (172 lb)   BMI 32 50 kg/m²     No visits with results within 2 Week(s) from this visit  Latest known visit with results is:   Hospital Outpatient Visit on 02/27/2023   Component Date Value   • Case Report 02/27/2023                      Value:Surgical Pathology Report                         Case: [id number]                                   Authorizing Provider:  Pearl Duverney, MD          Collected:           02/27/2023 1641              Ordering Location:     [address] [place] [address] Received:            02/27/2023 1941                                     Operating Room                                                               Pathologist:           Joelle Ford MD                                                     Specimen:    Large Intestine, Hepatic Flexure, 1  biopsy polyp hepatic flexure                         • Final Diagnosis 02/27/2023                      Value: This result contains rich text formatting which cannot be displayed here  • Additional Information 02/27/2023                      Value: This result contains rich text formatting which cannot be displayed here  • Synoptic Checklist 02/27/2023                      Value:                            COLON/RECTUM POLYP FORM - GI - A                                                                                     :    Other     • Gross Description 02/27/2023                      Value: This result contains rich text formatting which cannot be displayed here  Physical Exam  Vitals and nursing note reviewed  Constitutional:       Appearance: She is well-developed  She is obese  Comments: 3L NC    HENT:      Head: Normocephalic and atraumatic  Right Ear: There is impacted cerumen  Cardiovascular:      Rate and Rhythm: Normal rate and regular rhythm  Heart sounds: Normal heart sounds  No murmur heard  Pulmonary:      Effort: Pulmonary effort is normal       Breath sounds: Normal breath sounds  No wheezing or rales  Abdominal:      General: Bowel sounds are normal  There is no distension  Palpations: Abdomen is soft  Tenderness: There is no abdominal tenderness  Musculoskeletal:         General: No tenderness  Normal range of motion  Cervical back: Normal range of motion and neck supple  Lymphadenopathy:      Cervical: No cervical adenopathy  Skin:     General: Skin is warm and dry  Capillary Refill: Capillary refill takes less than 2 seconds  Findings: No erythema or rash  Neurological:      Mental Status: She is alert and oriented to person, place, and time  Cranial Nerves: No cranial nerve deficit  Sensory: No sensory deficit  Motor: No abnormal muscle tone  Psychiatric:         Behavior: Behavior normal          Thought Content: Thought content normal          Judgment: Judgment normal          Ear cerumen removal    Date/Time: 3/24/2023 10:50 AM  Performed by: Saeid Hernadez MD  Authorized by: Saeid Hernadez MD   Universal Protocol:  Risks and benefits: risks, benefits and alternatives were discussed    Procedure details:     Location:  R ear    Procedure type: irrigation with instrumentation      Instrumentation: curette and forceps    Post-procedure details:     Patient tolerance of procedure: Tolerated well, no immediate complications      BMI Counseling: Body mass index is 32 5 kg/m²   The BMI is above normal  Nutrition recommendations include decreasing portion sizes, encouraging healthy choices of fruits and vegetables, decreasing fast food intake, consuming healthier snacks and limiting drinks that contain sugar  Exercise recommendations include exercising 3-5 times per week  No pharmacotherapy was ordered  Rationale for BMI follow-up plan is due to patient being overweight or obese  Falls Plan of Care: balance, strength, and gait training instructions were provided  Medications that increase falls were reviewed           Gisele Dallas MD  [place] [id number]

## 2023-04-04 DIAGNOSIS — G47.00 INSOMNIA, UNSPECIFIED TYPE: ICD-10-CM

## 2023-04-04 RX ORDER — DOXEPIN HYDROCHLORIDE 10 MG/1
CAPSULE ORAL
Qty: 45 CAPSULE | Refills: 0 | Status: SHIPPED | OUTPATIENT
Start: 2023-04-04

## 2023-05-11 DIAGNOSIS — G47.00 INSOMNIA, UNSPECIFIED TYPE: ICD-10-CM

## 2023-05-11 RX ORDER — DOXEPIN HYDROCHLORIDE 10 MG/1
CAPSULE ORAL
Qty: 45 CAPSULE | Refills: 0 | Status: SHIPPED | OUTPATIENT
Start: 2023-05-11

## 2023-05-26 DIAGNOSIS — G47.00 INSOMNIA, UNSPECIFIED TYPE: ICD-10-CM

## 2023-05-26 RX ORDER — DOXEPIN HYDROCHLORIDE 10 MG/1
CAPSULE ORAL
Qty: 45 CAPSULE | Refills: 0 | Status: SHIPPED | OUTPATIENT
Start: 2023-05-26

## 2023-06-08 DIAGNOSIS — R45.1 AGITATION: ICD-10-CM

## 2023-06-08 DIAGNOSIS — I10 ESSENTIAL HYPERTENSION: ICD-10-CM

## 2023-06-08 RX ORDER — CITALOPRAM 10 MG/1
10 TABLET ORAL DAILY
Qty: 90 TABLET | Refills: 1 | Status: SHIPPED | OUTPATIENT
Start: 2023-06-08

## 2023-06-08 RX ORDER — AMLODIPINE BESYLATE 5 MG/1
5 TABLET ORAL DAILY
Qty: 90 TABLET | Refills: 1 | Status: SHIPPED | OUTPATIENT
Start: 2023-06-08

## 2023-06-22 DIAGNOSIS — T78.40XA ALLERGY, INITIAL ENCOUNTER: ICD-10-CM

## 2023-06-22 RX ORDER — MONTELUKAST SODIUM 10 MG/1
10 TABLET ORAL
Qty: 90 TABLET | Refills: 1 | Status: SHIPPED | OUTPATIENT
Start: 2023-06-22

## 2023-06-22 RX ORDER — LEVOCETIRIZINE DIHYDROCHLORIDE 5 MG/1
5 TABLET, FILM COATED ORAL EVERY EVENING
Qty: 90 TABLET | Refills: 1 | Status: SHIPPED | OUTPATIENT
Start: 2023-06-22

## 2023-06-23 DIAGNOSIS — G47.00 INSOMNIA, UNSPECIFIED TYPE: ICD-10-CM

## 2023-06-23 RX ORDER — DOXEPIN HYDROCHLORIDE 10 MG/1
CAPSULE ORAL
Qty: 45 CAPSULE | Refills: 5 | Status: SHIPPED | OUTPATIENT
Start: 2023-06-23

## 2023-07-16 RX ORDER — SODIUM CHLORIDE, SODIUM LACTATE, POTASSIUM CHLORIDE, CALCIUM CHLORIDE 600; 310; 30; 20 MG/100ML; MG/100ML; MG/100ML; MG/100ML
75 INJECTION, SOLUTION INTRAVENOUS CONTINUOUS
Status: CANCELLED | OUTPATIENT
Start: 2023-07-16

## 2023-07-17 ENCOUNTER — HOSPITAL ENCOUNTER (OUTPATIENT)
Dept: GASTROENTEROLOGY | Facility: AMBULARY SURGERY CENTER | Age: 83
Setting detail: OUTPATIENT SURGERY
Discharge: HOME | End: 2023-07-17
Attending: INTERNAL MEDICINE | Admitting: INTERNAL MEDICINE
Payer: MEDICARE

## 2023-07-17 ENCOUNTER — ANESTHESIA EVENT (OUTPATIENT)
Dept: GASTROENTEROLOGY | Facility: AMBULARY SURGERY CENTER | Age: 83
End: 2023-07-17

## 2023-07-17 ENCOUNTER — ANESTHESIA (OUTPATIENT)
Dept: GASTROENTEROLOGY | Facility: AMBULARY SURGERY CENTER | Age: 83
End: 2023-07-17

## 2023-07-17 VITALS
DIASTOLIC BLOOD PRESSURE: 69 MMHG | TEMPERATURE: 97.3 F | OXYGEN SATURATION: 9 % | HEART RATE: 80 BPM | RESPIRATION RATE: 20 BRPM | SYSTOLIC BLOOD PRESSURE: 183 MMHG

## 2023-07-17 DIAGNOSIS — R94.8 ABNORMAL PET SCAN OF COLON: ICD-10-CM

## 2023-07-17 PROBLEM — Z99.81 OXYGEN DEPENDENT: Status: ACTIVE | Noted: 2023-07-17

## 2023-07-17 PROCEDURE — 45385 COLONOSCOPY W/LESION REMOVAL: CPT | Performed by: INTERNAL MEDICINE

## 2023-07-17 PROCEDURE — 88305 TISSUE EXAM BY PATHOLOGIST: CPT | Performed by: PATHOLOGY

## 2023-07-17 RX ORDER — LIDOCAINE HYDROCHLORIDE 10 MG/ML
INJECTION, SOLUTION EPIDURAL; INFILTRATION; INTRACAUDAL; PERINEURAL AS NEEDED
Status: DISCONTINUED | OUTPATIENT
Start: 2023-07-17 | End: 2023-07-17

## 2023-07-17 RX ORDER — PROPOFOL 10 MG/ML
INJECTION, EMULSION INTRAVENOUS AS NEEDED
Status: DISCONTINUED | OUTPATIENT
Start: 2023-07-17 | End: 2023-07-17

## 2023-07-17 RX ORDER — PROPOFOL 10 MG/ML
INJECTION, EMULSION INTRAVENOUS CONTINUOUS PRN
Status: DISCONTINUED | OUTPATIENT
Start: 2023-07-17 | End: 2023-07-17

## 2023-07-17 RX ORDER — SODIUM CHLORIDE, SODIUM LACTATE, POTASSIUM CHLORIDE, CALCIUM CHLORIDE 600; 310; 30; 20 MG/100ML; MG/100ML; MG/100ML; MG/100ML
75 INJECTION, SOLUTION INTRAVENOUS CONTINUOUS
Status: DISCONTINUED | OUTPATIENT
Start: 2023-07-17 | End: 2023-07-21 | Stop reason: HOSPADM

## 2023-07-17 RX ORDER — SODIUM CHLORIDE, SODIUM LACTATE, POTASSIUM CHLORIDE, CALCIUM CHLORIDE 600; 310; 30; 20 MG/100ML; MG/100ML; MG/100ML; MG/100ML
INJECTION, SOLUTION INTRAVENOUS CONTINUOUS PRN
Status: DISCONTINUED | OUTPATIENT
Start: 2023-07-17 | End: 2023-07-17

## 2023-07-17 RX ADMIN — PROPOFOL 80 MCG/KG/MIN: 10 INJECTION, EMULSION INTRAVENOUS at 08:46

## 2023-07-17 RX ADMIN — LIDOCAINE HYDROCHLORIDE 40 MG: 10 INJECTION, SOLUTION EPIDURAL; INFILTRATION; INTRACAUDAL; PERINEURAL at 08:44

## 2023-07-17 RX ADMIN — SODIUM CHLORIDE, SODIUM LACTATE, POTASSIUM CHLORIDE, AND CALCIUM CHLORIDE: .6; .31; .03; .02 INJECTION, SOLUTION INTRAVENOUS at 08:39

## 2023-07-17 RX ADMIN — PROPOFOL 80 MG: 10 INJECTION, EMULSION INTRAVENOUS at 08:44

## 2023-07-17 RX ADMIN — SODIUM CHLORIDE, SODIUM LACTATE, POTASSIUM CHLORIDE, AND CALCIUM CHLORIDE 75 ML/HR: .6; .31; .03; .02 INJECTION, SOLUTION INTRAVENOUS at 08:17

## 2023-07-17 NOTE — ANESTHESIA POSTPROCEDURE EVALUATION
Post-Op Assessment Note    CV Status:  Stable  Pain Score: 0    Pain management: adequate     Mental Status:  Arousable   Hydration Status:  Euvolemic   PONV Controlled:  Controlled   Airway Patency:  Patent      Post Op Vitals Reviewed: Yes      Staff: CRNA         No notable events documented.     BP  162/84   Temp     Pulse 72   Resp 16   SpO2 98

## 2023-07-17 NOTE — ANESTHESIA PREPROCEDURE EVALUATION
Procedure:  COLONOSCOPY    Relevant Problems   CARDIO   (+) Congestive heart failure (HCC)   (+) HTN (hypertension)   (+) Hyperlipidemia   (+) PAD (peripheral artery disease) (HCC)      /RENAL   (+) Stage 3a chronic kidney disease (HCC)      NEURO/PSYCH   (+) Recurrent major depressive disorder, in partial remission (HCC)      PULMONARY   (+) COPD (chronic obstructive pulmonary disease) (HCC)   (+) Chronic hypoxemic respiratory failure (HCC)   (+) JULIO (obstructive sleep apnea) (CPAP)   (+) Oxygen dependent 4 LPM baseline (95% baseline O2 Sat)      Musculoskeletal and Integument   (+) Osteoporosis        Physical Exam    Airway    Mallampati score: III  TM Distance: >3 FB  Neck ROM: full     Dental   upper dentures and lower dentures,     Cardiovascular  Rhythm: regular, Rate: normal,     Pulmonary  Breath sounds clear to auscultation,     Other Findings        Anesthesia Plan  ASA Score- 4     Anesthesia Type- IV sedation with anesthesia with ASA Monitors. Additional Monitors:   Airway Plan:           Plan Factors-    Chart reviewed. Patient is not a current smoker. Induction- intravenous. Postoperative Plan-     Informed Consent- Anesthetic plan and risks discussed with patient. I personally reviewed this patient with the CRNA. Discussed and agreed on the Anesthesia Plan with the CRNA. Rehan Coyle

## 2023-07-17 NOTE — H&P
History and Physical -  [facility]  Louis Vazquez 79 y.o. female MRN: [id number]                  HPI: Louis Vazquez is a 79y.o. year old female who presents for abnormal colonoscopy follow up - hepatic lesion/inflammatory polyp that was oozing on last colonoscopy in Feb 2023. REVIEW OF SYSTEMS: Per the HPI, and otherwise unremarkable.     Historical Information   Past Medical History:   Diagnosis Date   • Asthma    • Back problem    • Chronic pain    • Colon cancer screening     recheck tattoo   • COPD (chronic obstructive pulmonary disease) (HCC)     Oxygen 3.5L via NC   • CPAP (continuous positive airway pressure) dependence    • Diverticulosis    • Emphysema of lung (HCC)    • High blood pressure    • Hyperlipidemia    • Hypertension    • Lung cancer ([address])     Left Lung cancer; wedge resection    • JULIO on CPAP    • PAD (peripheral artery disease) ([address])     Leg   • Wears glasses      Past Surgical History:   Procedure Laterality Date   • APPENDECTOMY      age 14 yr   • COLONOSCOPY  2018    repeat 2023   • COLONOSCOPY     • HYSTERECTOMY      complete   • LUNG CANCER SURGERY Left     wedge reseection, Dr Too Black History   Social History     Substance and Sexual Activity   Alcohol Use Not Currently     Social History     Substance and Sexual Activity   Drug Use Never     Social History     Tobacco Use   Smoking Status Former   • Packs/day: 1.50   • Years: 35.00   • Total pack years: 52.50   • Types: Cigarettes   Smokeless Tobacco Never   Tobacco Comments    Has smoked since age:   14 - As per [place]      Family History   Problem Relation Age of Onset   • Colon cancer Father    • Heart disease Sister    • Lung cancer Daughter        Meds/Allergies       Current Outpatient Medications:   •  amLODIPine (NORVASC) 5 mg tablet  •  budesonide (PULMICORT) 0.5 mg/2 mL nebulizer solution  •  carboxymethylcellulose 0.5 % SOLN  •  citalopram (CeleXA) 10 mg tablet  •  doxepin (SINEquan) 10 mg capsule  •  formoterol (Perforomist) 20 MCG/2ML nebulizer solution  •  ipratropium-albuterol (DUO-NEB) 0.5-2.5 mg/3 mL nebulizer solution  •  levocetirizine (XYZAL) 5 MG tablet  •  montelukast (SINGULAIR) 10 mg tablet  •  Multiple Vitamins-Minerals (AIRBORNE GUMMIES PO)  •  olopatadine HCl (PATADAY) 0.2 % opth drops  •  Omega-3 Fatty Acids (fish oil) 1,000 mg  •  rosuvastatin (CRESTOR) 20 MG tablet  •  timolol (TIMOPTIC) 0.5 % ophthalmic solution  •  VITAMIN D PO  •  albuterol (PROVENTIL HFA,VENTOLIN HFA) 90 mcg/act inhaler  •  bisacodyl (DULCOLAX) 5 mg EC tablet  •  Calcium Carbonate (CALTRATE 600 PO)  •  clopidogrel (PLAVIX) 75 mg tablet  •  Coenzyme Q10 (Co Q 10) 100 MG CAPS  •  denosumab (Prolia) 60 mg/mL  •  Nebulizers (Vios LC Sprint) MISC  •  neomycin-polymyxin-hydrocortisone (CORTISPORIN) 0.35%-10,000 units/mL-1% otic suspension  •  OXYGEN-HELIUM IN  •  polyethylene glycol (GLYCOLAX) 17 GM/SCOOP powder  •  polyethylene glycol (GOLYTELY) 4000 mL solution    Current Facility-Administered Medications:   •  lactated ringers infusion, 75 mL/hr, Intravenous, Continuous    Allergies   Allergen Reactions   • Baclofen Nausea Only and Dizziness   • Lisinopril Nausea Only and Dizziness   • Losartan Nausea Only and Dizziness   • Naproxen Nausea Only and Dizziness   • Zinc Acetate Nausea Only and Dizziness       Objective     BP (!) 217/82   Pulse 84   Temp (!) 97 °F (36.1 °C) (Temporal)   Resp 20   SpO2 98%       PHYSICAL EXAM    Gen: NAD  Head: NCAT  CV: RRR  CHEST: Clear  ABD: soft, NT/ND  EXT: no edema      ASSESSMENT/PLAN:  This is a 79y.o. year old female here for colonoscopy, and she is stable and optimized for her procedure.

## 2023-07-21 PROCEDURE — 88305 TISSUE EXAM BY PATHOLOGIST: CPT | Performed by: PATHOLOGY

## 2023-08-11 ENCOUNTER — OFFICE VISIT (OUTPATIENT)
Dept: FAMILY MEDICINE CLINIC | Facility: CLINIC | Age: 83
End: 2023-08-11
Payer: MEDICARE

## 2023-08-11 VITALS
RESPIRATION RATE: 16 BRPM | BODY MASS INDEX: 34.36 KG/M2 | OXYGEN SATURATION: 86 % | WEIGHT: 182 LBS | HEIGHT: 61 IN | DIASTOLIC BLOOD PRESSURE: 78 MMHG | SYSTOLIC BLOOD PRESSURE: 122 MMHG | HEART RATE: 90 BPM

## 2023-08-11 DIAGNOSIS — Z79.899 OTHER LONG TERM (CURRENT) DRUG THERAPY: ICD-10-CM

## 2023-08-11 DIAGNOSIS — N18.31 STAGE 3A CHRONIC KIDNEY DISEASE (HCC): ICD-10-CM

## 2023-08-11 DIAGNOSIS — J43.9 PULMONARY EMPHYSEMA, UNSPECIFIED EMPHYSEMA TYPE (HCC): ICD-10-CM

## 2023-08-11 DIAGNOSIS — J96.11 CHRONIC HYPOXEMIC RESPIRATORY FAILURE (HCC): ICD-10-CM

## 2023-08-11 DIAGNOSIS — I50.9 CONGESTIVE HEART FAILURE, UNSPECIFIED HF CHRONICITY, UNSPECIFIED HEART FAILURE TYPE (HCC): ICD-10-CM

## 2023-08-11 DIAGNOSIS — F33.41 RECURRENT MAJOR DEPRESSIVE DISORDER, IN PARTIAL REMISSION (HCC): ICD-10-CM

## 2023-08-11 DIAGNOSIS — E66.09 CLASS 1 OBESITY DUE TO EXCESS CALORIES WITH SERIOUS COMORBIDITY AND BODY MASS INDEX (BMI) OF 34.0 TO 34.9 IN ADULT: Primary | ICD-10-CM

## 2023-08-11 PROCEDURE — 99214 OFFICE O/P EST MOD 30 MIN: CPT | Performed by: FAMILY MEDICINE

## 2023-08-11 RX ORDER — BUPROPION HYDROCHLORIDE 150 MG/1
TABLET, EXTENDED RELEASE ORAL
Qty: 60 TABLET | Refills: 5 | Status: SHIPPED | OUTPATIENT
Start: 2023-08-11 | End: 2023-09-08

## 2023-08-11 RX ORDER — NALTREXONE HYDROCHLORIDE 50 MG/1
25 TABLET, FILM COATED ORAL 2 TIMES DAILY
Qty: 30 TABLET | Refills: 5 | Status: SHIPPED | OUTPATIENT
Start: 2023-08-11

## 2023-08-11 NOTE — PROGRESS NOTES
Assessment/Plan:    No changes to medications at this time  Trial of new weight loss medicines  She just cannot move around due to the end-stage COPD    1. Class 1 obesity due to excess calories with serious comorbidity and body mass index (BMI) of 34.0 to 34.9 in adult  -     buPROPion (Wellbutrin SR) 150 mg 12 hr tablet; Take 1 tablet (150 mg total) by mouth in the morning for 7 days, THEN 1 tablet (150 mg total) 2 (two) times a day for 21 days. -     naltrexone (REVIA) 50 mg tablet; Take 0.5 tablets (25 mg total) by mouth 2 (two) times a day  -     HEMOGLOBIN A1C W/ EAG ESTIMATION; Future  -     CBC and differential; Future  -     Lipid Panel with Direct LDL reflex; Future  -     Comprehensive metabolic panel; Future  -     TSH, 3rd generation with Free T4 reflex; Future    2. Other long term (current) drug therapy  -     HEMOGLOBIN A1C W/ EAG ESTIMATION; Future  -     CBC and differential; Future  -     Lipid Panel with Direct LDL reflex; Future  -     Comprehensive metabolic panel; Future  -     TSH, 3rd generation with Free T4 reflex; Future    3. Pulmonary emphysema, unspecified emphysema type ([address])    4. Chronic hypoxemic respiratory failure (HCC)    5. Congestive heart failure, unspecified HF chronicity, unspecified heart failure type ([address])    6. Recurrent major depressive disorder, in partial remission (HCC)    7. Stage 3a chronic kidney disease (HCC)         Subjective:      Patient ID: Kwasi Bryson is a 79 y.o. female. HPI  Here to go over chronic issues and labs / imaging studies if applicable. No major changes to her health although she has felt has been more weight increase the following portions of the patient's history were reviewed and updated as appropriate: allergies, current medications, past family history, past medical history, past social history, past surgical history and problem list.    Review of Systems   Constitutional: Negative for fever and unexpected weight change. Weight increase    HENT: Negative for ear pain, nosebleeds and trouble swallowing. Eyes: Negative for visual disturbance. Respiratory: Positive for shortness of breath. Negative for chest tightness. Cardiovascular: Negative for chest pain, palpitations and leg swelling. Gastrointestinal: Negative for abdominal pain, constipation, diarrhea and nausea. Endocrine: Negative for cold intolerance. Genitourinary: Negative for dysuria and urgency. Musculoskeletal: Negative for joint swelling and myalgias. Skin: Negative for rash. Neurological: Negative for tremors, seizures, syncope and weakness. Hematological: Does not bruise/bleed easily. Psychiatric/Behavioral: Negative for agitation, behavioral problems, hallucinations and suicidal ideas. The patient is nervous/anxious. Objective:      /78 (BP Location: Left arm, Patient Position: Sitting, Cuff Size: Standard)   Pulse 90   Resp 16   Ht 5' 1" (1.549 m)   Wt 82.6 kg (182 lb)   SpO2 (!) 86% Comment: patient was having issues with O2 (portable); hooked her up in office  BMI 34.39 kg/m²     No visits with results within 2 Week(s) from this visit.    Latest known visit with results is:   Hospital Outpatient Visit on 07/17/2023   Component Date Value   • Case Report 07/17/2023                      Value:Surgical Pathology Report                         Case: [id number]                                   Authorizing Provider:  Jacob Hanson MD          Collected:           07/17/2023 0908              Ordering Location:     [facility] access hospital Surgery   Received:            07/17/2023 [facility]                                                                       Pathologist:           Shawnee Mcdaniel MD                                                                 Specimen:    Large Intestine, Hepatic Flexure, hepatic flexure polyp                                   • Final Diagnosis 07/17/2023                      Value: This result contains rich text formatting which cannot be displayed here. • Additional Information 07/17/2023                      Value: This result contains rich text formatting which cannot be displayed here. • Synoptic Checklist 07/17/2023                      Value:                            COLON/RECTUM POLYP FORM - GI - All Specimens                                                                                     :    Other     • Gross Description 07/17/2023                      Value: This result contains rich text formatting which cannot be displayed here. Physical Exam  Vitals and nursing note reviewed. Constitutional:       Appearance: She is well-developed. She is obese. Comments: 3L NC    HENT:      Head: Normocephalic and atraumatic. Right Ear: There is no impacted cerumen. Cardiovascular:      Rate and Rhythm: Normal rate and regular rhythm. Heart sounds: Normal heart sounds. No murmur heard. Pulmonary:      Effort: Pulmonary effort is normal.      Breath sounds: Normal breath sounds. No wheezing or rales. Abdominal:      General: Bowel sounds are normal. There is no distension. Palpations: Abdomen is soft. Tenderness: There is no abdominal tenderness. Musculoskeletal:         General: No tenderness. Normal range of motion. Cervical back: Normal range of motion and neck supple. Lymphadenopathy:      Cervical: No cervical adenopathy. Skin:     General: Skin is warm and dry. Capillary Refill: Capillary refill takes less than 2 seconds. Findings: No erythema or rash. Neurological:      Mental Status: She is alert and oriented to person, place, and time. Cranial Nerves: No cranial nerve deficit. Sensory: No sensory deficit. Motor: No abnormal muscle tone.    Psychiatric:         Behavior: Behavior normal.         Thought Content: Thought content normal.         Judgment: Judgment normal.         BMI Counseling: Body mass index is 34.39 kg/m². The BMI is above normal. Nutrition recommendations include decreasing portion sizes, encouraging healthy choices of fruits and vegetables, decreasing fast food intake, consuming healthier snacks and limiting drinks that contain sugar. Exercise recommendations include exercising 3-5 times per week. No pharmacotherapy was ordered. Rationale for BMI follow-up plan is due to patient being overweight or obese. Falls Plan of Care: balance, strength, and gait training instructions were provided. Medications that increase falls were reviewed.          Jessica Nguyen MD  [address] [place] [address]

## 2023-09-06 ENCOUNTER — APPOINTMENT (OUTPATIENT)
Dept: LAB | Facility: CLINIC | Age: 83
End: 2023-09-06
Payer: MEDICARE

## 2023-09-06 DIAGNOSIS — Z79.899 OTHER LONG TERM (CURRENT) DRUG THERAPY: ICD-10-CM

## 2023-09-06 DIAGNOSIS — E66.09 CLASS 1 OBESITY DUE TO EXCESS CALORIES WITH SERIOUS COMORBIDITY AND BODY MASS INDEX (BMI) OF 34.0 TO 34.9 IN ADULT: ICD-10-CM

## 2023-09-06 LAB
ALBUMIN SERPL BCP-MCNC: 4.1 G/DL (ref 3.5–5)
ALP SERPL-CCNC: 54 U/L (ref 34–104)
ALT SERPL W P-5'-P-CCNC: 14 U/L (ref 7–52)
ANION GAP SERPL CALCULATED.3IONS-SCNC: 6 MMOL/L
AST SERPL W P-5'-P-CCNC: 12 U/L (ref 13–39)
BASOPHILS # BLD AUTO: 0.02 THOUSANDS/ÂΜL (ref 0–0.1)
BASOPHILS NFR BLD AUTO: 1 % (ref 0–1)
BILIRUB SERPL-MCNC: 0.47 MG/DL (ref 0.2–1)
BUN SERPL-MCNC: 22 MG/DL (ref 5–25)
CALCIUM SERPL-MCNC: 9.2 MG/DL (ref 8.4–10.2)
CHLORIDE SERPL-SCNC: 102 MMOL/L (ref 96–108)
CHOLEST SERPL-MCNC: 135 MG/DL
CO2 SERPL-SCNC: 32 MMOL/L (ref 21–32)
CREAT SERPL-MCNC: 0.73 MG/DL (ref 0.6–1.3)
EOSINOPHIL # BLD AUTO: 0.17 THOUSAND/ÂΜL (ref 0–0.61)
EOSINOPHIL NFR BLD AUTO: 4 % (ref 0–6)
ERYTHROCYTE [DISTWIDTH] IN BLOOD BY AUTOMATED COUNT: 13.6 % (ref 11.6–15.1)
EST. AVERAGE GLUCOSE BLD GHB EST-MCNC: 117 MG/DL
GFR SERPL CREATININE-BSD FRML MDRD: 75 ML/MIN/1.73SQ M
GLUCOSE P FAST SERPL-MCNC: 135 MG/DL (ref 65–99)
HBA1C MFR BLD: 5.7 %
HCT VFR BLD AUTO: 45.6 % (ref 34.8–46.1)
HDLC SERPL-MCNC: 38 MG/DL
HGB BLD-MCNC: 14.5 G/DL (ref 11.5–15.4)
IMM GRANULOCYTES # BLD AUTO: 0.01 THOUSAND/UL (ref 0–0.2)
IMM GRANULOCYTES NFR BLD AUTO: 0 % (ref 0–2)
LDLC SERPL CALC-MCNC: 55 MG/DL (ref 0–100)
LYMPHOCYTES # BLD AUTO: 0.82 THOUSANDS/ÂΜL (ref 0.6–4.47)
LYMPHOCYTES NFR BLD AUTO: 19 % (ref 14–44)
MCH RBC QN AUTO: 33 PG (ref 26.8–34.3)
MCHC RBC AUTO-ENTMCNC: 31.8 G/DL (ref 31.4–37.4)
MCV RBC AUTO: 104 FL (ref 82–98)
MONOCYTES # BLD AUTO: 0.29 THOUSAND/ÂΜL (ref 0.17–1.22)
MONOCYTES NFR BLD AUTO: 7 % (ref 4–12)
NEUTROPHILS # BLD AUTO: 2.95 THOUSANDS/ÂΜL (ref 1.85–7.62)
NEUTS SEG NFR BLD AUTO: 69 % (ref 43–75)
NRBC BLD AUTO-RTO: 0 /100 WBCS
PLATELET # BLD AUTO: 212 THOUSANDS/UL (ref 149–390)
PMV BLD AUTO: 10.7 FL (ref 8.9–12.7)
POTASSIUM SERPL-SCNC: 4.1 MMOL/L (ref 3.5–5.3)
PROT SERPL-MCNC: 6.9 G/DL (ref 6.4–8.4)
RBC # BLD AUTO: 4.4 MILLION/UL (ref 3.81–5.12)
SODIUM SERPL-SCNC: 140 MMOL/L (ref 135–147)
TRIGL SERPL-MCNC: 209 MG/DL
TSH SERPL DL<=0.05 MIU/L-ACNC: 2.74 UIU/ML (ref 0.45–4.5)
WBC # BLD AUTO: 4.26 THOUSAND/UL (ref 4.31–10.16)

## 2023-09-06 PROCEDURE — 36415 COLL VENOUS BLD VENIPUNCTURE: CPT

## 2023-09-06 PROCEDURE — 80061 LIPID PANEL: CPT

## 2023-09-06 PROCEDURE — 80053 COMPREHEN METABOLIC PANEL: CPT

## 2023-09-06 PROCEDURE — 84443 ASSAY THYROID STIM HORMONE: CPT

## 2023-09-06 PROCEDURE — 83036 HEMOGLOBIN GLYCOSYLATED A1C: CPT

## 2023-09-06 PROCEDURE — 85025 COMPLETE CBC W/AUTO DIFF WBC: CPT

## 2023-09-12 ENCOUNTER — TELEPHONE (OUTPATIENT)
Dept: FAMILY MEDICINE CLINIC | Facility: CLINIC | Age: 83
End: 2023-09-12

## 2023-10-16 ENCOUNTER — OFFICE VISIT (OUTPATIENT)
Dept: PULMONOLOGY | Facility: CLINIC | Age: 83
End: 2023-10-16
Payer: MEDICARE

## 2023-10-16 VITALS
BODY MASS INDEX: 34.74 KG/M2 | TEMPERATURE: 98.6 F | WEIGHT: 184 LBS | HEIGHT: 61 IN | RESPIRATION RATE: 20 BRPM | HEART RATE: 69 BPM | SYSTOLIC BLOOD PRESSURE: 128 MMHG | OXYGEN SATURATION: 94 % | DIASTOLIC BLOOD PRESSURE: 84 MMHG

## 2023-10-16 DIAGNOSIS — J43.9 PULMONARY EMPHYSEMA, UNSPECIFIED EMPHYSEMA TYPE (HCC): ICD-10-CM

## 2023-10-16 DIAGNOSIS — J96.11 CHRONIC HYPOXEMIC RESPIRATORY FAILURE (HCC): ICD-10-CM

## 2023-10-16 DIAGNOSIS — C34.92 NON-SMALL CELL CANCER OF LEFT LUNG (HCC): ICD-10-CM

## 2023-10-16 DIAGNOSIS — G47.33 OSA (OBSTRUCTIVE SLEEP APNEA): ICD-10-CM

## 2023-10-16 DIAGNOSIS — R91.8 MULTIPLE LUNG NODULES: Primary | ICD-10-CM

## 2023-10-16 PROCEDURE — 99214 OFFICE O/P EST MOD 30 MIN: CPT | Performed by: INTERNAL MEDICINE

## 2023-10-16 NOTE — ASSESSMENT & PLAN NOTE
Mrs. Travis Vidal hasCOPD for more than 15 years and is  on home oxygen therapy at 3 L/m for chronic hypoxemic respiratory failure. She has shortness of breath on exertion and her exercise tolerance is about a block. She was a smoker for a long time for about 35 years smoking 1-1/2 packs per day. 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 On clinical examination she was saturating well with oxygen supplementation. Her chest auscultation revealed bilateral basilar inspiratory coarse crackles. Her previous CT scan from 2015 showed bilateral extensive emphysematous changes. Her high-resolution CT scan of the chest showed bilateral emphysematous changes and mild bronchiectasis. Her PFT showed moderately severe airflow obstruction with severe uncorrected diffusion defect. she is on treatment with nebulized ipratropium albuterol, formoterol and budesonide. Currently her symptoms are well controlled and she is doing well. I had a long discussion with her and answered all questions.

## 2023-10-16 NOTE — ASSESSMENT & PLAN NOTE
She has obstructive sleep apnea and has been on CPAP therapy with oxygen supplementation at 3 L/m. She had a CPAP titration study in February 2016 which showed a CPAP pressure requirement of 9 cm and was using nasal pillows. She was using the CPAP every night and was comfortable with the mask and pressure. The [employer] is her DME. On clinical examination, she was obese. she got a replacement machine. She has been using that regularly. She has no significant daytime sleepiness or morning headache. She is feeling that the machine is helping her tremendously. She is very motivated to continue on CPAP therapy. Her CPAP compliance records were not currently available.

## 2023-10-16 NOTE — ASSESSMENT & PLAN NOTE
Her previous CT scan of the chest showed multiple calcified granuloma, scarring and lung nodules. These need to be followed up especially with her history of lung cancer in the past.  She was also found to have thyroid nodules. Her CT scan from November 2021 showed multiple stable lung nodules. Her repeat CT scan showed enlargement of the right lower lobe lung nodule from 0.5 to 1.1 cm in size. Her CT PET scan did not show any increased FDG uptake in the lung nodule. Our plan is to observe her at this point with a repeat scan in 3 to 6 months. Incidentally she had a small area of FDG uptake in the hepatic flexure. This was evaluated by GI. She needs a repeat CT scan and this is being ordered. I had a long discussion with her and I have answered all her questions.

## 2023-10-16 NOTE — PROGRESS NOTES
Assessment/Plan:    Multiple lung nodules  Her previous CT scan of the chest showed multiple calcified granuloma, scarring and lung nodules. These need to be followed up especially with her history of lung cancer in the past.  She was also found to have thyroid nodules. Her CT scan from November 2021 showed multiple stable lung nodules. Her repeat CT scan showed enlargement of the right lower lobe lung nodule from 0.5 to 1.1 cm in size. Her CT PET scan did not show any increased FDG uptake in the lung nodule. Our plan is to observe her at this point with a repeat scan in 3 to 6 months. Incidentally she had a small area of FDG uptake in the hepatic flexure. This was evaluated by GI. She needs a repeat CT scan and this is being ordered. I had a long discussion with her and I have answered all her questions. COPD (chronic obstructive pulmonary disease) [facility])  Mrs. Eliazar Allen hasCOPD for more than 15 years and is  on home oxygen therapy at 3 L/m for chronic hypoxemic respiratory failure. She has shortness of breath on exertion and her exercise tolerance is about a block. She was a smoker for a long time for about 35 years smoking 1-1/2 packs per day. 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 On clinical examination she was saturating well with oxygen supplementation. Her chest auscultation revealed bilateral basilar inspiratory coarse crackles. Her previous CT scan from 2015 showed bilateral extensive emphysematous changes. Her high-resolution CT scan of the chest showed bilateral emphysematous changes and mild bronchiectasis. Her PFT showed moderately severe airflow obstruction with severe uncorrected diffusion defect. she is on treatment with nebulized ipratropium albuterol, formoterol and budesonide. Currently her symptoms are well controlled and she is doing well. I had a long discussion with her and answered all questions.     JULIO (obstructive sleep apnea)  She has obstructive sleep apnea and has been on CPAP therapy with oxygen supplementation at 3 L/m. She had a CPAP titration study in February 2016 which showed a CPAP pressure requirement of 9 cm and was using nasal pillows. She was using the CPAP every night and was comfortable with the mask and pressure. The [address] is her DME. On clinical examination, she was obese. she got a replacement machine. She has been using that regularly. She has no significant daytime sleepiness or morning headache. She is feeling that the machine is helping her tremendously. She is very motivated to continue on CPAP therapy. Her CPAP compliance records were not currently available. Chronic hypoxemic respiratory failure (HCC)  He has chronic respiratory failure and has been on oxygen supplementation at 4 L/min at rest and up to 6 L during exertion. This is secondary to her advanced COPD. 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 Carlos Hedrick. She also received chemotherapy. She has been remaining cancer free. She follows with Dr. Shaka Aceves from oncology. 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 female. Mrs. Avery Trejo who has chronic respiratory failure and has been on oxygen 4 L/min and up to 6 L with exertion. She has advanced COPD for which she is on treatment with budesonide formoterol and nebulized ipratropium and albuterol as needed. She has previous history of non-small cell lung cancer.   She has had multiple lung nodules and she has got a 1.1 cm right lower lobe lung nodule which we have been following. We had done a CT PET scan in January 2023 which did not show any uptake. However in view of the high index of suspicion a repeat scan was advised in 6 months. Currently she has no weight loss or anorexia or hemoptysis. Her exercise tolerance is limited. She has also occasional dizziness. No significant cough or phlegm wheeze or chest pain. No swelling of feet. Her appetite has been good. No anorexia. No fever or chills. He also has obstructive sleep apnea and has been on CPAP therapy. She is using the CPAP regularly according to her she is comfortable with the mask and pressure. She feels benefit from CPAP therapy. The following portions of the patient's history were reviewed and updated as appropriate: allergies, current medications, past family history, past medical history, past social history, past surgical history, and problem list.    Review of Systems   Constitutional:  Positive for fatigue. Negative for appetite change, chills and fever. HENT:  Negative for hearing loss, rhinorrhea, sneezing, sore throat, trouble swallowing and voice change. Eyes:  Negative for visual disturbance. Respiratory:  Positive for shortness of breath. Negative for cough, chest tightness and wheezing. Cardiovascular:  Negative for chest pain, palpitations and leg swelling. Gastrointestinal:  Negative for abdominal pain, constipation, diarrhea, nausea and vomiting. Genitourinary:  Positive for urgency. Negative for dysuria and frequency. Musculoskeletal:  Positive for arthralgias. Negative for gait problem and joint swelling. Skin:  Negative for rash. Allergic/Immunologic: Negative for environmental allergies. Neurological:  Negative for dizziness, syncope, light-headedness and headaches. Psychiatric/Behavioral:  Negative for agitation, confusion and sleep disturbance. The patient is not nervous/anxious.           Objective:      /84 (BP Location: Left arm, Patient Position: Sitting, Cuff Size: Standard)   Pulse 69   Temp 98.6 °F (37 °C) (Tympanic)   Resp 20   Ht 5' 1" (1.549 m)   Wt 83.5 kg (184 lb)   SpO2 94%   PF (!) 6 L/min   BMI 34.77 kg/m²          Physical Exam  Vitals reviewed. Constitutional:       General: She is not in acute distress. Appearance: She is obese. She is not ill-appearing, toxic-appearing or diaphoretic. HENT:      Head: Normocephalic. Nose: Nose normal.   Eyes:      General: No scleral icterus. Conjunctiva/sclera: Conjunctivae normal.   Cardiovascular:      Rate and Rhythm: Normal rate and regular rhythm. Heart sounds: Normal heart sounds. No murmur heard. Pulmonary:      Effort: Pulmonary effort is normal. No respiratory distress. Breath sounds: No stridor. Rales (bilateral basal crackles) present. No wheezing or rhonchi. Chest:      Chest wall: No tenderness. Abdominal:      General: Bowel sounds are normal.      Palpations: Abdomen is soft. Tenderness: There is no abdominal tenderness. There is no guarding. Musculoskeletal:      Cervical back: No rigidity. Right lower leg: No edema. Left lower leg: No edema. Lymphadenopathy:      Cervical: No cervical adenopathy. Skin:     Coloration: Skin is not jaundiced or pale. Findings: No rash. Neurological:      Mental Status: She is alert and oriented to person, place, and time. Gait: Gait abnormal.   Psychiatric:         Mood and Affect: Mood normal.         Behavior: Behavior normal.         Thought Content: Thought content normal.         Judgment: Judgment normal.      I Spent 30 minutes of time taking care of this patient with complex medical issues. The majority of this time was spent directly with the patient counseling as well as coordinating care.

## 2023-10-18 NOTE — ASSESSMENT & PLAN NOTE
She was diagnosed with non-small cell lung cancer 7 years back and had underwent wedge resection by Dr. Phoebe Wood. She also received chemotherapy. She has been remaining cancer free. She follows with Dr. Rosendo Montez from oncology.

## 2023-10-18 NOTE — ASSESSMENT & PLAN NOTE
He has chronic respiratory failure and has been on oxygen supplementation at 4 L/min at rest and up to 6 L during exertion. This is secondary to her advanced COPD.

## 2023-10-30 ENCOUNTER — TELEPHONE (OUTPATIENT)
Dept: CARDIOLOGY CLINIC | Facility: CLINIC | Age: 83
End: 2023-10-30

## 2023-10-30 NOTE — TELEPHONE ENCOUNTER
Poke to the patient at length. She has been using 4.5 L at night and at rest.  But she is requiring more than 6 L and her oxygen concentrator is not able to deliver that level. We need to check with DME how she can get a higher level of oxygen as she notes that her oxygen levels go down to 86.

## 2023-10-30 NOTE — TELEPHONE ENCOUNTER
Patient called in stating that they are having severe issues breathing and their concentrator will not go over 4.5. They are requesting "help from Dr ". Office advise patient that is they are having difficultly breathing with what the concentrator is able to help with they should proceed to ED immediately. Patient stated "Oh, please no. I just want the dr to help me."  Office informed patient they would send a message to pulm staff but that the fastest and best way to get help is by going to the ED. Patient again verbalized they did not want to do that.

## 2023-10-31 NOTE — TELEPHONE ENCOUNTER
Spoke to Brianna Vázquez at [facility] she stated patient needs testing to show that she fails at 4.5L of O2. Patient needs to be scheduled for a nurse visit and placed on 4.5L and O2 needs to be documented that it is under 86% and then if that is the case we need to increase O2 until patient is stable. Notes can be sent to Lavell Mejia.

## 2023-10-31 NOTE — TELEPHONE ENCOUNTER
Daughter left v/m after Dr. Kelly Thrasher spoke to pt about having breathing issues. Called spouse today and Lauren Naranjoirene has another machine that goes higher. They need a script sent to them. Daughter left another v/m (yest) to call Katheleen Kussmaul with script so they can get to pt.   #[phone]

## 2023-11-01 NOTE — TELEPHONE ENCOUNTER
Patient's daughter aware of message above, states patient cannot make it into the office because she had been weak and oxygen has been continuously under 90%. Daughter states once patient is off the home concentrator she can barely make it to the driveway and into the car before she is weak and oxygen going low. Daughter is requesting a wheelchair so patient can get around to have her testing done. Please advise.

## 2023-11-03 ENCOUNTER — OFFICE VISIT (OUTPATIENT)
Dept: PULMONOLOGY | Facility: CLINIC | Age: 83
End: 2023-11-03
Payer: MEDICARE

## 2023-11-03 ENCOUNTER — HOSPITAL ENCOUNTER (EMERGENCY)
Facility: HOSPITAL | Age: 83
Discharge: HOME | End: 2023-11-03
Attending: INTERNAL MEDICINE
Payer: MEDICARE

## 2023-11-03 ENCOUNTER — APPOINTMENT (EMERGENCY)
Dept: CT IMAGING | Facility: HOSPITAL | Age: 83
End: 2023-11-03
Payer: MEDICARE

## 2023-11-03 ENCOUNTER — APPOINTMENT (EMERGENCY)
Dept: RADIOLOGY | Facility: HOSPITAL | Age: 83
End: 2023-11-03
Payer: MEDICARE

## 2023-11-03 VITALS
TEMPERATURE: 97.8 F | HEART RATE: 67 BPM | HEIGHT: 62 IN | OXYGEN SATURATION: 94 % | BODY MASS INDEX: 33.86 KG/M2 | WEIGHT: 184 LBS | RESPIRATION RATE: 30 BRPM | SYSTOLIC BLOOD PRESSURE: 134 MMHG | DIASTOLIC BLOOD PRESSURE: 74 MMHG

## 2023-11-03 VITALS
SYSTOLIC BLOOD PRESSURE: 160 MMHG | OXYGEN SATURATION: 6 % | BODY MASS INDEX: 34.77 KG/M2 | HEIGHT: 61 IN | DIASTOLIC BLOOD PRESSURE: 70 MMHG | HEART RATE: 94 BPM

## 2023-11-03 DIAGNOSIS — R91.8 MULTIPLE LUNG NODULES: ICD-10-CM

## 2023-11-03 DIAGNOSIS — J18.9 PNEUMONIA: ICD-10-CM

## 2023-11-03 DIAGNOSIS — J43.9 PULMONARY EMPHYSEMA, UNSPECIFIED EMPHYSEMA TYPE (HCC): Primary | ICD-10-CM

## 2023-11-03 DIAGNOSIS — I50.9 CONGESTIVE HEART FAILURE, UNSPECIFIED HF CHRONICITY, UNSPECIFIED HEART FAILURE TYPE (HCC): ICD-10-CM

## 2023-11-03 DIAGNOSIS — R06.02 SOB (SHORTNESS OF BREATH): Primary | ICD-10-CM

## 2023-11-03 DIAGNOSIS — C34.92 NON-SMALL CELL CANCER OF LEFT LUNG (HCC): ICD-10-CM

## 2023-11-03 DIAGNOSIS — J96.11 CHRONIC HYPOXEMIC RESPIRATORY FAILURE (HCC): ICD-10-CM

## 2023-11-03 LAB
2HR DELTA HS TROPONIN: -1 NG/L
ALBUMIN SERPL BCP-MCNC: 4.5 G/DL (ref 3.5–5)
ALP SERPL-CCNC: 53 U/L (ref 34–104)
ALT SERPL W P-5'-P-CCNC: 13 U/L (ref 7–52)
ANION GAP SERPL CALCULATED.3IONS-SCNC: 8 MMOL/L
AST SERPL W P-5'-P-CCNC: 12 U/L (ref 13–39)
ATRIAL RATE: 63 BPM
BASOPHILS # BLD AUTO: 0.03 THOUSANDS/ÂΜL (ref 0–0.1)
BASOPHILS NFR BLD AUTO: 1 % (ref 0–1)
BILIRUB SERPL-MCNC: 0.51 MG/DL (ref 0.2–1)
BNP SERPL-MCNC: 121 PG/ML (ref 0–100)
BUN SERPL-MCNC: 21 MG/DL (ref 5–25)
CALCIUM SERPL-MCNC: 10.4 MG/DL (ref 8.4–10.2)
CARDIAC TROPONIN I PNL SERPL HS: 6 NG/L
CARDIAC TROPONIN I PNL SERPL HS: 7 NG/L
CHLORIDE SERPL-SCNC: 100 MMOL/L (ref 96–108)
CO2 SERPL-SCNC: 32 MMOL/L (ref 21–32)
CREAT SERPL-MCNC: 0.77 MG/DL (ref 0.6–1.3)
EOSINOPHIL # BLD AUTO: 0.14 THOUSAND/ÂΜL (ref 0–0.61)
EOSINOPHIL NFR BLD AUTO: 3 % (ref 0–6)
ERYTHROCYTE [DISTWIDTH] IN BLOOD BY AUTOMATED COUNT: 13.6 % (ref 11.6–15.1)
FLUAV RNA RESP QL NAA+PROBE: NEGATIVE
FLUBV RNA RESP QL NAA+PROBE: NEGATIVE
GFR SERPL CREATININE-BSD FRML MDRD: 71 ML/MIN/1.73SQ M
GLUCOSE SERPL-MCNC: 124 MG/DL (ref 65–140)
HCT VFR BLD AUTO: 47.2 % (ref 34.8–46.1)
HGB BLD-MCNC: 15 G/DL (ref 11.5–15.4)
IMM GRANULOCYTES # BLD AUTO: 0.01 THOUSAND/UL (ref 0–0.2)
IMM GRANULOCYTES NFR BLD AUTO: 0 % (ref 0–2)
LYMPHOCYTES # BLD AUTO: 0.71 THOUSANDS/ÂΜL (ref 0.6–4.47)
LYMPHOCYTES NFR BLD AUTO: 13 % (ref 14–44)
MCH RBC QN AUTO: 32 PG (ref 26.8–34.3)
MCHC RBC AUTO-ENTMCNC: 31.8 G/DL (ref 31.4–37.4)
MCV RBC AUTO: 101 FL (ref 82–98)
MONOCYTES # BLD AUTO: 0.32 THOUSAND/ÂΜL (ref 0.17–1.22)
MONOCYTES NFR BLD AUTO: 6 % (ref 4–12)
NEUTROPHILS # BLD AUTO: 4.31 THOUSANDS/ÂΜL (ref 1.85–7.62)
NEUTS SEG NFR BLD AUTO: 77 % (ref 43–75)
NRBC BLD AUTO-RTO: 0 /100 WBCS
P AXIS: 67 DEGREES
PLATELET # BLD AUTO: 236 THOUSANDS/UL (ref 149–390)
PMV BLD AUTO: 9.7 FL (ref 8.9–12.7)
POTASSIUM SERPL-SCNC: 4.1 MMOL/L (ref 3.5–5.3)
PR INTERVAL: 172 MS
PROT SERPL-MCNC: 7.9 G/DL (ref 6.4–8.4)
QRS AXIS: 49 DEGREES
QRSD INTERVAL: 92 MS
QT INTERVAL: 416 MS
QTC INTERVAL: 425 MS
RBC # BLD AUTO: 4.69 MILLION/UL (ref 3.81–5.12)
RSV RNA RESP QL NAA+PROBE: NEGATIVE
SARS-COV-2 RNA RESP QL NAA+PROBE: NEGATIVE
SODIUM SERPL-SCNC: 140 MMOL/L (ref 135–147)
T WAVE AXIS: 84 DEGREES
VENTRICULAR RATE: 63 BPM
WBC # BLD AUTO: 5.52 THOUSAND/UL (ref 4.31–10.16)

## 2023-11-03 PROCEDURE — 0241U HB NFCT DS VIR RESP RNA 4 TRGT: CPT | Performed by: PHYSICIAN ASSISTANT

## 2023-11-03 PROCEDURE — 93010 ELECTROCARDIOGRAM REPORT: CPT | Performed by: INTERNAL MEDICINE

## 2023-11-03 PROCEDURE — G1004 CDSM NDSC: HCPCS

## 2023-11-03 PROCEDURE — 85025 COMPLETE CBC W/AUTO DIFF WBC: CPT | Performed by: PHYSICIAN ASSISTANT

## 2023-11-03 PROCEDURE — 71275 CT ANGIOGRAPHY CHEST: CPT

## 2023-11-03 PROCEDURE — 36415 COLL VENOUS BLD VENIPUNCTURE: CPT | Performed by: PHYSICIAN ASSISTANT

## 2023-11-03 PROCEDURE — 71045 X-RAY EXAM CHEST 1 VIEW: CPT

## 2023-11-03 PROCEDURE — 83880 ASSAY OF NATRIURETIC PEPTIDE: CPT | Performed by: PHYSICIAN ASSISTANT

## 2023-11-03 PROCEDURE — 93005 ELECTROCARDIOGRAM TRACING: CPT

## 2023-11-03 PROCEDURE — 99285 EMERGENCY DEPT VISIT HI MDM: CPT

## 2023-11-03 PROCEDURE — 80053 COMPREHEN METABOLIC PANEL: CPT | Performed by: PHYSICIAN ASSISTANT

## 2023-11-03 PROCEDURE — 99215 OFFICE O/P EST HI 40 MIN: CPT | Performed by: INTERNAL MEDICINE

## 2023-11-03 PROCEDURE — 84484 ASSAY OF TROPONIN QUANT: CPT | Performed by: PHYSICIAN ASSISTANT

## 2023-11-03 PROCEDURE — 99285 EMERGENCY DEPT VISIT HI MDM: CPT | Performed by: PHYSICIAN ASSISTANT

## 2023-11-03 RX ORDER — DOXYCYCLINE HYCLATE 100 MG/1
100 CAPSULE ORAL 2 TIMES DAILY
Qty: 20 CAPSULE | Refills: 0 | Status: SHIPPED | OUTPATIENT
Start: 2023-11-03 | End: 2023-11-13

## 2023-11-03 RX ORDER — DOXYCYCLINE HYCLATE 100 MG/1
100 CAPSULE ORAL ONCE
Status: COMPLETED | OUTPATIENT
Start: 2023-11-03 | End: 2023-11-03

## 2023-11-03 RX ADMIN — IOHEXOL 80 ML: 350 INJECTION, SOLUTION INTRAVENOUS at 12:30

## 2023-11-03 RX ADMIN — DOXYCYCLINE 100 MG: 100 CAPSULE ORAL at 14:21

## 2023-11-03 NOTE — ASSESSMENT & PLAN NOTE
He has chronic respiratory failure and has been on oxygen supplementation at 4 L/min at rest and up to 6 L during exertion. This is secondary to her advanced COPD. Her oxygen requirements have been going up. She was desaturating on arrival to the office. We placed her on 3 L of nasal cannula oxygen and her saturations improved to about 90. When off oxygen supplementation she quickly desaturates to low 80s. We made her walk in the office but she could not complete even 2 minutes and felt very exhausted. She needed up to 8 L/min of oxygen supplementation. The oxygen tank she has currently are unable to provide 8 L. We have sent a new request to her DME. I had a long discussion with her and her daughter and have answered all their questions.

## 2023-11-03 NOTE — ED PROVIDER NOTES
History  Chief Complaint   Patient presents with    Shortness of Breath     Visitor "In the last 4 days she started having an increase in SOB. We didn't know that we had a leak in her condensor. Dr Shanna Frausto ordered her yearly CXR yet every time I got her in the car her O2 dropped and she turned costello. Dr Shanna Frausto thinks she has fluid in her lungs" Pt denies CP. Past Medical History: Asthma, Back problem, Chronic pain, COPD- on Home O2, was 4 L/up to 6L with exertion, now up to 5 L NC, Diverticulosis, Emphysema of lung, HTN, Hyperlipidemia, Left Lung cancer - non-small cell lung cancer; wedge resection, JULIO on CPAP, PAD  Past Surgical History: APPENDECTOMY, Complete HYSTERECTOMY, Left LUNG CANCER SURGERY - wedge reseection, Dr Peggy Lopez    [facility] worsening SOB, B/L basilar crackles heard on exam - consider CHF vs pna vs worsening chronic lung disease. Family states they were having a problem with oxygen at home and portable canister-so they don't think pt was getting enough oxygen via NC  Pt with chronic sob on exertion and her exercise tolerance is about a block, but now states can't go anywhere bc she's so sob. PT denies: weight loss, anorexia, hemoptysis, dizziness, chest pain, LE edema, anorexia, fever or chills. Old records: PFT showed moderately severe airflow obstruction with severe uncorrected diffusion defect. she is on treatment with nebulized ipratropium albuterol, formoterol and budesonide. Multiple lung nodules  Her previous CT scan of the chest showed multiple calcified granuloma, scarring and lung nodules. CT scan from November 2021 showed multiple stable lung nodules; repeat CT scan showed enlargement of the right lower lobe lung nodule from 0.5 to 1.1 cm in size. Her CT PET scan 1/2023 did not show any increased FDG uptake in the lung nodule.                  Prior to Admission Medications   Prescriptions Last Dose Informant Patient Reported? Taking? Calcium Carbonate (CALTRATE 600 PO)   Yes Yes   Sig: Take 3 tablets by mouth in the morning   Coenzyme Q10 (Co Q 10) 100 MG CAPS   Yes Yes   Sig: Take 100 mg by mouth every morning   Multiple Vitamins-Minerals (AIRBORNE GUMMIES PO)  Self Yes Yes   Sig: Take by mouth every morning   Nebulizers (Vios LC Sprint) MISC  Self No No   Sig: Use as directed   OXYGEN-HELIUM IN   Yes Yes   Sig: Inhale 4L continuous   Omega-3 Fatty Acids (fish oil) 1,000 mg  Self Yes Yes   Sig: Take 1,000 mg by mouth every morning   VITAMIN D PO   Yes Yes   Sig: Take by mouth in the morning   albuterol (PROVENTIL HFA,VENTOLIN HFA) 90 mcg/act inhaler   No Yes   Sig: Inhale 2 puffs every 6 (six) hours as needed for shortness of breath   amLODIPine (NORVASC) 5 mg tablet   No Yes   Sig: Take 1 tablet (5 mg total) by mouth daily   Patient taking differently: Take 5 mg by mouth every morning   bisacodyl (DULCOLAX) 5 mg EC tablet   No Yes   Sig: Take 2 tablets (10 mg total) by mouth once for 1 dose   buPROPion (Wellbutrin SR) 150 mg 12 hr tablet   No Yes   Sig: Take 1 tablet (150 mg total) by mouth in the morning for 7 days, THEN 1 tablet (150 mg total) 2 (two) times a day for 21 days.    budesonide (PULMICORT) 0.5 mg/2 mL nebulizer solution  Self No Yes   Sig: Take 2 mL (0.5 mg total) by nebulization 2 (two) times a day Rinse mouth after use.   carboxymethylcellulose 0.5 % SOLN   Yes Yes   Si drop 3 (three) times a day as needed for dry eyes   citalopram (CeleXA) 10 mg tablet   No Yes   Sig: Take 1 tablet (10 mg total) by mouth daily   clopidogrel (PLAVIX) 75 mg tablet  Self Yes Yes   Si mg every morning Last dose 23   denosumab (Prolia) 60 mg/mL   No No   Sig: Inject 1 mL (60 mg total) under the skin once for 1 dose   Patient taking differently: Inject 60 mg under the skin once Next dose 2023   doxepin (SINEquan) 10 mg capsule   No No   Sig: Take 1-3 caps qhs prn sleep   Patient taking differently: Take 10 mg by mouth daily at bedtime Take 1-3 caps qhs prn sleep   formoterol (Perforomist) 20 MCG/2ML nebulizer solution  Self No Yes   Sig: Take 2 mL (20 mcg total) by nebulization 2 (two) times a day icd 10 code J44.9   ipratropium-albuterol (DUO-NEB) 0.5-2.5 mg/3 mL nebulizer solution  Self No Yes   Sig: Take 3 mL by nebulization every 6 (six) hours as needed for wheezing or shortness of breath ICD 10 J44.9   levocetirizine (XYZAL) 5 MG tablet   No Yes   Sig: Take 1 tablet (5 mg total) by mouth every evening   montelukast (SINGULAIR) 10 mg tablet   No Yes   Sig: Take 1 tablet (10 mg total) by mouth daily at bedtime   naltrexone (REVIA) 50 mg tablet   No Yes   Sig: Take 0.5 tablets (25 mg total) by mouth 2 (two) times a day   neomycin-polymyxin-hydrocortisone (CORTISPORIN) 0.35%-10,000 units/mL-1% otic suspension   No No   Sig: Administer 4 drops to the right ear 4 (four) times a day for 5 days   olopatadine HCl (PATADAY) 0.2 % opth drops  Self Yes Yes   Sig: Administer 1 drop to both eyes as needed (only if itchy eyes)   rosuvastatin (CRESTOR) 20 MG tablet  Self Yes Yes   Sig: TK 1 T PO QHS   timolol (TIMOPTIC) 0.5 % ophthalmic solution  Self Yes Yes   Sig: Administer 1 drop to both eyes 2 (two) times a day      Facility-Administered Medications: None       Past Medical History:   Diagnosis Date    Asthma     Back problem     Chronic pain     Colon cancer screening     recheck tattoo    COPD (chronic obstructive pulmonary disease) (HCC)     Oxygen 3.5L via NC    CPAP (continuous positive airway pressure) dependence     Diverticulosis     Emphysema of lung (HCC)     High blood pressure     Hyperlipidemia     Hypertension     Lung cancer (HCC)     Left Lung cancer; wedge resection     JULIO on CPAP     PAD (peripheral artery disease) ([facility])     Leg    Wears glasses        Past Surgical History:   Procedure Laterality Date    APPENDECTOMY      age 14 yr    COLONOSCOPY  2018    repeat 2023    COLONOSCOPY      HYSTERECTOMY complete    LUNG CANCER SURGERY Left     wedge reseection, Dr Amparo Trent       Family History   Problem Relation Age of Onset    Colon cancer Father     Heart disease Sister     Lung cancer Daughter      I have reviewed and agree with the history as documented. E-Cigarette/Vaping    E-Cigarette Use Never User      E-Cigarette/Vaping Substances    Nicotine No     THC No     CBD No     Flavoring No     Other No     Unknown No      Social History     Tobacco Use    Smoking status: Former     Packs/day: 1.50     Years: 35.00     Total pack years: 52.50     Types: Cigarettes    Smokeless tobacco: Never    Tobacco comments:     Has smoked since age:   14 - As per [employer]    Vaping Use    Vaping Use: Never used   Substance Use Topics    Alcohol use: Not Currently    Drug use: Never       Review of Systems   Constitutional:  Negative for fever. HENT:  Negative for sore throat. Respiratory:  Positive for shortness of breath. Negative for cough. Cardiovascular:  Negative for chest pain and leg swelling. Gastrointestinal:  Negative for abdominal pain, diarrhea and vomiting. Genitourinary:  Negative for dysuria and frequency. Musculoskeletal:  Negative for arthralgias and myalgias. Skin:  Negative for rash. Neurological:  Positive for weakness. Negative for dizziness and headaches. Psychiatric/Behavioral:  Negative for behavioral problems. All other systems reviewed and are negative. Physical Exam  Physical Exam  Vitals and nursing note reviewed. Constitutional:       General: She is in acute distress (mild). Appearance: She is well-developed. She is obese. HENT:      Head: Normocephalic and atraumatic. Right Ear: External ear normal.      Left Ear: External ear normal.      Nose: Nose normal.      Mouth/Throat:      Mouth: Mucous membranes are moist.      Pharynx: Oropharynx is clear. Eyes:      Conjunctiva/sclera: Conjunctivae normal.   Neck:      Vascular: No JVD.    Cardiovascular: Rate and Rhythm: Normal rate and regular rhythm. Pulmonary:      Effort: Respiratory distress (mild) present. Breath sounds: Decreased breath sounds present. No wheezing, rhonchi or rales. Chest:      Chest wall: No crepitus. Abdominal:      General: Bowel sounds are normal.      Palpations: Abdomen is soft. Musculoskeletal:         General: Normal range of motion. Cervical back: Normal range of motion. Right lower leg: Edema present. Left lower leg: Edema present. Comments: Trace edema B/L, no calf pain or tenderness   Skin:     General: Skin is warm and dry. Findings: No erythema. Neurological:      General: No focal deficit present. Mental Status: She is alert and oriented to person, place, and time.    Psychiatric:         Behavior: Behavior normal.         Vital Signs  ED Triage Vitals [11/03/23 1101]   Temperature Pulse Respirations Blood Pressure SpO2   97.8 °F (36.6 °C) 66 20 134/74 94 %      Temp Source Heart Rate Source Patient Position - Orthostatic VS BP Location FiO2 (%)   Oral Monitor Sitting Left arm --      Pain Score       No Pain           Vitals:    11/03/23 1101 11/03/23 1213 11/03/23 1330 11/03/23 1415   BP: 134/74      Pulse: 66 69 64 67   Patient Position - Orthostatic VS: Sitting            Visual Acuity      ED Medications  Medications   iohexol (OMNIPAQUE) 350 MG/ML injection (SINGLE-DOSE) 80 mL (80 mL Intravenous Given 11/3/23 1230)   doxycycline hyclate (VIBRAMYCIN) capsule 100 mg (100 mg Oral Given 11/3/23 1421)       Diagnostic Studies  Results Reviewed       Procedure Component Value Units Date/Time    HS Troponin I 2hr [[id number]]  (Normal) Collected: 11/03/23 1341    Lab Status: Final result Specimen: Blood from Arm, Right Updated: 11/03/23 1409     hs TnI 2hr 6 ng/L      Delta 2hr hsTnI -1 ng/L     FLU/RSV/COVID - if FLU/RSV clinically relevant [[id number]]  (Normal) Collected: 11/03/23 1132    Lab Status: Final result Specimen: Nares from Nose Updated: 11/03/23 3504     SARS-CoV-2 Negative     INFLUENZA A PCR Negative     INFLUENZA B PCR Negative     RSV PCR Negative    Narrative:      FOR PEDIATRIC PATIENTS - copy/paste COVID Guidelines URL to browser: [email or web address]    SARS-CoV-2 assay is a Nucleic Acid Amplification assay intended for the  qualitative detection of nucleic acid from SARS-CoV-2 in nasopharyngeal  swabs. Results are for the presumptive identification of SARS-CoV-2 RNA. Positive results are indicative of infection with SARS-CoV-2, the virus  causing COVID-19, but do not rule out bacterial infection or co-infection  with other viruses. Laboratories within the [facility] and its  territories are required to report all positive results to the appropriate  public health authorities. Negative results do not preclude SARS-CoV-2  infection and should not be used as the sole basis for treatment or other  patient management decisions. Negative results must be combined with  clinical observations, patient history, and epidemiological information. This test has not been FDA cleared or approved. This test has been authorized by FDA under an Emergency Use Authorization  (EUA). This test is only authorized for the duration of time the  declaration that circumstances exist justifying the authorization of the  emergency use of an in vitro diagnostic tests for detection of SARS-CoV-2  virus and/or diagnosis of COVID-19 infection under section 564(b)(1) of  the Act, 21 U. S.C. 350UIW-5(M)(4), unless the authorization is terminated  or revoked sooner. The test has been validated but independent review by FDA  and CLIA is pending. Test performed using [employer]: This RT-PCR assay targets N2,  a region unique to SARS-CoV-2. A conserved region in the E-gene was chosen  for pan-Sarbecovirus detection which includes SARS-CoV-2.     According to CMS-2020-01-R, this platform meets the definition of high-throughput technology.     B-Type Natriuretic Peptide(BNP) [[id number]]  (Abnormal) Collected: 11/03/23 1132    Lab Status: Final result Specimen: Blood from Arm, Right Updated: 11/03/23 1214      pg/mL     HS Troponin 0hr (reflex protocol) [[id number]]  (Normal) Collected: 11/03/23 1132    Lab Status: Final result Specimen: Blood from Arm, Right Updated: 11/03/23 1207     hs TnI 0hr 7 ng/L     Comprehensive metabolic panel [[id number]]  (Abnormal) Collected: 11/03/23 1132    Lab Status: Final result Specimen: Blood from Arm, Right Updated: 11/03/23 1201     Sodium 140 mmol/L      Potassium 4.1 mmol/L      Chloride 100 mmol/L      CO2 32 mmol/L      ANION GAP 8 mmol/L      BUN 21 mg/dL      Creatinine 0.77 mg/dL      Glucose 124 mg/dL      Calcium 10.4 mg/dL      AST 12 U/L      ALT 13 U/L      Alkaline Phosphatase 53 U/L      Total Protein 7.9 g/dL      Albumin 4.5 g/dL      Total Bilirubin 0.51 mg/dL      eGFR 71 ml/min/1.73sq m     Narrative:      [place] guidelines for Chronic Kidney Disease (CKD):     Stage 1 with normal or high GFR (GFR > 90 mL/min/1.73 square meters)    Stage 2 Mild CKD (GFR = 60-89 mL/min/1.73 square meters)    Stage 3A Moderate CKD (GFR = 45-59 mL/min/1.73 square meters)    Stage 3B Moderate CKD (GFR = 30-44 mL/min/1.73 square meters)    Stage 4 Severe CKD (GFR = 15-29 mL/min/1.73 square meters)    Stage 5 End Stage CKD (GFR <15 mL/min/1.73 square meters)  Note: GFR calculation is accurate only with a steady state creatinine    CBC and differential [[id number]]  (Abnormal) Collected: 11/03/23 1132    Lab Status: Final result Specimen: Blood from Arm, Right Updated: 11/03/23 1145     WBC 5.52 Thousand/uL      RBC 4.69 Million/uL      Hemoglobin 15.0 g/dL      Hematocrit 47.2 %       fL      MCH 32.0 pg      MCHC 31.8 g/dL      RDW 13.6 %      MPV 9.7 fL      Platelets 128 Thousands/uL      nRBC 0 /100 WBCs      Neutrophils Relative 77 %      Immat GRANS % 0 %      Lymphocytes Relative 13 %      Monocytes Relative 6 %      Eosinophils Relative 3 %      Basophils Relative 1 %      Neutrophils Absolute 4.31 Thousands/µL      Immature Grans Absolute 0.01 Thousand/uL      Lymphocytes Absolute 0.71 Thousands/µL      Monocytes Absolute 0.32 Thousand/µL      Eosinophils Absolute 0.14 Thousand/µL      Basophils Absolute 0.03 Thousands/µL                    CTA ED chest PE study   Final Result by Fady Eng MD (11/03 0433)      No definite evidence of pulmonary embolism. Significant mucous secretions in the right lower lobe bronchi with areas of occlusion. There is evidence of bronchitis however aspiration is a possibility. There is subsegmental volume loss in the right lower lobe. A few nodules appear stable; however, there is a slowly enlarging pleural-based nodule in the right lower lobe now measuring up to 1.4 cm. Given slow progression, low-grade malignancy is still possible despite negative PET study in January. Histologic    sampling should be considered when patient is no longer acute. .      Mild mediastinal adenopathy. This was discussed with ISAAC Robbins at 1:50 p.m. Follow-up was marked in the epic system. Workstation performed: [id number]         XR chest 1 view portable   ED Interpretation by Alanis Allen PA-C (11/03 7475)   nad      Final Result by Samantha Love MD (11/03 1780)      No acute cardiopulmonary disease.                   Workstation performed: [id number]                    Procedures  ECG 12 Lead Documentation Only    Date/Time: 11/3/2023 11:37 AM    Performed by: Alanis Allen PA-C  Authorized by: Alanis Allen PA-C    Indications / Diagnosis:  Sob  ECG reviewed by me, the ED Provider: yes    Patient location:  ED  Previous ECG:     Comparison to cardiac monitor: Yes    Interpretation:     Interpretation: normal    Rate:     ECG rate:  63    ECG rate assessment: normal    Rhythm:     Rhythm: sinus rhythm    Comments:      No acute ischemic changes           ED Course  ED Course as of 11/03/23 1441   Fri Nov 03, 2023   1148 Cbc wnl   1316 Viral panel neg   1410 Delta 2hr hsTnI: -1                                             Medical Decision Making  Pt with copd, chronic lung dx, prior lung ca, presents with worsening sob; of note pt and family report issues with home oxygen delivery - seen by pulmonology and referred to ED. Ddx: COPD exacerbation, inflammatory process, pneumonia, bronchitis, viral illness, COVID, PE, electrolyte abnormalities, cardiac arrhythmia, among others  Labs, CTA, EKG performed and reviewed. Patient main stable throughout emergency department visit stable on 5 L here in ER  Pt feeling better on NC here, will cover with doxy for possible aspiration pna, pulse ox between 95 and 93%  Joint decision making patient wants to go home does have oxygen at home does have good follow-up with Dr. Thuan Salgado and/or Complexity of Data Reviewed  External Data Reviewed: labs and notes. Labs: ordered. Decision-making details documented in ED Course. Radiology: ordered and independent interpretation performed. Decision-making details documented in ED Course. ECG/medicine tests: ordered and independent interpretation performed. Decision-making details documented in ED Course. Discussion of management or test interpretation with external provider(s): [facility] pulmonology Dr. Inocencia Meza,    Risk  Prescription drug management. Decision regarding hospitalization.              Disposition  Final diagnoses:   SOB (shortness of breath) - COPD, h/o lung CA   Pneumonia - possible early aspiration RLL     Time reflects when diagnosis was documented in both MDM as applicable and the Disposition within this note       Time User Action Codes Description Comment    11/3/2023  2:19 PM Wilma Mackey Add [R06.02] SOB (shortness of breath)     11/3/2023  2:20 PM Zoltan Silveira Add [J18.9] Pneumonia     11/3/2023  2:20 PM Zoltan Silveira Modify [J18.9] Pneumonia possible early aspiration RLL    11/3/2023  2:20 PM Zoltan Silveira Modify [R06.02] SOB (shortness of breath) COPD, h/o lung CA          ED Disposition       ED Disposition   Discharge    Condition   Stable    Date/Time   Fri Nov 3, 2023  2:19 PM    Comment   Christ Morocho discharge to home/self care.                    Follow-up Information       Follow up With Specialties Details Why Contact Info    Will Martinez MD Family Medicine   [address] [place] [address]  [phone]      Herminio Koyanagi, MD Pulmonary Disease, Pulmonology, Critical Care Medicine, Neurology, Sleep Medicine   [address] [place] [address]  [phone]              Discharge Medication List as of 11/3/2023  2:22 PM        START taking these medications    Details   doxycycline hyclate (VIBRAMYCIN) 100 mg capsule Take 1 capsule (100 mg total) by mouth 2 (two) times a day for 10 days, Starting Fri 11/3/2023, Until Mon 11/13/2023, Normal           CONTINUE these medications which have NOT CHANGED    Details   albuterol (PROVENTIL HFA,VENTOLIN HFA) 90 mcg/act inhaler Inhale 2 puffs every 6 (six) hours as needed for shortness of breath, Starting Fri 3/24/2023, Normal      amLODIPine (NORVASC) 5 mg tablet Take 1 tablet (5 mg total) by mouth daily, Starting Thu 6/8/2023, Normal      bisacodyl (DULCOLAX) 5 mg EC tablet Take 2 tablets (10 mg total) by mouth once for 1 dose, Starting Thu 2/2/2023, Normal      budesonide (PULMICORT) 0.5 mg/2 mL nebulizer solution Take 2 mL (0.5 mg total) by nebulization 2 (two) times a day Rinse mouth after use., Starting Fri 3/25/2022, Until Fri 11/3/2023, Normal      buPROPion (Wellbutrin SR) 150 mg 12 hr tablet Multiple Dosages:Starting Fri 8/11/2023, Until Thu 8/17/2023 at 2359, THEN Starting Fri 8/18/2023, Until Thu 9/7/2023 at 2359Take 1 tablet (150 mg total) by mouth in the morning for 7 days, THEN 1 tablet (150 mg total) 2 (two) times a day for 21 days.  , Normal      Calcium Carbonate (CALTRATE 600 PO) Take 3 tablets by mouth in the morning, Historical Med      carboxymethylcellulose 0.5 % SOLN 1 drop 3 (three) times a day as needed for dry eyes, Historical Med      citalopram (CeleXA) 10 mg tablet Take 1 tablet (10 mg total) by mouth daily, Starting Thu 6/8/2023, Normal      clopidogrel (PLAVIX) 75 mg tablet 75 mg every morning Last dose 7/11/23, Starting Wed 8/12/2020, Historical Med      Coenzyme Q10 (Co Q 10) 100 MG CAPS Take 100 mg by mouth every morning, Historical Med      formoterol (Perforomist) 20 MCG/2ML nebulizer solution Take 2 mL (20 mcg total) by nebulization 2 (two) times a day icd 10 code J44.9, Starting Fri 3/25/2022, Normal      ipratropium-albuterol (DUO-NEB) 0.5-2.5 mg/3 mL nebulizer solution Take 3 mL by nebulization every 6 (six) hours as needed for wheezing or shortness of breath ICD 10 J44.9, Starting Fri 4/8/2022, Normal      levocetirizine (XYZAL) 5 MG tablet Take 1 tablet (5 mg total) by mouth every evening, Starting Thu 6/22/2023, Normal      montelukast (SINGULAIR) 10 mg tablet Take 1 tablet (10 mg total) by mouth daily at bedtime, Starting Thu 6/22/2023, Normal      Multiple Vitamins-Minerals (AIRBORNE GUMMIES PO) Take by mouth every morning, Historical Med      naltrexone (REVIA) 50 mg tablet Take 0.5 tablets (25 mg total) by mouth 2 (two) times a day, Starting Fri 8/11/2023, Normal      olopatadine HCl (PATADAY) 0.2 % opth drops Administer 1 drop to both eyes as needed (only if itchy eyes), Historical Med      Omega-3 Fatty Acids (fish oil) 1,000 mg Take 1,000 mg by mouth every morning, Historical Med      OXYGEN-HELIUM IN Inhale 4L continuous, Historical Med      rosuvastatin (CRESTOR) 20 MG tablet TK 1 T PO QHS, Historical Med      timolol (TIMOPTIC) 0.5 % ophthalmic solution Administer 1 drop to both eyes 2 (two) times a day, Starting Wed 6/24/2020, Historical Med      VITAMIN D PO Take by mouth in the morning, Historical Med      denosumab (Prolia) 60 mg/mL Inject 1 mL (60 mg total) under the skin once for 1 dose, Starting Tue 8/31/2021, Normal      doxepin (SINEquan) 10 mg capsule Take 1-3 caps qhs prn sleep, Normal      Nebulizers (Vios LC Sprint) MISC Use as directed, Normal      neomycin-polymyxin-hydrocortisone (CORTISPORIN) 0.35%-10,000 units/mL-1% otic suspension Administer 4 drops to the right ear 4 (four) times a day for 5 days, Starting Fri 3/24/2023, Until Wed 7/12/2023, Normal             No discharge procedures on file.     PDMP Review       None            ED Provider  Electronically Signed by             Wil Pereira PA-C  11/03/23 2905

## 2023-11-03 NOTE — PROGRESS NOTES
Assessment/Plan:    COPD (chronic obstructive pulmonary disease) [facility])  Mrs. Eliazar Allen hasCOPD for more than 15 years and is  on home oxygen therapy at 3 L/m for chronic hypoxemic respiratory failure. She has shortness of breath on exertion and her exercise tolerance is about a block. She was a smoker for a long time for about 35 years smoking 1-1/2 packs per day. 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 On clinical examination she was saturating well with oxygen supplementation. Her chest auscultation revealed bilateral basilar inspiratory coarse crackles. Her previous CT scan from 2015 showed bilateral extensive emphysematous changes. Her high-resolution CT scan of the chest showed bilateral emphysematous changes and mild bronchiectasis. Her PFT showed moderately severe airflow obstruction with severe uncorrected diffusion defect. she is on treatment with nebulized ipratropium albuterol, formoterol and budesonide. Currently her symptoms a have worsened the past 2 days and she has bilateral crackles on chest auscultation. She likely has worsening congestive heart failure or has pneumonia. She needs to be evaluated further in the emergency room. I had a long discussion with the patient and her daughter. I offered to call the ambulance. The daughter agreed to take her to the emergency room at [facility].  I spoke to the ER physician and discussed the case with her. Chronic hypoxemic respiratory failure (HCC)  He has chronic respiratory failure and has been on oxygen supplementation at 4 L/min at rest and up to 6 L during exertion. This is secondary to her advanced COPD. Her oxygen requirements have been going up. She was desaturating on arrival to the office. We placed her on 3 L of nasal cannula oxygen and her saturations improved to about 90. When off oxygen supplementation she quickly desaturates to low 80s.   We made her walk in the office but she could not complete even 2 minutes and felt very exhausted. She needed up to 8 L/min of oxygen supplementation. The oxygen tank she has currently are unable to provide 8 L. We have sent a new request to her DME. I had a long discussion with her and her daughter and have answered all their questions. Multiple lung nodules  Her previous CT scan of the chest showed multiple calcified granuloma, scarring and lung nodules. These need to be followed up especially with her history of lung cancer in the past.  She was also found to have thyroid nodules. Her CT scan from November 2021 showed multiple stable lung nodules. Her repeat CT scan showed enlargement of the right lower lobe lung nodule from 0.5 to 1.1 cm in size. Her CT PET scan did not show any increased FDG uptake in the lung nodule. Our plan is to observe her at this point with a repeat scan in 3 to 6 months. Incidentally she had a small area of FDG uptake in the hepatic flexure. This was evaluated by GI. I had a long discussion with her and I have answered all her questions. She had a CT angiogram for pulmonary embolism in the emergency room when she was evaluated on 11/4/2023. This showed mild enlargement in the right lower lobe lung nodule to 1.4 cm. The possibility of a slow-growing malignancy has been raised though she has been PET negative. We will continue to follow this lung nodule closely. 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 Shanice Toscano. She also received chemotherapy. She has been remaining cancer free. She follows with Dr. Emma Witt from oncology. Is an enlarging right lower lobe lung nodule now which has been PET negative.     Congestive heart failure (HCC)  Wt Readings from Last 3 Encounters:   11/03/23 83.5 kg (184 lb)   10/16/23 83.5 kg (184 lb)   08/11/23 82.6 kg (182 lb)   She has history of congestive heart failure. Currently she is not on any diuretic therapy. Her chest auscultation revealed bilateral crackles today and she needs to be further evaluated especially in view of her worsening shortness of breath and oxygen requirement. Diagnoses and all orders for this visit:    Pulmonary emphysema, unspecified emphysema type ([address])    Chronic hypoxemic respiratory failure ([address])  -     Home Oxygen with Portability    Multiple lung nodules    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)    Congestive heart failure, unspecified HF chronicity, unspecified heart failure type (HCC)          Subjective:      Patient ID: Kierra Pagan is a 79 y.o. female. Linda Linton is a 79-year-old lady with previous history of severe COPD, lung cancer and congestive heart failure. She has not been feeling well for the past 3 days and her shortness of breath has worsened. Been on oxygen supplementation at 5 L/min. She is short of breath on minimal exertion and even with 8 L at home her oxygen levels are low. There have been having problems with the oxygen tanks and have been contacting [address] [place] [address]. She has cough with phlegm which is occasionally yellow. She has no chest pain no palpitations no swelling of feet. No fever or chills. She has been on nebulized ipratropium and albuterol and budesonide. She is currently not on any diuretic therapy. Her oxygen saturation in the office was 80s on arrival in spite of oxygen. She needed 3 L/min of oxygen when she is resting. We subsequently attempted a 6-minute walk test she desaturated quickly and after about 2 minutes the test had to be abandoned. She was put on 8 L/min and her oxygen saturation improved.         The following portions of the patient's history were reviewed and updated as appropriate: allergies, current medications, past family history, past medical history, past social history, past surgical history, and problem list.    Review of Systems Objective:      /70 (BP Location: Left arm, Patient Position: Sitting, Cuff Size: Large)   Pulse 94   Ht 5' 1" (1.549 m)   SpO2 (!) 6%   BMI 34.77 kg/m²          Physical Exam  Vitals reviewed. Constitutional:       General: She is not in acute distress. Appearance: She is ill-appearing. She is not toxic-appearing or diaphoretic. HENT:      Head: Normocephalic. Mouth/Throat:      Mouth: Mucous membranes are moist.   Eyes:      General: No scleral icterus. Conjunctiva/sclera: Conjunctivae normal.   Cardiovascular:      Rate and Rhythm: Normal rate and regular rhythm. Heart sounds: Normal heart sounds. No murmur heard. Pulmonary:      Effort: Pulmonary effort is normal. Respiratory distress: Mild respiratory distress with exertion. Breath sounds: No stridor. Rales (Bilateral basal crackles coarse) present. No wheezing or rhonchi. Chest:      Chest wall: No tenderness. Abdominal:      General: Bowel sounds are normal.      Palpations: Abdomen is soft. Tenderness: There is no abdominal tenderness. There is no guarding. Musculoskeletal:      Cervical back: Neck supple. No rigidity. Right lower leg: No edema. Left lower leg: No edema. Lymphadenopathy:      Cervical: No cervical adenopathy. Skin:     Coloration: Skin is not jaundiced or pale. Findings: No rash. Neurological:      Mental Status: She is alert and oriented to person, place, and time. Gait: Gait normal.   Psychiatric:         Mood and Affect: Mood normal.         Behavior: Behavior normal.         Thought Content: Thought content normal.         Judgment: Judgment normal.      I spent 50 minutes of time in the office taking care of this patient with complex medical issues. The majority of this time was spent directly with the patient counseling as well as coordinating care. The patient was very sick and needed to be sent to the emergency room for urgent evaluation.

## 2023-11-03 NOTE — ASSESSMENT & PLAN NOTE
Mrs. Alison Henry hasCOPD for more than 15 years and is  on home oxygen therapy at 3 L/m for chronic hypoxemic respiratory failure. She has shortness of breath on exertion and her exercise tolerance is about a block. She was a smoker for a long time for about 35 years smoking 1-1/2 packs per day. 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 On clinical examination she was saturating well with oxygen supplementation. Her chest auscultation revealed bilateral basilar inspiratory coarse crackles. Her previous CT scan from 2015 showed bilateral extensive emphysematous changes. Her high-resolution CT scan of the chest showed bilateral emphysematous changes and mild bronchiectasis. Her PFT showed moderately severe airflow obstruction with severe uncorrected diffusion defect. she is on treatment with nebulized ipratropium albuterol, formoterol and budesonide. Currently her symptoms a have worsened the past 2 days and she has bilateral crackles on chest auscultation. She likely has worsening congestive heart failure or has pneumonia. She needs to be evaluated further in the emergency room. I had a long discussion with the patient and her daughter. I offered to call the ambulance. The daughter agreed to take her to the emergency room at [facility].  I spoke to the ER physician and discussed the case with her.

## 2023-11-03 NOTE — ASSESSMENT & PLAN NOTE
Her previous CT scan of the chest showed multiple calcified granuloma, scarring and lung nodules. These need to be followed up especially with her history of lung cancer in the past.  She was also found to have thyroid nodules. Her CT scan from November 2021 showed multiple stable lung nodules. Her repeat CT scan showed enlargement of the right lower lobe lung nodule from 0.5 to 1.1 cm in size. Her CT PET scan did not show any increased FDG uptake in the lung nodule. Our plan is to observe her at this point with a repeat scan in 3 to 6 months. Incidentally she had a small area of FDG uptake in the hepatic flexure. This was evaluated by GI. I had a long discussion with her and I have answered all her questions. She had a CT angiogram for pulmonary embolism in the emergency room when she was evaluated on 11/4/2023. This showed mild enlargement in the right lower lobe lung nodule to 1.4 cm. The possibility of a slow-growing malignancy has been raised though she has been PET negative. We will continue to follow this lung nodule closely.

## 2023-11-06 ENCOUNTER — TELEPHONE (OUTPATIENT)
Dept: ADMINISTRATIVE | Facility: HOSPITAL | Age: 83
End: 2023-11-06

## 2023-11-06 ENCOUNTER — VBI (OUTPATIENT)
Dept: FAMILY MEDICINE CLINIC | Facility: CLINIC | Age: 83
End: 2023-11-06

## 2023-11-06 NOTE — ASSESSMENT & PLAN NOTE
She was diagnosed with non-small cell lung cancer 7 years back and had underwent wedge resection by Dr. Faith Nieto. She also received chemotherapy. She has been remaining cancer free. She follows with Dr. Gumaro Guerrero from oncology. Is an enlarging right lower lobe lung nodule now which has been PET negative.

## 2023-11-06 NOTE — ASSESSMENT & PLAN NOTE
Wt Readings from Last 3 Encounters:   11/03/23 83.5 kg (184 lb)   10/16/23 83.5 kg (184 lb)   08/11/23 82.6 kg (182 lb)   She has history of congestive heart failure. Currently she is not on any diuretic therapy. Her chest auscultation revealed bilateral crackles today and she needs to be further evaluated especially in view of her worsening shortness of breath and oxygen requirement.

## 2023-11-06 NOTE — TELEPHONE ENCOUNTER
Hi Dr Nathan Guerrero. A CT of the chest was places for pt last ov, however was seen with you last Friday and in the ER had a CTA.  Would you still like for pt to have the CT of the chest?

## 2023-11-06 NOTE — TELEPHONE ENCOUNTER
11/06/23 11:34 AM    Patient contacted post ED visit, [facility] department spoke with patient/caregiver and outreach was successful. Thank you.   Ryan HITCHCOCK [facility]

## 2023-11-07 ENCOUNTER — TELEPHONE (OUTPATIENT)
Dept: PULMONOLOGY | Facility: CLINIC | Age: 83
End: 2023-11-07

## 2023-11-13 ENCOUNTER — OFFICE VISIT (OUTPATIENT)
Dept: FAMILY MEDICINE CLINIC | Facility: CLINIC | Age: 83
End: 2023-11-13
Payer: MEDICARE

## 2023-11-13 VITALS
OXYGEN SATURATION: 84 % | RESPIRATION RATE: 16 BRPM | HEIGHT: 62 IN | BODY MASS INDEX: 33.65 KG/M2 | SYSTOLIC BLOOD PRESSURE: 136 MMHG | HEART RATE: 71 BPM | DIASTOLIC BLOOD PRESSURE: 78 MMHG

## 2023-11-13 DIAGNOSIS — J43.9 PULMONARY EMPHYSEMA, UNSPECIFIED EMPHYSEMA TYPE (HCC): ICD-10-CM

## 2023-11-13 DIAGNOSIS — Z99.81 OXYGEN DEPENDENT: ICD-10-CM

## 2023-11-13 DIAGNOSIS — J18.9 PNEUMONIA: ICD-10-CM

## 2023-11-13 DIAGNOSIS — N18.31 STAGE 3A CHRONIC KIDNEY DISEASE (HCC): ICD-10-CM

## 2023-11-13 DIAGNOSIS — Z23 ENCOUNTER FOR IMMUNIZATION: Primary | ICD-10-CM

## 2023-11-13 DIAGNOSIS — Z00.00 WELL ADULT EXAM: ICD-10-CM

## 2023-11-13 DIAGNOSIS — N39.3 FEMALE STRESS INCONTINENCE: ICD-10-CM

## 2023-11-13 DIAGNOSIS — G47.00 INSOMNIA, UNSPECIFIED TYPE: ICD-10-CM

## 2023-11-13 DIAGNOSIS — E66.09 CLASS 1 OBESITY DUE TO EXCESS CALORIES WITH SERIOUS COMORBIDITY AND BODY MASS INDEX (BMI) OF 33.0 TO 33.9 IN ADULT: ICD-10-CM

## 2023-11-13 PROCEDURE — G0439 PPPS, SUBSEQ VISIT: HCPCS | Performed by: FAMILY MEDICINE

## 2023-11-13 PROCEDURE — 99214 OFFICE O/P EST MOD 30 MIN: CPT | Performed by: FAMILY MEDICINE

## 2023-11-13 PROCEDURE — 90662 IIV NO PRSV INCREASED AG IM: CPT | Performed by: FAMILY MEDICINE

## 2023-11-13 PROCEDURE — G0008 ADMIN INFLUENZA VIRUS VAC: HCPCS | Performed by: FAMILY MEDICINE

## 2023-11-13 RX ORDER — DOXYCYCLINE HYCLATE 100 MG/1
100 CAPSULE ORAL 2 TIMES DAILY
Qty: 14 CAPSULE | Refills: 0 | Status: SHIPPED | OUTPATIENT
Start: 2023-11-13 | End: 2023-11-20

## 2023-11-13 RX ORDER — GUAIFENESIN 600 MG/1
1200 TABLET, EXTENDED RELEASE ORAL EVERY 12 HOURS SCHEDULED
Qty: 56 TABLET | Refills: 0 | Status: SHIPPED | OUTPATIENT
Start: 2023-11-13 | End: 2023-11-27

## 2023-11-13 RX ORDER — TRAZODONE HYDROCHLORIDE 100 MG/1
100 TABLET ORAL
Qty: 14 TABLET | Refills: 0 | Status: SHIPPED | OUTPATIENT
Start: 2023-11-13

## 2023-11-13 RX ORDER — PREDNISONE 10 MG/1
TABLET ORAL
Qty: 18 TABLET | Refills: 0 | Status: SHIPPED | OUTPATIENT
Start: 2023-11-13

## 2023-11-13 NOTE — PROGRESS NOTES
Assessment and Plan:     Problem List Items Addressed This Visit     COPD (chronic obstructive pulmonary disease) ([address])    Relevant Medications    guaiFENesin (MUCINEX) 600 mg 12 hr tablet    predniSONE 10 mg tablet    Stage 3a chronic kidney disease (HCC)    Oxygen dependent 4 LPM baseline    Relevant Medications    guaiFENesin (MUCINEX) 600 mg 12 hr tablet   Other Visit Diagnoses     Encounter for immunization    -  Primary    Relevant Orders    influenza vaccine, high-dose, PF 0.7 mL (FLUZONE HIGH-DOSE) (Completed)    Insomnia, unspecified type        Relevant Medications    traZODone (DESYREL) 100 mg tablet    Pneumonia        possible early aspiration RLL    Relevant Medications    guaiFENesin (MUCINEX) 600 mg 12 hr tablet    doxycycline hyclate (VIBRAMYCIN) 100 mg capsule    Female stress incontinence        Well adult exam        Class 1 obesity due to excess calories with serious comorbidity and body mass index (BMI) of 33.0 to 33.9 in adult              I advised her to be mindful of her dietary choices. Urinary Incontinence Plan of Care: counseling topics discussed: practice Kegel (pelvic floor strengthening) exercises. Preventive health issues were discussed with patient, and age appropriate screening tests were ordered as noted in patient's After Visit Summary. Personalized health advice and appropriate referrals for health education or preventive services given if needed, as noted in patient's After Visit Summary. History of Present Illness:     Patient presents for a Medicare Wellness Visit    HPI   Eliazar Allen is an 60-year-old female here for a Medicare Wellness Visit. The patient is accompanied by her , and she consents to the use of GAYATRI. Recent medical records on 11/03/2023:  Chest auscultation revealed bilateral crackles. Oxygen saturation was recorded as 94 percent. She received doxycycline. Patient has received a diagnosis of COPD.   Patient reports feeling well when sitting, but experiences dyspnea as soon as she stands up or engages in an activity. She reports expelling a considerable amount of phlegm after taking the pills that were prescribed for her. The last dose was this morning. She uses lemon [place] cough drops, and they effectively loosen and facilitate removal of phlegm. Patient observed a change in her phlegm color from yellow to white. She uses Vicks to alleviate nasal congestion, resulting in an improved overall sensation. She experiences insomnia and typically takes 2 sleep pills. On one occasion, she took 3 pills, leading to being awake for a significant portion of the night. When unbale to sleep throughout the night, she tends to doze off in a chair during the day. She has not yet explored taking melatonin. Her diet is characterized by a limited intake of dairy and an absence of red meat. She is not currently considering going to palliative care. Patient utilizes a wheelchair. She uses eye drops. Patient Care Team:  Michelle Hernandes MD as PCP - General (Family Medicine)  Michelle Hernandes MD as PCP - [address] [place] [address])     Review of Systems:     Review of Systems   Constitutional:  Negative for fever and unexpected weight change. Weight increase    HENT:  Negative for ear pain, nosebleeds and trouble swallowing. Eyes:  Negative for visual disturbance. Respiratory:  Positive for shortness of breath. Negative for chest tightness. Cardiovascular:  Negative for chest pain, palpitations and leg swelling. Gastrointestinal:  Negative for abdominal pain, constipation, diarrhea and nausea. Endocrine: Negative for cold intolerance. Genitourinary:  Negative for dysuria and urgency. Musculoskeletal:  Negative for joint swelling and myalgias. Skin:  Negative for rash. Neurological:  Negative for tremors, seizures, syncope and weakness. Hematological:  Does not bruise/bleed easily.    Psychiatric/Behavioral:  Negative for agitation, behavioral problems, hallucinations and suicidal ideas. The patient is nervous/anxious.          Problem List:     Patient Active Problem List   Diagnosis   • JULIO (obstructive sleep apnea)   • COPD (chronic obstructive pulmonary disease) (HCC)   • HTN (hypertension)   • Hyperlipidemia   • 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)   • Recurrent major depressive disorder, in partial remission ([address])   • Chronic hypoxemic respiratory failure (HCC)   • PAD (peripheral artery disease) (HCC)   • Congestive heart failure (HCC)   • OAB (overactive bladder)   • Osteoporosis   • Stage 3a chronic kidney disease (HCC)   • Multiple lung nodules   • Class 1 obesity due to excess calories with body mass index (BMI) of 34.0 to 34.9 in adult   • Recurrent falls   • Oxygen dependent 4 LPM baseline      Past Medical and Surgical History:     Past Medical History:   Diagnosis Date   • Asthma    • Back problem    • Chronic pain    • Colon cancer screening     recheck tattoo   • COPD (chronic obstructive pulmonary disease) ([facility])     Oxygen 3.5L via NC   • CPAP (continuous positive airway pressure) dependence    • Diverticulosis    • Emphysema of lung (HCC)    • High blood pressure    • Hyperlipidemia    • Hypertension    • Lung cancer ([address])     Left Lung cancer; wedge resection    • JULIO on CPAP    • PAD (peripheral artery disease) ([address])     Leg   • Wears glasses      Past Surgical History:   Procedure Laterality Date   • APPENDECTOMY      age 14 yr   • COLONOSCOPY  2018    repeat 2023   • COLONOSCOPY     • HYSTERECTOMY      complete   • LUNG CANCER SURGERY Left     wedge reseection, Dr Mayra Edwards      Family History:     Family History   Problem Relation Age of Onset   • Colon cancer Father    • Heart disease Sister    • Lung cancer Daughter       Social History:     Social History     Socioeconomic History   • Marital status: /Civil Union     Spouse name: None   • Number of children: 4   • Years of education: 12   • Highest education level: None   Occupational History   • Occupation: Retired -    Tobacco Use   • Smoking status: Former     Packs/day: 1.50     Years: 35.00     Total pack years: 52.50     Types: Cigarettes   • Smokeless tobacco: Never   • Tobacco comments:     Has smoked since age:   14 - As per [employer]    Vaping Use   • Vaping Use: Never used   Substance and Sexual Activity   • Alcohol use: Not Currently   • Drug use: Never   • Sexual activity: None   Other Topics Concern   • None   Social History Narrative    Most recent tobacco use screenin2020    Do you currently or have you served in the [address] [place] [address]: No    Were you activated, into active duty, as a member of the [employer] or as a Reservist: No    Occupation: Retired;     Exercise level: None    Has smoked since age: 14    Alcohol intake: None    Caffeine intake: Occasional    Chewing tobacco: none    Marital status:     Illicit drugs: Denied    Diet: Cardiac    Seat belts used routinely: Yes    Sexual orientation: Heterosexual    Smoke alarm in home: Yes    Advance directive: No    General stress level: Medium    Salt Intake: HTN Diet    Sunscreen used routinely: Yes    Has the Patient had a mammogram to screen for breast cancer within 24 months: No    Guns present in home: No    Single or multi-level home/work: single level home    Live alone or with others: with others    Number of children: 4    Presence of domestic violence: No    Are you currently employed: No    Asbestos exposure: No    TB exposure: No    Environmental exposure: No    Animal exposure: Yes    Hard of hearing or deaf in one or both ears: No    Legally blind in one or both eyes: No    has cpap, oxygen and nebulizer-[employer]     - [facility]     Financial Resource Strain: [address] [facility]  (2023)    Overall Financial Resource Strain ([facility])    • Difficulty of Paying Living Expenses: Not very hard   Food Insecurity: Not on file   Transportation Needs: No Transportation Needs (11/13/2023)    PRAPARE - Transportation    • Lack of Transportation (Medical): No    • Lack of Transportation (Non-Medical):  No   Physical Activity: Not on file   Stress: Not on file   Social Connections: Not on file   Intimate Partner Violence: Not on file   Housing Stability: Not on file      Medications and Allergies:     Current Outpatient Medications   Medication Sig Dispense Refill   • albuterol (PROVENTIL HFA,VENTOLIN HFA) 90 mcg/act inhaler Inhale 2 puffs every 6 (six) hours as needed for shortness of breath 18 g 5   • amLODIPine (NORVASC) 5 mg tablet Take 1 tablet (5 mg total) by mouth daily (Patient taking differently: Take 5 mg by mouth every morning) 90 tablet 1   • Calcium Carbonate (CALTRATE 600 PO) Take 3 tablets by mouth in the morning     • carboxymethylcellulose 0.5 % SOLN 1 drop 3 (three) times a day as needed for dry eyes     • citalopram (CeleXA) 10 mg tablet Take 1 tablet (10 mg total) by mouth daily 90 tablet 1   • clopidogrel (PLAVIX) 75 mg tablet 75 mg every morning Last dose 7/11/23     • Coenzyme Q10 (Co Q 10) 100 MG CAPS Take 100 mg by mouth every morning     • doxycycline hyclate (VIBRAMYCIN) 100 mg capsule Take 1 capsule (100 mg total) by mouth 2 (two) times a day for 7 days 14 capsule 0   • formoterol (Perforomist) 20 MCG/2ML nebulizer solution Take 2 mL (20 mcg total) by nebulization 2 (two) times a day icd 10 code J44.9 180 mL 1   • guaiFENesin (MUCINEX) 600 mg 12 hr tablet Take 2 tablets (1,200 mg total) by mouth every 12 (twelve) hours for 14 days 56 tablet 0   • ipratropium-albuterol (DUO-NEB) 0.5-2.5 mg/3 mL nebulizer solution Take 3 mL by nebulization every 6 (six) hours as needed for wheezing or shortness of breath ICD 10 J44.9 300 mL 0   • levocetirizine (XYZAL) 5 MG tablet Take 1 tablet (5 mg total) by mouth every evening 90 tablet 1   • montelukast (SINGULAIR) 10 mg tablet Take 1 tablet (10 mg total) by mouth daily at bedtime 90 tablet 1   • Multiple Vitamins-Minerals (AIRBORNE GUMMIES PO) Take by mouth every morning     • Nebulizers (Vios LC Sprint) MISC Use as directed 1 each 0   • Omega-3 Fatty Acids (fish oil) 1,000 mg Take 1,000 mg by mouth every morning     • OXYGEN-HELIUM IN Inhale 4L continuous     • predniSONE 10 mg tablet Take 1 tablet tid x3d then 1 tab bid x3d then 1 tab qd x3d 18 tablet 0   • rosuvastatin (CRESTOR) 20 MG tablet TK 1 T PO QHS     • timolol (TIMOPTIC) 0.5 % ophthalmic solution Administer 1 drop to both eyes 2 (two) times a day     • traZODone (DESYREL) 100 mg tablet Take 1 tablet (100 mg total) by mouth daily at bedtime 14 tablet 0   • VITAMIN D PO Take by mouth in the morning     • bisacodyl (DULCOLAX) 5 mg EC tablet Take 2 tablets (10 mg total) by mouth once for 1 dose 2 tablet 0   • budesonide (PULMICORT) 0.5 mg/2 mL nebulizer solution Take 2 mL (0.5 mg total) by nebulization 2 (two) times a day Rinse mouth after use. 120 mL 0   • denosumab (Prolia) 60 mg/mL Inject 1 mL (60 mg total) under the skin once for 1 dose (Patient taking differently: Inject 60 mg under the skin once Next dose 6/2023) 1 mL 0     No current facility-administered medications for this visit.      Allergies   Allergen Reactions   • Baclofen Nausea Only and Dizziness   • Lisinopril Nausea Only and Dizziness   • Losartan Nausea Only and Dizziness   • Naproxen Nausea Only and Dizziness   • Zinc Acetate Nausea Only and Dizziness      Immunizations:     Immunization History   Administered Date(s) Administered   • COVID-19 MODERNA VACC 0.5 ML IM 06/01/2022   • COVID-19 PFIZER VACCINE 0.3 ML IM 02/03/2021, 02/24/2021, 11/20/2021   • DTaP 08/01/2018   • INFLUENZA 10/29/2013, 09/17/2014, 09/28/2015, 09/20/2016, 09/01/2017, 08/10/2018, 01/04/2019, 09/20/2022   • Influenza, high dose seasonal 0.7 mL 09/23/2020, 08/31/2021, 09/20/2022, 11/13/2023   • Pneumococcal Conjugate 13-Valent 10/01/2015   • Pneumococcal Polysaccharide PPV23 01/11/2010   • Tdap 08/01/2018   • Zoster 10/29/2013      Health Maintenance:         Topic Date Due   • Colorectal Cancer Screening  Discontinued         Topic Date Due   • COVID-19 Vaccine (5 - Pfizer series) 07/27/2022      Medicare Screening Tests and Risk Assessments:     Drea Martins is here for her Subsequent Wellness visit. Last Medicare Wellness visit information reviewed, patient interviewed and updates made to the record today. Health Risk Assessment:   Patient rates overall health as fair. Patient feels that their physical health rating is same. Patient is very satisfied with their life. Eyesight was rated as same. Hearing was rated as same. Patient feels that their emotional and mental health rating is same. Patients states they are never, rarely angry. Patient states they are never, rarely unusually tired/fatigued. Pain experienced in the last 7 days has been none. Patient states that she has experienced no weight loss or gain in last 6 months. Depression Screening:   PHQ-9 Score: 0      Fall Risk Screening: In the past year, patient has experienced: no history of falling in past year      Urinary Incontinence Screening:   Patient has leaked urine accidently in the last six months. Home Safety:  Patient has trouble with stairs inside or outside of their home. Patient has working smoke alarms and has working carbon monoxide detector. Home safety hazards include: none. Nutrition:   Current diet is Regular. Medications:   Patient is currently taking over-the-counter supplements. OTC medications include: see medication list. Patient is able to manage medications. Activities of Daily Living (ADLs)/Instrumental Activities of Daily Living (IADLs):   Walk and transfer into and out of bed and chair?: Yes  Dress and groom yourself?: Yes    Bathe or shower yourself?: Yes    Feed yourself?  Yes  Do your laundry/housekeeping?: Yes  Manage your money, pay your bills and track your expenses?: Yes  Make your own meals?: Yes    Do your own shopping?: No    Previous Hospitalizations:   Any hospitalizations or ED visits within the last 12 months?: Yes    How many hospitalizations have you had in the last year?: 1-2    Advance Care Planning:   Living will: Yes    Durable POA for healthcare: No    Advanced directive: Yes    Five wishes given: No      Cognitive Screening:   Provider or family/friend/caregiver concerned regarding cognition?: No    PREVENTIVE SCREENINGS      Cardiovascular Screening:    General: Screening Not Indicated and History Lipid Disorder      Diabetes Screening:     General: Screening Current      Colorectal Cancer Screening:     General: Patient Declines      Breast Cancer Screening:     General: Screening Not Indicated      Cervical Cancer Screening:    General: Screening Not Indicated      Osteoporosis Screening:    General: Screening Not Indicated and History Osteoporosis      Abdominal Aortic Aneurysm (AAA) Screening:        General: Screening Not Indicated      Lung Cancer Screening:     General: Screening Not Indicated and History Lung Cancer      Hepatitis C Screening:      Hep C Screening Accepted: No     Screening, Brief Intervention, and Referral to Treatment (SBIRT)    Screening  Typical number of drinks in a day: 0  Typical number of drinks in a week: 0  Interpretation: Low risk drinking behavior. Single Item Drug Screening:  How often have you used an illegal drug (including marijuana) or a prescription medication for non-medical reasons in the past year? never    Single Item Drug Screen Score: 0  Interpretation: Negative screen for possible drug use disorder    Brief Intervention  Alcohol & drug use screenings were reviewed. No concerns regarding substance use disorder identified. No results found. I have personally reviewed results with the patient.     Test results on 11/03/2023 for Influenza A and B, RSV and COVID-19 were all negative and BNP is slightly elevated. Most recent blood work result revealed slightly elevated blood glucose, normal total cholesterol, and slightly elevated triglycerides. CTA chest on 11/03/2023 revealed significant  mucus within the posterior basal bronchi in the right lower lobe with occlusion. There is evidence of bronchitis however aspiration is a possibility. There is loss of volume in the right lower lobe. Few nodules appear stable. ECG on 11/03/2023 is normal. PET scan skull base to mid-thigh on 01/06/2023 is normal.     Physical Exam:     /78 (BP Location: Left arm, Patient Position: Sitting, Cuff Size: Standard)   Pulse 71   Resp 16   Ht 5' 2" (1.575 m)   SpO2 (!) 84%   BMI 33.65 kg/m²     Physical Exam  Vitals and nursing note reviewed. Constitutional:       Appearance: She is well-developed. She is obese. Comments: Oxygen NC 5L on portable tank  Wheelchair    HENT:      Head: Normocephalic and atraumatic. Right Ear: External ear normal.      Left Ear: External ear normal.      Nose: Nose normal.   Eyes:      Conjunctiva/sclera: Conjunctivae normal.      Pupils: Pupils are equal, round, and reactive to light. Cardiovascular:      Rate and Rhythm: Normal rate and regular rhythm. Heart sounds: Normal heart sounds. No murmur heard. Pulmonary:      Effort: Pulmonary effort is normal.      Breath sounds: Normal breath sounds. No wheezing. Abdominal:      General: Bowel sounds are normal.      Palpations: Abdomen is soft. Musculoskeletal:         General: No tenderness. Normal range of motion. Cervical back: Normal range of motion and neck supple. Lymphadenopathy:      Cervical: No cervical adenopathy. Skin:     General: Skin is warm and dry. Capillary Refill: Capillary refill takes less than 2 seconds. Neurological:      Mental Status: She is alert and oriented to person, place, and time. Psychiatric:         Behavior: Behavior normal.         Thought Content:  Thought content normal. Judgment: Judgment normal.            Madelaine Opitz, MD      Transcribed by Jeffry Wilson on 11/13/2023 at 7:00 PM EST.

## 2023-11-14 NOTE — PATIENT INSTRUCTIONS
Urinary Incontinence   AMBULATORY CARE:   Urinary incontinence (UI)  is loss of bladder control. UI develops because your bladder cannot store or empty urine properly. The 3 most common types of UI are stress incontinence, urge incontinence, or both. Common symptoms include the following: You feel like your bladder does not empty completely when you urinate. You urinate often and need to urinate immediately. You leak urine when you sleep, or you wake up with the urge to urinate. You leak urine when you cough, sneeze, exercise, or laugh. Seek care immediately if:   You have severe pain. You are confused or cannot think clearly. You see blood in your urine. You have pain when you urinate. You have new or worse pain, even after treatment. Call your doctor if:   You have a fever. Your mouth feels dry or you have vision changes. Your urine is cloudy or smells bad. You have questions or concerns about your condition or care. Treatment  may include any of the following:  Medicines  may be given to help strengthen your bladder control. Electrical stimulation  is used to send a small amount of electrical energy to your pelvic floor muscles. This helps control your bladder function. Electrodes may be placed outside your body or in your rectum. For women, the electrodes may be placed in the vagina. A bulking agent  may be injected into the wall of your urethra to make it thicker. This helps keep your urethra closed and decreases urine leakage. Devices  such as a clamp, pessary, or tampon may help stop urine leaks. Ask your healthcare provider for more information about these and other devices. Surgery  may be needed if other treatments do not work. Several types of surgery can help improve your bladder control. Ask your provider for more information about the surgery you may need. Self-care:   Keep a UI record.   Write down how often you leak urine and how much you leak. Make a note of what you were doing when you leaked urine. Drink liquids as directed. Ask your healthcare provider how much liquid to drink each day and which liquids are best for you. You may need to limit the amount of liquid you drink to help control your urine leakage. Do not drink any liquid right before you go to bed. Limit or do not have drinks that contain caffeine or alcohol. Prevent constipation. Eat a variety of high-fiber foods. Good examples are high-fiber cereals, beans, vegetables, and whole-grain breads. Prune juice may help make your bowel movement softer. Walking is the best way to trigger your intestines to have a bowel movement. Exercise regularly and maintain a healthy weight. Ask your provider how much you should weigh and about the best exercise plan for you. Weight loss and exercise will decrease pressure on your bladder and help you control your leakage. Ask your provider to help you create a weight loss plan, if needed. Use a catheter as directed  to help empty your bladder. A catheter is a tiny, plastic tube that is put into your bladder to drain your urine. Your provider may tell you to use a catheter to prevent your bladder from getting too full and leaking urine. Go to behavior therapy as directed. Behavior therapy may be used to help you learn to control your urge to urinate. Follow up with your doctor as directed:  Write down your questions so you remember to ask them during your visits. © Copyright José Woo 2023 Information is for End User's use only and may not be sold, redistributed or otherwise used for commercial purposes. The above information is an  only. It is not intended as medical advice for individual conditions or treatments. Talk to your doctor, nurse or pharmacist before following any medical regimen to see if it is safe and effective for you.     Kegel Exercises for Women   AMBULATORY CARE:   Kegel exercises help strengthen your pelvic muscles. Pelvic muscles hold your pelvic organs, such as your bladder and uterus, in place. Kegel exercises help prevent or control certain conditions, such as urine incontinence (leakage) or uterine prolapse. Call your doctor or physical therapist if:   You cannot feel your muscles tighten or relax. You continue to leak urine. You have questions or concerns about your condition or care. Use the correct muscles:  Pelvic muscles are the muscles you use to control urine flow. To target these muscles, stop and start the flow of urine several times. This will help you become familiar with how it feels to tighten and relax these muscles. How to do Kegel exercises:   Get into a comfortable position. You may lie down, stand up, or sit down to do these exercises. When you first try to do these exercises, it may be easier if you lie down. Tighten or squeeze your pelvic muscles slowly. It may feel like you are trying to hold back urine or gas. Hold this position for 3 seconds. Relax for 3 seconds. Repeat this cycle 10 times. Do not hold your breath when you do Kegel exercises. Keep your stomach, back, and leg muscles relaxed. Do 10 sets of Kegel exercises, at least 3 times a day. When you know how to do Kegel exercises, use different positions. This will help to strengthen your pelvic muscles as much as possible. You can do these exercises while you lie on the floor, watch TV, or while you stand. Tighten your pelvic muscles before you sneeze, cough, or lift to prevent urine leakage. You may notice improved bladder control within about 6 weeks. Follow up with your doctor or physical therapist as directed:  Write down your questions so you remember to ask them during your visits. © Copyright Debbie Chew 2023 Information is for End User's use only and may not be sold, redistributed or otherwise used for commercial purposes. The above information is an  only. It is not intended as medical advice for individual conditions or treatments. Talk to your doctor, nurse or pharmacist before following any medical regimen to see if it is safe and effective for you.

## 2023-11-21 DIAGNOSIS — I50.9 CONGESTIVE HEART FAILURE, UNSPECIFIED HF CHRONICITY, UNSPECIFIED HEART FAILURE TYPE (HCC): Primary | ICD-10-CM

## 2023-11-21 RX ORDER — ROSUVASTATIN CALCIUM 20 MG/1
20 TABLET, COATED ORAL DAILY
Qty: 90 TABLET | Refills: 1 | Status: SHIPPED | OUTPATIENT
Start: 2023-11-21

## 2023-11-27 DIAGNOSIS — G47.00 INSOMNIA, UNSPECIFIED TYPE: ICD-10-CM

## 2023-11-27 RX ORDER — TRAZODONE HYDROCHLORIDE 100 MG/1
100 TABLET ORAL
Qty: 14 TABLET | Refills: 0 | Status: SHIPPED | OUTPATIENT
Start: 2023-11-27

## 2023-11-27 NOTE — TELEPHONE ENCOUNTER
Reason for call:   [x] Refill   [] Prior Auth  [] Other:     Office:   [x] PCP/Provider - Dr Carolyne Johnson  [] Specialty/Provider -     Medication:   Trazodone 100 mg, 1 hs prn, 14        [facility]    Does the patient have enough for 3 days?    [x] Yes   [] No - Send as HP to POD

## 2023-12-01 DIAGNOSIS — I73.9 PAD (PERIPHERAL ARTERY DISEASE) (HCC): Primary | ICD-10-CM

## 2023-12-01 DIAGNOSIS — I10 ESSENTIAL HYPERTENSION: ICD-10-CM

## 2023-12-01 RX ORDER — AMLODIPINE BESYLATE 5 MG/1
5 TABLET ORAL DAILY
Qty: 90 TABLET | Refills: 0 | Status: SHIPPED | OUTPATIENT
Start: 2023-12-01

## 2023-12-01 RX ORDER — CLOPIDOGREL BISULFATE 75 MG/1
75 TABLET ORAL DAILY
Qty: 90 TABLET | Refills: 1 | Status: SHIPPED | OUTPATIENT
Start: 2023-12-01

## 2023-12-09 DIAGNOSIS — R45.1 AGITATION: ICD-10-CM

## 2023-12-11 RX ORDER — CITALOPRAM HYDROBROMIDE 10 MG/1
10 TABLET ORAL DAILY
Qty: 90 TABLET | Refills: 1 | Status: SHIPPED | OUTPATIENT
Start: 2023-12-11

## 2023-12-14 DIAGNOSIS — I73.9 PAD (PERIPHERAL ARTERY DISEASE) (HCC): Primary | ICD-10-CM

## 2023-12-14 DIAGNOSIS — J44.9 CHRONIC OBSTRUCTIVE PULMONARY DISEASE, UNSPECIFIED COPD TYPE (HCC): ICD-10-CM

## 2023-12-14 DIAGNOSIS — T78.40XA ALLERGY, INITIAL ENCOUNTER: ICD-10-CM

## 2023-12-14 DIAGNOSIS — E78.2 MIXED HYPERLIPIDEMIA: ICD-10-CM

## 2023-12-14 RX ORDER — MONTELUKAST SODIUM 10 MG/1
10 TABLET ORAL
Qty: 90 TABLET | Refills: 1 | Status: SHIPPED | OUTPATIENT
Start: 2023-12-14

## 2023-12-14 RX ORDER — PEAK FLOW METER
EACH MISCELLANEOUS
Qty: 1 EACH | Refills: 0 | Status: SHIPPED | OUTPATIENT
Start: 2023-12-14

## 2023-12-19 DIAGNOSIS — J44.9 CHRONIC OBSTRUCTIVE PULMONARY DISEASE, UNSPECIFIED COPD TYPE (HCC): Primary | ICD-10-CM

## 2023-12-19 RX ORDER — GUAIFENESIN 600 MG/1
1200 TABLET, EXTENDED RELEASE ORAL EVERY 12 HOURS SCHEDULED
Qty: 120 TABLET | Refills: 2 | Status: SHIPPED | OUTPATIENT
Start: 2023-12-19 | End: 2024-03-18

## 2023-12-20 LAB
DME PARACHUTE DELIVERY DATE REQUESTED: NORMAL
DME PARACHUTE ITEM DESCRIPTION: NORMAL
DME PARACHUTE ORDER STATUS: NORMAL
DME PARACHUTE SUPPLIER NAME: NORMAL
DME PARACHUTE SUPPLIER PHONE: NORMAL

## 2023-12-22 DIAGNOSIS — G47.00 INSOMNIA, UNSPECIFIED TYPE: ICD-10-CM

## 2023-12-22 RX ORDER — TRAZODONE HYDROCHLORIDE 100 MG/1
100 TABLET ORAL
Qty: 90 TABLET | Refills: 1 | Status: SHIPPED | OUTPATIENT
Start: 2023-12-22

## 2023-12-29 LAB
DME PARACHUTE DELIVERY DATE ACTUAL: NORMAL
DME PARACHUTE DELIVERY DATE EXPECTED: NORMAL
DME PARACHUTE DELIVERY DATE REQUESTED: NORMAL
DME PARACHUTE ITEM DESCRIPTION: NORMAL
DME PARACHUTE ITEM DESCRIPTION: NORMAL
DME PARACHUTE ORDER STATUS: NORMAL
DME PARACHUTE SUPPLIER NAME: NORMAL
DME PARACHUTE SUPPLIER PHONE: NORMAL

## 2024-01-10 ENCOUNTER — TELEPHONE (OUTPATIENT)
Dept: PULMONOLOGY | Facility: CLINIC | Age: 84
End: 2024-01-10

## 2024-02-11 DIAGNOSIS — T78.40XA ALLERGY, INITIAL ENCOUNTER: ICD-10-CM

## 2024-02-11 RX ORDER — LEVOCETIRIZINE DIHYDROCHLORIDE 5 MG/1
5 TABLET, FILM COATED ORAL EVERY EVENING
Qty: 90 TABLET | Refills: 1 | Status: SHIPPED | OUTPATIENT
Start: 2024-02-11

## 2024-02-12 ENCOUNTER — TELEPHONE (OUTPATIENT)
Age: 84
End: 2024-02-12

## 2024-02-12 NOTE — TELEPHONE ENCOUNTER
Naresh from [facility] called to inform a new fax for Albuterol and Ipratropium will be sent. Needs to verify vials and quantity and then refaxed. If any questions Naresh can be reached at [phone]

## 2024-02-15 DIAGNOSIS — J44.9 CHRONIC OBSTRUCTIVE PULMONARY DISEASE, UNSPECIFIED COPD TYPE (HCC): ICD-10-CM

## 2024-02-16 RX ORDER — IPRATROPIUM BROMIDE AND ALBUTEROL SULFATE 2.5; .5 MG/3ML; MG/3ML
3 SOLUTION RESPIRATORY (INHALATION) EVERY 6 HOURS PRN
Qty: 300 ML | Refills: 0 | Status: SHIPPED | OUTPATIENT
Start: 2024-02-16

## 2024-02-16 RX ORDER — BUDESONIDE 0.5 MG/2ML
0.5 INHALANT ORAL 2 TIMES DAILY
Qty: 120 ML | Refills: 0 | Status: SHIPPED | OUTPATIENT
Start: 2024-02-16 | End: 2024-03-17

## 2024-02-16 RX ORDER — FORMOTEROL FUMARATE DIHYDRATE 20 UG/2ML
20 SOLUTION RESPIRATORY (INHALATION) 2 TIMES DAILY
Qty: 180 ML | Refills: 1 | Status: SHIPPED | OUTPATIENT
Start: 2024-02-16

## 2024-02-16 RX ORDER — ALBUTEROL SULFATE 2.5 MG/3ML
2.5 SOLUTION RESPIRATORY (INHALATION) DAILY PRN
Qty: 180 ML | Refills: 0 | Status: SHIPPED | OUTPATIENT
Start: 2024-02-16

## 2024-02-19 NOTE — TELEPHONE ENCOUNTER
Celestina called from [facility] again requesting the following medications.    albuterol (2.5 mg/3 mL) 0.083 % nebulizer solution   albuterol (PROVENTIL HFA,VENTOLIN Hbudesonide (PULMICORT) 0.5 mg/2 mL nebulizer solution FA) 90 mcg/act inhaler

## 2024-02-23 DIAGNOSIS — I10 ESSENTIAL HYPERTENSION: ICD-10-CM

## 2024-02-23 RX ORDER — AMLODIPINE BESYLATE 5 MG/1
5 TABLET ORAL DAILY
Qty: 90 TABLET | Refills: 0 | Status: SHIPPED | OUTPATIENT
Start: 2024-02-23

## 2024-03-03 DIAGNOSIS — I50.9 CONGESTIVE HEART FAILURE, UNSPECIFIED HF CHRONICITY, UNSPECIFIED HEART FAILURE TYPE (HCC): ICD-10-CM

## 2024-03-04 RX ORDER — ROSUVASTATIN CALCIUM 20 MG/1
20 TABLET, COATED ORAL DAILY
Qty: 90 TABLET | Refills: 1 | Status: SHIPPED | OUTPATIENT
Start: 2024-03-04

## 2024-03-13 DIAGNOSIS — J44.9 CHRONIC OBSTRUCTIVE PULMONARY DISEASE, UNSPECIFIED COPD TYPE (HCC): ICD-10-CM

## 2024-03-13 RX ORDER — GUAIFENESIN 600 MG/1
TABLET, EXTENDED RELEASE ORAL
Qty: 120 TABLET | Refills: 2 | Status: SHIPPED | OUTPATIENT
Start: 2024-03-13

## 2024-04-16 DIAGNOSIS — J96.11 CHRONIC HYPOXEMIC RESPIRATORY FAILURE (HCC): ICD-10-CM

## 2024-04-16 DIAGNOSIS — R45.1 AGITATION: Primary | ICD-10-CM

## 2024-04-16 RX ORDER — DOXEPIN HYDROCHLORIDE 10 MG/1
CAPSULE ORAL
Qty: 45 CAPSULE | Refills: 0 | Status: SHIPPED | OUTPATIENT
Start: 2024-04-16

## 2024-04-16 RX ORDER — ALBUTEROL SULFATE 90 UG/1
2 AEROSOL, METERED RESPIRATORY (INHALATION) EVERY 6 HOURS PRN
Qty: 8.5 G | Refills: 1 | Status: SHIPPED | OUTPATIENT
Start: 2024-04-16

## 2024-04-24 DIAGNOSIS — J44.9 CHRONIC OBSTRUCTIVE PULMONARY DISEASE, UNSPECIFIED COPD TYPE (HCC): ICD-10-CM

## 2024-04-24 RX ORDER — IPRATROPIUM BROMIDE AND ALBUTEROL SULFATE 2.5; .5 MG/3ML; MG/3ML
3 SOLUTION RESPIRATORY (INHALATION) EVERY 6 HOURS PRN
Qty: 360 ML | Refills: 2 | Status: SHIPPED | OUTPATIENT
Start: 2024-04-24

## 2024-04-24 RX ORDER — BUDESONIDE 0.5 MG/2ML
0.5 INHALANT ORAL 2 TIMES DAILY
Qty: 120 ML | Refills: 3 | Status: SHIPPED | OUTPATIENT
Start: 2024-04-24 | End: 2024-05-24

## 2024-04-29 ENCOUNTER — RA CDI HCC (OUTPATIENT)
Dept: OTHER | Facility: HOSPITAL | Age: 84
End: 2024-04-29

## 2024-04-29 NOTE — PROGRESS NOTES
I13.0  I70.213  HCC coding opportunities          Chart Reviewed number of suggestions sent to Provider: 2     Patients Insurance     Medicare Insurance: Medicare

## 2024-05-01 ENCOUNTER — TELEPHONE (OUTPATIENT)
Dept: PULMONOLOGY | Facility: CLINIC | Age: 84
End: 2024-05-01

## 2024-05-01 DIAGNOSIS — J43.9 PULMONARY EMPHYSEMA, UNSPECIFIED EMPHYSEMA TYPE (HCC): Primary | ICD-10-CM

## 2024-05-01 NOTE — TELEPHONE ENCOUNTER
Patient called the RX Refill Line. Message is being forwarded to the office.     Patient is requesting Patient is requesting a new Nebulizer.  Patient stated the one she has is not working and she can't get relief from her breathing. Patient gets her supplies from [employer] [phone]  Patient would like a call from  Dr. Bora oMreno to  let her know it was ordered.    Please contact patient at [phone]

## 2024-05-03 ENCOUNTER — TELEMEDICINE (OUTPATIENT)
Dept: FAMILY MEDICINE CLINIC | Facility: CLINIC | Age: 84
End: 2024-05-03
Payer: MEDICARE

## 2024-05-03 ENCOUNTER — TELEPHONE (OUTPATIENT)
Dept: LAB | Facility: HOSPITAL | Age: 84
End: 2024-05-03

## 2024-05-03 VITALS — HEIGHT: 62 IN | BODY MASS INDEX: 33.65 KG/M2

## 2024-05-03 DIAGNOSIS — J96.11 CHRONIC HYPOXEMIC RESPIRATORY FAILURE (HCC): ICD-10-CM

## 2024-05-03 DIAGNOSIS — E61.1 IRON DEFICIENCY: ICD-10-CM

## 2024-05-03 DIAGNOSIS — R45.1 AGITATION: ICD-10-CM

## 2024-05-03 DIAGNOSIS — E78.2 MIXED HYPERLIPIDEMIA: ICD-10-CM

## 2024-05-03 DIAGNOSIS — J43.9 PULMONARY EMPHYSEMA, UNSPECIFIED EMPHYSEMA TYPE (HCC): ICD-10-CM

## 2024-05-03 DIAGNOSIS — N18.31 STAGE 3A CHRONIC KIDNEY DISEASE (HCC): ICD-10-CM

## 2024-05-03 DIAGNOSIS — M25.471 RIGHT ANKLE SWELLING: ICD-10-CM

## 2024-05-03 DIAGNOSIS — Z79.899 OTHER LONG TERM (CURRENT) DRUG THERAPY: Primary | ICD-10-CM

## 2024-05-03 DIAGNOSIS — E53.8 B12 DEFICIENCY: ICD-10-CM

## 2024-05-03 DIAGNOSIS — I10 PRIMARY HYPERTENSION: ICD-10-CM

## 2024-05-03 DIAGNOSIS — I50.9 CONGESTIVE HEART FAILURE, UNSPECIFIED HF CHRONICITY, UNSPECIFIED HEART FAILURE TYPE (HCC): ICD-10-CM

## 2024-05-03 DIAGNOSIS — E55.9 VITAMIN D DEFICIENCY: ICD-10-CM

## 2024-05-03 LAB
DME PARACHUTE DELIVERY DATE ACTUAL: NORMAL
DME PARACHUTE DELIVERY DATE REQUESTED: NORMAL
DME PARACHUTE ITEM DESCRIPTION: NORMAL
DME PARACHUTE ORDER STATUS: NORMAL
DME PARACHUTE SUPPLIER NAME: NORMAL
DME PARACHUTE SUPPLIER PHONE: NORMAL

## 2024-05-03 PROCEDURE — 99214 OFFICE O/P EST MOD 30 MIN: CPT | Performed by: FAMILY MEDICINE

## 2024-05-03 PROCEDURE — G2211 COMPLEX E/M VISIT ADD ON: HCPCS | Performed by: FAMILY MEDICINE

## 2024-05-03 RX ORDER — CITALOPRAM 20 MG/1
20 TABLET ORAL DAILY
Qty: 90 TABLET | Refills: 1 | Status: SHIPPED | OUTPATIENT
Start: 2024-05-03

## 2024-05-03 RX ORDER — TORSEMIDE 10 MG/1
10 TABLET ORAL EVERY MORNING
Qty: 30 TABLET | Refills: 0 | Status: SHIPPED | OUTPATIENT
Start: 2024-05-03 | End: 2024-05-04

## 2024-05-03 NOTE — PROGRESS NOTES
Virtual Regular Visit    Verification of patient location:    Patient is located at Home in the following state in which I hold an active license [place]      Assessment/Plan:    Problem List Items Addressed This Visit       COPD (chronic obstructive pulmonary disease) (HCC)    Relevant Orders    B-Type Natriuretic Peptide(BNP)    Hemoglobin A1C    CBC and differential    Comprehensive metabolic panel    Lipid Panel with Direct LDL reflex    Magnesium    Vitamin D 25 hydroxy    TSH, 3rd generation with Free T4 reflex    Vitamin B12    Iron Panel (Includes Ferritin, Iron Sat%, Iron, and TIBC)    UA w Reflex to Microscopic w Reflex to Culture    HTN (hypertension)    Relevant Medications    torsemide (DEMADEX) 10 mg tablet    Other Relevant Orders    B-Type Natriuretic Peptide(BNP)    Hemoglobin A1C    CBC and differential    Comprehensive metabolic panel    Lipid Panel with Direct LDL reflex    Magnesium    Vitamin D 25 hydroxy    TSH, 3rd generation with Free T4 reflex    Vitamin B12    Iron Panel (Includes Ferritin, Iron Sat%, Iron, and TIBC)    UA w Reflex to Microscopic w Reflex to Culture    Hyperlipidemia    Relevant Orders    B-Type Natriuretic Peptide(BNP)    Hemoglobin A1C    CBC and differential    Comprehensive metabolic panel    Lipid Panel with Direct LDL reflex    Magnesium    Vitamin D 25 hydroxy    TSH, 3rd generation with Free T4 reflex    Vitamin B12    Iron Panel (Includes Ferritin, Iron Sat%, Iron, and TIBC)    UA w Reflex to Microscopic w Reflex to Culture    Chronic hypoxemic respiratory failure (HCC)    Relevant Orders    B-Type Natriuretic Peptide(BNP)    Hemoglobin A1C    CBC and differential    Comprehensive metabolic panel    Lipid Panel with Direct LDL reflex    Magnesium    Vitamin D 25 hydroxy    TSH, 3rd generation with Free T4 reflex    Vitamin B12    Iron Panel (Includes Ferritin, Iron Sat%, Iron, and TIBC)    UA w Reflex to Microscopic w Reflex to Culture    Echo complete w/ contrast if  indicated    Congestive heart failure (HCC)    Relevant Orders    B-Type Natriuretic Peptide(BNP)    Hemoglobin A1C    CBC and differential    Comprehensive metabolic panel    Lipid Panel with Direct LDL reflex    Magnesium    Vitamin D 25 hydroxy    TSH, 3rd generation with Free T4 reflex    Vitamin B12    Iron Panel (Includes Ferritin, Iron Sat%, Iron, and TIBC)    UA w Reflex to Microscopic w Reflex to Culture    Stage 3a chronic kidney disease (HCC)    Relevant Medications    torsemide (DEMADEX) 10 mg tablet    Other Relevant Orders    B-Type Natriuretic Peptide(BNP)    Hemoglobin A1C    CBC and differential    Comprehensive metabolic panel    Lipid Panel with Direct LDL reflex    Magnesium    Vitamin D 25 hydroxy    TSH, 3rd generation with Free T4 reflex    Vitamin B12    Iron Panel (Includes Ferritin, Iron Sat%, Iron, and TIBC)    UA w Reflex to Microscopic w Reflex to Culture     Other Visit Diagnoses       Other long term (current) drug therapy    -  Primary    Relevant Orders    B-Type Natriuretic Peptide(BNP)    Hemoglobin A1C    CBC and differential    Comprehensive metabolic panel    Lipid Panel with Direct LDL reflex    Magnesium    Vitamin D 25 hydroxy    TSH, 3rd generation with Free T4 reflex    Vitamin B12    Iron Panel (Includes Ferritin, Iron Sat%, Iron, and TIBC)    UA w Reflex to Microscopic w Reflex to Culture    Vitamin D deficiency        Relevant Orders    B-Type Natriuretic Peptide(BNP)    Hemoglobin A1C    CBC and differential    Comprehensive metabolic panel    Lipid Panel with Direct LDL reflex    Magnesium    Vitamin D 25 hydroxy    TSH, 3rd generation with Free T4 reflex    Vitamin B12    Iron Panel (Includes Ferritin, Iron Sat%, Iron, and TIBC)    UA w Reflex to Microscopic w Reflex to Culture    B12 deficiency        Relevant Orders    B-Type Natriuretic Peptide(BNP)    Hemoglobin A1C    CBC and differential    Comprehensive metabolic panel    Lipid Panel with Direct LDL reflex     Magnesium    Vitamin D 25 hydroxy    TSH, 3rd generation with Free T4 reflex    Vitamin B12    Iron Panel (Includes Ferritin, Iron Sat%, Iron, and TIBC)    UA w Reflex to Microscopic w Reflex to Culture    Iron deficiency        Relevant Orders    B-Type Natriuretic Peptide(BNP)    Hemoglobin A1C    CBC and differential    Comprehensive metabolic panel    Lipid Panel with Direct LDL reflex    Magnesium    Vitamin D 25 hydroxy    TSH, 3rd generation with Free T4 reflex    Vitamin B12    Iron Panel (Includes Ferritin, Iron Sat%, Iron, and TIBC)    UA w Reflex to Microscopic w Reflex to Culture    Right ankle swelling        Relevant Medications    torsemide (DEMADEX) 10 mg tablet    Agitation        Relevant Medications    citalopram (CeleXA) 20 mg tablet                 Reason for visit is   Chief Complaint   Patient presents with    Virtual Regular Visit    Follow-up    Virtual Regular Visit          Encounter provider rAi Mendiola MD      Recent Visits  No visits were found meeting these conditions.  Showing recent visits within past 7 days and meeting all other requirements  Today's Visits  Date Type Provider Dept   05/03/24 Telemedicine Ari Mendiola MD [facility]   Showing today's visits and meeting all other requirements  Future Appointments  No visits were found meeting these conditions.  Showing future appointments within next 150 days and meeting all other requirements       The patient was identified by name and date of birth. Erynnona Morocho was informed that this is a telemedicine visit and that the visit is being conducted through the [employer] platform. She agrees to proceed..  My office door was closed. No one else was in the room.  She acknowledged consent and understanding of privacy and security of the video platform. The patient has agreed to participate and understands they can discontinue the visit at any time.    Patient is aware this is a billable service.     Subjective  Eryn Morocho is a 83  y.o. female  .      HPI   Here with daughter also       Right ankle swelling x10d    Her oxygen concentrator broke   Oxygen 50%   Now on tank and new concentrator coming today   Now 75%    Mood is a little depressed and has mood swings does take doxepin at night is on Celexa 10 mg    Blood pressure controlled  Does use her nebulizers daily  Does not leave the house  Relies on her daughter to pretty much do everything for her  Past Medical History:   Diagnosis Date    Asthma     Back problem     Chronic pain     Colon cancer screening     recheck tattoo    COPD (chronic obstructive pulmonary disease) (HCC)     Oxygen 3.5L via NC    CPAP (continuous positive airway pressure) dependence     Diverticulosis     Emphysema of lung (HCC)     High blood pressure     Hyperlipidemia     Hypertension     Lung cancer (HCC)     Left Lung cancer; wedge resection     JULIO on CPAP     PAD (peripheral artery disease) (HCC)     Leg    Wears glasses        Past Surgical History:   Procedure Laterality Date    APPENDECTOMY      age 11 yr    COLONOSCOPY  2018    repeat 2023    COLONOSCOPY      HYSTERECTOMY      complete    LUNG CANCER SURGERY Left     wedge reseection, Dr Anna       Current Outpatient Medications   Medication Sig Dispense Refill    albuterol (2.5 mg/3 mL) 0.083 % nebulizer solution Take 3 mL (2.5 mg total) by nebulization daily as needed for wheezing or shortness of breath 180 mL 0    albuterol (PROVENTIL HFA,VENTOLIN HFA) 90 mcg/act inhaler INHALE 2 PUFFS BY MOUTH EVERY 6 HOURS AS NEEDED FOR SHORTNESS OF BREATH 8.5 g 1    amLODIPine (NORVASC) 5 mg tablet Take 1 tablet (5 mg total) by mouth daily 90 tablet 0    budesonide (PULMICORT) 0.5 mg/2 mL nebulizer solution Take 2 mL (0.5 mg total) by nebulization 2 (two) times a day Rinse mouth after use. 120 mL 3    Calcium Carbonate (CALTRATE 600 PO) Take 3 tablets by mouth in the morning      carboxymethylcellulose 0.5 % SOLN 1 drop 3 (three) times a day as needed for dry  eyes      citalopram (CeleXA) 20 mg tablet Take 1 tablet (20 mg total) by mouth daily 90 tablet 1    clopidogrel (PLAVIX) 75 mg tablet Take 1 tablet (75 mg total) by mouth daily Last dose 7/11/23 90 tablet 1    Coenzyme Q10 (Co Q 10) 100 MG CAPS Take 2 capsules (200 mg total) by mouth every morning 180 capsule 1    doxepin (SINEquan) 10 mg capsule TAKE 1 TO 3 CAPSULES BY MOUTH EVERY NIGHT AT BEDTIME AS NEEDED FOR SLEEP 45 capsule 0    formoterol (Perforomist) 20 MCG/2ML nebulizer solution Take 2 mL (20 mcg total) by nebulization 2 (two) times a day icd 10 code J44.9 180 mL 1    guaiFENesin (MUCINEX) 600 mg 12 hr tablet TAKE 2 TABLET BY MOUTH EVERY 12 HOURS 120 tablet 2    ipratropium-albuterol (DUO-NEB) 0.5-2.5 mg/3 mL nebulizer solution Take 3 mL by nebulization every 6 (six) hours as needed for wheezing or shortness of breath ICD 10 J44.9 360 mL 2    levocetirizine (XYZAL) 5 MG tablet TAKE 1 TABLET(5 MG) BY MOUTH EVERY EVENING 90 tablet 1    montelukast (SINGULAIR) 10 mg tablet Take 1 tablet (10 mg total) by mouth daily at bedtime 90 tablet 1    Multiple Vitamins-Minerals (AIRBORNE GUMMIES PO) Take by mouth every morning      Nebulizers (Vios LC Sprint) MISC Use as directed 1 each 0    Omega-3 Fatty Acids (fish oil) 1,000 mg Take 1,000 mg by mouth every morning      OXYGEN-HELIUM IN Inhale 4L continuous      rosuvastatin (CRESTOR) 20 MG tablet TAKE 1 TABLET(20 MG) BY MOUTH DAILY 90 tablet 1    timolol (TIMOPTIC) 0.5 % ophthalmic solution Administer 1 drop to both eyes 2 (two) times a day      torsemide (DEMADEX) 10 mg tablet Take 1 tablet (10 mg total) by mouth every morning for 10 days 30 tablet 0    VITAMIN D PO Take by mouth in the morning      bisacodyl (DULCOLAX) 5 mg EC tablet Take 2 tablets (10 mg total) by mouth once for 1 dose 2 tablet 0    denosumab (Prolia) 60 mg/mL Inject 1 mL (60 mg total) under the skin once for 1 dose (Patient taking differently: Inject 60 mg under the skin once Next dose 6/2023) 1  "mL 0     No current facility-administered medications for this visit.        Allergies   Allergen Reactions    Baclofen Nausea Only and Dizziness    Lisinopril Nausea Only and Dizziness    Losartan Nausea Only and Dizziness    Naproxen Nausea Only and Dizziness    Zinc Acetate Nausea Only and Dizziness       Review of Systems   Constitutional:  Positive for activity change. Negative for fever and unexpected weight change.        Weight increase    HENT:  Negative for ear pain, nosebleeds and trouble swallowing.    Eyes:  Negative for visual disturbance.   Respiratory:  Positive for shortness of breath. Negative for chest tightness.    Cardiovascular:  Negative for chest pain, palpitations and leg swelling.   Gastrointestinal:  Negative for abdominal pain, constipation, diarrhea and nausea.   Endocrine: Negative for cold intolerance.   Genitourinary:  Negative for dysuria and urgency.   Musculoskeletal:  Positive for arthralgias, back pain and gait problem. Negative for joint swelling and myalgias.   Skin:  Negative for rash.   Neurological:  Positive for weakness. Negative for tremors, seizures and syncope.   Hematological:  Does not bruise/bleed easily.   Psychiatric/Behavioral:  Negative for agitation, behavioral problems, hallucinations and suicidal ideas. The patient is nervous/anxious.        Video Exam    Vitals:    05/03/24 1246   Height: 5' 2\" (1.575 m)       Physical Exam  Constitutional:       Appearance: She is well-developed. She is obese.      Comments: On oxygen NC     Pulmonary:      Effort: Pulmonary effort is normal.   Neurological:      Mental Status: She is alert and oriented to person, place, and time.   Psychiatric:         Behavior: Behavior normal.          Visit Time  Total Visit Duration: 25        "

## 2024-05-04 RX ORDER — TORSEMIDE 10 MG/1
TABLET ORAL
Qty: 90 TABLET | Refills: 1 | Status: SHIPPED | OUTPATIENT
Start: 2024-05-04

## 2024-05-09 DIAGNOSIS — I73.9 PAD (PERIPHERAL ARTERY DISEASE) (HCC): ICD-10-CM

## 2024-05-09 DIAGNOSIS — I10 ESSENTIAL HYPERTENSION: ICD-10-CM

## 2024-05-09 RX ORDER — CLOPIDOGREL BISULFATE 75 MG/1
75 TABLET ORAL DAILY
Qty: 90 TABLET | Refills: 1 | Status: SHIPPED | OUTPATIENT
Start: 2024-05-09

## 2024-05-16 ENCOUNTER — TELEPHONE (OUTPATIENT)
Dept: LAB | Facility: HOSPITAL | Age: 84
End: 2024-05-16

## 2024-05-17 ENCOUNTER — APPOINTMENT (OUTPATIENT)
Dept: LAB | Facility: HOSPITAL | Age: 84
End: 2024-05-17
Payer: MEDICARE

## 2024-05-17 ENCOUNTER — TELEPHONE (OUTPATIENT)
Dept: LAB | Facility: HOSPITAL | Age: 84
End: 2024-05-17

## 2024-05-17 DIAGNOSIS — N18.31 STAGE 3A CHRONIC KIDNEY DISEASE (HCC): ICD-10-CM

## 2024-05-17 DIAGNOSIS — E55.9 VITAMIN D DEFICIENCY: ICD-10-CM

## 2024-05-17 DIAGNOSIS — J43.9 PULMONARY EMPHYSEMA, UNSPECIFIED EMPHYSEMA TYPE (HCC): ICD-10-CM

## 2024-05-17 DIAGNOSIS — I10 PRIMARY HYPERTENSION: ICD-10-CM

## 2024-05-17 DIAGNOSIS — I50.9 CONGESTIVE HEART FAILURE, UNSPECIFIED HF CHRONICITY, UNSPECIFIED HEART FAILURE TYPE (HCC): ICD-10-CM

## 2024-05-17 DIAGNOSIS — Z79.899 OTHER LONG TERM (CURRENT) DRUG THERAPY: ICD-10-CM

## 2024-05-17 DIAGNOSIS — E53.8 B12 DEFICIENCY: ICD-10-CM

## 2024-05-17 DIAGNOSIS — J96.11 CHRONIC HYPOXEMIC RESPIRATORY FAILURE (HCC): ICD-10-CM

## 2024-05-17 DIAGNOSIS — E61.1 IRON DEFICIENCY: ICD-10-CM

## 2024-05-17 DIAGNOSIS — E78.2 MIXED HYPERLIPIDEMIA: ICD-10-CM

## 2024-05-17 LAB
25(OH)D3 SERPL-MCNC: 36.9 NG/ML (ref 30–100)
ALBUMIN SERPL BCP-MCNC: 3.9 G/DL (ref 3.5–5)
ALP SERPL-CCNC: 49 U/L (ref 34–104)
ALT SERPL W P-5'-P-CCNC: 10 U/L (ref 7–52)
ANION GAP SERPL CALCULATED.3IONS-SCNC: 7 MMOL/L (ref 4–13)
AST SERPL W P-5'-P-CCNC: 13 U/L (ref 13–39)
BASOPHILS # BLD AUTO: 0.02 THOUSANDS/ÂΜL (ref 0–0.1)
BASOPHILS NFR BLD AUTO: 1 % (ref 0–1)
BILIRUB SERPL-MCNC: 0.44 MG/DL (ref 0.2–1)
BNP SERPL-MCNC: 205 PG/ML (ref 0–100)
BUN SERPL-MCNC: 20 MG/DL (ref 5–25)
CALCIUM SERPL-MCNC: 9.5 MG/DL (ref 8.4–10.2)
CHLORIDE SERPL-SCNC: 98 MMOL/L (ref 96–108)
CHOLEST SERPL-MCNC: 137 MG/DL
CO2 SERPL-SCNC: 35 MMOL/L (ref 21–32)
CREAT SERPL-MCNC: 0.79 MG/DL (ref 0.6–1.3)
EOSINOPHIL # BLD AUTO: 0.23 THOUSAND/ÂΜL (ref 0–0.61)
EOSINOPHIL NFR BLD AUTO: 5 % (ref 0–6)
ERYTHROCYTE [DISTWIDTH] IN BLOOD BY AUTOMATED COUNT: 13.7 % (ref 11.6–15.1)
EST. AVERAGE GLUCOSE BLD GHB EST-MCNC: 117 MG/DL
FERRITIN SERPL-MCNC: 42 NG/ML (ref 11–307)
GFR SERPL CREATININE-BSD FRML MDRD: 68 ML/MIN/1.73SQ M
GLUCOSE P FAST SERPL-MCNC: 126 MG/DL (ref 65–99)
HBA1C MFR BLD: 5.7 %
HCT VFR BLD AUTO: 41.8 % (ref 34.8–46.1)
HDLC SERPL-MCNC: 35 MG/DL
HGB BLD-MCNC: 13.1 G/DL (ref 11.5–15.4)
IMM GRANULOCYTES # BLD AUTO: 0.02 THOUSAND/UL (ref 0–0.2)
IMM GRANULOCYTES NFR BLD AUTO: 1 % (ref 0–2)
IRON SATN MFR SERPL: 24 % (ref 15–50)
IRON SERPL-MCNC: 77 UG/DL (ref 50–212)
LDLC SERPL CALC-MCNC: 45 MG/DL (ref 0–100)
LYMPHOCYTES # BLD AUTO: 0.66 THOUSANDS/ÂΜL (ref 0.6–4.47)
LYMPHOCYTES NFR BLD AUTO: 16 % (ref 14–44)
MAGNESIUM SERPL-MCNC: 2 MG/DL (ref 1.9–2.7)
MCH RBC QN AUTO: 32.7 PG (ref 26.8–34.3)
MCHC RBC AUTO-ENTMCNC: 31.3 G/DL (ref 31.4–37.4)
MCV RBC AUTO: 104 FL (ref 82–98)
MONOCYTES # BLD AUTO: 0.33 THOUSAND/ÂΜL (ref 0.17–1.22)
MONOCYTES NFR BLD AUTO: 8 % (ref 4–12)
NEUTROPHILS # BLD AUTO: 2.97 THOUSANDS/ÂΜL (ref 1.85–7.62)
NEUTS SEG NFR BLD AUTO: 69 % (ref 43–75)
NRBC BLD AUTO-RTO: 0 /100 WBCS
PLATELET # BLD AUTO: 199 THOUSANDS/UL (ref 149–390)
PMV BLD AUTO: 9.5 FL (ref 8.9–12.7)
POTASSIUM SERPL-SCNC: 4.3 MMOL/L (ref 3.5–5.3)
PROT SERPL-MCNC: 6.8 G/DL (ref 6.4–8.4)
RBC # BLD AUTO: 4.01 MILLION/UL (ref 3.81–5.12)
SODIUM SERPL-SCNC: 140 MMOL/L (ref 135–147)
TIBC SERPL-MCNC: 315 UG/DL (ref 250–450)
TRIGL SERPL-MCNC: 283 MG/DL
TSH SERPL DL<=0.05 MIU/L-ACNC: 1.98 UIU/ML (ref 0.45–4.5)
UIBC SERPL-MCNC: 238 UG/DL (ref 155–355)
VIT B12 SERPL-MCNC: 812 PG/ML (ref 180–914)
WBC # BLD AUTO: 4.23 THOUSAND/UL (ref 4.31–10.16)

## 2024-05-17 PROCEDURE — 83880 ASSAY OF NATRIURETIC PEPTIDE: CPT

## 2024-05-17 NOTE — TELEPHONE ENCOUNTER
5/17 - called pt to let her know phleb is coming today - phleb called cell phone to comfirm - that number is not working

## 2024-05-19 DIAGNOSIS — I10 ESSENTIAL HYPERTENSION: ICD-10-CM

## 2024-05-19 RX ORDER — AMLODIPINE BESYLATE 5 MG/1
5 TABLET ORAL DAILY
Qty: 90 TABLET | Refills: 1 | Status: SHIPPED | OUTPATIENT
Start: 2024-05-19

## 2024-05-21 ENCOUNTER — TELEPHONE (OUTPATIENT)
Dept: FAMILY MEDICINE CLINIC | Facility: CLINIC | Age: 84
End: 2024-05-21

## 2024-05-21 NOTE — TELEPHONE ENCOUNTER
----- Message from Ari Mendiola MD sent at 5/21/2024  5:40 AM EDT -----  You are a PreDiabetic: watch the white rice, white bread, white Potato, and pasta. Maybe try the brown /whole wheat versions, or just limit how much and how often. Also be careful of the juices and sodas, and of course the sweets.     But things are pretty stable   I see a bit more co2   This is a breathing thing - if she has a bipap she can wear it during naps also   But that is pretty much all we can do     Rest is fine

## 2024-05-22 ENCOUNTER — HOSPITAL ENCOUNTER (OUTPATIENT)
Dept: NON INVASIVE DIAGNOSTICS | Facility: HOSPITAL | Age: 84
Discharge: HOME | End: 2024-05-22
Payer: MEDICARE

## 2024-05-22 VITALS
HEIGHT: 62 IN | HEART RATE: 71 BPM | DIASTOLIC BLOOD PRESSURE: 78 MMHG | WEIGHT: 184 LBS | SYSTOLIC BLOOD PRESSURE: 136 MMHG | BODY MASS INDEX: 33.86 KG/M2

## 2024-05-22 DIAGNOSIS — J96.11 CHRONIC HYPOXEMIC RESPIRATORY FAILURE (HCC): ICD-10-CM

## 2024-05-22 LAB
AORTIC ROOT: 3 CM
APICAL FOUR CHAMBER EJECTION FRACTION: 67 %
ASCENDING AORTA: 2.9 CM
BSA FOR ECHO PROCEDURE: 1.84 M2
DOP CALC LVOT AREA: 3.14 CM2
DOP CALC LVOT DIAMETER: 2 CM
E WAVE DECELERATION TIME: 222 MS
E/A RATIO: 1.59
FRACTIONAL SHORTENING: 37 (ref 28–44)
INTERVENTRICULAR SEPTUM IN DIASTOLE (PARASTERNAL SHORT AXIS VIEW): 1.5 CM
INTERVENTRICULAR SEPTUM: 1.5 CM (ref 0.6–1.1)
LAAS-AP2: 22.7 CM2
LAAS-AP4: 25 CM2
LEFT ATRIUM SIZE: 4.9 CM
LEFT ATRIUM VOLUME (MOD BIPLANE): 68 ML
LEFT ATRIUM VOLUME INDEX (MOD BIPLANE): 36.8 ML/M2
LEFT INTERNAL DIMENSION IN SYSTOLE: 2.7 CM (ref 2.1–4)
LEFT VENTRICULAR INTERNAL DIMENSION IN DIASTOLE: 4.3 CM (ref 3.5–6)
LEFT VENTRICULAR POSTERIOR WALL IN END DIASTOLE: 1.1 CM
LEFT VENTRICULAR STROKE VOLUME: 53 ML
LVSV (TEICH): 53 ML
MV E'TISSUE VEL-SEP: 6 CM/S
MV PEAK A VEL: 0.7 M/S
MV PEAK E VEL: 111 CM/S
MV STENOSIS PRESSURE HALF TIME: 64 MS
MV VALVE AREA P 1/2 METHOD: 3.44
RIGHT ATRIUM AREA SYSTOLE A4C: 22.7 CM2
RIGHT VENTRICLE ID DIMENSION: 2.8 CM
SL CV LEFT ATRIUM LENGTH A2C: 6.8 CM
SL CV LV EF: 70
SL CV PED ECHO LEFT VENTRICLE DIASTOLIC VOLUME (MOD BIPLANE) 2D: 81 ML
SL CV PED ECHO LEFT VENTRICLE SYSTOLIC VOLUME (MOD BIPLANE) 2D: 28 ML
TRICUSPID ANNULAR PLANE SYSTOLIC EXCURSION: 1.9 CM

## 2024-05-22 PROCEDURE — 93306 TTE W/DOPPLER COMPLETE: CPT | Performed by: INTERNAL MEDICINE

## 2024-05-22 PROCEDURE — 93306 TTE W/DOPPLER COMPLETE: CPT

## 2024-05-24 ENCOUNTER — TELEPHONE (OUTPATIENT)
Dept: FAMILY MEDICINE CLINIC | Facility: CLINIC | Age: 84
End: 2024-05-24

## 2024-05-24 NOTE — TELEPHONE ENCOUNTER
----- Message from Ari Mendiola MD sent at 5/24/2024  9:12 AM EDT -----  Well we do have some moderate heart failure failure with some impaired relaxation  We do see evidence also of the lung disease that would create changes in the heart we do not see any fibrillation happening which is a good thing but certainly it is possible with the findings that we see we just have to be mindful to make sure that your heart rate is regular AKA beats to the marching of the drum kind of idea

## 2024-05-26 DIAGNOSIS — I50.9 CONGESTIVE HEART FAILURE, UNSPECIFIED HF CHRONICITY, UNSPECIFIED HEART FAILURE TYPE (HCC): ICD-10-CM

## 2024-05-26 DIAGNOSIS — T78.40XA ALLERGY, INITIAL ENCOUNTER: ICD-10-CM

## 2024-05-27 RX ORDER — LEVOCETIRIZINE DIHYDROCHLORIDE 5 MG/1
5 TABLET, FILM COATED ORAL EVERY EVENING
Qty: 90 TABLET | Refills: 1 | Status: SHIPPED | OUTPATIENT
Start: 2024-05-27

## 2024-05-27 RX ORDER — ROSUVASTATIN CALCIUM 20 MG/1
20 TABLET, COATED ORAL DAILY
Qty: 90 TABLET | Refills: 1 | Status: SHIPPED | OUTPATIENT
Start: 2024-05-27

## 2024-05-27 RX ORDER — MONTELUKAST SODIUM 10 MG/1
10 TABLET ORAL
Qty: 90 TABLET | Refills: 1 | Status: SHIPPED | OUTPATIENT
Start: 2024-05-27

## 2024-05-28 DIAGNOSIS — I10 ESSENTIAL HYPERTENSION: ICD-10-CM

## 2024-05-28 NOTE — TELEPHONE ENCOUNTER
Reason for call:   [x] Refill   [] Prior Auth  [] Other:     Office:   [x] PCP/Provider -   [] Specialty/Provider -     Medication: amLODIPine (NORVASC) 5 mg tablet TAKE 1 TABLET(5 MG) BY MOUTH DAILY       Pharmacy: [id number] [place], [place] - [address]     Does the patient have enough for 3 days?   [x] Yes   [] No - Send as HP to POD

## 2024-05-29 RX ORDER — AMLODIPINE BESYLATE 5 MG/1
5 TABLET ORAL DAILY
Qty: 90 TABLET | Refills: 1 | OUTPATIENT
Start: 2024-05-29

## 2024-06-09 DIAGNOSIS — J44.9 CHRONIC OBSTRUCTIVE PULMONARY DISEASE, UNSPECIFIED COPD TYPE (HCC): ICD-10-CM

## 2024-06-09 RX ORDER — GUAIFENESIN 600 MG/1
TABLET, EXTENDED RELEASE ORAL
Qty: 120 TABLET | Refills: 5 | Status: SHIPPED | OUTPATIENT
Start: 2024-06-09

## 2024-06-12 ENCOUNTER — HOSPITAL ENCOUNTER (EMERGENCY)
Facility: HOSPITAL | Age: 84
End: 2024-06-12
Attending: EMERGENCY MEDICINE
Payer: MEDICARE

## 2024-06-12 ENCOUNTER — HOSPITAL ENCOUNTER (INPATIENT)
Facility: HOSPITAL | Age: 84
LOS: 7 days | Discharge: HOME HEALTH | DRG: 291 | End: 2024-06-19
Attending: ANESTHESIOLOGY | Admitting: ANESTHESIOLOGY
Payer: MEDICARE

## 2024-06-12 ENCOUNTER — NURSE TRIAGE (OUTPATIENT)
Age: 84
End: 2024-06-12

## 2024-06-12 ENCOUNTER — APPOINTMENT (EMERGENCY)
Dept: RADIOLOGY | Facility: HOSPITAL | Age: 84
End: 2024-06-12
Payer: MEDICARE

## 2024-06-12 VITALS
RESPIRATION RATE: 20 BRPM | HEART RATE: 84 BPM | OXYGEN SATURATION: 91 % | TEMPERATURE: 99.3 F | DIASTOLIC BLOOD PRESSURE: 72 MMHG | SYSTOLIC BLOOD PRESSURE: 165 MMHG

## 2024-06-12 DIAGNOSIS — I50.9 ACUTE ON CHRONIC CONGESTIVE HEART FAILURE, UNSPECIFIED HEART FAILURE TYPE (HCC): Primary | ICD-10-CM

## 2024-06-12 DIAGNOSIS — J43.9 PULMONARY EMPHYSEMA, UNSPECIFIED EMPHYSEMA TYPE (HCC): Primary | ICD-10-CM

## 2024-06-12 DIAGNOSIS — J44.9 OSA AND COPD OVERLAP SYNDROME (HCC): ICD-10-CM

## 2024-06-12 DIAGNOSIS — Z99.81 OXYGEN DEPENDENT: ICD-10-CM

## 2024-06-12 DIAGNOSIS — I50.33 ACUTE ON CHRONIC DIASTOLIC CONGESTIVE HEART FAILURE (HCC): ICD-10-CM

## 2024-06-12 DIAGNOSIS — I16.0 HYPERTENSIVE URGENCY: ICD-10-CM

## 2024-06-12 DIAGNOSIS — J96.11 CHRONIC RESPIRATORY FAILURE WITH HYPOXIA AND HYPERCAPNIA (HCC): ICD-10-CM

## 2024-06-12 DIAGNOSIS — J96.12 CHRONIC RESPIRATORY FAILURE WITH HYPOXIA AND HYPERCAPNIA (HCC): ICD-10-CM

## 2024-06-12 DIAGNOSIS — G47.33 OSA AND COPD OVERLAP SYNDROME (HCC): ICD-10-CM

## 2024-06-12 DIAGNOSIS — R29.6 RECURRENT FALLS: ICD-10-CM

## 2024-06-12 DIAGNOSIS — J96.01 ACUTE HYPOXEMIC RESPIRATORY FAILURE (HCC): ICD-10-CM

## 2024-06-12 PROBLEM — J96.21 ACUTE ON CHRONIC RESPIRATORY FAILURE WITH HYPOXIA AND HYPERCAPNIA (HCC): Status: ACTIVE | Noted: 2020-09-23

## 2024-06-12 PROBLEM — J96.22 ACUTE ON CHRONIC RESPIRATORY FAILURE WITH HYPOXIA AND HYPERCAPNIA (HCC): Status: ACTIVE | Noted: 2020-09-23

## 2024-06-12 LAB
2HR DELTA HS TROPONIN: 3 NG/L
4HR DELTA HS TROPONIN: 5 NG/L
ALBUMIN SERPL BCG-MCNC: 4.3 G/DL (ref 3.5–5)
ALP SERPL-CCNC: 49 U/L (ref 34–104)
ALT SERPL W P-5'-P-CCNC: 8 U/L (ref 7–52)
ANION GAP SERPL CALCULATED.3IONS-SCNC: 7 MMOL/L (ref 4–13)
AST SERPL W P-5'-P-CCNC: 16 U/L (ref 13–39)
ATRIAL RATE: 92 BPM
BASE EX.OXY STD BLDV CALC-SCNC: 50.9 % (ref 60–80)
BASE EX.OXY STD BLDV CALC-SCNC: 55.1 % (ref 60–80)
BASE EXCESS BLDV CALC-SCNC: 3.3 MMOL/L
BASE EXCESS BLDV CALC-SCNC: 8.7 MMOL/L
BASOPHILS # BLD AUTO: 0.01 THOUSANDS/ÂΜL (ref 0–0.1)
BASOPHILS NFR BLD AUTO: 0 % (ref 0–1)
BILIRUB SERPL-MCNC: 0.96 MG/DL (ref 0.2–1)
BNP SERPL-MCNC: 245 PG/ML (ref 0–100)
BUN SERPL-MCNC: 15 MG/DL (ref 5–25)
CALCIUM SERPL-MCNC: 9.8 MG/DL (ref 8.4–10.2)
CARDIAC TROPONIN I PNL SERPL HS: 11 NG/L
CARDIAC TROPONIN I PNL SERPL HS: 13 NG/L
CARDIAC TROPONIN I PNL SERPL HS: 8 NG/L
CHLORIDE SERPL-SCNC: 96 MMOL/L (ref 96–108)
CO2 SERPL-SCNC: 36 MMOL/L (ref 21–32)
CREAT SERPL-MCNC: 0.74 MG/DL (ref 0.6–1.3)
EOSINOPHIL # BLD AUTO: 0.09 THOUSAND/ÂΜL (ref 0–0.61)
EOSINOPHIL NFR BLD AUTO: 1 % (ref 0–6)
ERYTHROCYTE [DISTWIDTH] IN BLOOD BY AUTOMATED COUNT: 14.8 % (ref 11.6–15.1)
FLUAV RNA RESP QL NAA+PROBE: NEGATIVE
FLUBV RNA RESP QL NAA+PROBE: NEGATIVE
GFR SERPL CREATININE-BSD FRML MDRD: 74 ML/MIN/1.73SQ M
GLUCOSE SERPL-MCNC: 156 MG/DL (ref 65–140)
HCO3 BLDV-SCNC: 32.1 MMOL/L (ref 24–30)
HCO3 BLDV-SCNC: 36.9 MMOL/L (ref 24–30)
HCT VFR BLD AUTO: 44.4 % (ref 34.8–46.1)
HGB BLD-MCNC: 14 G/DL (ref 11.5–15.4)
IMM GRANULOCYTES # BLD AUTO: 0.03 THOUSAND/UL (ref 0–0.2)
IMM GRANULOCYTES NFR BLD AUTO: 0 % (ref 0–2)
LYMPHOCYTES # BLD AUTO: 0.44 THOUSANDS/ÂΜL (ref 0.6–4.47)
LYMPHOCYTES NFR BLD AUTO: 6 % (ref 14–44)
MAGNESIUM SERPL-MCNC: 1.9 MG/DL (ref 1.9–2.7)
MCH RBC QN AUTO: 33 PG (ref 26.8–34.3)
MCHC RBC AUTO-ENTMCNC: 31.5 G/DL (ref 31.4–37.4)
MCV RBC AUTO: 105 FL (ref 82–98)
MONOCYTES # BLD AUTO: 0.36 THOUSAND/ÂΜL (ref 0.17–1.22)
MONOCYTES NFR BLD AUTO: 5 % (ref 4–12)
NEUTROPHILS # BLD AUTO: 6.63 THOUSANDS/ÂΜL (ref 1.85–7.62)
NEUTS SEG NFR BLD AUTO: 88 % (ref 43–75)
NRBC BLD AUTO-RTO: 0 /100 WBCS
O2 CT BLDV-SCNC: 10.5 ML/DL
O2 CT BLDV-SCNC: 11.4 ML/DL
P AXIS: 68 DEGREES
PCO2 BLDV: 66.7 MM HG (ref 42–50)
PCO2 BLDV: 68.2 MM HG (ref 42–50)
PH BLDV: 7.29 [PH] (ref 7.3–7.4)
PH BLDV: 7.36 [PH] (ref 7.3–7.4)
PLATELET # BLD AUTO: 190 THOUSANDS/UL (ref 149–390)
PMV BLD AUTO: 9.4 FL (ref 8.9–12.7)
PO2 BLDV: 29.7 MM HG (ref 35–45)
PO2 BLDV: 31.9 MM HG (ref 35–45)
POTASSIUM SERPL-SCNC: 4.4 MMOL/L (ref 3.5–5.3)
PR INTERVAL: 144 MS
PROCALCITONIN SERPL-MCNC: <0.05 NG/ML
PROT SERPL-MCNC: 7.4 G/DL (ref 6.4–8.4)
QRS AXIS: 43 DEGREES
QRSD INTERVAL: 88 MS
QT INTERVAL: 396 MS
QTC INTERVAL: 471 MS
RBC # BLD AUTO: 4.24 MILLION/UL (ref 3.81–5.12)
RSV RNA RESP QL NAA+PROBE: NEGATIVE
SARS-COV-2 RNA RESP QL NAA+PROBE: NEGATIVE
SODIUM SERPL-SCNC: 139 MMOL/L (ref 135–147)
T WAVE AXIS: 97 DEGREES
TSH SERPL DL<=0.05 MIU/L-ACNC: 0.74 UIU/ML (ref 0.45–4.5)
VENTRICULAR RATE: 85 BPM
WBC # BLD AUTO: 7.56 THOUSAND/UL (ref 4.31–10.16)

## 2024-06-12 PROCEDURE — 94003 VENT MGMT INPAT SUBQ DAY: CPT

## 2024-06-12 PROCEDURE — 87081 CULTURE SCREEN ONLY: CPT | Performed by: ANESTHESIOLOGY

## 2024-06-12 PROCEDURE — 93010 ELECTROCARDIOGRAM REPORT: CPT | Performed by: INTERNAL MEDICINE

## 2024-06-12 PROCEDURE — 99285 EMERGENCY DEPT VISIT HI MDM: CPT

## 2024-06-12 PROCEDURE — 83735 ASSAY OF MAGNESIUM: CPT | Performed by: PHYSICIAN ASSISTANT

## 2024-06-12 PROCEDURE — 71045 X-RAY EXAM CHEST 1 VIEW: CPT

## 2024-06-12 PROCEDURE — 99291 CRITICAL CARE FIRST HOUR: CPT | Performed by: PHYSICIAN ASSISTANT

## 2024-06-12 PROCEDURE — 82805 BLOOD GASES W/O2 SATURATION: CPT | Performed by: PHYSICIAN ASSISTANT

## 2024-06-12 PROCEDURE — 84443 ASSAY THYROID STIM HORMONE: CPT | Performed by: PHYSICIAN ASSISTANT

## 2024-06-12 PROCEDURE — 94002 VENT MGMT INPAT INIT DAY: CPT

## 2024-06-12 PROCEDURE — 94760 N-INVAS EAR/PLS OXIMETRY 1: CPT

## 2024-06-12 PROCEDURE — 85025 COMPLETE CBC W/AUTO DIFF WBC: CPT | Performed by: PHYSICIAN ASSISTANT

## 2024-06-12 PROCEDURE — 84484 ASSAY OF TROPONIN QUANT: CPT | Performed by: PHYSICIAN ASSISTANT

## 2024-06-12 PROCEDURE — 80053 COMPREHEN METABOLIC PANEL: CPT | Performed by: PHYSICIAN ASSISTANT

## 2024-06-12 PROCEDURE — 0241U HB NFCT DS VIR RESP RNA 4 TRGT: CPT | Performed by: PHYSICIAN ASSISTANT

## 2024-06-12 PROCEDURE — 94640 AIRWAY INHALATION TREATMENT: CPT

## 2024-06-12 PROCEDURE — 93005 ELECTROCARDIOGRAM TRACING: CPT

## 2024-06-12 PROCEDURE — 36415 COLL VENOUS BLD VENIPUNCTURE: CPT | Performed by: PHYSICIAN ASSISTANT

## 2024-06-12 PROCEDURE — 96374 THER/PROPH/DIAG INJ IV PUSH: CPT

## 2024-06-12 PROCEDURE — 99223 1ST HOSP IP/OBS HIGH 75: CPT | Performed by: ANESTHESIOLOGY

## 2024-06-12 PROCEDURE — 84145 PROCALCITONIN (PCT): CPT | Performed by: PHYSICIAN ASSISTANT

## 2024-06-12 PROCEDURE — 83880 ASSAY OF NATRIURETIC PEPTIDE: CPT | Performed by: PHYSICIAN ASSISTANT

## 2024-06-12 RX ORDER — TIMOLOL MALEATE 5 MG/ML
1 SOLUTION/ DROPS OPHTHALMIC 2 TIMES DAILY
Status: DISCONTINUED | OUTPATIENT
Start: 2024-06-12 | End: 2024-06-19 | Stop reason: HOSPADM

## 2024-06-12 RX ORDER — AMLODIPINE BESYLATE 5 MG/1
5 TABLET ORAL DAILY
Status: DISCONTINUED | OUTPATIENT
Start: 2024-06-13 | End: 2024-06-13

## 2024-06-12 RX ORDER — CHLORHEXIDINE GLUCONATE ORAL RINSE 1.2 MG/ML
15 SOLUTION DENTAL EVERY 12 HOURS SCHEDULED
Status: DISCONTINUED | OUTPATIENT
Start: 2024-06-12 | End: 2024-06-19 | Stop reason: HOSPADM

## 2024-06-12 RX ORDER — FUROSEMIDE 10 MG/ML
40 INJECTION INTRAMUSCULAR; INTRAVENOUS ONCE
Status: COMPLETED | OUTPATIENT
Start: 2024-06-12 | End: 2024-06-12

## 2024-06-12 RX ORDER — MONTELUKAST SODIUM 10 MG/1
10 TABLET ORAL
Status: DISCONTINUED | OUTPATIENT
Start: 2024-06-12 | End: 2024-06-19 | Stop reason: HOSPADM

## 2024-06-12 RX ORDER — FORMOTEROL FUMARATE DIHYDRATE 20 UG/2ML
20 SOLUTION RESPIRATORY (INHALATION) 2 TIMES DAILY
Status: DISCONTINUED | OUTPATIENT
Start: 2024-06-12 | End: 2024-06-19 | Stop reason: HOSPADM

## 2024-06-12 RX ORDER — UBIDECARENONE 30 MG
200 CAPSULE ORAL EVERY MORNING
Status: DISCONTINUED | OUTPATIENT
Start: 2024-06-13 | End: 2024-06-19 | Stop reason: HOSPADM

## 2024-06-12 RX ORDER — BISACODYL 5 MG/1
10 TABLET, DELAYED RELEASE ORAL ONCE
Status: DISCONTINUED | OUTPATIENT
Start: 2024-06-12 | End: 2024-06-19 | Stop reason: HOSPADM

## 2024-06-12 RX ORDER — ATORVASTATIN CALCIUM 40 MG/1
40 TABLET, FILM COATED ORAL
Status: DISCONTINUED | OUTPATIENT
Start: 2024-06-12 | End: 2024-06-19 | Stop reason: HOSPADM

## 2024-06-12 RX ORDER — BUDESONIDE 0.5 MG/2ML
0.5 INHALANT ORAL 2 TIMES DAILY
Status: DISCONTINUED | OUTPATIENT
Start: 2024-06-12 | End: 2024-06-19 | Stop reason: HOSPADM

## 2024-06-12 RX ORDER — LORATADINE 10 MG/1
10 TABLET ORAL DAILY
Status: DISCONTINUED | OUTPATIENT
Start: 2024-06-13 | End: 2024-06-19 | Stop reason: HOSPADM

## 2024-06-12 RX ORDER — LABETALOL HYDROCHLORIDE 5 MG/ML
10 INJECTION, SOLUTION INTRAVENOUS EVERY 4 HOURS PRN
Status: DISCONTINUED | OUTPATIENT
Start: 2024-06-12 | End: 2024-06-19 | Stop reason: HOSPADM

## 2024-06-12 RX ORDER — CLOPIDOGREL BISULFATE 75 MG/1
75 TABLET ORAL DAILY
Status: DISCONTINUED | OUTPATIENT
Start: 2024-06-13 | End: 2024-06-19 | Stop reason: HOSPADM

## 2024-06-12 RX ORDER — IPRATROPIUM BROMIDE AND ALBUTEROL SULFATE 2.5; .5 MG/3ML; MG/3ML
3 SOLUTION RESPIRATORY (INHALATION)
Status: DISCONTINUED | OUTPATIENT
Start: 2024-06-12 | End: 2024-06-19 | Stop reason: HOSPADM

## 2024-06-12 RX ORDER — CHLORAL HYDRATE 500 MG
1000 CAPSULE ORAL EVERY MORNING
Status: DISCONTINUED | OUTPATIENT
Start: 2024-06-13 | End: 2024-06-19 | Stop reason: HOSPADM

## 2024-06-12 RX ORDER — ENOXAPARIN SODIUM 100 MG/ML
40 INJECTION SUBCUTANEOUS DAILY
Status: DISCONTINUED | OUTPATIENT
Start: 2024-06-13 | End: 2024-06-19 | Stop reason: HOSPADM

## 2024-06-12 RX ORDER — CITALOPRAM 20 MG/1
20 TABLET ORAL DAILY
Status: DISCONTINUED | OUTPATIENT
Start: 2024-06-13 | End: 2024-06-19 | Stop reason: HOSPADM

## 2024-06-12 RX ADMIN — FORMOTEROL FUMARATE DIHYDRATE 20 MCG: 20 SOLUTION RESPIRATORY (INHALATION) at 19:53

## 2024-06-12 RX ADMIN — NITROGLYCERIN 1 INCH: 20 OINTMENT TOPICAL at 13:38

## 2024-06-12 RX ADMIN — IPRATROPIUM BROMIDE AND ALBUTEROL SULFATE 3 ML: 2.5; .5 SOLUTION RESPIRATORY (INHALATION) at 19:35

## 2024-06-12 RX ADMIN — TIMOLOL MALEATE 1 DROP: 5 SOLUTION OPHTHALMIC at 20:30

## 2024-06-12 RX ADMIN — IPRATROPIUM BROMIDE AND ALBUTEROL SULFATE 3 ML: 2.5; .5 SOLUTION RESPIRATORY (INHALATION) at 16:29

## 2024-06-12 RX ADMIN — MONTELUKAST 10 MG: 10 TABLET, FILM COATED ORAL at 21:25

## 2024-06-12 RX ADMIN — BUDESONIDE 0.5 MG: 0.5 INHALANT RESPIRATORY (INHALATION) at 19:35

## 2024-06-12 RX ADMIN — CHLORHEXIDINE GLUCONATE 15 ML: 1.2 RINSE ORAL at 21:25

## 2024-06-12 RX ADMIN — FUROSEMIDE 40 MG: 10 INJECTION, SOLUTION INTRAMUSCULAR; INTRAVENOUS at 11:12

## 2024-06-12 NOTE — ASSESSMENT & PLAN NOTE
Wt Readings from Last 3 Encounters:   06/12/24 81.4 kg (179 lb 7.3 oz)   05/22/24 83.5 kg (184 lb)   11/03/23 83.5 kg (184 lb)     Presents with SOB and evidence of volume overload  Recent Echo 5/22/24 demonstrated EF 70%, grade II DD, no significant valvular disease. She was started on 10mg torsemide after this finding   CXR with mild vascular congestion and small pleural effusions    PTA meds: 10mg torsemide daily  Given 40mg IV lasix in the ED  Consideration for repeat lasix dosing tonight   Avoid hypertension   Closely monitor I&Os  Cardiac diet  Daily weights

## 2024-06-12 NOTE — ASSESSMENT & PLAN NOTE
Lab Results   Component Value Date    EGFR 74 06/12/2024    EGFR 68 05/17/2024    EGFR 71 11/03/2023    CREATININE 0.74 06/12/2024    CREATININE 0.79 05/17/2024    CREATININE 0.77 11/03/2023     Baseline creatinine around 0.7  Presenting creatinine 0.74  Electrolytes and acid/base stable  Given 40mg IV lasix  Close I&Os  Daily weights  Avoid nephrotoxins

## 2024-06-12 NOTE — ASSESSMENT & PLAN NOTE
Wt Readings from Last 3 Encounters:   05/22/24 83.5 kg (184 lb)   11/03/23 83.5 kg (184 lb)   10/16/23 83.5 kg (184 lb)     Presents with SOB and evidence of volume overload  Recent Echo 5/22/24 demonstrated EF 70%, grade II DD, no significant valvular disease. She was started on 10mg torsemide after this finding   CXR with mild vascular congestion and small pleural effusions    PTA meds: 10mg torsemide daily  Given 40mg IV lasix in the ED  Consideration for repeat lasix dosing tonight   Avoid hypertension   Closely monitor I&Os  Cardiac diet  Daily weights

## 2024-06-12 NOTE — ASSESSMENT & PLAN NOTE
Patient presents with SOB, placed on BiPAP secondary to increased work of breathing. Chronically on 4L NC.  Acute on chronic respiratory failure likely secondary to heart failure exacerbation, +/- possible flash pulmonary edema in the setting of hypertensive urgency with SBP >200s in  ED course: 40mg IV lasix, nitro paste, BiPAP  CXR: vascular congestion  COVID/flu/RSV negative    Procal <0.05    Plan:  Continue diuresis  Wean BiPAP as able  Scheduled nebs  Avoid hypertension   Pulmonary hygiene  Maintain Sp02 >88%

## 2024-06-12 NOTE — ASSESSMENT & PLAN NOTE
SBP noted to be >200s on arrival to [facility] ED. Consideration for possible flash pulmonary edema which could have contributed to acutely worsening SOB today   ED course: 1inch nitropaste, 40mg IV lasix  SBP 140s on arrival to [facility]    Plan:  Resume home norvasc  PRN labetalol  Consider nitro gtt if needed SBP remains high with acute SOB  Goal SBP <150

## 2024-06-12 NOTE — ED ATTENDING ATTESTATION
6/12/2024  I, Nacho Vick DO, saw and evaluated the patient. I have discussed the patient with the resident/non-physician practitioner and agree with the resident's/non-physician practitioner's findings, Plan of Care, and MDM as documented in the resident's/non-physician practitioner's note, except where noted. All available labs and Radiology studies were reviewed.  I was present for key portions of any procedure(s) performed by the resident/non-physician practitioner and I was immediately available to provide assistance.       At this point I agree with the current assessment done in the Emergency Department.  I have conducted an independent evaluation of this patient a history and physical is as follows:    ED Course     83-year-old female presenting with shortness of breath.  On assessment, she has conversational tachypnea.  There are crackles at bases.  Will treat as likely pulmonary edema.  Will give IV Lasix and placed on BiPAP.  Will obtain chest x-ray and ultimately admit    Critical Care Time  Procedures

## 2024-06-12 NOTE — RESPIRATORY THERAPY NOTE
Taken pt off of bipap and placed on 6L NC. WOB increased while on the NC and oxygen saturations decreased to the 80's. Placed pt back on Bipap 14/6. SPO2 came back up and WOB decreased. Provider notified.

## 2024-06-12 NOTE — ED NOTES
Attempt to call [facility] ICU 0119      Teri Britton, RN  06/12/24 1529

## 2024-06-12 NOTE — H&P
[facility]  H&P  Name: Eryn Morocho 83 y.o. female I MRN: [id number]  Unit/Bed#: ICU 11 I Date of Admission: 6/12/2024   Date of Service: 6/12/2024 I Hospital Day: 0      Assessment & Plan   Acute on chronic respiratory failure with hypoxia and hypercapnia (HCC)  Assessment & Plan  Patient presents with SOB, placed on BiPAP secondary to increased work of breathing. Chronically on 4L NC.  Acute on chronic respiratory failure likely secondary to heart failure exacerbation, +/- possible flash pulmonary edema in the setting of hypertensive urgency with SBP >200s in  ED course: 40mg IV lasix, nitro paste, BiPAP  CXR: vascular congestion  COVID/flu/RSV negative    Procal <0.05    Plan:  Continue diuresis  Wean BiPAP as able  Scheduled nebs  Avoid hypertension   Pulmonary hygiene  Maintain Sp02 >88%    Acute on chronic diastolic congestive heart failure (HCC)  Assessment & Plan  Wt Readings from Last 3 Encounters:   06/12/24 81.4 kg (179 lb 7.3 oz)   05/22/24 83.5 kg (184 lb)   11/03/23 83.5 kg (184 lb)     Presents with SOB and evidence of volume overload  Recent Echo 5/22/24 demonstrated EF 70%, grade II DD, no significant valvular disease. She was started on 10mg torsemide after this finding   CXR with mild vascular congestion and small pleural effusions    PTA meds: 10mg torsemide daily  Given 40mg IV lasix in the ED  Consideration for repeat lasix dosing tonight   Avoid hypertension   Closely monitor I&Os  Cardiac diet  Daily weights            Hypertensive urgency  Assessment & Plan  SBP noted to be >200s on arrival to [facility] ED. Consideration for possible flash pulmonary edema which could have contributed to acutely worsening SOB today   ED course: 1inch nitropaste, 40mg IV lasix  SBP 140s on arrival to [facility]    Plan:  Resume home norvasc  PRN labetalol  Consider nitro gtt if needed SBP remains high with acute SOB  Goal SBP <150    Stage 3a chronic kidney disease  (HCC)  Assessment & Plan  Lab Results   Component Value Date    EGFR 74 06/12/2024    EGFR 68 05/17/2024    EGFR 71 11/03/2023    CREATININE 0.74 06/12/2024    CREATININE 0.79 05/17/2024    CREATININE 0.77 11/03/2023     Baseline creatinine around 0.7  Presenting creatinine 0.74  Electrolytes and acid/base stable  Given 40mg IV lasix  Close I&Os  Daily weights  Avoid nephrotoxins     PAD (peripheral artery disease) (HCC)  Assessment & Plan  Continue plavix/statin  Neurovascular exams    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)  Assessment & Plan  History of non small cell lung CA >5 years ago s/p wedge resection and chemo  Following with pulm and found to have lung nodules that have been increasing in size. Per outpatient pulm, PET scan negative  Continue following with Heme/Onc and Pulm as an outpatient     Hyperlipidemia  Assessment & Plan  Continue statin     HTN (hypertension)  Assessment & Plan  PTA meds: amlodipine, torsemide  Continue amlodipine     COPD (chronic obstructive pulmonary disease) ([facility])  Assessment & Plan  COPD, chronically on 4L NC  Not in exacerbation  Continue scheduled ross, performist, mucinex, singulair   Follows with Dr. Moreno as an outpatient     JULIO (obstructive sleep apnea)  Assessment & Plan  JULIO requiring CPAP HS  Continue BiPAP HS for now, transition to CPAP HS when able            History of Present Illness     HPI: Eryn Morocho is a 83 y.o.  with PMH of COPD on 3-4L home O2, HTN, HLD, JULIO on CPAP, PAD, previous lung CA s/p wedge resection who presented to [facility] with progressive shortness of breath x a couple of weeks (2 weeks per patient, 6 weeks per family). Patient has been sleeping in a chair recently as she is unable to lay flat. She has also noticed increased lower extremity edema. She had a televisit with her primary care physician who ordered an echocardiogram which demonstrated diastolic dysfunction. She was then placed on torsemide after that finding which she  has been taking recently. Despite diuresis, she continued to have shortness of breath which was acutely worse this morning prompting evaluation. In the ED, she was placed on BiPAP due to increased work of breathing and tachypnea. She was hypertensive with SBP >200. CXR with evidence of pulmonary edema. . She was given 1 inch of nitro paste and 40 mg IV lasix. She was trial'ed off of BiPAP in the ED but she became hypoxic to the 80s and tachypneic again and therefore BiPAP was replaced and she was transferred to [facility] for further workup and monitoring.         History obtained from chart review and the patient.  Review of Systems: Review of Systems  Disposition: Stepdown Level 1  Historical Information   Past Medical History:  No date: Asthma  No date: Back problem  No date: Chronic pain  No date: Colon cancer screening      Comment:  recheck tattoo  No date: COPD (chronic obstructive pulmonary disease) ([facility])      Comment:  Oxygen 3.5L via NC  No date: CPAP (continuous positive airway pressure) dependence  No date: Diverticulosis  No date: Emphysema of lung ([facility])  No date: High blood pressure  No date: Hyperlipidemia  No date: Hypertension  No date: Lung cancer ([facility])      Comment:  Left Lung cancer; wedge resection   No date: JULIO on CPAP  No date: PAD (peripheral artery disease) ([facility])      Comment:  Leg  No date: Wears glasses Past Surgical History:  No date: APPENDECTOMY      Comment:  age 11 yr  2018: COLONOSCOPY      Comment:  repeat 2023  No date: COLONOSCOPY  No date: HYSTERECTOMY      Comment:  complete  No date: LUNG CANCER SURGERY; Left      Comment:  wedge reseection, Dr Anna   Current Outpatient Medications   Medication Instructions    albuterol (PROVENTIL HFA,VENTOLIN HFA) 90 mcg/act inhaler 2 puffs, Inhalation, Every 6 hours PRN    albuterol 2.5 mg, Nebulization, Daily PRN    amLODIPine (NORVASC) 5 mg, Oral, Daily    bisacodyl (DULCOLAX) 10 mg, Oral, Once    budesonide (PULMICORT) 0.5 mg,  Nebulization, 2 times daily, Rinse mouth after use.    Calcium Carbonate (CALTRATE 600 PO) 3 tablets, Oral, Daily    carboxymethylcellulose 0.5 % SOLN 1 drop, 3 times daily PRN    citalopram (CELEXA) 20 mg, Oral, Daily    clopidogrel (PLAVIX) 75 mg, Oral, Daily    Co Q 10 200 mg, Oral, Every morning    doxepin (SINEquan) 10 mg capsule TAKE 1 TO 3 CAPSULES BY MOUTH EVERY NIGHT AT BEDTIME AS NEEDED FOR SLEEP    fish oil 1,000 mg, Oral, Every morning    formoterol (PERFOROMIST) 20 mcg, Nebulization, 2 times daily, icd 10 code J44.9    guaiFENesin (MUCINEX) 600 mg 12 hr tablet TAKE 2 TABLET BY MOUTH EVERY 12 HOURS    ipratropium-albuterol (DUO-NEB) 0.5-2.5 mg/3 mL nebulizer solution 3 mL, Nebulization, Every 6 hours PRN, ICD 10 J44.9    levocetirizine (XYZAL) 5 mg, Oral, Every evening    montelukast (SINGULAIR) 10 mg, Oral, Daily at bedtime    Multiple Vitamins-Minerals (AIRBORNE GUMMIES PO) Oral, Every morning    Nebulizers (Vios LC Sprint) MISC Use as directed    OXYGEN-HELIUM IN Inhalation, 4L continuous    Prolia 60 mg, Subcutaneous, Once    rosuvastatin (CRESTOR) 20 mg, Oral, Daily    timolol (TIMOPTIC) 0.5 % ophthalmic solution 1 drop, Both Eyes, 2 times daily    torsemide (DEMADEX) 10 mg tablet TAKE 1 TABLET(10 MG) BY MOUTH EVERY MORNING FOR 10 DAYS    VITAMIN D PO Oral, Daily    Allergies   Allergen Reactions    Baclofen Nausea Only and Dizziness    Lisinopril Nausea Only and Dizziness    Losartan Nausea Only and Dizziness    Naproxen Nausea Only and Dizziness    Zinc Acetate Nausea Only and Dizziness      Social History     Tobacco Use    Smoking status: Former     Current packs/day: 1.50     Average packs/day: 1.5 packs/day for 35.0 years (52.5 ttl pk-yrs)     Types: Cigarettes    Smokeless tobacco: Never    Tobacco comments:     Has smoked since age:   11 - As per [place]    Vaping Use    Vaping status: Never Used   Substance Use Topics    Alcohol use: Not Currently    Drug use: Never    Family History    Problem Relation Age of Onset    Colon cancer Father     Heart disease Sister     Lung cancer Daughter           Objective                            Vitals I/O      Most Recent Min/Max in 24hrs   Temp 99.1 °F (37.3 °C) Temp  Min: 99.1 °F (37.3 °C)  Max: 99.3 °F (37.4 °C)   Pulse 79 Pulse  Min: 79  Max: 90   Resp (!) 42 Resp  Min: 20  Max: 42   /66 BP  Min: 145/66  Max: 206/88   O2 Sat 93 % SpO2  Min: 91 %  Max: 96 %    No intake or output data in the 24 hours ending 06/12/24 1656    Diet NPO; Sips with meds    Invasive Monitoring           Physical Exam   Physical Exam  Eyes:      Extraocular Movements: Extraocular movements intact.      Pupils: Pupils are equal, round, and reactive to light.   Skin:     General: Skin is warm and dry.   HENT:      Head: Normocephalic and atraumatic.      Mouth/Throat:      Mouth: Mucous membranes are moist.   Cardiovascular:      Rate and Rhythm: Normal rate and regular rhythm.   Abdominal: General: Bowel sounds are normal.      Palpations: Abdomen is soft.   Constitutional:       General: She is awake.      Appearance: She is well-developed and well-nourished. She is obese.   Pulmonary:      Effort: Tachypnea present.      Comments: Assessed on BiPAP   Neurological:      General: No focal deficit present.      Mental Status: She is alert and oriented to person, place and time.      Motor: gross motor function is at baseline for patient. Strength full and intact in all extremities.            Diagnostic Studies      EKG: NSR on tele   Imaging:  I have personally reviewed pertinent reports.   and I have personally reviewed pertinent films in PACS     Medications:  Scheduled PRN   [START ON 6/13/2024] amLODIPine, 5 mg, Daily  atorvastatin, 40 mg, Daily With Dinner  bisacodyl, 10 mg, Once  budesonide, 0.5 mg, BID  chlorhexidine, 15 mL, Q12H ANNE  [START ON 6/13/2024] citalopram, 20 mg, Daily  [START ON 6/13/2024] clopidogrel, 75 mg, Daily  [START ON 6/13/2024] co-enzyme Q-10,  200 mg, QAM  [START ON 6/13/2024] enoxaparin, 40 mg, Daily  [START ON 6/13/2024] fish oil, 1,000 mg, QAM  formoterol, 20 mcg, BID  ipratropium-albuterol, 3 mL, Q6H  [START ON 6/13/2024] loratadine, 10 mg, Daily  montelukast, 10 mg, HS  [START ON 6/13/2024] multivitamin-minerals, 1 tablet, Daily  timolol, 1 drop, BID      labetalol, 10 mg, Q4H PRN       Continuous          Labs:    CBC    Recent Labs     06/12/24  1101   WBC 7.56   HGB 14.0   HCT 44.4        BMP    Recent Labs     06/12/24  1101   SODIUM 139   K 4.4   CL 96   CO2 36*   AGAP 7   BUN 15   CREATININE 0.74   CALCIUM 9.8       Coags    No recent results     Additional Electrolytes  Recent Labs     06/12/24  1101   MG 1.9          Blood Gas    No recent results  Recent Labs     06/12/24  1300   PHVEN 7.361   JAT3PRL 66.7*   PO2VEN 29.7*   EMA6EUW 36.9*   BEVEN 8.7   J3OLVPM 55.1*    LFTs  Recent Labs     06/12/24  1101   ALT 8   AST 16   ALKPHOS 49   ALB 4.3   TBILI 0.96       Infectious  Recent Labs     06/12/24  1101   PROCALCITONI <0.05     Glucose  Recent Labs     06/12/24  1101   GLUC 156*             Anticipated Length of Stay is > 2 midnights  PRANAY Cee

## 2024-06-12 NOTE — ASSESSMENT & PLAN NOTE
SBP noted to be >200s on arrival to [facility] ED. Consideration for possible flash pulmonary edema which could have contributed to acutely worsening SOB today   ED course: 1inch nitropaste, 40mg IV lasix  SBP 140s on arrival to [facility]    Plan:  Resume home norvasc  PRN labetalol  Consider nitro gtt if needed SBP remains high with acute SOB  Goal SBP <150

## 2024-06-12 NOTE — ED PROVIDER NOTES
"History  Chief Complaint   Patient presents with    Shortness of Breath     Cough and sob for \"a while\"  60% on 4L NC for EMS, 15L NRB at 90% on arrival.   Bilateral leg swelling     This is an 83-year-old female with past medical history significant for COPD, hypertension, hyperlipidemia, and congestive heart failure presenting to the emergency department today for shortness of breath.  The patient notes symptoms have been ongoing for approximately 4 days.  It is associated with a \"deep\" nonproductive cough.  The patient has no nasal congestion or rhinorrhea.  She wears 4 L of oxygen at baseline.  She was found by EMS saturating 60% on her chronic 4 L nasal cannula.  She denies any chest pain or palpitations.  She notes bilateral lower extremity swelling but no calf pain bilaterally.  No nausea, vomiting, diarrhea, or constipation.  No abdominal pain.  No fevers or chills.  The patient notes she takes torsemide daily.  The patient denies other complaints at this time.      History provided by:  Patient   used: No    Shortness of Breath  Severity:  Severe  Onset quality:  Gradual  Duration:  4 days  Timing:  Constant  Progression:  Worsening  Chronicity:  New  Relieved by:  Nothing  Worsened by:  Nothing  Ineffective treatments:  None tried  Associated symptoms: cough, PND and sputum production    Associated symptoms: no abdominal pain, no chest pain, no claudication, no diaphoresis, no ear pain, no fever, no headaches, no neck pain, no rash, no sore throat, no syncope, no swollen glands, no vomiting and no wheezing        Prior to Admission Medications   Prescriptions Last Dose Informant Patient Reported? Taking?   Calcium Carbonate (CALTRATE 600 PO)   Yes No   Sig: Take 3 tablets by mouth in the morning   Coenzyme Q10 (Co Q 10) 100 MG CAPS   No No   Sig: Take 2 capsules (200 mg total) by mouth every morning   Multiple Vitamins-Minerals (AIRBORNE GUMMIES PO)  Self Yes No   Sig: Take by mouth " every morning   Nebulizers (Vios LC Sprint) MISC   No No   Sig: Use as directed   OXYGEN-HELIUM IN   Yes No   Sig: Inhale 4L continuous   Omega-3 Fatty Acids (fish oil) 1,000 mg  Self Yes No   Sig: Take 1,000 mg by mouth every morning   VITAMIN D PO   Yes No   Sig: Take by mouth in the morning   albuterol (2.5 mg/3 mL) 0.083 % nebulizer solution   No No   Sig: Take 3 mL (2.5 mg total) by nebulization daily as needed for wheezing or shortness of breath   albuterol (PROVENTIL HFA,VENTOLIN HFA) 90 mcg/act inhaler   No No   Sig: INHALE 2 PUFFS BY MOUTH EVERY 6 HOURS AS NEEDED FOR SHORTNESS OF BREATH   amLODIPine (NORVASC) 5 mg tablet   No No   Sig: TAKE 1 TABLET(5 MG) BY MOUTH DAILY   bisacodyl (DULCOLAX) 5 mg EC tablet   No No   Sig: Take 2 tablets (10 mg total) by mouth once for 1 dose   budesonide (PULMICORT) 0.5 mg/2 mL nebulizer solution   No No   Sig: Take 2 mL (0.5 mg total) by nebulization 2 (two) times a day Rinse mouth after use.   carboxymethylcellulose 0.5 % SOLN   Yes No   Si drop 3 (three) times a day as needed for dry eyes   citalopram (CeleXA) 20 mg tablet   No No   Sig: Take 1 tablet (20 mg total) by mouth daily   clopidogrel (PLAVIX) 75 mg tablet   No No   Sig: TAKE 1 TABLET BY MOUTH DAILY   denosumab (Prolia) 60 mg/mL   No No   Sig: Inject 1 mL (60 mg total) under the skin once for 1 dose   Patient taking differently: Inject 60 mg under the skin once Next dose 2023   doxepin (SINEquan) 10 mg capsule   No No   Sig: TAKE 1 TO 3 CAPSULES BY MOUTH EVERY NIGHT AT BEDTIME AS NEEDED FOR SLEEP   formoterol (Perforomist) 20 MCG/2ML nebulizer solution   No No   Sig: Take 2 mL (20 mcg total) by nebulization 2 (two) times a day icd 10 code J44.9   guaiFENesin (MUCINEX) 600 mg 12 hr tablet   No No   Sig: TAKE 2 TABLET BY MOUTH EVERY 12 HOURS   ipratropium-albuterol (DUO-NEB) 0.5-2.5 mg/3 mL nebulizer solution   No No   Sig: Take 3 mL by nebulization every 6 (six) hours as needed for wheezing or shortness of  breath ICD 10 J44.9   levocetirizine (XYZAL) 5 MG tablet   No No   Sig: TAKE 1 TABLET(5 MG) BY MOUTH EVERY EVENING   montelukast (SINGULAIR) 10 mg tablet   No No   Sig: TAKE 1 TABLET(10 MG) BY MOUTH DAILY AT BEDTIME   rosuvastatin (CRESTOR) 20 MG tablet   No No   Sig: TAKE 1 TABLET(20 MG) BY MOUTH DAILY   timolol (TIMOPTIC) 0.5 % ophthalmic solution  Self Yes No   Sig: Administer 1 drop to both eyes 2 (two) times a day   torsemide (DEMADEX) 10 mg tablet   No No   Sig: TAKE 1 TABLET(10 MG) BY MOUTH EVERY MORNING FOR 10 DAYS      Facility-Administered Medications: None       Past Medical History:   Diagnosis Date    Asthma     Back problem     Chronic pain     Colon cancer screening     recheck tattoo    COPD (chronic obstructive pulmonary disease) (HCC)     Oxygen 3.5L via NC    CPAP (continuous positive airway pressure) dependence     Diverticulosis     Emphysema of lung (HCC)     High blood pressure     Hyperlipidemia     Hypertension     Lung cancer (HCC)     Left Lung cancer; wedge resection     JULIO on CPAP     PAD (peripheral artery disease) (HCC)     Leg    Wears glasses        Past Surgical History:   Procedure Laterality Date    APPENDECTOMY      age 11 yr    COLONOSCOPY  2018    repeat 2023    COLONOSCOPY      HYSTERECTOMY      complete    LUNG CANCER SURGERY Left     wedge reseection, Dr Anna       Family History   Problem Relation Age of Onset    Colon cancer Father     Heart disease Sister     Lung cancer Daughter      I have reviewed and agree with the history as documented.    E-Cigarette/Vaping    E-Cigarette Use Never User      E-Cigarette/Vaping Substances    Nicotine No     THC No     CBD No     Flavoring No     Other No     Unknown No      Social History     Tobacco Use    Smoking status: Former     Current packs/day: 1.50     Average packs/day: 1.5 packs/day for 35.0 years (52.5 ttl pk-yrs)     Types: Cigarettes    Smokeless tobacco: Never    Tobacco comments:     Has smoked since age:   11 -  As per Alina    Vaping Use    Vaping status: Never Used   Substance Use Topics    Alcohol use: Not Currently    Drug use: Never       Review of Systems   Constitutional:  Negative for appetite change, chills, diaphoresis, fatigue and fever.   HENT:  Negative for ear pain and sore throat.    Eyes:  Negative for visual disturbance.   Respiratory:  Positive for cough, sputum production and shortness of breath. Negative for chest tightness and wheezing.    Cardiovascular:  Positive for leg swelling and PND. Negative for chest pain, palpitations, claudication and syncope.   Gastrointestinal:  Negative for abdominal pain, constipation, diarrhea, nausea and vomiting.   Musculoskeletal:  Negative for neck pain and neck stiffness.   Skin:  Negative for rash and wound.   Neurological:  Negative for dizziness, seizures, syncope, weakness, light-headedness, numbness and headaches.   Psychiatric/Behavioral:  Negative for confusion.    All other systems reviewed and are negative.      Physical Exam  Physical Exam  Vitals and nursing note reviewed.   Constitutional:       General: She is in acute distress.      Appearance: Normal appearance. She is obese. She is ill-appearing. She is not toxic-appearing or diaphoretic.   HENT:      Head: Normocephalic and atraumatic.      Nose: Nose normal. No congestion or rhinorrhea.      Mouth/Throat:      Mouth: Mucous membranes are moist.      Pharynx: No oropharyngeal exudate or posterior oropharyngeal erythema.   Eyes:      General: No scleral icterus.        Right eye: No discharge.         Left eye: No discharge.      Extraocular Movements: Extraocular movements intact.      Pupils: Pupils are equal, round, and reactive to light.   Cardiovascular:      Rate and Rhythm: Normal rate and regular rhythm.      Pulses: Normal pulses.      Heart sounds: Normal heart sounds. No murmur heard.     No friction rub. No gallop.   Pulmonary:      Effort: Respiratory distress present.      Breath  sounds: No stridor. No wheezing, rhonchi or rales.      Comments: Decreased breath sounds to the bilateral lung fields  Chest:      Chest wall: No tenderness.   Abdominal:      General: Abdomen is flat. There is no distension.      Palpations: Abdomen is soft.      Tenderness: There is no abdominal tenderness. There is no right CVA tenderness, left CVA tenderness, guarding or rebound.   Musculoskeletal:         General: Normal range of motion.      Cervical back: Normal range of motion. No tenderness.      Right lower leg: Edema (2+) present.      Left lower leg: Edema (2+) present.   Skin:     General: Skin is warm and dry.      Capillary Refill: Capillary refill takes less than 2 seconds.      Coloration: Skin is not jaundiced or pale.   Neurological:      General: No focal deficit present.      Mental Status: She is alert and oriented to person, place, and time. Mental status is at baseline.   Psychiatric:         Mood and Affect: Mood normal.         Behavior: Behavior normal.         Vital Signs  ED Triage Vitals   Temperature Pulse Respirations Blood Pressure SpO2   06/12/24 1120 06/12/24 1056 06/12/24 1056 06/12/24 1056 06/12/24 1056   99.3 °F (37.4 °C) 90 (!) 26 (!) 196/82 91 %      Temp Source Heart Rate Source Patient Position - Orthostatic VS BP Location FiO2 (%)   06/12/24 1120 06/12/24 1056 -- -- --   Axillary Monitor         Pain Score       06/12/24 1056       No Pain           Vitals:    06/12/24 1115 06/12/24 1200 06/12/24 1230 06/12/24 1500   BP: (!) 188/83 (!) 206/88  165/72   Pulse: 82 87 83 84         Visual Acuity      ED Medications  Medications   furosemide (LASIX) injection 40 mg (40 mg Intravenous Given 6/12/24 1112)   nitroglycerin (NITRO-BID) 2 % TD ointment 1 inch (1 inch Topical Given 6/12/24 1338)       Diagnostic Studies  Results Reviewed       Procedure Component Value Units Date/Time    HS Troponin I 4hr [[id number]]  (Normal) Collected: 06/12/24 1509    Lab Status: Final result  Specimen: Blood from Arm, Right Updated: 06/12/24 1536     hs TnI 4hr 13 ng/L      Delta 4hr hsTnI 5 ng/L     HS Troponin I 2hr [[id number]]  (Normal) Collected: 06/12/24 1300    Lab Status: Final result Specimen: Blood from Arm, Right Updated: 06/12/24 1328     hs TnI 2hr 11 ng/L      Delta 2hr hsTnI 3 ng/L     Blood gas, venous [[id number]]  (Abnormal) Collected: 06/12/24 1300    Lab Status: Final result Specimen: Blood from Arm, Right Updated: 06/12/24 1306     pH, Ricardo 7.361     pCO2, Ricardo 66.7 mm Hg      pO2, Ricardo 29.7 mm Hg      HCO3, Ricardo 36.9 mmol/L      Base Excess, Ricardo 8.7 mmol/L      O2 Content, Ricardo 11.4 ml/dL      O2 HGB, VENOUS 55.1 %     B-Type Natriuretic Peptide(BNP) [[id number]]  (Abnormal) Collected: 06/12/24 1101    Lab Status: Final result Specimen: Blood from Arm, Right Updated: 06/12/24 1219      pg/mL     FLU/RSV/COVID - if FLU/RSV clinically relevant [[id number]]  (Normal) Collected: 06/12/24 1101    Lab Status: Final result Specimen: Nares from Nose Updated: 06/12/24 1203     SARS-CoV-2 Negative     INFLUENZA A PCR Negative     INFLUENZA B PCR Negative     RSV PCR Negative    Narrative:      FOR PEDIATRIC PATIENTS - copy/paste COVID Guidelines URL to browser: [email or web address]    SARS-CoV-2 assay is a Nucleic Acid Amplification assay intended for the  qualitative detection of nucleic acid from SARS-CoV-2 in nasopharyngeal  swabs. Results are for the presumptive identification of SARS-CoV-2 RNA.    Positive results are indicative of infection with SARS-CoV-2, the virus  causing COVID-19, but do not rule out bacterial infection or co-infection  with other viruses. Laboratories within the [place] and its  territories are required to report all positive results to the appropriate  public health authorities. Negative results do not preclude SARS-CoV-2  infection and should not be used as the sole basis for treatment or other  patient  management decisions. Negative results must be combined with  clinical observations, patient history, and epidemiological information.  This test has not been FDA cleared or approved.    This test has been authorized by FDA under an Emergency Use Authorization  (EUA). This test is only authorized for the duration of time the  declaration that circumstances exist justifying the authorization of the  emergency use of an in vitro diagnostic tests for detection of SARS-CoV-2  virus and/or diagnosis of COVID-19 infection under section 564(b)(1) of  the Act, 21 U.S.C. 360bbb-3(b)(1), unless the authorization is terminated  or revoked sooner. The test has been validated but independent review by FDA  and CLIA is pending.    Test performed using [employer] GeneXpert: This RT-PCR assay targets N2,  a region unique to SARS-CoV-2. A conserved region in the E-gene was chosen  for pan-Sarbecovirus detection which includes SARS-CoV-2.    According to CMS-2020-01-R, this platform meets the definition of high-throughput technology.    TSH, 3rd generation with Free T4 reflex [[id number]]  (Normal) Collected: 06/12/24 1101    Lab Status: Final result Specimen: Blood from Arm, Right Updated: 06/12/24 1146     TSH 3RD GENERATON 0.743 uIU/mL     Procalcitonin [[id number]]  (Normal) Collected: 06/12/24 1101    Lab Status: Final result Specimen: Blood from Arm, Right Updated: 06/12/24 1141     Procalcitonin <0.05 ng/ml     HS Troponin 0hr (reflex protocol) [[id number]]  (Normal) Collected: 06/12/24 1101    Lab Status: Final result Specimen: Blood from Arm, Right Updated: 06/12/24 1137     hs TnI 0hr 8 ng/L     Blood gas, venous [[id number]]  (Abnormal) Collected: 06/12/24 1101    Lab Status: Final result Specimen: Blood from Arm, Right Updated: 06/12/24 1134     pH, Ricardo 7.291     pCO2, Ricardo 68.2 mm Hg      pO2, Ricardo 31.9 mm Hg      HCO3, Ricardo 32.1 mmol/L      Base Excess, Ricardo 3.3 mmol/L      O2 Content, Ricardo 10.5 ml/dL      O2 HGB, VENOUS 50.9 %      Comprehensive metabolic panel [[id number]]  (Abnormal) Collected: 06/12/24 1101    Lab Status: Final result Specimen: Blood from Arm, Right Updated: 06/12/24 1130     Sodium 139 mmol/L      Potassium 4.4 mmol/L      Chloride 96 mmol/L      CO2 36 mmol/L      ANION GAP 7 mmol/L      BUN 15 mg/dL      Creatinine 0.74 mg/dL      Glucose 156 mg/dL      Calcium 9.8 mg/dL      AST 16 U/L      ALT 8 U/L      Alkaline Phosphatase 49 U/L      Total Protein 7.4 g/dL      Albumin 4.3 g/dL      Total Bilirubin 0.96 mg/dL      eGFR 74 ml/min/1.73sq m     Narrative:      National Kidney Disease Foundation guidelines for Chronic Kidney Disease (CKD):     Stage 1 with normal or high GFR (GFR > 90 mL/min/1.73 square meters)    Stage 2 Mild CKD (GFR = 60-89 mL/min/1.73 square meters)    Stage 3A Moderate CKD (GFR = 45-59 mL/min/1.73 square meters)    Stage 3B Moderate CKD (GFR = 30-44 mL/min/1.73 square meters)    Stage 4 Severe CKD (GFR = 15-29 mL/min/1.73 square meters)    Stage 5 End Stage CKD (GFR <15 mL/min/1.73 square meters)  Note: GFR calculation is accurate only with a steady state creatinine    Magnesium [[id number]]  (Normal) Collected: 06/12/24 1101    Lab Status: Final result Specimen: Blood from Arm, Right Updated: 06/12/24 1130     Magnesium 1.9 mg/dL     CBC and differential [[id number]]  (Abnormal) Collected: 06/12/24 1101    Lab Status: Final result Specimen: Blood from Arm, Right Updated: 06/12/24 1111     WBC 7.56 Thousand/uL      RBC 4.24 Million/uL      Hemoglobin 14.0 g/dL      Hematocrit 44.4 %       fL      MCH 33.0 pg      MCHC 31.5 g/dL      RDW 14.8 %      MPV 9.4 fL      Platelets 190 Thousands/uL      nRBC 0 /100 WBCs      Segmented % 88 %      Immature Grans % 0 %      Lymphocytes % 6 %      Monocytes % 5 %      Eosinophils Relative 1 %      Basophils Relative 0 %      Absolute Neutrophils 6.63 Thousands/µL      Absolute Immature Grans 0.03 Thousand/uL      Absolute Lymphocytes 0.44 Thousands/µL       Absolute Monocytes 0.36 Thousand/µL      Eosinophils Absolute 0.09 Thousand/µL      Basophils Absolute 0.01 Thousands/µL                    XR chest 1 view portable   Final Result by Carlos Banks MD (06/12 1656)      Mild pulmonary vascular congestion and small bilateral pleural effusions.            Workstation performed: [id number]                    Procedures  ECG 12 Lead Documentation Only    Date/Time: 6/12/2024 11:07 AM    Performed by: Gray Caba PA-C  Authorized by: Gray Caba PA-C    Indications / Diagnosis:  SOB  Patient location:  ED  Previous ECG:     Previous ECG:  Compared to current    Similarity:  No change  Interpretation:     Interpretation: normal    Quality:     Tracing quality:  Limited by artifact  Rate:     ECG rate:  85    ECG rate assessment: normal    Rhythm:     Rhythm: sinus rhythm    Ectopy:     Ectopy: none    QRS:     QRS axis:  Normal  Conduction:     Conduction: normal    ST segments:     ST segments:  Normal  T waves:     T waves: normal    Comments:      Normal sinus rhythm with a rate of 85.  Normal axis.  No ectopy.  No STEMI.  Limited by artifact.  CriticalCare Time    Date/Time: 6/12/2024 11:05 AM    Performed by: Gray Caba PA-C  Authorized by: Gray Caba PA-C    Critical care provider statement:     Critical care time (minutes):  35    Critical care time was exclusive of:  Separately billable procedures and treating other patients    Critical care was necessary to treat or prevent imminent or life-threatening deterioration of the following conditions:  Respiratory failure    Critical care was time spent personally by me on the following activities:  Blood draw for specimens, obtaining history from patient or surrogate, discussions with consultants, evaluation of patient's response to treatment, examination of patient, review of old charts, re-evaluation of patient's condition, ordering and review of  radiographic studies, ordering and review of laboratory studies and ordering and performing treatments and interventions  Comments:      Respiratory Distress + IV Lasix + Nitro Paste + BiPAP           ED Course  ED Course as of 06/12/24 1613   Wed Jun 12, 2024   1137 pH, Ricardo(!): 7.291  The patient is on BiPAP. Will recheck in one hour.   1220 BNP(!): 245   1310 pH, Ricardo: 7.361  pH improving. Will trial off BiPAP and continue to monitor.   1320 The patient was taken off of BiPAP.  Within 30 seconds, the patient began to desaturate and work of breathing deteriorated.  We will put the patient back on BiPAP.  Will plan to transfer to ICU.                               SBIRT 20yo+      Flowsheet Row Most Recent Value   Initial Alcohol Screen: US AUDIT-C     1. How often do you have a drink containing alcohol? 0 Filed at: 06/12/2024 1058   2. How many drinks containing alcohol do you have on a typical day you are drinking?  0 Filed at: 06/12/2024 1058   3a. Male UNDER 65: How often do you have five or more drinks on one occasion? 0 Filed at: 06/12/2024 1058   3b. FEMALE Any Age, or MALE 65+: How often do you have 4 or more drinks on one occassion? 0 Filed at: 06/12/2024 1058   Audit-C Score 0 Filed at: 06/12/2024 1058   LEIF: How many times in the past year have you...    Used an illegal drug or used a prescription medication for non-medical reasons? Never Filed at: 06/12/2024 1058                      Medical Decision Making  83-year-old female presenting to the emergency department today for worsening shortness of breath.  She also has lower extremity swelling.  She presents to the emergency department hypoxemic to the low 80s on 15 L nonrebreather.  The patient also notes a cough.  The patient was placed on BiPAP secondary to work of breathing and hypoxemia.  EKG shows normal sinus rhythm with a rate of 85.  Artifact noted.  Chest x-ray suspicious for fluid overload on my independent interpretation.  On physical  "examination, the patient has decreased breath sounds bilaterally.  She is ill-appearing and has 2+ edema to the bilateral lower extremities.  Initial VBG shows pH of 7.291 with a pCO2 of 68.2.  Negative troponin.  Negative procalcitonin.  BNP is mildly elevated.  The patient was given 40 mg of Lasix in addition to nitroglycerin paste while here in the emergency department.  Patient was trialed off of BiPAP after 2 hours and then improving VBG.  Unfortunately, shortly after the patient was taken off BiPAP her work of breathing once again deteriorated and the patient was again hypoxemic.  Based upon need for continuous BiPAP, will plan on transfer.  Case was discussed with ICU attending Dr. Martin who accepts the patient for transfer to [facility].  The patient and/or patient's proxy verify their understanding and agree to the plan at this time.  All questions answered to the patient and/or their proxy's satisfaction.  All labs reviewed and utilized in the medical decision making process (if labs were ordered).  Portions of the record may have been created with voice recognition software.  Occasional wrong word or \"sound a like\" substitutions may have occurred due to the inherent limitations of voice recognition software.  Read the chart carefully and recognize, using context, where substitutions have occurred.    Case discussed with family at bedside.  I reviewed prior EKG.  I reviewed prior notes.  I reviewed prior labs.    Problems Addressed:  Acute hypoxemic respiratory failure (HCC): undiagnosed new problem with uncertain prognosis  Acute on chronic congestive heart failure, unspecified heart failure type (HCC): undiagnosed new problem with uncertain prognosis    Amount and/or Complexity of Data Reviewed  Independent Historian: caregiver  External Data Reviewed: labs, ECG and notes.  Labs: ordered. Decision-making details documented in ED Course.  Radiology: ordered and independent interpretation " performed. Decision-making details documented in ED Course.     Details: CXR  ECG/medicine tests: ordered and independent interpretation performed. Decision-making details documented in ED Course.  Discussion of management or test interpretation with external provider(s): Dr. Martin - ICU    Risk  Prescription drug management.             Disposition  Final diagnoses:   Acute on chronic congestive heart failure, unspecified heart failure type (HCC)   Acute hypoxemic respiratory failure (HCC)     Time reflects when diagnosis was documented in both MDM as applicable and the Disposition within this note       Time User Action Codes Description Comment    6/12/2024  1:43 PM Gray Caba Add [I50.9] Acute on chronic congestive heart failure, unspecified heart failure type (HCC)     6/12/2024  1:43 PM Gray Caba Add [J96.01] Acute hypoxemic respiratory failure (HCC)           ED Disposition       ED Disposition   Transfer to Another Facility-In Network    Condition   --    Date/Time   Wed Jun 12, 2024 1343    Comment   Eryn Morocho should be transferred out to [facility].               MD Documentation      Flowsheet Row Most Recent Value   Patient Condition The patient has been stabilized such that within reasonable medical probability, no material deterioration of the patient condition or the condition of the unborn child(almita) is likely to result from the transfer   Reason for Transfer Level of Care needed not available at this facility   Benefits of Transfer Specialized equipment and/or services available at the receiving facility (Include comment)________________________  [ICU]   Risks of Transfer Potential for delay in receiving treatment, Potential deterioration of medical condition, Loss of IV, Increased discomfort during transfer, Possible worsening of condition or death during transfer   Accepting Physician Dr. Martin   Accepting Facility Name, [facility]  [place])    (Name & Tel number) Ofelia Angel RN   Sending MD Gray Caba PA-C   Provider Certification General risk, such as traffic hazards, adverse weather conditions, rough terrain or turbulence, possible failure of equipment (including vehicle or aircraft), or consequences of actions of persons outside the control of the transport personnel, Unanticipated needs of medical equipment and personnel during transport, Risk of worsening condition, The possibility of a transport vehicle being unavailable          RN Documentation      Flowsheet Row Most Recent Value   Accepting Facility Name, City & State  MELANIE Escamilla ([place], [place])    (Name & Tel number) Ofelia Angel RN          Follow-up Information    None         Discharge Medication List as of 6/12/2024  3:30 PM        CONTINUE these medications which have NOT CHANGED    Details   albuterol (2.5 mg/3 mL) 0.083 % nebulizer solution Take 3 mL (2.5 mg total) by nebulization daily as needed for wheezing or shortness of breath, Starting Fri 2/16/2024, Print      albuterol (PROVENTIL HFA,VENTOLIN HFA) 90 mcg/act inhaler INHALE 2 PUFFS BY MOUTH EVERY 6 HOURS AS NEEDED FOR SHORTNESS OF BREATH, Starting Tue 4/16/2024, Normal      amLODIPine (NORVASC) 5 mg tablet TAKE 1 TABLET(5 MG) BY MOUTH DAILY, Starting Sun 5/19/2024, Normal      bisacodyl (DULCOLAX) 5 mg EC tablet Take 2 tablets (10 mg total) by mouth once for 1 dose, Starting Thu 2/2/2023, Normal      budesonide (PULMICORT) 0.5 mg/2 mL nebulizer solution Take 2 mL (0.5 mg total) by nebulization 2 (two) times a day Rinse mouth after use., Starting Wed 4/24/2024, Until Fri 5/24/2024, Normal      Calcium Carbonate (CALTRATE 600 PO) Take 3 tablets by mouth in the morning, Historical Med      carboxymethylcellulose 0.5 % SOLN 1 drop 3 (three) times a day as needed for dry eyes, Historical Med      citalopram (CeleXA) 20 mg tablet Take 1 tablet (20 mg total) by mouth  daily, Starting Fri 5/3/2024, Normal      clopidogrel (PLAVIX) 75 mg tablet TAKE 1 TABLET BY MOUTH DAILY, Starting Thu 5/9/2024, Normal      Coenzyme Q10 (Co Q 10) 100 MG CAPS Take 2 capsules (200 mg total) by mouth every morning, Starting Thu 12/14/2023, Normal      denosumab (Prolia) 60 mg/mL Inject 1 mL (60 mg total) under the skin once for 1 dose, Starting Tue 8/31/2021, Normal      doxepin (SINEquan) 10 mg capsule TAKE 1 TO 3 CAPSULES BY MOUTH EVERY NIGHT AT BEDTIME AS NEEDED FOR SLEEP, Normal      formoterol (Perforomist) 20 MCG/2ML nebulizer solution Take 2 mL (20 mcg total) by nebulization 2 (two) times a day icd 10 code J44.9, Starting Fri 2/16/2024, Print      guaiFENesin (MUCINEX) 600 mg 12 hr tablet TAKE 2 TABLET BY MOUTH EVERY 12 HOURS, Normal      ipratropium-albuterol (DUO-NEB) 0.5-2.5 mg/3 mL nebulizer solution Take 3 mL by nebulization every 6 (six) hours as needed for wheezing or shortness of breath ICD 10 J44.9, Starting Wed 4/24/2024, Normal      levocetirizine (XYZAL) 5 MG tablet TAKE 1 TABLET(5 MG) BY MOUTH EVERY EVENING, Starting Mon 5/27/2024, Normal      montelukast (SINGULAIR) 10 mg tablet TAKE 1 TABLET(10 MG) BY MOUTH DAILY AT BEDTIME, Starting Mon 5/27/2024, Normal      Multiple Vitamins-Minerals (AIRBORNE GUMMIES PO) Take by mouth every morning, Historical Med      Nebulizers (Vios LC Sprint) MISC Use as directed, Normal      Omega-3 Fatty Acids (fish oil) 1,000 mg Take 1,000 mg by mouth every morning, Historical Med      OXYGEN-HELIUM IN Inhale 4L continuous, Historical Med      rosuvastatin (CRESTOR) 20 MG tablet TAKE 1 TABLET(20 MG) BY MOUTH DAILY, Starting Mon 5/27/2024, Normal      timolol (TIMOPTIC) 0.5 % ophthalmic solution Administer 1 drop to both eyes 2 (two) times a day, Starting Wed 6/24/2020, Historical Med      torsemide (DEMADEX) 10 mg tablet TAKE 1 TABLET(10 MG) BY MOUTH EVERY MORNING FOR 10 DAYS, Normal      VITAMIN D PO Take by mouth in the morning, Historical Med              No discharge procedures on file.    PDMP Review       None            ED Provider  Electronically Signed by             Gray Caba PA-C  06/12/24 [id number]

## 2024-06-12 NOTE — TELEPHONE ENCOUNTER
"Patient call:  Pt stated provider: Dr. Moreno    Actionable item: ER    What is the reason for the call/chief complaint?    Reports worsened SOB and hypoxemia over the past week. Patient with a current 79% SPO2 despite treatment and increased oxygen.   Patient also with reduced HR to 64-67 over the past 4 days compared to her being on the \"higher side\" per her daughter.    Was prescribed diuretics and expectorants. Has not been using CPAP due to recently drier mucosa.     Dispo: Advised patient to visit ER due to concern of hypoxia related bradycardia and not responsive to home treatment. Will be there within the next hour via daughter driving her.  Informed to call [facility] with worsening symptoms.  Agrees with plan.   All questions answered.     Reason for Disposition   Extra heart beats OR irregular heart beating (i.e., \"palpitations\")    Answer Assessment - Initial Assessment Questions  1. RESPIRATORY STATUS: \"Describe your breathing?\" (e.g., wheezing, shortness of breath, unable to speak, severe coughing)       Difficulty breathing/moving air + hypoxemic  2. ONSET: \"When did this breathing problem begin?\"       Worsened in the last week  3. PATTERN \"Does the difficult breathing come and go, or has it been constant since it started?\"       Constant   4. SEVERITY: \"How bad is your breathing?\" (e.g., mild, moderate, severe)     - MILD: No SOB at rest, mild SOB with walking, speaks normally in sentences, can lay down, no retractions, pulse < 100.     - MODERATE: SOB at rest, SOB with minimal exertion and prefers to sit, cannot lie down flat, speaks in phrases, mild retractions, audible wheezing, pulse 100-120.     - SEVERE: Very SOB at rest, speaks in single words, struggling to breathe, sitting hunched forward, retractions, pulse > 120       worsened  5. RECURRENT SYMPTOM: \"Have you had difficulty breathing before?\" If Yes, ask: \"When was the last time?\" and \"What happened that time?\"       -  6. CARDIAC HISTORY: " "\"Do you have any history of heart disease?\" (e.g., heart attack, angina, bypass surgery, angioplasty)       Unsure. Mention of CHF in 2020 ER visit  7. LUNG HISTORY: \"Do you have any history of lung disease?\"  (e.g., pulmonary embolus, asthma, emphysema)      COPD  8. CAUSE: \"What do you think is causing the breathing problem?\"       unsure  9. OTHER SYMPTOMS: \"Do you have any other symptoms? (e.g., dizziness, runny nose, cough, chest pain, fever)      Hypoxemia, reduced HR , lower extremity edema, pulmonary congestion    Protocols used: Breathing Difficulty-ADULT-[place]    "

## 2024-06-12 NOTE — EMTALA/ACUTE CARE TRANSFER
[facility] EMERGENCY DEPARTMENT  [address] [place] [address] [place] [address]  Dept: [phone]      EMTALA TRANSFER CONSENT    NAME Eryn Morocho                                         1940                              MRN [id number]    I have been informed of my rights regarding examination, treatment, and transfer   by Gray Caba PA-C.    Benefits: Specialized equipment and/or services available at the receiving facility (Include comment)________________________ (ICU)    Risks: Potential for delay in receiving treatment, Potential deterioration of medical condition, Loss of IV, Increased discomfort during transfer, Possible worsening of condition or death during transfer      Consent for Transfer:  I acknowledge that my medical condition has been evaluated and explained to me by the emergency department physician or other qualified medical person and/or my attending physician, who has recommended that I be transferred to the service of  Accepting Physician: Dr. Martin at Accepting Facility Name, City & State : [facility] ([place], [place]). The above potential benefits of such transfer, the potential risks associated with such transfer, and the probable risks of not being transferred have been explained to me, and I fully understand them.  The doctor has explained that, in my case, the benefits of transfer outweigh the risks.  I agree to be transferred.    I authorize the performance of emergency medical procedures and treatments upon me in both transit and upon arrival at the receiving facility.  Additionally, I authorize the release of any and all medical records to the receiving facility and request they be transported with me, if possible.  I understand that the safest mode of transportation during a medical emergency is an ambulance and that the Hospital advocates the use of this mode of transport. Risks of traveling to the receiving facility by car, including absence of  medical control, life sustaining equipment, such as oxygen, and medical personnel has been explained to me and I fully understand them.    (NICOLE CORRECT BOX BELOW)  [  ]  I consent to the stated transfer and to be transported by ambulance/helicopter.  [  ]  I consent to the stated transfer, but refuse transportation by ambulance and accept full responsibility for my transportation by car.  I understand the risks of non-ambulance transfers and I exonerate the Hospital and its staff from any deterioration in my condition that results from this refusal.    X___________________________________________    DATE  24  TIME________  Signature of patient or legally responsible individual signing on patient behalf           RELATIONSHIP TO PATIENT_________________________          Provider Certification    NAME Eryn Morocho                                         1940                              MRN [id number]    A medical screening exam was performed on the above named patient.  Based on the examination:    Condition Necessitating Transfer The primary encounter diagnosis was Acute on chronic congestive heart failure, unspecified heart failure type (HCC). A diagnosis of Acute hypoxemic respiratory failure (HCC) was also pertinent to this visit.    Patient Condition: The patient has been stabilized such that within reasonable medical probability, no material deterioration of the patient condition or the condition of the unborn child(almita) is likely to result from the transfer    Reason for Transfer: Level of Care needed not available at this facility    Transfer Requirements: Facility [facility] ([place], [place])   Space available and qualified personnel available for treatment as acknowledged by Ofelia Angel RN  Agreed to accept transfer and to provide appropriate medical treatment as acknowledged by       Dr. Martin  Appropriate medical records of the examination and treatment of the patient are provided  at the time of transfer   STAFF INITIAL WHEN COMPLETED _______  Transfer will be performed by qualified personnel from    and appropriate transfer equipment as required, including the use of necessary and appropriate life support measures.    Provider Certification: I have examined the patient and explained the following risks and benefits of being transferred/refusing transfer to the patient/family:  General risk, such as traffic hazards, adverse weather conditions, rough terrain or turbulence, possible failure of equipment (including vehicle or aircraft), or consequences of actions of persons outside the control of the transport personnel, Unanticipated needs of medical equipment and personnel during transport, Risk of worsening condition, The possibility of a transport vehicle being unavailable      Based on these reasonable risks and benefits to the patient and/or the unborn child(almita), and based upon the information available at the time of the patient’s examination, I certify that the medical benefits reasonably to be expected from the provision of appropriate medical treatments at another medical facility outweigh the increasing risks, if any, to the individual’s medical condition, and in the case of labor to the unborn child, from effecting the transfer.    X____________________________________________ DATE 06/12/24        TIME_______      ORIGINAL - SEND TO MEDICAL RECORDS   COPY - SEND WITH PATIENT DURING TRANSFER

## 2024-06-12 NOTE — ASSESSMENT & PLAN NOTE
COPD, chronically on 4L NC  Not in exacerbation  Continue scheduled ross, performist, mucinex, singulair   Follows with Dr. Moreno as an outpatient

## 2024-06-12 NOTE — ASSESSMENT & PLAN NOTE
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 Per outpatient pulm, PET scan negative  Continue following with Heme/Onc and Pulm as an outpatient

## 2024-06-13 LAB
ANION GAP SERPL CALCULATED.3IONS-SCNC: 14 MMOL/L (ref 4–13)
BASOPHILS # BLD AUTO: 0.01 THOUSANDS/ÂΜL (ref 0–0.1)
BASOPHILS NFR BLD AUTO: 0 % (ref 0–1)
BUN SERPL-MCNC: 14 MG/DL (ref 5–25)
CALCIUM SERPL-MCNC: 9.3 MG/DL (ref 8.4–10.2)
CHLORIDE SERPL-SCNC: 93 MMOL/L (ref 96–108)
CO2 SERPL-SCNC: 35 MMOL/L (ref 21–32)
CREAT SERPL-MCNC: 0.66 MG/DL (ref 0.6–1.3)
EOSINOPHIL # BLD AUTO: 0.07 THOUSAND/ÂΜL (ref 0–0.61)
EOSINOPHIL NFR BLD AUTO: 1 % (ref 0–6)
ERYTHROCYTE [DISTWIDTH] IN BLOOD BY AUTOMATED COUNT: 14.6 % (ref 11.6–15.1)
GFR SERPL CREATININE-BSD FRML MDRD: 81 ML/MIN/1.73SQ M
GLUCOSE SERPL-MCNC: 125 MG/DL (ref 65–140)
HCT VFR BLD AUTO: 40 % (ref 34.8–46.1)
HGB BLD-MCNC: 12.6 G/DL (ref 11.5–15.4)
IMM GRANULOCYTES # BLD AUTO: 0.01 THOUSAND/UL (ref 0–0.2)
IMM GRANULOCYTES NFR BLD AUTO: 0 % (ref 0–2)
LYMPHOCYTES # BLD AUTO: 0.56 THOUSANDS/ÂΜL (ref 0.6–4.47)
LYMPHOCYTES NFR BLD AUTO: 10 % (ref 14–44)
MAGNESIUM SERPL-MCNC: 1.8 MG/DL (ref 1.9–2.7)
MCH RBC QN AUTO: 33.1 PG (ref 26.8–34.3)
MCHC RBC AUTO-ENTMCNC: 31.5 G/DL (ref 31.4–37.4)
MCV RBC AUTO: 105 FL (ref 82–98)
MONOCYTES # BLD AUTO: 0.48 THOUSAND/ÂΜL (ref 0.17–1.22)
MONOCYTES NFR BLD AUTO: 9 % (ref 4–12)
MRSA NOSE QL CULT: NORMAL
NEUTROPHILS # BLD AUTO: 4.43 THOUSANDS/ÂΜL (ref 1.85–7.62)
NEUTS SEG NFR BLD AUTO: 80 % (ref 43–75)
NRBC BLD AUTO-RTO: 0 /100 WBCS
PHOSPHATE SERPL-MCNC: 3.7 MG/DL (ref 2.3–4.1)
PLATELET # BLD AUTO: 170 THOUSANDS/UL (ref 149–390)
PMV BLD AUTO: 9.4 FL (ref 8.9–12.7)
POTASSIUM SERPL-SCNC: 4.2 MMOL/L (ref 3.5–5.3)
PROCALCITONIN SERPL-MCNC: <0.05 NG/ML
RBC # BLD AUTO: 3.81 MILLION/UL (ref 3.81–5.12)
SODIUM SERPL-SCNC: 142 MMOL/L (ref 135–147)
WBC # BLD AUTO: 5.56 THOUSAND/UL (ref 4.31–10.16)

## 2024-06-13 PROCEDURE — 85025 COMPLETE CBC W/AUTO DIFF WBC: CPT | Performed by: NURSE PRACTITIONER

## 2024-06-13 PROCEDURE — 94640 AIRWAY INHALATION TREATMENT: CPT

## 2024-06-13 PROCEDURE — 94668 MNPJ CHEST WALL SBSQ: CPT

## 2024-06-13 PROCEDURE — 94760 N-INVAS EAR/PLS OXIMETRY 1: CPT

## 2024-06-13 PROCEDURE — 94664 DEMO&/EVAL PT USE INHALER: CPT

## 2024-06-13 PROCEDURE — NC001 PR NO CHARGE: Performed by: NURSE PRACTITIONER

## 2024-06-13 PROCEDURE — 99232 SBSQ HOSP IP/OBS MODERATE 35: CPT | Performed by: ANESTHESIOLOGY

## 2024-06-13 PROCEDURE — 83735 ASSAY OF MAGNESIUM: CPT | Performed by: NURSE PRACTITIONER

## 2024-06-13 PROCEDURE — 84145 PROCALCITONIN (PCT): CPT | Performed by: NURSE PRACTITIONER

## 2024-06-13 PROCEDURE — 84100 ASSAY OF PHOSPHORUS: CPT | Performed by: NURSE PRACTITIONER

## 2024-06-13 PROCEDURE — 80048 BASIC METABOLIC PNL TOTAL CA: CPT | Performed by: NURSE PRACTITIONER

## 2024-06-13 PROCEDURE — 94660 CPAP INITIATION&MGMT: CPT

## 2024-06-13 RX ORDER — MAGNESIUM SULFATE HEPTAHYDRATE 40 MG/ML
2 INJECTION, SOLUTION INTRAVENOUS ONCE
Status: COMPLETED | OUTPATIENT
Start: 2024-06-13 | End: 2024-06-13

## 2024-06-13 RX ORDER — AMLODIPINE BESYLATE 10 MG/1
10 TABLET ORAL DAILY
Status: DISCONTINUED | OUTPATIENT
Start: 2024-06-14 | End: 2024-06-19 | Stop reason: HOSPADM

## 2024-06-13 RX ORDER — AMLODIPINE BESYLATE 5 MG/1
5 TABLET ORAL ONCE
Status: COMPLETED | OUTPATIENT
Start: 2024-06-13 | End: 2024-06-13

## 2024-06-13 RX ORDER — FUROSEMIDE 10 MG/ML
40 INJECTION INTRAMUSCULAR; INTRAVENOUS ONCE
Status: COMPLETED | OUTPATIENT
Start: 2024-06-13 | End: 2024-06-13

## 2024-06-13 RX ADMIN — MAGNESIUM SULFATE HEPTAHYDRATE 2 G: 40 INJECTION, SOLUTION INTRAVENOUS at 08:21

## 2024-06-13 RX ADMIN — Medication 1 TABLET: at 08:20

## 2024-06-13 RX ADMIN — FORMOTEROL FUMARATE DIHYDRATE 20 MCG: 20 SOLUTION RESPIRATORY (INHALATION) at 07:09

## 2024-06-13 RX ADMIN — TIMOLOL MALEATE 1 DROP: 5 SOLUTION OPHTHALMIC at 08:21

## 2024-06-13 RX ADMIN — TIMOLOL MALEATE 1 DROP: 5 SOLUTION OPHTHALMIC at 17:57

## 2024-06-13 RX ADMIN — AMLODIPINE BESYLATE 5 MG: 5 TABLET ORAL at 08:20

## 2024-06-13 RX ADMIN — CITALOPRAM HYDROBROMIDE 20 MG: 20 TABLET ORAL at 08:20

## 2024-06-13 RX ADMIN — IPRATROPIUM BROMIDE AND ALBUTEROL SULFATE 3 ML: 2.5; .5 SOLUTION RESPIRATORY (INHALATION) at 13:27

## 2024-06-13 RX ADMIN — IPRATROPIUM BROMIDE AND ALBUTEROL SULFATE 3 ML: 2.5; .5 SOLUTION RESPIRATORY (INHALATION) at 01:36

## 2024-06-13 RX ADMIN — LORATADINE 10 MG: 10 TABLET ORAL at 08:20

## 2024-06-13 RX ADMIN — IPRATROPIUM BROMIDE AND ALBUTEROL SULFATE 3 ML: 2.5; .5 SOLUTION RESPIRATORY (INHALATION) at 07:09

## 2024-06-13 RX ADMIN — IPRATROPIUM BROMIDE AND ALBUTEROL SULFATE 3 ML: 2.5; .5 SOLUTION RESPIRATORY (INHALATION) at 19:24

## 2024-06-13 RX ADMIN — CHLORHEXIDINE GLUCONATE 15 ML: 1.2 RINSE ORAL at 21:26

## 2024-06-13 RX ADMIN — MONTELUKAST 10 MG: 10 TABLET, FILM COATED ORAL at 21:26

## 2024-06-13 RX ADMIN — CLOPIDOGREL 75 MG: 75 TABLET ORAL at 08:20

## 2024-06-13 RX ADMIN — ATORVASTATIN CALCIUM 40 MG: 40 TABLET, FILM COATED ORAL at 15:59

## 2024-06-13 RX ADMIN — CHLORHEXIDINE GLUCONATE 15 ML: 1.2 RINSE ORAL at 08:19

## 2024-06-13 RX ADMIN — BUDESONIDE 0.5 MG: 0.5 INHALANT RESPIRATORY (INHALATION) at 07:09

## 2024-06-13 RX ADMIN — AMLODIPINE BESYLATE 5 MG: 5 TABLET ORAL at 11:20

## 2024-06-13 RX ADMIN — FUROSEMIDE 40 MG: 10 INJECTION, SOLUTION INTRAMUSCULAR; INTRAVENOUS at 11:20

## 2024-06-13 RX ADMIN — ENOXAPARIN SODIUM 40 MG: 40 INJECTION SUBCUTANEOUS at 08:19

## 2024-06-13 RX ADMIN — OMEGA-3 FATTY ACIDS CAP 1000 MG 1000 MG: 1000 CAP at 08:20

## 2024-06-13 RX ADMIN — FORMOTEROL FUMARATE DIHYDRATE 20 MCG: 20 SOLUTION RESPIRATORY (INHALATION) at 19:42

## 2024-06-13 RX ADMIN — Medication 200 MG: at 08:21

## 2024-06-13 RX ADMIN — BUDESONIDE 0.5 MG: 0.5 INHALANT RESPIRATORY (INHALATION) at 19:24

## 2024-06-13 NOTE — CASE MANAGEMENT
Case Management Assessment & Discharge Planning Note    Patient name Eryn Morocho  Location ICU 11/ICU 11 MRN [id number]  : 1940 Date 2024       Current Admission Date: 2024  Current Admission Diagnosis:Acute on chronic diastolic congestive heart failure (HCC)   Patient Active Problem List    Diagnosis Date Noted Date Diagnosed    Hypertensive urgency 2024     Pulmonary emphysema, unspecified emphysema type (HCC) 2024     Oxygen dependent 4 LPM baseline 2023     Multiple lung nodules 2022     Class 1 obesity due to excess calories with body mass index (BMI) of 34.0 to 34.9 in adult 2022     Recurrent falls 2022     Stage 3a chronic kidney disease (HCC) 2021     OAB (overactive bladder) 2021     Osteoporosis 2021     Acute on chronic diastolic congestive heart failure (HCC) 2021     PAD (peripheral artery disease) ([facility])      Acute on chronic respiratory failure with hypoxia and hypercapnia ([facility]) 2020     Non-small cell cancer of left lung ([facility]) 2020     Recurrent major depressive disorder, in partial remission ([facility]) 2020     JULIO (obstructive sleep apnea)      COPD (chronic obstructive pulmonary disease) ([facility])      HTN (hypertension)      Hyperlipidemia        LOS (days): 1  Geometric Mean LOS (GMLOS) (days):   Days to GMLOS:     OBJECTIVE:    Risk of Unplanned Readmission Score: 15.13     Current admission status: Inpatient  Referral Reason: Other (Disposition planning)    Preferred Pharmacy:   [id number] - [place] -  [address]   [place] [address]  Phone: [phone] Fax: [phone]    [facility]  [address] [facility] [address]  Phone: [phone]     Primary Care Provider: Ari Mendiola MD    Primary Insurance: MEDICARE  Secondary Insurance: AARP    ASSESSMENT:  Active Health Care Proxies    There are no active Health Care Proxies on file.        Advance Directives  Does patient have a Health Care POA?: Yes  Does patient have Advance Directives?: Yes  Advance Directives: Power of  for health care  Primary Contact: Karen Jenkins    Readmission Root Cause  30 Day Readmission: No    Patient Information  Admitted from:: Home  Mental Status: Alert  During Assessment patient was accompanied by: Not accompanied during assessment  Assessment information provided by:: Patient  Primary Caregiver: Family  Caregiver's Name:: Karen Jenkins  Caregiver's Relationship to Patient:: Family Member (daughter)  Caregiver's Telephone Number:: [phone]  Support Systems: Spouse/significant other, Daughter, Family members  County of Residence: [place]  What city do you live in?: [place]  Home entry access options. Select all that apply.: Stairs  Number of steps to enter home.: 1  Do the steps have railings?: No (uses molding around door)  Type of Current Residence: [facility]  Living Arrangements: Lives w/ Spouse/significant other, Lives w/ Daughter    Activities of Daily Living Prior to Admission  Functional Status: Assistance  Completes ADLs independently?: No  Level of ADL dependence: Assistance  Ambulates independently?: No  Level of ambulatory dependence: Assistance  Does patient use assisted devices?: Yes  Assisted Devices (DME) used: Bedside Commode, CPAP, Home Oxygen concentrator, Nebulizer, Portable Oxygen tanks, Shower Chair, Straight Cane, Walker, Wheelchair  DME Company Name (respiratory supplies): [employer]  O2 Rate(s): 4.5L  Does patient currently own DME?: Yes  What DME does the patient currently own?: Bedside Commode, CPAP, Home Oxygen concentrator, Nebulizer, Portable Oxygen tanks, Shower Chair, Straight Cane, Walker, Wheelchair  Does patient have a history of Outpatient Therapy (PT/OT)?: No  Does the patient have a history of Short-Term Rehab?: No  Does patient have a history of HHC?: No  Does patient currently have HHC?: No    Patient  Information Continued  Income Source: Pension/intermediate  Does patient have prescription coverage?: Yes ([address])  Does patient receive dialysis treatments?: No  Does patient have a history of substance abuse?: No  Does patient have a history of Mental Health Diagnosis?: No    Means of Transportation  Means of Transport to Appts:: Family transport      Social Determinants of Health (SDOH)      Flowsheet Row Most Recent Value   Housing Stability    In the last 12 months, was there a time when you were not able to pay the mortgage or rent on time? N   In the past 12 months, how many times have you moved where you were living? 0   At any time in the past 12 months, were you homeless or living in a shelter (including now)? N   Transportation Needs    In the past 12 months, has lack of transportation kept you from medical appointments or from getting medications? no   In the past 12 months, has lack of transportation kept you from meetings, work, or from getting things needed for daily living? No   Food Insecurity    Within the past 12 months, you worried that your food would run out before you got the money to buy more. Never true   Within the past 12 months, the food you bought just didn't last and you didn't have money to get more. Never true   Utilities    In the past 12 months has the electric, gas, oil, or water company threatened to shut off services in your home? No            DISCHARGE DETAILS:    Discharge planning discussed with:: Patient  Freedom of Choice: Yes  Comments - Freedom of Choice: JASKARAN met with pt to assess needs and discuss plans.  Pt's plan is to return home with her daughter and  when discharged.  Per pt her daughter cares for her and her  and is very supportive.  JASKARAN talked with pt about home care services to assist with CHF management and therapy.  Pt declined services stating she's not fond of people coming into the house. SW offered to call daughter to introduce  services.  Per pt she'd prefer SW just give her a card because her daughter never answers the phone due to the amount of spam calls they receive.  Card will be given. SW offered to assist in future if needed.  SW will follow to monitor progress and assist if needed.  CM contacted family/caregiver?: No- see comments (see above)  Were Treatment Team discharge recommendations reviewed with patient/caregiver?: Yes  Did patient/caregiver verbalize understanding of patient care needs?: Yes  Were patient/caregiver advised of the risks associated with not following Treatment Team discharge recommendations?: Yes    Requested Home Health Care         Is the patient interested in HHC at discharge?: No    DME Referral Provided  Referral made for DME?: No    Other Referral/Resources/Interventions Provided:  Interventions: None Indicated    Treatment Team Recommendation: Home  Discharge Destination Plan:: Home  Transport at Discharge : Family

## 2024-06-13 NOTE — PROGRESS NOTES
Critical Care Interval Transfer Note:    Brief Hospital Summary:   Eryn presented with several weeks of SOB, leg swelling. She was placed on BiPAP and given lasix now with improvement in her respiratory status. She was weaned to MFNC today and is now tolerating 11L MFNC. Increased home amlodipine from 5 to 10mg daily.       Barriers to discharge:   Ongoing IV diuresis  Wean O2 back to home requirements      Consults: IP CONSULT TO CASE MANAGEMENT      Discharge Plan: Anticipate discharge in 24-48 hrs to home.          Patient seen and evaluated by Critical Care today and deemed to be appropriate for transfer to Stepdown Level 2. Spoke to Dr. Rodney from [facility] to accept transfer. Critical care can be contacted via [employer] with any questions or concerns.

## 2024-06-13 NOTE — PROGRESS NOTES
Former[facility]  Progress Note  Name: Eryn Morocho I  MRN: [id number]  Unit/Bed#: ICU 11 I Date of Admission: 6/12/2024   Date of Service: 6/13/2024 I Hospital Day: 1    Assessment & Plan   Acute on chronic respiratory failure with hypoxia and hypercapnia (HCC)  Assessment & Plan  Patient presents with SOB, placed on BiPAP secondary to increased work of breathing. Chronically on 4L NC.  Acute on chronic respiratory failure likely secondary to heart failure exacerbation, +/- possible flash pulmonary edema in the setting of hypertensive urgency with SBP >200s in  ED course: 40mg IV lasix, nitro paste, BiPAP  CXR: vascular congestion  COVID/flu/RSV negative    Procal <0.05    Plan:  Continue diuresis  Wean BiPAP as able  Scheduled nebs  Avoid hypertension   Pulmonary hygiene  Maintain Sp02 >88%    Acute on chronic diastolic congestive heart failure (HCC)  Assessment & Plan  Wt Readings from Last 3 Encounters:   06/12/24 81.4 kg (179 lb 7.3 oz)   05/22/24 83.5 kg (184 lb)   11/03/23 83.5 kg (184 lb)     Presents with SOB and evidence of volume overload  Recent Echo 5/22/24 demonstrated EF 70%, grade II DD, no significant valvular disease. She was started on 10mg torsemide after this finding   CXR with mild vascular congestion and small pleural effusions    PTA meds: 10mg torsemide daily  Given 40mg IV lasix in the ED  Consideration for repeat lasix dosing tonight   Avoid hypertension   Closely monitor I&Os  Cardiac diet  Daily weights            Hypertensive urgency  Assessment & Plan  SBP noted to be >200s on arrival to [facility] ED. Consideration for possible flash pulmonary edema which could have contributed to acutely worsening SOB today   ED course: 1inch nitropaste, 40mg IV lasix  SBP 140s on arrival to [facility]    Plan:  Resume home norvasc  PRN labetalol  Consider nitro gtt if needed SBP remains high with acute SOB  Goal SBP <150    Stage 3a chronic kidney disease  (HCC)  Assessment & Plan  Lab Results   Component Value Date    EGFR 74 06/12/2024    EGFR 68 05/17/2024    EGFR 71 11/03/2023    CREATININE 0.74 06/12/2024    CREATININE 0.79 05/17/2024    CREATININE 0.77 11/03/2023     Baseline creatinine around 0.7  Presenting creatinine 0.74  Electrolytes and acid/base stable  Given 40mg IV lasix  Close I&Os  Daily weights  Avoid nephrotoxins     PAD (peripheral artery disease) (HCC)  Assessment & Plan  Continue plavix/statin  Neurovascular exams    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)  Assessment & Plan  History of non small cell lung CA >5 years ago s/p wedge resection and chemo  Following with pulm and found to have lung nodules that have been increasing in size. Per outpatient pulm, PET scan negative  Continue following with Heme/Onc and Pulm as an outpatient     Hyperlipidemia  Assessment & Plan  Continue statin     HTN (hypertension)  Assessment & Plan  PTA meds: amlodipine, torsemide  Continue amlodipine     COPD (chronic obstructive pulmonary disease) ([facility])  Assessment & Plan  COPD, chronically on 4L NC  Not in exacerbation  Continue scheduled duonebs, performist, mucinex, singulair   Follows with Dr. Moreno as an outpatient     JULIO (obstructive sleep apnea)  Assessment & Plan  JULIO requiring CPAP HS  Continue BiPAP HS for now, transition to CPAP HS when able              Disposition: Stepdown Level 1    ICU Core Measures     A: Assess, Prevent, and Manage Pain Has pain been assessed? Yes  Need for changes to pain regimen? No   B: Both SAT/SAT  N/A   C: Choice of Sedation RASS Goal: N/A patient not on sedation  Need for changes to sedation or analgesia regimen? No   D: Delirium CAM-ICU: Negative   E: Early Mobility  Plan for early mobility? Yes   F: Family Engagement Plan for family engagement today? Yes         Prophylaxis:  VTE VTE covered by:  enoxaparin, Subcutaneous       Stress Ulcer  not ordered         Significant 24hr Events     24hr events: No acute events  overnight.  Patient rested comfortably on BiPAP.     Subjective   Review of Systems: Review of Systems   Constitutional: Negative.    HENT: Negative.     Eyes: Negative.    Respiratory: Negative.     Cardiovascular: Negative.    Gastrointestinal: Negative.    Endocrine: Negative.    Genitourinary: Negative.    Musculoskeletal: Negative.    Allergic/Immunologic: Negative.    Neurological: Negative.    Hematological: Negative.    Psychiatric/Behavioral: Negative.          Objective                            Vitals I/O      Most Recent Min/Max in 24hrs   Temp 97.5 °F (36.4 °C) Temp  Min: 97.5 °F (36.4 °C)  Max: 99.3 °F (37.4 °C)   Pulse 69 Pulse  Min: 63  Max: 90   Resp 22 Resp  Min: 20  Max: 42   /72 BP  Min: 144/65  Max: 206/88   O2 Sat 92 % SpO2  Min: 91 %  Max: 96 %    No intake or output data in the 24 hours ending 06/13/24 0539    Diet NPO; Sips with meds    Invasive Monitoring           Physical Exam   Physical Exam  Vitals and nursing note reviewed.   Eyes:      General: Vision grossly intact.      Extraocular Movements: Extraocular movements intact.      Conjunctiva/sclera: Conjunctivae normal.      Pupils: Pupils are equal, round, and reactive to light.   Skin:     General: Skin is warm and dry.   HENT:      Head: Atraumatic.      Right Ear: Hearing and tympanic membrane normal.      Left Ear: Hearing and tympanic membrane normal.      Mouth/Throat:      Mouth: Mucous membranes are dry.   Cardiovascular:      Rate and Rhythm: Normal rate and regular rhythm.      Pulses: Normal pulses.   Musculoskeletal:         General: Normal range of motion.   Abdominal: General: Bowel sounds are normal.      Palpations: Abdomen is soft.   Constitutional:       Appearance: She is well-developed and well-nourished.   Pulmonary:      Comments: On bipap  B/L breath sounds diminished   Neurological:      Mental Status: She is alert and oriented to person, place and time. Mental status is at baseline.      Motor: gross  motor function is at baseline for patient. Strength full and intact in all extremities.        Corneal reflex present, cough reflex and gag reflex intact.   Genitourinary/Anorectal:     Comments: Due to void            Diagnostic Studies      EKG: NSR alarms on   Imaging: XR chest 1 view portable    Result Date: 6/12/2024  Impression: Mild pulmonary vascular congestion and small bilateral pleural effusions. Workstation performed: [id number]    I have personally reviewed pertinent reports.   and I have personally reviewed pertinent films in PACS     Medications:  Scheduled PRN   amLODIPine, 5 mg, Daily  atorvastatin, 40 mg, Daily With Dinner  bisacodyl, 10 mg, Once  budesonide, 0.5 mg, BID  chlorhexidine, 15 mL, Q12H ANNE  citalopram, 20 mg, Daily  clopidogrel, 75 mg, Daily  co-enzyme Q-10, 200 mg, QAM  enoxaparin, 40 mg, Daily  fish oil, 1,000 mg, QAM  formoterol, 20 mcg, BID  ipratropium-albuterol, 3 mL, Q6H  loratadine, 10 mg, Daily  montelukast, 10 mg, HS  multivitamin-minerals, 1 tablet, Daily  timolol, 1 drop, BID      labetalol, 10 mg, Q4H PRN       Continuous          Labs:    CBC    Recent Labs     06/12/24  1101   WBC 7.56   HGB 14.0   HCT 44.4        BMP    Recent Labs     06/12/24  1101   SODIUM 139   K 4.4   CL 96   CO2 36*   AGAP 7   BUN 15   CREATININE 0.74   CALCIUM 9.8       Coags    No recent results     Additional Electrolytes  Recent Labs     06/12/24  1101   MG 1.9          Blood Gas    No recent results  Recent Labs     06/12/24  1300   PHVEN 7.361   KLU7NWK 66.7*   PO2VEN 29.7*   RCE3QLN 36.9*   BEVEN 8.7   L5BDGET 55.1*    LFTs  Recent Labs     06/12/24  1101   ALT 8   AST 16   ALKPHOS 49   ALB 4.3   TBILI 0.96       Infectious  Recent Labs     06/12/24  1101   PROCALCITONI <0.05     Glucose  Recent Labs     06/12/24  1101   GLUC 156*               PRANAY Roberts

## 2024-06-14 ENCOUNTER — HOME HEALTH ADMISSION (OUTPATIENT)
Dept: HOME HEALTH SERVICES | Facility: HOME HEALTHCARE | Age: 84
End: 2024-06-14
Payer: MEDICARE

## 2024-06-14 LAB
ANION GAP SERPL CALCULATED.3IONS-SCNC: 4 MMOL/L (ref 4–13)
BUN SERPL-MCNC: 17 MG/DL (ref 5–25)
CALCIUM SERPL-MCNC: 8.7 MG/DL (ref 8.4–10.2)
CHLORIDE SERPL-SCNC: 95 MMOL/L (ref 96–108)
CO2 SERPL-SCNC: 38 MMOL/L (ref 21–32)
CREAT SERPL-MCNC: 0.74 MG/DL (ref 0.6–1.3)
ERYTHROCYTE [DISTWIDTH] IN BLOOD BY AUTOMATED COUNT: 14.4 % (ref 11.6–15.1)
GFR SERPL CREATININE-BSD FRML MDRD: 74 ML/MIN/1.73SQ M
GLUCOSE SERPL-MCNC: 132 MG/DL (ref 65–140)
HCT VFR BLD AUTO: 38.5 % (ref 34.8–46.1)
HGB BLD-MCNC: 12.2 G/DL (ref 11.5–15.4)
MAGNESIUM SERPL-MCNC: 2.1 MG/DL (ref 1.9–2.7)
MCH RBC QN AUTO: 33.3 PG (ref 26.8–34.3)
MCHC RBC AUTO-ENTMCNC: 31.7 G/DL (ref 31.4–37.4)
MCV RBC AUTO: 105 FL (ref 82–98)
PHOSPHATE SERPL-MCNC: 3 MG/DL (ref 2.3–4.1)
PLATELET # BLD AUTO: 178 THOUSANDS/UL (ref 149–390)
PMV BLD AUTO: 8.9 FL (ref 8.9–12.7)
POTASSIUM SERPL-SCNC: 4.2 MMOL/L (ref 3.5–5.3)
RBC # BLD AUTO: 3.66 MILLION/UL (ref 3.81–5.12)
SODIUM SERPL-SCNC: 137 MMOL/L (ref 135–147)
WBC # BLD AUTO: 4.72 THOUSAND/UL (ref 4.31–10.16)

## 2024-06-14 PROCEDURE — 94760 N-INVAS EAR/PLS OXIMETRY 1: CPT

## 2024-06-14 PROCEDURE — 83735 ASSAY OF MAGNESIUM: CPT | Performed by: NURSE PRACTITIONER

## 2024-06-14 PROCEDURE — 97110 THERAPEUTIC EXERCISES: CPT

## 2024-06-14 PROCEDURE — 97163 PT EVAL HIGH COMPLEX 45 MIN: CPT

## 2024-06-14 PROCEDURE — 94668 MNPJ CHEST WALL SBSQ: CPT

## 2024-06-14 PROCEDURE — 85027 COMPLETE CBC AUTOMATED: CPT | Performed by: NURSE PRACTITIONER

## 2024-06-14 PROCEDURE — 99232 SBSQ HOSP IP/OBS MODERATE 35: CPT | Performed by: NURSE PRACTITIONER

## 2024-06-14 PROCEDURE — 94640 AIRWAY INHALATION TREATMENT: CPT

## 2024-06-14 PROCEDURE — 94664 DEMO&/EVAL PT USE INHALER: CPT

## 2024-06-14 PROCEDURE — 94660 CPAP INITIATION&MGMT: CPT

## 2024-06-14 PROCEDURE — 84100 ASSAY OF PHOSPHORUS: CPT | Performed by: NURSE PRACTITIONER

## 2024-06-14 PROCEDURE — 80048 BASIC METABOLIC PNL TOTAL CA: CPT | Performed by: NURSE PRACTITIONER

## 2024-06-14 RX ORDER — DOCUSATE SODIUM 100 MG/1
100 CAPSULE, LIQUID FILLED ORAL 2 TIMES DAILY
Status: DISCONTINUED | OUTPATIENT
Start: 2024-06-14 | End: 2024-06-19 | Stop reason: HOSPADM

## 2024-06-14 RX ORDER — FUROSEMIDE 10 MG/ML
40 INJECTION INTRAMUSCULAR; INTRAVENOUS ONCE
Status: COMPLETED | OUTPATIENT
Start: 2024-06-14 | End: 2024-06-14

## 2024-06-14 RX ORDER — POLYETHYLENE GLYCOL 3350 17 G/17G
17 POWDER, FOR SOLUTION ORAL DAILY
Status: DISCONTINUED | OUTPATIENT
Start: 2024-06-14 | End: 2024-06-19 | Stop reason: HOSPADM

## 2024-06-14 RX ADMIN — AMLODIPINE BESYLATE 10 MG: 10 TABLET ORAL at 09:01

## 2024-06-14 RX ADMIN — MONTELUKAST 10 MG: 10 TABLET, FILM COATED ORAL at 21:01

## 2024-06-14 RX ADMIN — CLOPIDOGREL 75 MG: 75 TABLET ORAL at 09:01

## 2024-06-14 RX ADMIN — CHLORHEXIDINE GLUCONATE 15 ML: 1.2 RINSE ORAL at 20:53

## 2024-06-14 RX ADMIN — IPRATROPIUM BROMIDE AND ALBUTEROL SULFATE 3 ML: 2.5; .5 SOLUTION RESPIRATORY (INHALATION) at 09:04

## 2024-06-14 RX ADMIN — FUROSEMIDE 40 MG: 10 INJECTION, SOLUTION INTRAMUSCULAR; INTRAVENOUS at 12:16

## 2024-06-14 RX ADMIN — IPRATROPIUM BROMIDE AND ALBUTEROL SULFATE 3 ML: 2.5; .5 SOLUTION RESPIRATORY (INHALATION) at 19:27

## 2024-06-14 RX ADMIN — BUDESONIDE 0.5 MG: 0.5 INHALANT RESPIRATORY (INHALATION) at 09:14

## 2024-06-14 RX ADMIN — OMEGA-3 FATTY ACIDS CAP 1000 MG 1000 MG: 1000 CAP at 09:01

## 2024-06-14 RX ADMIN — IPRATROPIUM BROMIDE AND ALBUTEROL SULFATE 3 ML: 2.5; .5 SOLUTION RESPIRATORY (INHALATION) at 13:33

## 2024-06-14 RX ADMIN — Medication 1 TABLET: at 09:01

## 2024-06-14 RX ADMIN — ENOXAPARIN SODIUM 40 MG: 40 INJECTION SUBCUTANEOUS at 09:01

## 2024-06-14 RX ADMIN — CITALOPRAM HYDROBROMIDE 20 MG: 20 TABLET ORAL at 09:01

## 2024-06-14 RX ADMIN — IPRATROPIUM BROMIDE AND ALBUTEROL SULFATE 3 ML: 2.5; .5 SOLUTION RESPIRATORY (INHALATION) at 01:59

## 2024-06-14 RX ADMIN — TIMOLOL MALEATE 1 DROP: 5 SOLUTION OPHTHALMIC at 17:24

## 2024-06-14 RX ADMIN — BUDESONIDE 0.5 MG: 0.5 INHALANT RESPIRATORY (INHALATION) at 19:27

## 2024-06-14 RX ADMIN — CHLORHEXIDINE GLUCONATE 15 ML: 1.2 RINSE ORAL at 09:01

## 2024-06-14 RX ADMIN — FORMOTEROL FUMARATE DIHYDRATE 20 MCG: 20 SOLUTION RESPIRATORY (INHALATION) at 19:46

## 2024-06-14 RX ADMIN — ATORVASTATIN CALCIUM 40 MG: 40 TABLET, FILM COATED ORAL at 17:24

## 2024-06-14 RX ADMIN — LORATADINE 10 MG: 10 TABLET ORAL at 09:01

## 2024-06-14 RX ADMIN — Medication 200 MG: at 09:02

## 2024-06-14 RX ADMIN — TIMOLOL MALEATE 1 DROP: 5 SOLUTION OPHTHALMIC at 09:02

## 2024-06-14 RX ADMIN — FORMOTEROL FUMARATE DIHYDRATE 20 MCG: 20 SOLUTION RESPIRATORY (INHALATION) at 09:10

## 2024-06-14 NOTE — ASSESSMENT & PLAN NOTE
Wt Readings from Last 3 Encounters:   06/14/24 81 kg (178 lb 9.2 oz)   05/22/24 83.5 kg (184 lb)   11/03/23 83.5 kg (184 lb)     Patient had presented with shortness of breath and evidence of fluid overload.  Echo performed 5/22/2024 demonstrated EF 70%, grade 2 diastolic dysfunction.  She was started on 10 mg torsemide at that time.  Patient did receive Lasix 40 mg IV in the ED.  Improved volume status however few crackles noted on exam, will repeat Lasix 40 mg IV x 1 today.  Consider resumption of torsemide in a.m.  Intake and output.  Daily weights.

## 2024-06-14 NOTE — ASSESSMENT & PLAN NOTE
Patient has a history of non-small cell lung cancer greater than 5 years ago status post wedge resection and chemotherapy.  Follows with outpatient pulmonary and found to have lung nodules that have been increasing in size, outpatient PET scan negative.  Continue following with outpatient heme-onc and pulmonary.

## 2024-06-14 NOTE — ASSESSMENT & PLAN NOTE
Lab Results   Component Value Date    EGFR 74 06/14/2024    EGFR 81 06/13/2024    EGFR 74 06/12/2024    CREATININE 0.74 06/14/2024    CREATININE 0.66 06/13/2024    CREATININE 0.74 06/12/2024     Patient remains at baseline 0.74 creatinine.  Continue intake and output.  Daily weights.  Avoid nephrotoxins and periods of relative hypotension.  Repeat BMP in a.m.

## 2024-06-14 NOTE — PHYSICAL THERAPY NOTE
"       PHYSICAL THERAPY EVALUATION/TREATMENT     06/14/24 1410   PT Last Visit   PT Visit Date 06/14/24   Note Type   Note type Evaluation   Pain Assessment   Pain Assessment Tool 0-10   Pain Score No Pain   Restrictions/Precautions   Other Precautions Chair Alarm;Bed Alarm;O2;Fall Risk;Multiple lines  (seen in ICU, SOB with limited activity)   Home Living   Type of Home House   Home Layout One level;Stairs to enter without rails  (one small step to enter)   Bathroom Equipment Shower chair;Commode   Home Equipment Walker;Wheelchair-manual;Cane  (O2, rollator and roller walker)   Additional Comments patient states using a cane at all times prior to admission although with limited ambulation in her home (she stays in one room of the house and has a commode and assist of her daughter as needed for ADLs) patient also using a transport WC for out of home to doctor and using WC on one step into home as well. (family also states that recently paitent having only video visits with doctor)   Prior Function   Level of [place] Needs assistance with ADLs;Needs assistance with functional mobility;Needs assistance with IADLS   Lives With Family  (spouse and daughter)   Receives Help From Family   IADLs Family/Friend/Other provides transportation;Family/Friend/Other provides meals;Family/Friend/Other provides medication management   Falls in the last 6 months 0   General   Additional Pertinent History chart reviewed, patient admitted with CHF. Now presents as generally weak and SOB with limitations to functional mobility prior to admission.   Family/Caregiver Present Yes  (twin daughters present)   Cognition   Overall Cognitive Status WFL   Arousal/Participation Cooperative   Orientation Level Oriented X4   Following Commands Follows multistep commands with increased time or repetition   Subjective   Subjective patient states not feeling safe with use of walker, \"too clumsy\". patient resistant to attempt roller walker at this " time   RLE Assessment   RLE Assessment   (ROM WFL, strength 3+/5)   LLE Assessment   LLE Assessment   (ROM WFL, strength 3+/5)   Coordination   Movements are Fluid and Coordinated 0   Coordination and Movement Description decreased coordination and balance with transfers, gait and balance activity   Bed Mobility   Additional Comments patient sitting OOB on commode upon arrival by therapist and required max assist for hygiene for urination due to unsteady standing balance and multiple lines in ICU as well   Transfers   Sit to Stand 4  Minimal assistance   Additional items Assist x 1   Stand to Sit 4  Minimal assistance   Additional items Assist x 1   Ambulation/Elevation   Gait Assistance 3  Moderate assist   Additional items Assist x 1;Verbal cues;Tactile cues   Assistive Device Straight cane   Distance 5 feet x 2 with reaching for funiture and forward flexed posturing   Balance   Static Sitting Fair +   Dynamic Sitting Fair   Static Standing Fair -   Dynamic Standing Poor +   Ambulatory Poor +   Activity Tolerance   Activity Tolerance Patient limited by fatigue;Treatment limited secondary to medical complications (Comment)  (limited by SOB, weakness)   Nurse Made Aware yes   Assessment   Prognosis Good   Problem List Decreased strength;Decreased range of motion;Decreased endurance;Impaired balance;Decreased mobility;Decreased coordination  (SOB)   Assessment Patient seen for Physical Therapy evaluation. Patient admitted with Acute on chronic diastolic congestive heart failure (HCC).  Comorbidities affecting patient's physical performance include: .  Personal factors affecting patient at time of initial evaluation include: ambulating with assistive device, inability to ambulate household distances, inability to navigate community distances, inability to navigate level surfaces without external assistance, inability to perform dynamic tasks in community, limited home support, inability to perform physical activity,  inability to perform ADLS, inability to perform IADLS , and inability to live alone. Prior to admission, patient was requiring assist for functional mobility with cane, requiring assist for ADLS, requiring assist for IADLS, and home with family assist.  Please find objective findings from Physical Therapy assessment regarding body systems outlined above with impairments and limitations including weakness, decreased ROM, impaired balance, decreased endurance, impaired coordination, gait deviations, decreased activity tolerance, decreased functional mobility tolerance, fall risk, and SOB upon exertion.  The Barthel Index was used as a functional outcome tool presenting with a score of Barthel Index Score: 45 today indicating marked limitations of functional mobility and ADLS.  Patient's clinical presentation is currently unstable/unpredictable as seen in patient's presentation of vital sign response, changing level of pain, increased fall risk, new onset of impairment of functional mobility, decreased endurance, and new onset of weakness. Pt would benefit from continued Physical Therapy treatment to address deficits as defined above and maximize level of functional mobility. As demonstrated by objective findings, the assigned level of complexity for this evaluation is high.The patient's -[facility] Basic Mobility Inpatient Short Form Raw Score is 14. A Raw score of less than or equal to 16 suggests the patient may benefit from discharge to post-acute rehabilitation services. Please also refer to the recommendation of the Physical Therapist for safe discharge planning.   Goals   Patient Goals to go home and be able to play with her dog   STG Expiration Date 06/21/24   Short Term Goal #1 transfers and gait with roller walker with supervision   Short Term Goal #2 gait endurance to 30 feet   LTG Expiration Date 06/28/24   Long Term Goal #1 strength BLEs grossly 4/5   Long Term Goal #2 transfers and gait with cane with  supervision to independent for functional household distances   Plan   Treatment/Interventions ADL retraining;Functional transfer training;LE strengthening/ROM;Therapeutic exercise;Endurance training;Patient/family training;Equipment eval/education;Bed mobility;Gait training;Compensatory technique education   PT Frequency Other (Comment)  (5x/week)   Discharge Recommendation   Rehab Resource Intensity Level, PT III (Minimum Resource Intensity)   AM-PAC Basic Mobility Inpatient   Turning in Flat Bed Without Bedrails 3   Lying on Back to Sitting on Edge of Flat Bed Without Bedrails 3   Moving Bed to Chair 2   Standing Up From Chair Using Arms 3   Walk in Room 2   Climb 3-5 Stairs With Railing 1   Basic Mobility Inpatient Raw Score 14   Basic Mobility Standardized Score 35.55   [facility] Highest Level Of Mobility   -[facility] Goal 4: Move to chair/commode   -[facility] Achieved 6: Walk 10 steps or more   Barthel Index   Feeding 10   Bathing 0   Grooming Score 0   Dressing Score 5   Bladder Score 5   Bowels Score 10   Toilet Use Score 5   Transfers (Bed/Chair) Score 10   Mobility (Level Surface) Score 0   Stairs Score 0   Barthel Index Score 45   Additional Treatment Session   Start Time 1355   End Time 1410   Treatment Assessment s:patient states feeling SOB and being basically homebound for couple years O: BLE exercise completed as listed below. A: patient will benefit from continued PT with progression as tolerated and increasing functional mobility with clinical staff as well. Patient resistant to use of roller walker at this time- education completed in benefits and patient remains resistant   Licensure   [place] License Number  Radha Armas PT [id number]

## 2024-06-14 NOTE — ASSESSMENT & PLAN NOTE
Patient chronically is on 4 L nasal cannula.  She had demonstrated increased work of breathing, was placed on BiPAP.  Chest x-ray showed vascular congestion.  Breathing has improved however some conversational dyspnea still noted.  Currently on 12 L mid flow nasal cannula  Repeat Lasix 40 mg IV x 1 today.  Monitor.

## 2024-06-14 NOTE — CASE MANAGEMENT
Case Management Discharge Planning Note    Patient name Eryn Morocho  Location ICU 11ICU 11 MRN [id number]  : 1940 Date 2024       Current Admission Date: 2024  Current Admission Diagnosis:Acute on chronic diastolic congestive heart failure (HCC)   Patient Active Problem List    Diagnosis Date Noted Date Diagnosed    Hypertensive urgency 2024     Pulmonary emphysema, unspecified emphysema type (HCC) 2024     Oxygen dependent 4 LPM baseline 2023     Multiple lung nodules 2022     Class 1 obesity due to excess calories with body mass index (BMI) of 34.0 to 34.9 in adult 2022     Recurrent falls 2022     Stage 3a chronic kidney disease (HCC) 2021     OAB (overactive bladder) 2021     Osteoporosis 2021     Acute on chronic diastolic congestive heart failure (HCC) 2021     PAD (peripheral artery disease) ([facility])      Acute on chronic respiratory failure with hypoxia and hypercapnia ([facility]) 2020     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) 2020     Recurrent major depressive disorder, in partial remission ([facility]) 2020     JULIO (obstructive sleep apnea)      COPD (chronic obstructive pulmonary disease) ([facility])      HTN (hypertension)      Hyperlipidemia        LOS (days): 2  Geometric Mean LOS (GMLOS) (days): 3.9  Days to GMLOS:1.9     OBJECTIVE:  Risk of Unplanned Readmission Score: 15.56     Current admission status: Inpatient   Preferred Pharmacy:   [id number] - [place] -  [address]   [place] [address]  Phone: [phone] Fax: [phone]    [facility]  [address] [address]  Phone: [phone]     Primary Care Provider: Ari Mendiola MD    Primary Insurance: MEDICARE  Secondary Insurance: [facility]    DISCHARGE DETAILS:    Discharge planning discussed with:: Patient and daughter, Karen Jenkins  Freedom of Choice: Yes  Comments - Freedom of Choice: SW  following to assist with DCP.  SW placed call to pt's daughter earlier today to discuss recommendations and plans.  SW reviewed plan for pt to return home under daughter's care and offered home care services for nursing follow up and therapy.  Pt's daughter said that services would be appreciated to teach her how to better care for pt and watch for signs and symptoms when issues start.  [place] home care referrals were made at the request of daughter and pt was accepted by several different agencies.  When daughter arrived to visit with pt this afternoon agency list was given to daughter to review.  After considering options daughter requested services be provided by [facility].  Agency will be reserved in [place].  Daughter also talked with SW about possible need for additional oxygen equipment if requirements don't decrease.  SW also talked with daughter about hospital bed for pt due to breathing issues and to assist with care.  Pt and daughter declined hospital bed at this time.  JASKARAN offered to work with [facility] for additional oxygen support if needed.  JASKARAN offered ongoing support and assistance.  Will follow to monitor progress and assist as needed.  CM contacted family/caregiver?: Yes  Were Treatment Team discharge recommendations reviewed with patient/caregiver?: Yes  Did patient/caregiver verbalize understanding of patient care needs?: Yes  Were patient/caregiver advised of the risks associated with not following Treatment Team discharge recommendations?: Yes    Contacts  Patient Contacts: Karen Jenkins  Relationship to Patient:: Family (daughter)  Contact Method: In Person  Reason/Outcome: Discharge Planning    Requested Home Health Care         Is the patient interested in HHC at discharge?: Yes  Home Health Discipline requested:: Nursing, Physical Therapy, Occupational Therapy  Home Health Agency Name:: [facility]  Home Health Follow-Up Provider:: PCP  Home Health Services Needed:: COPD  Management, Evaluate Functional Status and Safety, Gait/ADL Training, Heart Failure Management, Oxygen Via Nasal Cannula, Strengthening/Theraputic Exercises to Improve Function  Homebound Criteria Met:: Requires the Assistance of Another Person for Safe Ambulation or to Leave the Home, Uses an Assist Device (i.e. cane, walker, etc)  Supporting Clincal Findings:: Fatigues Easliy in Short Distances, Limited Endurance, Requires Oxygen    DME Referral Provided  Referral made for DME?:  (possible additional O2)    Other Referral/Resources/Interventions Provided:  Interventions: HHC, DME  Referral Comments: Home care services planned with [facility].  Will follow to assist with additional oxygen if needed.    Treatment Team Recommendation: Home with Home Health Care  Discharge Destination Plan:: Home with Home Health Care  Transport at Discharge : Family (possible BLS pending O2 requirements)    IMM Given (Date):: 06/14/24  IMM Given to:: Family (IMM #2 reviewed with pt's daughter.  Daughter verbalized understanding.  Daughter signed IMM and copy given.  Copy also placed in scan bin for chart.)

## 2024-06-14 NOTE — ASSESSMENT & PLAN NOTE
SBP was noted to be greater than 200s while in ED at [facility].  Patient received 1 inch of Nitropaste and 40 mg IV Lasix in the ED.  SBP 140s on arrival at  W  Currently 166/65.  Monitor

## 2024-06-14 NOTE — PROGRESS NOTES
[facility]  Progress Note  Name: Eryn Morocho I  MRN: [id number]  Unit/Bed#: ICU 11 I Date of Admission: 6/12/2024   Date of Service: 6/14/2024 I Hospital Day: 2    Assessment & Plan   * Acute on chronic diastolic congestive heart failure (HCC)  Assessment & Plan  Wt Readings from Last 3 Encounters:   06/14/24 81 kg (178 lb 9.2 oz)   05/22/24 83.5 kg (184 lb)   11/03/23 83.5 kg (184 lb)     Patient had presented with shortness of breath and evidence of fluid overload.  Echo performed 5/22/2024 demonstrated EF 70%, grade 2 diastolic dysfunction.  She was started on 10 mg torsemide at that time.  Patient did receive Lasix 40 mg IV in the ED.  Improved volume status however few crackles noted on exam, will repeat Lasix 40 mg IV x 1 today.  Consider resumption of torsemide in a.m.  Intake and output.  Daily weights.          Acute on chronic respiratory failure with hypoxia and hypercapnia (HCC)  Assessment & Plan  Patient chronically is on 4 L nasal cannula.  She had demonstrated increased work of breathing, was placed on BiPAP.  Chest x-ray showed vascular congestion.  Breathing has improved however some conversational dyspnea still noted.  Currently on 12 L mid flow nasal cannula  Repeat Lasix 40 mg IV x 1 today.  Monitor.    Hypertensive urgency  Assessment & Plan  SBP was noted to be greater than 200s while in ED at [facility].  Patient received 1 inch of Nitropaste and 40 mg IV Lasix in the ED.  SBP 140s on arrival at  W  Currently 166/65.  Monitor    Stage 3a chronic kidney disease (HCC)  Assessment & Plan  Lab Results   Component Value Date    EGFR 74 06/14/2024    EGFR 81 06/13/2024    EGFR 74 06/12/2024    CREATININE 0.74 06/14/2024    CREATININE 0.66 06/13/2024    CREATININE 0.74 06/12/2024     Patient remains at baseline 0.74 creatinine.  Continue intake and output.  Daily weights.  Avoid nephrotoxins and periods of relative hypotension.  Repeat BMP in a.m.    PAD (peripheral artery  disease) (HCC)  Assessment & Plan  Patient has history of PAD, continue with Plavix and statin.  Heart healthy diet    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)  Assessment & Plan  Patient has a history of non-small cell lung cancer greater than 5 years ago status post wedge resection and chemotherapy.  Follows with outpatient pulmonary and found to have lung nodules that have been increasing in size, outpatient PET scan negative.  Continue following with outpatient heme-onc and pulmonary.    COPD (chronic obstructive pulmonary disease) (HCC)  Assessment & Plan  Patient has a history of COPD chronically on 4 L nasal cannula.  No wheezing appreciated on exam.  Currently on 12 L mid flow nasal cannula, weaned off of BiPAP.  Continue DuoNebs, perform assist, Mucinex and singular.  Continue to follow outpatient pulm    Hyperlipidemia  Assessment & Plan  Continue statin.  Heart healthy diet.    JULIO (obstructive sleep apnea)  Assessment & Plan  JULIO requiring CPAP nightly.  Continue               VTE Pharmacologic Prophylaxis:   Moderate Risk (Score 3-4) - Pharmacological DVT Prophylaxis Ordered: enoxaparin (Lovenox).    Mobility:   Basic Mobility Inpatient Raw Score: 20  JH-HLM Goal: 6: Walk 10 steps or more  JH-HLM Achieved: 6: Walk 10 steps or more  JH-HLM Goal achieved. Continue to encourage appropriate mobility.    Patient Centered Rounds: I performed bedside rounds with nursing staff today.   Discussions with Specialists or Other Care Team Provider: multidisciplinary team     Education and Discussions with Family / Patient: Updated  (daughter) via phone.    Total Time Spent on Date of Encounter in care of patient: greater than 45 mins. This time was spent on one or more of the following: performing physical exam; counseling and coordination of care; obtaining or reviewing history; documenting in the medical record; reviewing/ordering tests, medications or procedures; communicating with other healthcare  professionals and discussing with patient's family/caregivers.    Current Length of Stay: 2 day(s)  Current Patient Status: Inpatient   Certification Statement: The patient will continue to require additional inpatient hospital stay due to IV lasix, monitor electrolytes, wean O2 as tolerated   Discharge Plan: Anticipate discharge in 48-72 hrs to discharge location to be determined pending rehab evaluations.    Code Status: Level 1 - Full Code    Subjective:   Patient seen sitting up in bed resting comfortably.  Continues on 12 L mid flow O2.  Just had finished breakfast.  Feels that her breathing has improved however she still is a little short of breath.  Reports that she has been urinating a lot.  Overall is feeling improved however not at baseline yet.    Objective:     Vitals:   Temp (24hrs), Av.8 °F (36.6 °C), Min:97.4 °F (36.3 °C), Max:98.1 °F (36.7 °C)    Temp:  [97.4 °F (36.3 °C)-98.1 °F (36.7 °C)] 98.1 °F (36.7 °C)  HR:  [65-80] 69  Resp:  [21-37] 31  BP: (146-173)/(65-76) 166/65  SpO2:  [85 %-97 %] 91 %  Body mass index is 32.66 kg/m².     Input and Output Summary (last 24 hours):     Intake/Output Summary (Last 24 hours) at 2024 1037  Last data filed at 2024 0600  Gross per 24 hour   Intake 810 ml   Output 1000 ml   Net -190 ml       Physical Exam:   Physical Exam  Vitals and nursing note reviewed.   Constitutional:       General: She is not in acute distress.  HENT:      Head: Normocephalic.      Nose: Nose normal.      Mouth/Throat:      Mouth: Mucous membranes are moist.   Eyes:      Extraocular Movements: Extraocular movements intact.      Conjunctiva/sclera: Conjunctivae normal.      Pupils: Pupils are equal, round, and reactive to light.   Cardiovascular:      Rate and Rhythm: Normal rate and regular rhythm.      Pulses: Normal pulses.      Heart sounds: Normal heart sounds.   Pulmonary:      Comments: Few crackles noted at bases; on 12 liters mid-flow O2  Abdominal:      General:  Bowel sounds are normal. There is no distension.      Palpations: Abdomen is soft.      Tenderness: There is no abdominal tenderness.   Genitourinary:     Comments: Voiding spontaneously   Musculoskeletal:         General: Normal range of motion.      Cervical back: Normal range of motion.      Comments: Trace edema bilateral LEs    Skin:     General: Skin is warm and dry.      Capillary Refill: Capillary refill takes less than 2 seconds.      Coloration: Skin is pale.   Neurological:      General: No focal deficit present.      Mental Status: She is alert and oriented to person, place, and time.   Psychiatric:         Mood and Affect: Mood normal.         Behavior: Behavior normal.         Thought Content: Thought content normal.         Judgment: Judgment normal.          Additional Data:     Labs:  Results from last 7 days   Lab Units 06/14/24  0550 06/13/24  0945   WBC Thousand/uL 4.72 5.56   HEMOGLOBIN g/dL 12.2 12.6   HEMATOCRIT % 38.5 40.0   PLATELETS Thousands/uL 178 170   SEGS PCT %  --  80*   LYMPHO PCT %  --  10*   MONO PCT %  --  9   EOS PCT %  --  1     Results from last 7 days   Lab Units 06/14/24  0550 06/13/24  0535 06/12/24  1101   SODIUM mmol/L 137   < > 139   POTASSIUM mmol/L 4.2   < > 4.4   CHLORIDE mmol/L 95*   < > 96   CO2 mmol/L 38*   < > 36*   BUN mg/dL 17   < > 15   CREATININE mg/dL 0.74   < > 0.74   ANION GAP mmol/L 4   < > 7   CALCIUM mg/dL 8.7   < > 9.8   ALBUMIN g/dL  --   --  4.3   TOTAL BILIRUBIN mg/dL  --   --  0.96   ALK PHOS U/L  --   --  49   ALT U/L  --   --  8   AST U/L  --   --  16   GLUCOSE RANDOM mg/dL 132   < > 156*    < > = values in this interval not displayed.                 Results from last 7 days   Lab Units 06/13/24  0535 06/12/24  1101   PROCALCITONIN ng/ml <0.05 <0.05       Lines/Drains:  Invasive Devices       Peripheral Intravenous Line  Duration             Peripheral IV 06/12/24 Right Antecubital 1 day              Drain  Duration             External Urinary  Catheter 1 day                          Imaging: Reviewed radiology reports from this admission including: chest xray    Recent Cultures (last 7 days):         Last 24 Hours Medication List:   Current Facility-Administered Medications   Medication Dose Route Frequency Provider Last Rate    amLODIPine  10 mg Oral Daily [facility] CR[facility]      atorvastatin  40 mg Oral Daily [facility]      bisacodyl  10 mg Oral Once [facility], CRNP      budesonide  0.5 mg Nebulization BID [facility], CRNP      chlorhexidine  15 mL Mouth/Throat Q12H ANNE [facility], CRNP      citalopram  20 mg Oral Daily [facility], CRNP      clopidogrel  75 mg Oral Daily [facility], CRNP      co-enzyme Q-10  200 mg Oral QAM [facility], CRNP      docusate sodium  100 mg Oral BID Angelica Scott, CRNP      enoxaparin  40 mg Subcutaneous Daily [facility], CRNP      fish oil  1,000 mg Oral QAM [facility], CRNP      formoterol  20 mcg Nebulization BID [facility], CRNP      furosemide  40 mg Intravenous Once Angelica Scott, CRNP      ipratropium-albuterol  3 mL Nebulization Q6H [facility], CRNP      labetalol  10 mg Intravenous Q4H PRN [facility], CRNP      loratadine  10 mg Oral Daily [facility], CRNP      montelukast  10 mg Oral HS [facility], CRNP      multivitamin-minerals  1 tablet Oral Daily [facility], CRNP      polyethylene glycol  17 g Oral Daily Angelica Scott, TABBYNP      timolol  1 drop Both Eyes BID [facility] Jamie, PRANAY          Today, Patient Was Seen By: PRANAY Martino    **Please Note: This note may have been constructed using a voice recognition system.**

## 2024-06-14 NOTE — ASSESSMENT & PLAN NOTE
Patient has a history of COPD chronically on 4 L nasal cannula.  No wheezing appreciated on exam.  Currently on 12 L mid flow nasal cannula, weaned off of BiPAP.  Continue DuoNebs, perform assist, Mucinex and singular.  Continue to follow outpatient pulm

## 2024-06-14 NOTE — PROGRESS NOTES
Patient:    MRN:  [id number]    Jose Request ID:  [id number]    Level of care reserved:  [facility] Agency    Partner Reserved:  [facility], [place] [address] [phone]    Clinical needs requested:    Geography searched:  [phone]    Start of Service:    Request sent:  11:27am EDT on 6/14/2024 by Jen Acosta    Partner reserved:  3:15pm EDT on 6/14/2024 by Jen Acosta    Choice list shared:  12:14pm EDT on 6/14/2024 by Jen Acosta

## 2024-06-15 LAB
ANION GAP SERPL CALCULATED.3IONS-SCNC: 5 MMOL/L (ref 4–13)
BUN SERPL-MCNC: 15 MG/DL (ref 5–25)
CALCIUM SERPL-MCNC: 9.2 MG/DL (ref 8.4–10.2)
CHLORIDE SERPL-SCNC: 94 MMOL/L (ref 96–108)
CO2 SERPL-SCNC: 39 MMOL/L (ref 21–32)
CREAT SERPL-MCNC: 0.68 MG/DL (ref 0.6–1.3)
ERYTHROCYTE [DISTWIDTH] IN BLOOD BY AUTOMATED COUNT: 14.4 % (ref 11.6–15.1)
GFR SERPL CREATININE-BSD FRML MDRD: 80 ML/MIN/1.73SQ M
GLUCOSE SERPL-MCNC: 131 MG/DL (ref 65–140)
HCT VFR BLD AUTO: 40.5 % (ref 34.8–46.1)
HGB BLD-MCNC: 12.7 G/DL (ref 11.5–15.4)
MAGNESIUM SERPL-MCNC: 2 MG/DL (ref 1.9–2.7)
MCH RBC QN AUTO: 32.7 PG (ref 26.8–34.3)
MCHC RBC AUTO-ENTMCNC: 31.4 G/DL (ref 31.4–37.4)
MCV RBC AUTO: 104 FL (ref 82–98)
PLATELET # BLD AUTO: 197 THOUSANDS/UL (ref 149–390)
PMV BLD AUTO: 9 FL (ref 8.9–12.7)
POTASSIUM SERPL-SCNC: 3.6 MMOL/L (ref 3.5–5.3)
PROCALCITONIN SERPL-MCNC: <0.05 NG/ML
RBC # BLD AUTO: 3.88 MILLION/UL (ref 3.81–5.12)
SODIUM SERPL-SCNC: 138 MMOL/L (ref 135–147)
WBC # BLD AUTO: 4.12 THOUSAND/UL (ref 4.31–10.16)

## 2024-06-15 PROCEDURE — 97167 OT EVAL HIGH COMPLEX 60 MIN: CPT

## 2024-06-15 PROCEDURE — 83735 ASSAY OF MAGNESIUM: CPT | Performed by: NURSE PRACTITIONER

## 2024-06-15 PROCEDURE — 80048 BASIC METABOLIC PNL TOTAL CA: CPT | Performed by: NURSE PRACTITIONER

## 2024-06-15 PROCEDURE — 94640 AIRWAY INHALATION TREATMENT: CPT

## 2024-06-15 PROCEDURE — 99232 SBSQ HOSP IP/OBS MODERATE 35: CPT | Performed by: NURSE PRACTITIONER

## 2024-06-15 PROCEDURE — 84145 PROCALCITONIN (PCT): CPT | Performed by: NURSE PRACTITIONER

## 2024-06-15 PROCEDURE — 94664 DEMO&/EVAL PT USE INHALER: CPT

## 2024-06-15 PROCEDURE — 97530 THERAPEUTIC ACTIVITIES: CPT

## 2024-06-15 PROCEDURE — 97535 SELF CARE MNGMENT TRAINING: CPT

## 2024-06-15 PROCEDURE — 99223 1ST HOSP IP/OBS HIGH 75: CPT | Performed by: NURSE PRACTITIONER

## 2024-06-15 PROCEDURE — 85027 COMPLETE CBC AUTOMATED: CPT | Performed by: NURSE PRACTITIONER

## 2024-06-15 PROCEDURE — 94668 MNPJ CHEST WALL SBSQ: CPT

## 2024-06-15 PROCEDURE — 94660 CPAP INITIATION&MGMT: CPT

## 2024-06-15 PROCEDURE — 94760 N-INVAS EAR/PLS OXIMETRY 1: CPT

## 2024-06-15 RX ORDER — FUROSEMIDE 10 MG/ML
40 INJECTION INTRAMUSCULAR; INTRAVENOUS
Status: DISCONTINUED | OUTPATIENT
Start: 2024-06-15 | End: 2024-06-16

## 2024-06-15 RX ORDER — PREDNISONE 20 MG/1
40 TABLET ORAL DAILY
Status: DISCONTINUED | OUTPATIENT
Start: 2024-06-15 | End: 2024-06-19 | Stop reason: HOSPADM

## 2024-06-15 RX ORDER — SODIUM CHLORIDE FOR INHALATION 3 %
4 VIAL, NEBULIZER (ML) INHALATION
Status: DISCONTINUED | OUTPATIENT
Start: 2024-06-15 | End: 2024-06-19 | Stop reason: HOSPADM

## 2024-06-15 RX ORDER — GUAIFENESIN 600 MG/1
1200 TABLET, EXTENDED RELEASE ORAL EVERY 12 HOURS SCHEDULED
Status: DISCONTINUED | OUTPATIENT
Start: 2024-06-15 | End: 2024-06-19 | Stop reason: HOSPADM

## 2024-06-15 RX ADMIN — GUAIFENESIN 1200 MG: 600 TABLET, EXTENDED RELEASE ORAL at 11:48

## 2024-06-15 RX ADMIN — LORATADINE 10 MG: 10 TABLET ORAL at 09:26

## 2024-06-15 RX ADMIN — FORMOTEROL FUMARATE DIHYDRATE 20 MCG: 20 SOLUTION RESPIRATORY (INHALATION) at 20:19

## 2024-06-15 RX ADMIN — CITALOPRAM HYDROBROMIDE 20 MG: 20 TABLET ORAL at 09:24

## 2024-06-15 RX ADMIN — PREDNISONE 40 MG: 20 TABLET ORAL at 11:48

## 2024-06-15 RX ADMIN — IPRATROPIUM BROMIDE AND ALBUTEROL SULFATE 3 ML: 2.5; .5 SOLUTION RESPIRATORY (INHALATION) at 01:07

## 2024-06-15 RX ADMIN — Medication 1 TABLET: at 09:26

## 2024-06-15 RX ADMIN — CLOPIDOGREL 75 MG: 75 TABLET ORAL at 09:26

## 2024-06-15 RX ADMIN — MONTELUKAST 10 MG: 10 TABLET, FILM COATED ORAL at 21:48

## 2024-06-15 RX ADMIN — GUAIFENESIN 1200 MG: 600 TABLET, EXTENDED RELEASE ORAL at 21:48

## 2024-06-15 RX ADMIN — Medication 200 MG: at 09:26

## 2024-06-15 RX ADMIN — SODIUM CHLORIDE SOLN NEBU 3% 4 ML: 3 NEBU SOLN at 13:48

## 2024-06-15 RX ADMIN — BUDESONIDE 0.5 MG: 0.5 INHALANT RESPIRATORY (INHALATION) at 08:18

## 2024-06-15 RX ADMIN — IPRATROPIUM BROMIDE AND ALBUTEROL SULFATE 3 ML: 2.5; .5 SOLUTION RESPIRATORY (INHALATION) at 08:13

## 2024-06-15 RX ADMIN — FORMOTEROL FUMARATE DIHYDRATE 20 MCG: 20 SOLUTION RESPIRATORY (INHALATION) at 08:21

## 2024-06-15 RX ADMIN — ENOXAPARIN SODIUM 40 MG: 40 INJECTION SUBCUTANEOUS at 09:26

## 2024-06-15 RX ADMIN — TIMOLOL MALEATE 1 DROP: 5 SOLUTION OPHTHALMIC at 09:26

## 2024-06-15 RX ADMIN — BUDESONIDE 0.5 MG: 0.5 INHALANT RESPIRATORY (INHALATION) at 19:35

## 2024-06-15 RX ADMIN — SODIUM CHLORIDE SOLN NEBU 3% 4 ML: 3 NEBU SOLN at 20:02

## 2024-06-15 RX ADMIN — CHLORHEXIDINE GLUCONATE 15 ML: 1.2 RINSE ORAL at 09:24

## 2024-06-15 RX ADMIN — TIMOLOL MALEATE 1 DROP: 5 SOLUTION OPHTHALMIC at 17:41

## 2024-06-15 RX ADMIN — FUROSEMIDE 40 MG: 10 INJECTION, SOLUTION INTRAMUSCULAR; INTRAVENOUS at 16:48

## 2024-06-15 RX ADMIN — IPRATROPIUM BROMIDE AND ALBUTEROL SULFATE 3 ML: 2.5; .5 SOLUTION RESPIRATORY (INHALATION) at 19:35

## 2024-06-15 RX ADMIN — OMEGA-3 FATTY ACIDS CAP 1000 MG 1000 MG: 1000 CAP at 09:26

## 2024-06-15 RX ADMIN — IPRATROPIUM BROMIDE AND ALBUTEROL SULFATE 3 ML: 2.5; .5 SOLUTION RESPIRATORY (INHALATION) at 13:45

## 2024-06-15 RX ADMIN — AMLODIPINE BESYLATE 10 MG: 10 TABLET ORAL at 09:24

## 2024-06-15 RX ADMIN — ATORVASTATIN CALCIUM 40 MG: 40 TABLET, FILM COATED ORAL at 16:48

## 2024-06-15 NOTE — ASSESSMENT & PLAN NOTE
SBP was noted to be greater than 200s while in ED at [facility].  Patient received 1 inch of Nitropaste and 40 mg IV Lasix in the ED.  SBP 140s on arrival at  W  Currently 170/72  Monitor

## 2024-06-15 NOTE — ASSESSMENT & PLAN NOTE
Lab Results   Component Value Date    EGFR 80 06/15/2024    EGFR 74 06/14/2024    EGFR 81 06/13/2024    CREATININE 0.68 06/15/2024    CREATININE 0.74 06/14/2024    CREATININE 0.66 06/13/2024     Patient remains at baseline 0.74 creatinine.  Continue intake and output.  Daily weights.  Avoid nephrotoxins and periods of relative hypotension.  Repeat BMP in a.m.

## 2024-06-15 NOTE — CONSULTS
"Consultation - Pulmonary Medicine   Eryn Morocho 83 y.o. female MRN: [id number]  Unit/Bed#: ICU 11 Encounter: [id number]      Assessment/Plan:    Acute on chronic hypoxic hypercapnic respiratory failure, multifactorial due to below   Baseline oxygen requirement is 3 to 5 L nasal cannula.   Weaned from 10L to 8L while at bedside.   Titrate supplemental oxygen to maintain saturations greater than or equal to 89%.   Activity as tolerated.   Ambulatory pulse ox prior to discharge.   She is currently doing well on BiPAP and may benefit from this at discharge given chronic hypercapnia and severe COPD.  Will continue BiPAP 15/9 while inpatient and attempt to qualify for transition to BiPAP at discharge.    Acute on chronic diastolic CHF   Diuretics and management per primary team.   Monitor I's and O's and daily weights.    Severe COPD with bronchiectasis with possible mild acute exacerbation   Add prednisone 40 mg daily for 5 days.   Continue budesonide/Perforomist twice daily with DuoNebs every 6 hours and add hypertonic saline for pulmonary hygiene as well.   Encourage flutter valve and add Mucinex.   May benefit from vest therapy if sputum production remains refractory to above interventions.   Home regimen is budesonide/Perforomist/DuoNebs twice daily.    Obesity with underlying obstructive sleep apnea   Uses CPAP at 9 cmH2O with 3 L of oxygen at bedtime.   BiPAP as above.    Non-small cell lung cancer with pulmonary nodules   Diagnosed 7 years ago and status post EMIR wedge resection and chemotherapy.   Follows with Dr. Esquivel.   Following with serial imaging for pulmonary nodules.    Outpatient follow-up pending course.  Discussed with primary team and nursing.    History of Present Illness   Physician Requesting Consult: Sherine Smart DO  Reason for Consult / Principal Problem: Oxygen dependent with increased requirement  Hx and PE limited by: None  Chief Complaint: \"I get so SOB.\"  HPI: Eryn Morocho is a 83 y.o. "  female who presented to [facility] [place] with complaints of increased SOB. Eryn reports today that she came to the hospital due to an increased SOB.  She reports that over the last few months she has missed doctors appointments with our office due to increased shortness of breath and difficulty getting moving in the morning.  She reports that it takes her quite a few hours to get out of bed and get off of her CPAP.  She has significant mucus production that is difficult to expectorate despite use of flutter valve and nebulizers at home.  She reports that her shortness of breath was getting progressively worse with some increased sputum production, but no fever.  She presented to the ER for further evaluation.  She uses Perforomist/budesonide/DuoNebs twice daily at home and wears up to 5 L of supplemental oxygen.  She wears CPAP at 9 cmH2O.  She lives at home with her  and her daughter who take care of her.  Aside from the above, she has no other complaints than fatigue.    Inpatient consult to Pulmonology  Consult performed by: PRANAY Price  Consult ordered by: PRANAY Martino        Review of Systems   All other systems reviewed and are negative.  A full 12-point review of systems was completed and is negative except for those outlined in the HPI.    Historical Information   Past Medical History:   Diagnosis Date    Asthma     Back problem     Chronic pain     Colon cancer screening     recheck tattoo    COPD (chronic obstructive pulmonary disease) (HCC)     Oxygen 3.5L via NC    CPAP (continuous positive airway pressure) dependence     Diverticulosis     Emphysema of lung (HCC)     High blood pressure     Hyperlipidemia     Hypertension     Lung cancer (HCC)     Left Lung cancer; wedge resection     JULIO on CPAP     PAD (peripheral artery disease) (HCC)     Leg    Wears glasses      Past Surgical History:   Procedure Laterality Date    APPENDECTOMY      age 11 yr     COLONOSCOPY  2018    repeat 2023    COLONOSCOPY      HYSTERECTOMY      complete    LUNG CANCER SURGERY Left     wedge reseection, Dr Anna     Social History   Social History     Substance and Sexual Activity   Alcohol Use Not Currently     Social History     Substance and Sexual Activity   Drug Use Never     Social History     Tobacco Use   Smoking Status Former    Current packs/day: 1.50    Average packs/day: 1.5 packs/day for 35.0 years (52.5 ttl pk-yrs)    Types: Cigarettes   Smokeless Tobacco Never   Tobacco Comments    Has smoked since age:   11 - As per Alina      E-Cigarette/Vaping    E-Cigarette Use Never User      E-Cigarette/Vaping Substances    Nicotine No     THC No     CBD No     Flavoring No     Other No     Unknown No      Family History:   Family History   Problem Relation Age of Onset    Colon cancer Father     Heart disease Sister     Lung cancer Daughter        Meds/Allergies   all current active meds have been reviewed, pertinent pulmonary meds have been reviewed, current meds:   Current Facility-Administered Medications   Medication Dose Route Frequency    amLODIPine (NORVASC) tablet 10 mg  10 mg Oral Daily    atorvastatin (LIPITOR) tablet 40 mg  40 mg Oral Daily With Dinner    bisacodyl (DULCOLAX) EC tablet 10 mg  10 mg Oral Once    budesonide (PULMICORT) inhalation solution 0.5 mg  0.5 mg Nebulization BID    chlorhexidine (PERIDEX) 0.12 % oral rinse 15 mL  15 mL Mouth/Throat Q12H ANNE    citalopram (CeleXA) tablet 20 mg  20 mg Oral Daily    clopidogrel (PLAVIX) tablet 75 mg  75 mg Oral Daily    co-enzyme Q-10 capsule 200 mg  200 mg Oral QAM    docusate sodium (COLACE) capsule 100 mg  100 mg Oral BID    enoxaparin (LOVENOX) subcutaneous injection 40 mg  40 mg Subcutaneous Daily    fish oil capsule 1,000 mg  1,000 mg Oral QAM    formoterol (PERFOROMIST) nebulizer solution 20 mcg  20 mcg Nebulization BID    furosemide (LASIX) injection 40 mg  40 mg Intravenous BID (diuretic)     ipratropium-albuterol (DUO-NEB) 0.5-2.5 mg/3 mL inhalation solution 3 mL  3 mL Nebulization Q6H    labetalol (NORMODYNE) injection 10 mg  10 mg Intravenous Q4H PRN    loratadine (CLARITIN) tablet 10 mg  10 mg Oral Daily    montelukast (SINGULAIR) tablet 10 mg  10 mg Oral HS    multivitamin-minerals (CENTRUM) tablet 1 tablet  1 tablet Oral Daily    polyethylene glycol (MIRALAX) packet 17 g  17 g Oral Daily    timolol (TIMOPTIC) 0.5 % ophthalmic solution 1 drop  1 drop Both Eyes BID   , and PTA meds:   Prior to Admission Medications   Prescriptions Last Dose Informant Patient Reported? Taking?   Calcium Carbonate (CALTRATE 600 PO)   Yes No   Sig: Take 3 tablets by mouth in the morning   Coenzyme Q10 (Co Q 10) 100 MG CAPS   No No   Sig: Take 2 capsules (200 mg total) by mouth every morning   Multiple Vitamins-Minerals (AIRBORNE GUMMIES PO)  Self Yes No   Sig: Take by mouth every morning   Nebulizers (Vios LC Sprint) MISC   No No   Sig: Use as directed   OXYGEN-HELIUM IN   Yes No   Sig: Inhale 4L continuous   Omega-3 Fatty Acids (fish oil) 1,000 mg  Self Yes No   Sig: Take 1,000 mg by mouth every morning   VITAMIN D PO   Yes No   Sig: Take by mouth in the morning   albuterol (2.5 mg/3 mL) 0.083 % nebulizer solution   No No   Sig: Take 3 mL (2.5 mg total) by nebulization daily as needed for wheezing or shortness of breath   albuterol (PROVENTIL HFA,VENTOLIN HFA) 90 mcg/act inhaler   No No   Sig: INHALE 2 PUFFS BY MOUTH EVERY 6 HOURS AS NEEDED FOR SHORTNESS OF BREATH   amLODIPine (NORVASC) 5 mg tablet   No No   Sig: TAKE 1 TABLET(5 MG) BY MOUTH DAILY   bisacodyl (DULCOLAX) 5 mg EC tablet   No No   Sig: Take 2 tablets (10 mg total) by mouth once for 1 dose   budesonide (PULMICORT) 0.5 mg/2 mL nebulizer solution   No No   Sig: Take 2 mL (0.5 mg total) by nebulization 2 (two) times a day Rinse mouth after use.   carboxymethylcellulose 0.5 % SOLN   Yes No   Si drop 3 (three) times a day as needed for dry eyes   citalopram  "(CeleXA) 20 mg tablet   No No   Sig: Take 1 tablet (20 mg total) by mouth daily   clopidogrel (PLAVIX) 75 mg tablet   No No   Sig: TAKE 1 TABLET BY MOUTH DAILY   denosumab (Prolia) 60 mg/mL   No No   Sig: Inject 1 mL (60 mg total) under the skin once for 1 dose   Patient taking differently: Inject 60 mg under the skin once Next dose 6/2023   doxepin (SINEquan) 10 mg capsule   No No   Sig: TAKE 1 TO 3 CAPSULES BY MOUTH EVERY NIGHT AT BEDTIME AS NEEDED FOR SLEEP   formoterol (Perforomist) 20 MCG/2ML nebulizer solution   No No   Sig: Take 2 mL (20 mcg total) by nebulization 2 (two) times a day icd 10 code J44.9   guaiFENesin (MUCINEX) 600 mg 12 hr tablet   No No   Sig: TAKE 2 TABLET BY MOUTH EVERY 12 HOURS   ipratropium-albuterol (DUO-NEB) 0.5-2.5 mg/3 mL nebulizer solution   No No   Sig: Take 3 mL by nebulization every 6 (six) hours as needed for wheezing or shortness of breath ICD 10 J44.9   levocetirizine (XYZAL) 5 MG tablet   No No   Sig: TAKE 1 TABLET(5 MG) BY MOUTH EVERY EVENING   montelukast (SINGULAIR) 10 mg tablet   No No   Sig: TAKE 1 TABLET(10 MG) BY MOUTH DAILY AT BEDTIME   rosuvastatin (CRESTOR) 20 MG tablet   No No   Sig: TAKE 1 TABLET(20 MG) BY MOUTH DAILY   timolol (TIMOPTIC) 0.5 % ophthalmic solution  Self Yes No   Sig: Administer 1 drop to both eyes 2 (two) times a day   torsemide (DEMADEX) 10 mg tablet   No No   Sig: TAKE 1 TABLET(10 MG) BY MOUTH EVERY MORNING FOR 10 DAYS      Facility-Administered Medications: None       Allergies   Allergen Reactions    Baclofen Nausea Only and Dizziness    Lisinopril Nausea Only and Dizziness    Losartan Nausea Only and Dizziness    Naproxen Nausea Only and Dizziness    Zinc Acetate Nausea Only and Dizziness       Objective   Vitals: Blood pressure 169/72, pulse 81, temperature 97.5 °F (36.4 °C), temperature source Temporal, resp. rate 20, height 5' 2\" (1.575 m), weight 81.1 kg (178 lb 12.7 oz), SpO2 93%.8L NC,Body mass index is 32.7 kg/m².    Intake/Output " Summary (Last 24 hours) at 6/15/2024 1030  Last data filed at 6/15/2024 0900  Gross per 24 hour   Intake --   Output 1300 ml   Net -1300 ml     Invasive Devices       Peripheral Intravenous Line  Duration             Peripheral IV 06/14/24 Left;Ventral (anterior) Forearm <1 day              Drain  Duration             External Urinary Catheter 2 days                    Physical Exam  Vitals reviewed.   Constitutional:       General: She is not in acute distress.     Appearance: She is well-developed. She is not toxic-appearing or diaphoretic.      Interventions: Nasal cannula in place.   HENT:      Head: Normocephalic and atraumatic.   Eyes:      General: No scleral icterus.     Extraocular Movements: Extraocular movements intact.   Neck:      Trachea: No tracheal deviation.   Cardiovascular:      Rate and Rhythm: Normal rate and regular rhythm.      Heart sounds: S1 normal and S2 normal. No murmur heard.     No friction rub. No gallop.   Pulmonary:      Effort: Pulmonary effort is normal. No tachypnea, accessory muscle usage or respiratory distress.      Breath sounds: No stridor. Decreased breath sounds present. No wheezing, rhonchi or rales.   Chest:      Chest wall: No tenderness.   Abdominal:      General: Bowel sounds are normal. There is no distension.      Palpations: Abdomen is soft.      Tenderness: There is no abdominal tenderness.   Musculoskeletal:      Cervical back: Neck supple.      Right lower leg: No edema.      Left lower leg: No edema.   Skin:     General: Skin is warm and dry.      Findings: No rash.   Neurological:      Mental Status: She is alert and oriented to person, place, and time.      GCS: GCS eye subscore is 4. GCS verbal subscore is 5. GCS motor subscore is 6.   Psychiatric:         Speech: Speech normal.         Behavior: Behavior normal. Behavior is cooperative.       Lab Results: CBC:   Lab Results   Component Value Date    WBC 4.12 (L) 06/15/2024    HGB 12.7 06/15/2024    HCT 40.5  "06/15/2024     (H) 06/15/2024     06/15/2024    RBC 3.88 06/15/2024    MCH 32.7 06/15/2024    MCHC 31.4 06/15/2024    RDW 14.4 06/15/2024    MPV 9.0 06/15/2024   , CMP:   Lab Results   Component Value Date    SODIUM 138 06/15/2024    K 3.6 06/15/2024    CL 94 (L) 06/15/2024    CO2 39 (H) 06/15/2024    BUN 15 06/15/2024    CREATININE 0.68 06/15/2024    CALCIUM 9.2 06/15/2024    EGFR 80 06/15/2024     VB.36/66/29    Procalcitonin: Less than 0.05 x 2     Flu/COVID/RSV PCR: Negative    BNP: 245    Imaging Studies: I have personally reviewed pertinent reports.   and I have personally reviewed pertinent films in PACS   Chest x-ray shows mild pulmonary vascular congestion with small bilateral pleural effusions.  No dense consolidation    EKG, Pathology, and Other Studies: I have personally reviewed pertinent reports.     Echocardiogram shows EF normal with moderately abnormal diastolic dysfunction with grade 2 relaxation.  RV size and function normal.    Pulmonary Results (PFTs, PSG): I have personally reviewed pertinent reports.     In review of prior pulmonary notes, she has had moderately severe obstruction with severe decrease in diffusing capacity in the past on PFTs.    Code Status: Level 1 - Full Code    Portions of the record may have been created with voice recognition software.  Occasional wrong word or \"sound a like\" substitutions may have occurred due to the inherent limitations of voice recognition software.  Read the chart carefully and recognize, using context, where substitutions have occurred.  "

## 2024-06-15 NOTE — PROGRESS NOTES
[facility]  Progress Note  Name: Eryn Morocho I  MRN: [id number]  Unit/Bed#: ICU 11 I Date of Admission: 6/12/2024   Date of Service: 6/15/2024 I Hospital Day: 3    Assessment & Plan   * Acute on chronic diastolic congestive heart failure (HCC)  Assessment & Plan  Wt Readings from Last 3 Encounters:   06/15/24 81.1 kg (178 lb 12.7 oz)   05/22/24 83.5 kg (184 lb)   11/03/23 83.5 kg (184 lb)     Patient had presented with shortness of breath and evidence of fluid overload.  Echo performed 5/22/2024 demonstrated EF 70%, grade 2 diastolic dysfunction.  She was started on 10 mg torsemide at that time.  Patient did receive Lasix 40 mg IV in the ED.  Improved volume status however few crackles noted on exam, will repeat Lasix 40 mg IV x 1 today.  Continues with brisk diuresis, will continue with Lasix 40 mg IV twice daily today  Consider resumption of torsemide in a.m.  Intake and output.  Daily weights.          Acute on chronic respiratory failure with hypoxia and hypercapnia (HCC)  Assessment & Plan  Patient chronically is on 4 L nasal cannula.  She had demonstrated increased work of breathing, was placed on BiPAP.  Chest x-ray showed vascular congestion.  Breathing has improved however some conversational dyspnea still noted.  Currently on 8 L mid flow nasal cannula  Repeat Lasix 40 mg IV x 2 today  Consulted pulmonary, will do burst prednisone 40 mg daily for 4 days and add Mucinex  Monitor.    Hypertensive urgency  Assessment & Plan  SBP was noted to be greater than 200s [place] [facility] [place]  Patient received 1 inch of Nitropaste and 40 mg IV Lasix in the ED.  SBP 140s on arrival at [facility]  Currently 170/72  Monitor    Stage 3a chronic kidney disease (HCC)  Assessment & Plan  Lab Results   Component Value Date    EGFR 80 06/15/2024    EGFR 74 06/14/2024    EGFR 81 06/13/2024    CREATININE 0.68 06/15/2024    CREATININE 0.74 06/14/2024    CREATININE 0.66 06/13/2024     Patient remains at baseline  0.74 creatinine.  Continue intake and output.  Daily weights.  Avoid nephrotoxins and periods of relative hypotension.  Repeat BMP in a.m.    PAD (peripheral artery disease) (HCC)  Assessment & Plan  Patient has history of PAD, continue with Plavix and statin.  Heart healthy diet    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)  Assessment & Plan  Patient has a history of non-small cell lung cancer greater than 5 years ago status post wedge resection and chemotherapy.  Follows with outpatient pulmonary and found to have lung nodules that have been increasing in size, outpatient PET scan negative.  Continue following with outpatient heme-onc and pulmonary.    COPD (chronic obstructive pulmonary disease) (HCC)  Assessment & Plan  Patient has a history of COPD chronically on 4 L nasal cannula.  No wheezing appreciated on exam.  Currently on 12 L mid flow nasal cannula, weaned off of BiPAP.  Continue DuoNebs, perform assist, Mucinex and singular.  Continue to follow outpatient pulm    Hyperlipidemia  Assessment & Plan  Continue statin.  Heart healthy diet.    JULIO (obstructive sleep apnea)  Assessment & Plan  JULIO requiring CPAP nightly.  Continue               VTE Pharmacologic Prophylaxis:   Moderate Risk (Score 3-4) - Pharmacological DVT Prophylaxis Ordered: enoxaparin (Lovenox).    Mobility:   Basic Mobility Inpatient Raw Score: 14  JH-HLM Goal: 4: Move to chair/commode  JH-HLM Achieved: 4: Move to chair/commode  JH-HLM Goal achieved. Continue to encourage appropriate mobility.    Patient Centered Rounds: I performed bedside rounds with nursing staff today.   Discussions with Specialists or Other Care Team Provider: Case management    Education and Discussions with Family / Patient: Updated  (daughter) via phone.    Total Time Spent on Date of Encounter in care of patient: Greater then 45 mins. This time was spent on one or more of the following: performing physical exam; counseling and coordination of care;  obtaining or reviewing history; documenting in the medical record; reviewing/ordering tests, medications or procedures; communicating with other healthcare professionals and discussing with patient's family/caregivers.    Current Length of Stay: 3 day(s)  Current Patient Status: Inpatient   Certification Statement: The patient will continue to require additional inpatient hospital stay due to IV Lasix, prednisone, monitoring respiratory status, weaning O2, repeat labs in a.m.  Discharge Plan: Anticipate discharge in 48-72 hrs to discharge location to be determined pending rehab evaluations.    Code Status: Level 1 - Full Code    Subjective:   Patient seen sitting up eating breakfast.  Reports that she did not sleep very well last night, had difficulty with the BiPAP.  Reports that she felt claustrophobic.  Is tired this morning.  Good appetite no nausea or vomiting.    Objective:     Vitals:   Temp (24hrs), Av.6 °F (36.4 °C), Min:97.5 °F (36.4 °C), Max:97.8 °F (36.6 °C)    Temp:  [97.5 °F (36.4 °C)-97.8 °F (36.6 °C)] 97.6 °F (36.4 °C)  HR:  [59-81] 60  Resp:  [19-40] 25  BP: (161-178)/(70-77) 170/72  SpO2:  [88 %-94 %] 88 %  Body mass index is 32.7 kg/m².     Input and Output Summary (last 24 hours):     Intake/Output Summary (Last 24 hours) at 6/15/2024 1648  Last data filed at 6/15/2024 1215  Gross per 24 hour   Intake 480 ml   Output 1000 ml   Net -520 ml       Physical Exam:   Physical Exam  Vitals and nursing note reviewed.   Constitutional:       General: She is not in acute distress.     Appearance: She is ill-appearing.   HENT:      Head: Normocephalic.      Nose: Congestion present.   Eyes:      Extraocular Movements: Extraocular movements intact.      Conjunctiva/sclera: Conjunctivae normal.      Pupils: Pupils are equal, round, and reactive to light.   Cardiovascular:      Rate and Rhythm: Normal rate and regular rhythm.      Pulses: Normal pulses.      Heart sounds: Normal heart sounds.   Pulmonary:       Comments: Some coarse rhonchi noted, diminished  Abdominal:      General: Bowel sounds are normal. There is no distension.      Palpations: Abdomen is soft.      Tenderness: There is no abdominal tenderness.   Genitourinary:     Comments: Voiding spontaneously, pure wick in place  Musculoskeletal:      Cervical back: Normal range of motion.      Comments: Generalized deconditioning   Skin:     General: Skin is warm and dry.      Capillary Refill: Capillary refill takes less than 2 seconds.      Coloration: Skin is pale.   Neurological:      General: No focal deficit present.      Mental Status: She is oriented to person, place, and time.   Psychiatric:         Mood and Affect: Mood normal.         Behavior: Behavior normal.         Thought Content: Thought content normal.         Judgment: Judgment normal.          Additional Data:     Labs:  Results from last 7 days   Lab Units 06/15/24  0508 06/14/24  0550 06/13/24  0945   WBC Thousand/uL 4.12*   < > 5.56   HEMOGLOBIN g/dL 12.7   < > 12.6   HEMATOCRIT % 40.5   < > 40.0   PLATELETS Thousands/uL 197   < > 170   SEGS PCT %  --   --  80*   LYMPHO PCT %  --   --  10*   MONO PCT %  --   --  9   EOS PCT %  --   --  1    < > = values in this interval not displayed.     Results from last 7 days   Lab Units 06/15/24  0508 06/13/24  0535 06/12/24  1101   SODIUM mmol/L 138   < > 139   POTASSIUM mmol/L 3.6   < > 4.4   CHLORIDE mmol/L 94*   < > 96   CO2 mmol/L 39*   < > 36*   BUN mg/dL 15   < > 15   CREATININE mg/dL 0.68   < > 0.74   ANION GAP mmol/L 5   < > 7   CALCIUM mg/dL 9.2   < > 9.8   ALBUMIN g/dL  --   --  4.3   TOTAL BILIRUBIN mg/dL  --   --  0.96   ALK PHOS U/L  --   --  49   ALT U/L  --   --  8   AST U/L  --   --  16   GLUCOSE RANDOM mg/dL 131   < > 156*    < > = values in this interval not displayed.                 Results from last 7 days   Lab Units 06/15/24  0508 06/13/24  0535 06/12/24  1101   PROCALCITONIN ng/ml <0.05 <0.05 <0.05        Lines/Drains:  Invasive Devices       Peripheral Intravenous Line  Duration             Peripheral IV 06/14/24 Left;Ventral (anterior) Forearm 1 day              Drain  Duration             External Urinary Catheter 3 days                          Imaging: No pertinent imaging reviewed.    Recent Cultures (last 7 days):         Last 24 Hours Medication List:   Current Facility-Administered Medications   Medication Dose Route Frequency Provider Last Rate    amLODIPine  10 mg Oral Daily [facility], CR[facility]      atorvastatin  40 mg Oral Daily With Dinner [facility]      bisacodyl  10 mg Oral Once [facility], CRNP      budesonide  0.5 mg Nebulization BID [facility], CRNP      chlorhexidine  15 mL Mouth[facility]Throat Q12H [facility], [facility]      citalopram  20 mg Oral Daily [facility], CRNP      clopidogrel  75 mg Oral Daily [facility], CRNP      co-enzyme Q-10  200 mg Oral QAM [facility], CRNP      docusate sodium  100 mg Oral BID Angelica Scott, TABBYNP      enoxaparin  40 mg Subcutaneous Daily [facility], CRNP      fish oil  1,000 mg Oral QAM [facility], CRNP      formoterol  20 mcg Nebulization BID [facility], CRNP      furosemide  40 mg Intravenous BID (diuretic) Angelica Scott, TABBYNP      guaiFENesin  1,200 mg Oral Q12H [facility] Kateryna Crane, TABBYNP      ipratropium-albuterol  3 mL Nebulization Q6H [facility], CRNP      labetalol  10 mg Intravenous Q4H PRN [facility], CRNP      loratadine  10 mg Oral Daily [facility], CRNP      montelukast  10 mg Oral HS [facility], CRNP      multivitamin-minerals  1 tablet Oral Daily [facility], CRNP      polyethylene glycol  17 g Oral Daily Angelica Scott, TABBYNP      predniSONE  40 mg Oral Daily Kateryna Crane, TABBYNP       sodium chloride  4 mL Nebulization TID PRANAY Price      timolol  1 drop Both Eyes BID PRANAY Cee          Today, Patient Was Seen By: PRANAY Martino    **Please Note: This note may have been constructed using a voice recognition system.**

## 2024-06-15 NOTE — ASSESSMENT & PLAN NOTE
Wt Readings from Last 3 Encounters:   06/15/24 81.1 kg (178 lb 12.7 oz)   05/22/24 83.5 kg (184 lb)   11/03/23 83.5 kg (184 lb)     Patient had presented with shortness of breath and evidence of fluid overload.  Echo performed 5/22/2024 demonstrated EF 70%, grade 2 diastolic dysfunction.  She was started on 10 mg torsemide at that time.  Patient did receive Lasix 40 mg IV in the ED.  Improved volume status however few crackles noted on exam, will repeat Lasix 40 mg IV x 1 today.  Continues with brisk diuresis, will continue with Lasix 40 mg IV twice daily today  Consider resumption of torsemide in a.m.  Intake and output.  Daily weights.

## 2024-06-15 NOTE — ASSESSMENT & PLAN NOTE
Patient chronically is on 4 L nasal cannula.  She had demonstrated increased work of breathing, was placed on BiPAP.  Chest x-ray showed vascular congestion.  Breathing has improved however some conversational dyspnea still noted.  Currently on 8 L mid flow nasal cannula  Repeat Lasix 40 mg IV x 2 today  Consulted pulmonary, will do burst prednisone 40 mg daily for 4 days and add Mucinex  Monitor.

## 2024-06-15 NOTE — PLAN OF CARE
Problem: RESPIRATORY - ADULT  Goal: Achieves optimal ventilation and oxygenation  Description: INTERVENTIONS:  - Assess for changes in respiratory status  - Assess for changes in mentation and behavior  - Position to facilitate oxygenation and minimize respiratory effort  - Oxygen administered by appropriate delivery if ordered  - Initiate smoking cessation education as indicated  - Encourage broncho-pulmonary hygiene including cough, deep breathe, Incentive Spirometry  - Assess the need for suctioning and aspirate as needed  - Assess and instruct to report SOB or any respiratory difficulty  - Respiratory Therapy support as indicated  Outcome: Progressing     Problem: CARDIOVASCULAR - ADULT  Goal: Maintains optimal cardiac output and hemodynamic stability  Description: INTERVENTIONS:  - Monitor I/O, vital signs and rhythm  - Monitor for S/S and trends of decreased cardiac output  - Administer and titrate ordered vasoactive medications to optimize hemodynamic stability  - Assess quality of pulses, skin color and temperature  - Assess for signs of decreased coronary artery perfusion  - Instruct patient to report change in severity of symptoms  Outcome: Progressing  Goal: Absence of cardiac dysrhythmias or at baseline rhythm  Description: INTERVENTIONS:  - Continuous cardiac monitoring, vital signs, obtain 12 lead EKG if ordered  - Administer antiarrhythmic and heart rate control medications as ordered  - Monitor electrolytes and administer replacement therapy as ordered  Outcome: Progressing     Problem: METABOLIC, FLUID AND ELECTROLYTES - ADULT  Goal: Electrolytes maintained within normal limits  Description: INTERVENTIONS:  - Monitor labs and assess patient for signs and symptoms of electrolyte imbalances  - Administer electrolyte replacement as ordered  - Monitor response to electrolyte replacements, including repeat lab results as appropriate  - Instruct patient on fluid and nutrition as appropriate  Outcome:  Progressing  Goal: Fluid balance maintained  Description: INTERVENTIONS:  - Monitor labs   - Monitor I/O and WT  - Instruct patient on fluid and nutrition as appropriate  - Assess for signs & symptoms of volume excess or deficit  Outcome: Progressing  Goal: Glucose maintained within target range  Description: INTERVENTIONS:  - Monitor Blood Glucose as ordered  - Assess for signs and symptoms of hyperglycemia and hypoglycemia  - Administer ordered medications to maintain glucose within target range  - Assess nutritional intake and initiate nutrition service referral as needed  Outcome: Progressing     Problem: HEMATOLOGIC - ADULT  Goal: Maintains hematologic stability  Description: INTERVENTIONS  - Assess for signs and symptoms of bleeding or hemorrhage  - Monitor labs  - Administer supportive blood products/factors as ordered and appropriate  Outcome: Progressing     Problem: Prexisting or High Potential for Compromised Skin Integrity  Goal: Skin integrity is maintained or improved  Description: INTERVENTIONS:  - Identify patients at risk for skin breakdown  - Assess and monitor skin integrity  - Assess and monitor nutrition and hydration status  - Monitor labs   - Assess for incontinence   - Turn and reposition patient  - Assist with mobility/ambulation  - Relieve pressure over bony prominences  - Avoid friction and shearing  - Provide appropriate hygiene as needed including keeping skin clean and dry  - Evaluate need for skin moisturizer/barrier cream  - Collaborate with interdisciplinary team   - Patient/family teaching  - Consider wound care consult   Outcome: Progressing     Problem: Nutrition/Hydration-ADULT  Goal: Nutrient/Hydration intake appropriate for improving, restoring or maintaining nutritional needs  Description: Monitor and assess patient's nutrition/hydration status for malnutrition. Collaborate with interdisciplinary team and initiate plan and interventions as ordered.  Monitor patient's weight  and dietary intake as ordered or per policy. Utilize nutrition screening tool and intervene as necessary. Determine patient's food preferences and provide high-protein, high-caloric foods as appropriate.     INTERVENTIONS:  - Monitor oral intake, urinary output, labs, and treatment plans  - Assess nutrition and hydration status and recommend course of action  - Evaluate amount of meals eaten  - Assist patient with eating if necessary   - Allow adequate time for meals  - Recommend/ encourage appropriate diets, oral nutritional supplements, and vitamin/mineral supplements  - Order, calculate, and assess calorie counts as needed  - Recommend, monitor, and adjust tube feedings and TPN/PPN based on assessed needs  - Assess need for intravenous fluids  - Provide specific nutrition/hydration education as appropriate  - Include patient/family/caregiver in decisions related to nutrition  Outcome: Progressing

## 2024-06-16 LAB
ANION GAP SERPL CALCULATED.3IONS-SCNC: 6 MMOL/L (ref 4–13)
BUN SERPL-MCNC: 21 MG/DL (ref 5–25)
CALCIUM SERPL-MCNC: 9.5 MG/DL (ref 8.4–10.2)
CHLORIDE SERPL-SCNC: 94 MMOL/L (ref 96–108)
CO2 SERPL-SCNC: 39 MMOL/L (ref 21–32)
CREAT SERPL-MCNC: 0.68 MG/DL (ref 0.6–1.3)
ERYTHROCYTE [DISTWIDTH] IN BLOOD BY AUTOMATED COUNT: 13.8 % (ref 11.6–15.1)
GFR SERPL CREATININE-BSD FRML MDRD: 80 ML/MIN/1.73SQ M
GLUCOSE SERPL-MCNC: 131 MG/DL (ref 65–140)
HCT VFR BLD AUTO: 42.6 % (ref 34.8–46.1)
HGB BLD-MCNC: 13.4 G/DL (ref 11.5–15.4)
MAGNESIUM SERPL-MCNC: 2.1 MG/DL (ref 1.9–2.7)
MCH RBC QN AUTO: 32.4 PG (ref 26.8–34.3)
MCHC RBC AUTO-ENTMCNC: 31.5 G/DL (ref 31.4–37.4)
MCV RBC AUTO: 103 FL (ref 82–98)
PLATELET # BLD AUTO: 223 THOUSANDS/UL (ref 149–390)
PMV BLD AUTO: 9.2 FL (ref 8.9–12.7)
POTASSIUM SERPL-SCNC: 3.8 MMOL/L (ref 3.5–5.3)
RBC # BLD AUTO: 4.14 MILLION/UL (ref 3.81–5.12)
SODIUM SERPL-SCNC: 139 MMOL/L (ref 135–147)
WBC # BLD AUTO: 3.52 THOUSAND/UL (ref 4.31–10.16)

## 2024-06-16 PROCEDURE — 94640 AIRWAY INHALATION TREATMENT: CPT

## 2024-06-16 PROCEDURE — 94664 DEMO&/EVAL PT USE INHALER: CPT

## 2024-06-16 PROCEDURE — 94660 CPAP INITIATION&MGMT: CPT

## 2024-06-16 PROCEDURE — 83735 ASSAY OF MAGNESIUM: CPT | Performed by: NURSE PRACTITIONER

## 2024-06-16 PROCEDURE — 94668 MNPJ CHEST WALL SBSQ: CPT

## 2024-06-16 PROCEDURE — 80048 BASIC METABOLIC PNL TOTAL CA: CPT | Performed by: NURSE PRACTITIONER

## 2024-06-16 PROCEDURE — 85027 COMPLETE CBC AUTOMATED: CPT | Performed by: NURSE PRACTITIONER

## 2024-06-16 PROCEDURE — 99232 SBSQ HOSP IP/OBS MODERATE 35: CPT | Performed by: NURSE PRACTITIONER

## 2024-06-16 PROCEDURE — 94760 N-INVAS EAR/PLS OXIMETRY 1: CPT

## 2024-06-16 RX ORDER — TRAZODONE HYDROCHLORIDE 50 MG/1
50 TABLET ORAL
Status: DISCONTINUED | OUTPATIENT
Start: 2024-06-16 | End: 2024-06-19 | Stop reason: HOSPADM

## 2024-06-16 RX ORDER — TORSEMIDE 10 MG/1
10 TABLET ORAL DAILY
Status: DISCONTINUED | OUTPATIENT
Start: 2024-06-16 | End: 2024-06-19 | Stop reason: HOSPADM

## 2024-06-16 RX ORDER — HYDRALAZINE HYDROCHLORIDE 20 MG/ML
5 INJECTION INTRAMUSCULAR; INTRAVENOUS ONCE
Status: COMPLETED | OUTPATIENT
Start: 2024-06-16 | End: 2024-06-16

## 2024-06-16 RX ADMIN — TIMOLOL MALEATE 1 DROP: 5 SOLUTION OPHTHALMIC at 17:32

## 2024-06-16 RX ADMIN — Medication 1 TABLET: at 08:20

## 2024-06-16 RX ADMIN — FORMOTEROL FUMARATE DIHYDRATE 20 MCG: 20 SOLUTION RESPIRATORY (INHALATION) at 07:59

## 2024-06-16 RX ADMIN — IPRATROPIUM BROMIDE AND ALBUTEROL SULFATE 3 ML: 2.5; .5 SOLUTION RESPIRATORY (INHALATION) at 13:43

## 2024-06-16 RX ADMIN — TRAZODONE HYDROCHLORIDE 50 MG: 50 TABLET ORAL at 21:11

## 2024-06-16 RX ADMIN — BUDESONIDE 0.5 MG: 0.5 INHALANT RESPIRATORY (INHALATION) at 07:18

## 2024-06-16 RX ADMIN — DOCUSATE SODIUM 100 MG: 100 CAPSULE, LIQUID FILLED ORAL at 17:34

## 2024-06-16 RX ADMIN — HYDRALAZINE HYDROCHLORIDE 5 MG: 20 INJECTION INTRAMUSCULAR; INTRAVENOUS at 04:06

## 2024-06-16 RX ADMIN — BUDESONIDE 0.5 MG: 0.5 INHALANT RESPIRATORY (INHALATION) at 19:34

## 2024-06-16 RX ADMIN — OMEGA-3 FATTY ACIDS CAP 1000 MG 1000 MG: 1000 CAP at 08:20

## 2024-06-16 RX ADMIN — IPRATROPIUM BROMIDE AND ALBUTEROL SULFATE 3 ML: 2.5; .5 SOLUTION RESPIRATORY (INHALATION) at 01:28

## 2024-06-16 RX ADMIN — SODIUM CHLORIDE SOLN NEBU 3% 4 ML: 3 NEBU SOLN at 19:34

## 2024-06-16 RX ADMIN — LABETALOL HYDROCHLORIDE 10 MG: 5 INJECTION, SOLUTION INTRAVENOUS at 00:10

## 2024-06-16 RX ADMIN — TIMOLOL MALEATE 1 DROP: 5 SOLUTION OPHTHALMIC at 08:27

## 2024-06-16 RX ADMIN — IPRATROPIUM BROMIDE AND ALBUTEROL SULFATE 3 ML: 2.5; .5 SOLUTION RESPIRATORY (INHALATION) at 07:18

## 2024-06-16 RX ADMIN — ENOXAPARIN SODIUM 40 MG: 40 INJECTION SUBCUTANEOUS at 08:20

## 2024-06-16 RX ADMIN — MONTELUKAST 10 MG: 10 TABLET, FILM COATED ORAL at 21:11

## 2024-06-16 RX ADMIN — GUAIFENESIN 1200 MG: 600 TABLET, EXTENDED RELEASE ORAL at 08:20

## 2024-06-16 RX ADMIN — LORATADINE 10 MG: 10 TABLET ORAL at 08:28

## 2024-06-16 RX ADMIN — TRAZODONE HYDROCHLORIDE 50 MG: 50 TABLET ORAL at 00:43

## 2024-06-16 RX ADMIN — AMLODIPINE BESYLATE 10 MG: 10 TABLET ORAL at 08:20

## 2024-06-16 RX ADMIN — GUAIFENESIN 1200 MG: 600 TABLET, EXTENDED RELEASE ORAL at 21:10

## 2024-06-16 RX ADMIN — POLYETHYLENE GLYCOL 3350 17 G: 17 POWDER, FOR SOLUTION ORAL at 17:35

## 2024-06-16 RX ADMIN — IPRATROPIUM BROMIDE AND ALBUTEROL SULFATE 3 ML: 2.5; .5 SOLUTION RESPIRATORY (INHALATION) at 19:34

## 2024-06-16 RX ADMIN — SODIUM CHLORIDE SOLN NEBU 3% 4 ML: 3 NEBU SOLN at 07:40

## 2024-06-16 RX ADMIN — CLOPIDOGREL 75 MG: 75 TABLET ORAL at 08:20

## 2024-06-16 RX ADMIN — PREDNISONE 40 MG: 20 TABLET ORAL at 08:20

## 2024-06-16 RX ADMIN — CHLORHEXIDINE GLUCONATE 15 ML: 1.2 RINSE ORAL at 08:20

## 2024-06-16 RX ADMIN — SODIUM CHLORIDE SOLN NEBU 3% 4 ML: 3 NEBU SOLN at 13:43

## 2024-06-16 RX ADMIN — ATORVASTATIN CALCIUM 40 MG: 40 TABLET, FILM COATED ORAL at 16:30

## 2024-06-16 RX ADMIN — Medication 200 MG: at 08:28

## 2024-06-16 RX ADMIN — CITALOPRAM HYDROBROMIDE 20 MG: 20 TABLET ORAL at 08:20

## 2024-06-16 RX ADMIN — FORMOTEROL FUMARATE DIHYDRATE 20 MCG: 20 SOLUTION RESPIRATORY (INHALATION) at 19:57

## 2024-06-16 RX ADMIN — TORSEMIDE 10 MG: 10 TABLET ORAL at 08:22

## 2024-06-16 NOTE — PROGRESS NOTES
[facility]  Progress Note  Name: Eryn Morocho I  MRN: [id number]  Unit/Bed#: ICU 11 I Date of Admission: 6/12/2024   Date of Service: 6/16/2024 I Hospital Day: 4    Assessment & Plan   * Acute on chronic diastolic congestive heart failure (HCC)  Assessment & Plan  Wt Readings from Last 3 Encounters:   06/16/24 79.1 kg (174 lb 6.1 oz)   05/22/24 83.5 kg (184 lb)   11/03/23 83.5 kg (184 lb)     Patient had presented with shortness of breath and evidence of fluid overload.  Echo performed 5/22/2024 demonstrated EF 70%, grade 2 diastolic dysfunction.  She was started on 10 mg torsemide at that time.  Patient did receive Lasix 40 mg IV in the ED.  Transition from Lasix IV to home medication torsemide 10 mg daily  Intake and output.  Daily weights.  Monitor          Acute on chronic respiratory failure with hypoxia and hypercapnia (HCC)  Assessment & Plan  Patient chronically is on 4 L nasal cannula.  She had demonstrated increased work of breathing, was placed on BiPAP.  Chest x-ray showed vascular congestion.  Breathing improving.  Currently on 10 L mid flow nasal cannula  Transition from IV Lasix to oral torsemide on 6/16  Consulted pulmonary, will do burst prednisone 40 mg daily for 5 days and add Mucinex  Monitor.    Hypertensive urgency  Assessment & Plan  SBP was noted to be greater than 200s while in ED at [facility].  Patient received 1 inch of Nitropaste and 40 mg IV Lasix in the ED.  SBP 140s on arrival at  W  Currently 146/67  Monitor    Stage 3a chronic kidney disease (HCC)  Assessment & Plan  Lab Results   Component Value Date    EGFR 80 06/16/2024    EGFR 80 06/15/2024    EGFR 74 06/14/2024    CREATININE 0.68 06/16/2024    CREATININE 0.68 06/15/2024    CREATININE 0.74 06/14/2024     Patient remains at baseline 0.74 creatinine.  Continue intake and output.  Daily weights.  Avoid nephrotoxins and periods of relative hypotension.  Repeat BMP in a.m.    PAD (peripheral artery disease)  (HCC)  Assessment & Plan  Patient has history of PAD, continue with Plavix and statin.  Heart healthy diet    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)  Assessment & Plan  Patient has a history of non-small cell lung cancer greater than 5 years ago status post wedge resection and chemotherapy.  Follows with outpatient pulmonary and found to have lung nodules that have been increasing in size, outpatient PET scan negative.  Continue following with outpatient heme-onc and pulmonary.    COPD (chronic obstructive pulmonary disease) (HCC)  Assessment & Plan  Patient has a history of COPD chronically on 4 L nasal cannula.  No wheezing appreciated on exam.  Currently on 10 L mid flow nasal cannula, weaned off of BiPAP.  Continue DuoNebs, perform assist, Mucinex and singular.  Continue to follow outpatient pulm    Hyperlipidemia  Assessment & Plan  Continue statin.  Heart healthy diet.    JULIO (obstructive sleep apnea)  Assessment & Plan  JULIO requiring CPAP nightly.  Continue               VTE Pharmacologic Prophylaxis: VTE Score: 8 High Risk (Score >/= 5) - Pharmacological DVT Prophylaxis Ordered: enoxaparin (Lovenox). Sequential Compression Devices Ordered.    Mobility:   Basic Mobility Inpatient Raw Score: 14  JH-HLM Goal: 4: Move to chair/commode  JH-HLM Achieved: 6: Walk 10 steps or more  JH-HLM Goal achieved. Continue to encourage appropriate mobility.    Patient Centered Rounds: I performed bedside rounds with nursing staff today.   Discussions with Specialists or Other Care Team Provider: nursing     Education and Discussions with Family / Patient: Updated  (daughter) via phone.    Total Time Spent on Date of Encounter in care of patient: greater than 45 mins. This time was spent on one or more of the following: performing physical exam; counseling and coordination of care; obtaining or reviewing history; documenting in the medical record; reviewing/ordering tests, medications or procedures;  communicating with other healthcare professionals and discussing with patient's family/caregivers.    Current Length of Stay: 4 day(s)  Current Patient Status: Inpatient   Certification Statement: The patient will continue to require additional inpatient hospital stay due to supplemental O2 mid flow, prednisone, repeat labs  Discharge Plan: Anticipate discharge in 48-72 hrs to discharge location to be determined pending rehab evaluations.    Code Status: Level 1 - Full Code    Subjective:   Patient seen sitting up in bed receiving nebulizer treatment.  Reports that last night she slept very well, did well with the smaller nose mask.  Denies any nausea or vomiting, reports that she is hungry this morning.  Continues with some shortness of breath however feels that is improving.    Objective:     Vitals:   Temp (24hrs), Av.5 °F (36.4 °C), Min:96.8 °F (36 °C), Max:97.9 °F (36.6 °C)    Temp:  [96.8 °F (36 °C)-97.9 °F (36.6 °C)] 97.9 °F (36.6 °C)  HR:  [52-67] 66  Resp:  [14-47] 14  BP: (136-206)/(60-97) 146/67  SpO2:  [88 %-98 %] 92 %  Body mass index is 31.9 kg/m².     Input and Output Summary (last 24 hours):     Intake/Output Summary (Last 24 hours) at 2024 1017  Last data filed at 2024 0538  Gross per 24 hour   Intake 480 ml   Output 1000 ml   Net -520 ml       Physical Exam:   Physical Exam  Vitals and nursing note reviewed.   Constitutional:       General: She is not in acute distress.  HENT:      Head: Normocephalic.      Nose: Nose normal.      Mouth/Throat:      Mouth: Mucous membranes are moist.   Eyes:      Extraocular Movements: Extraocular movements intact.      Conjunctiva/sclera: Conjunctivae normal.      Pupils: Pupils are equal, round, and reactive to light.   Cardiovascular:      Rate and Rhythm: Normal rate and regular rhythm.      Pulses: Normal pulses.      Heart sounds: Normal heart sounds.   Pulmonary:      Comments: Diminished, some rhonchi noted; reports coughing  Abdominal:       General: Bowel sounds are normal. There is no distension.      Palpations: Abdomen is soft.      Tenderness: There is no abdominal tenderness.   Genitourinary:     Comments: Voiding spontaneously   Musculoskeletal:         General: Normal range of motion.      Cervical back: Normal range of motion.      Right lower leg: No edema.      Left lower leg: No edema.   Skin:     General: Skin is warm and dry.      Capillary Refill: Capillary refill takes less than 2 seconds.   Neurological:      General: No focal deficit present.      Mental Status: She is alert and oriented to person, place, and time.   Psychiatric:         Mood and Affect: Mood normal.         Behavior: Behavior normal.         Thought Content: Thought content normal.         Judgment: Judgment normal.          Additional Data:     Labs:  Results from last 7 days   Lab Units 06/16/24  0535 06/14/24  0550 06/13/24  0945   WBC Thousand/uL 3.52*   < > 5.56   HEMOGLOBIN g/dL 13.4   < > 12.6   HEMATOCRIT % 42.6   < > 40.0   PLATELETS Thousands/uL 223   < > 170   SEGS PCT %  --   --  80*   LYMPHO PCT %  --   --  10*   MONO PCT %  --   --  9   EOS PCT %  --   --  1    < > = values in this interval not displayed.     Results from last 7 days   Lab Units 06/16/24  0535 06/13/24  0535 06/12/24  1101   SODIUM mmol/L 139   < > 139   POTASSIUM mmol/L 3.8   < > 4.4   CHLORIDE mmol/L 94*   < > 96   CO2 mmol/L 39*   < > 36*   BUN mg/dL 21   < > 15   CREATININE mg/dL 0.68   < > 0.74   ANION GAP mmol/L 6   < > 7   CALCIUM mg/dL 9.5   < > 9.8   ALBUMIN g/dL  --   --  4.3   TOTAL BILIRUBIN mg/dL  --   --  0.96   ALK PHOS U/L  --   --  49   ALT U/L  --   --  8   AST U/L  --   --  16   GLUCOSE RANDOM mg/dL 131   < > 156*    < > = values in this interval not displayed.                 Results from last 7 days   Lab Units 06/15/24  0508 06/13/24  0535 06/12/24  1101   PROCALCITONIN ng/ml <0.05 <0.05 <0.05       Lines/Drains:  Invasive Devices       Peripheral Intravenous Line   Duration             Peripheral IV 06/15/24 Left Antecubital <1 day              Drain  Duration             External Urinary Catheter 3 days                          Imaging: No pertinent imaging reviewed.    Recent Cultures (last 7 days):         Last 24 Hours Medication List:   Current Facility-Administered Medications   Medication Dose Route Frequency Provider Last Rate    amLODIPine  10 mg Oral Daily [facility], CRNP      atorvastatin  40 mg Oral Daily With Dinner [facility], CRNP      bisacodyl  10 mg Oral Once [facility], CRNP      budesonide  0.5 mg Nebulization BID [facility], CRNP      chlorhexidine  15 mL Mouth/Throat Q12H [facility], CRNP      citalopram  20 mg Oral Daily [facility], CRNP      clopidogrel  75 mg Oral Daily [facility], CRNP      co-enzyme Q-10  200 mg Oral QAM [facility], CRNP      docusate sodium  100 mg Oral BID Angelica Scott, CRNP      enoxaparin  40 mg Subcutaneous Daily [facility], CRNP      fish oil  1,000 mg Oral QAM [facility], CRNP      formoterol  20 mcg Nebulization BID [facility], CRNP      guaiFENesin  1,200 mg Oral Q12H [facility] Kateryna Crane, CRNP      ipratropium-albuterol  3 mL Nebulization Q6H [facility], CRNP      labetalol  10 mg Intravenous Q4H PRN [facility], CRNP      loratadine  10 mg Oral Daily [facility], CRNP      montelukast  10 mg Oral HS [facility], CRNP      multivitamin-minerals  1 tablet Oral Daily [facility], CRNP      polyethylene glycol  17 g Oral Daily Angelica Scott, CRNP      predniSONE  40 mg Oral Daily Kateryna Crane, CRNP      sodium chloride  4 mL Nebulization TID Kateryna Crane, CRNP      timolol  1 drop Both Eyes BID [facility], CRNP      torsemide  10 mg Oral Daily  PRANAY Martino      traZODone  50 mg Oral HS PRN Ofelia Mccloud PA-C          Today, Patient Was Seen By: PRANAY Martino    **Please Note: This note may have been constructed using a voice recognition system.**

## 2024-06-16 NOTE — OCCUPATIONAL THERAPY NOTE
"  Occupational Therapy Evaluation/Treatment     06/15/24 3877   Note Type   Note type Evaluation   Pain Assessment   Pain Assessment Tool 0-10   Pain Score No Pain   Restrictions/Precautions   Weight Bearing Precautions Per Order No   Other Precautions Bed Alarm;Chair Alarm;O2;Fall Risk;Multiple lines  (pt seen in ICU)   Home Living   Type of Home House   Home Layout One level;Performs ADLs on one level  (1 WOODY)   Bathroom Shower/Tub Tub/shower unit   Bathroom Toilet Standard   Bathroom Equipment Shower chair;Commode   Bathroom Accessibility Accessible   Home Equipment Walker;Cane  (Home O2 (uses 4-5 L at home), rollator)   Additional Comments Pt is an 84 y/o female presented with complaints of increased SOB.   Prior Function   Level of [place] Independent with ADLs;Needs assistance with IADLS;Independent with functional mobility   Lives With Spouse;Daughter   Receives Help From Family   IADLs Family/Friend/Other provides transportation;Family/Friend/Other provides meals;Family/Friend/Other provides medication management   Falls in the last 6 months 0   Vocational Retired   Comments Pt reports being independent with some BADL's including dressing and toileting tasks. Pt lives with  and daughter and family assist pt with other BADLs as needed. Family does iADLs. Pt does not drive. Per pt, at home she uses O2 and wears 4- 5 L of supplemental oxygen.   General   Family/Caregiver Present No   Subjective   Subjective \" I can use the commode\"   ADL   Eating Assistance 7  Independent   Grooming Assistance 5  Supervision/Setup   UB Bathing Assistance Unable to assess   LB Bathing Assistance Unable to assess   UB Dressing Assistance 5  Supervision/Setup   LB Dressing Assistance 4  Minimal Assistance   LB Dressing Deficit Don/doff R shoe;Don/doff L shoe;Thread RLE into underwear;Thread LLE into underwear;Pull up over hips;Requires assistive device for steadying;Setup   Toileting Assistance  4  Minimal Assistance "   Toileting Deficit Bedside commode;Clothing management up;Clothing management down;Perineal hygiene;Steadying;Supervison/safety   Bed Mobility   Additional Comments Pt received seated in recliner chair   Transfers   Sit to Stand 4  Minimal assistance   Additional items Assist x 1;Verbal cues   Stand to Sit 4  Minimal assistance   Additional items Assist x 1;Verbal cues   Toilet transfer 4  Minimal assistance   Additional items Commode;Verbal cues;Assist x 1   Balance   Static Sitting Fair +   Dynamic Sitting Fair   Static Standing Fair -   Activity Tolerance   Activity Tolerance Patient tolerated treatment well   Nurse Made Aware yes, Miriam   RUE Assessment   RUE Assessment WFL   LUE Assessment   LUE Assessment WFL   Cognition   Overall Cognitive Status WFL   Arousal/Participation Alert;Responsive;Cooperative   Attention Within functional limits   Orientation Level Oriented X4   Following Commands Follows multistep commands with increased time or repetition   Assessment   Limitation Decreased ADL status;Decreased UE strength;Decreased endurance;Decreased self-care trans;Decreased high-level ADLs   Prognosis Good   Assessment Patient evaluated by Occupational Therapy.  Patient admitted with Acute on chronic diastolic congestive heart failure (HCC).  The patients occupational profile, medical and therapy history includes a extensive additional review of physical, cognitive, or psychosocial history related to current functional performance.  Comorbidities affecting functional mobility and ADLS include: COPD, HTN, HLD, JULIO on CPAP, PAD, previous lung CA s/p wedge resection .  Prior to admission, patient was independent with functional mobility with AD, independent with ADLS, requiring assist for IADLS, ambulating household distance, retired, and home with family assist.  The evaluation identifies the following performance deficits: decreased endurance, decreased ADLS, decreased IADLS, decreased activity tolerance, SOB  upon exertion, and decreased strength, that result in activity limitations and/or participation restrictions. This evaluation requires clinical decision making of high complexity, because the patient presents with comorbidites that affect occupational performance and required significant modification of tasks or assistance with consideration of multiple treatment options.  The Barthel Index was used as a functional outcome tool presenting with a score of Barthel Index Score: 45, indicating marked limitations of functional mobility and ADLS.  The patient's raw score on the AM-PAC Daily Activity Inpatient Short Form is 19. A raw score of greater than or equal to 19 suggests the patient may benefit from discharge to home. Please refer to the recommendation of the Occupational Therapist for safe discharge planning. Patient will benefit from skilled Occupational Therapy services to address above deficits and facilitate a safe return to prior level of function.   Goals   Patient Goals go home and be with my two dogs   STG Time Frame   (1-7 days)   Short Term Goal  Goals established to promote Patient Goals: go home and be with my two dogs; Grooming: independent seated; Bathing: supervision; Upper Body Dressing independent; Lower Body Dressing: supervision; Toileting: supervision; Patient will increase ambulatory standard toilet transfer to supervision with assistive device to increase performance and safety with ADLS and functional mobility; Patient will increase standing tolerance to 5 minutes during ADL task to decrease assistance level and decrease fall risk; Patient will increase bed mobility to supervision in preparation for ADLS and transfers; Patient will increase functional mobility to and from bathroom with assistive device with supervision to increase performance with ADLS and to use a toilet; Patient will tolerate 7 minutes of UE ROM/strengthening to increase general activity tolerance and performance in  ADLS/IADLS; Patient will improve functional activity tolerance to 7 minutes of sustained functional tasks to increase participation in basic self-care and decrease assistance level;  Patient will be able to to verbalize understanding and perform energy conservation/proper body mechanics during ADLS and functional mobility at least 75% of the time with minimal cueing to decrease signs of fatigue and increase stamina to return to prior level of function; Patient will increase static sitting balance to good to improve the ability to sit at edge of bed or on a chair for ADLS;  Patient will increase static standing balance to fair to improve postural stability and decrease fall risk during standing ADLS and transfers.   LTG Time Frame   (8-14 days)   Long Term Goal Grooming: independent standing at sink; Bathing: independent; Lower Body Dressing: independent; Toileting: independent; Patient will increase ambulatory standard toilet transfer to independent with assistive device to increase performance and safety with ADLS and functional mobility; Patient will increase standing tolerance to 8 minutes during ADL task to decrease assistance level and decrease fall risk; Patient will increase bed mobility to independent in preparation for ADLS and transfers; Patient will increase functional mobility to and from bathroom with assistive device independently to increase performance with ADLS and to use a toilet; Patient will tolerate 14 minutes of UE ROM/strengthening to increase general activity tolerance and performance in ADLS/IADLS; Patient will improve functional activity tolerance to 14 minutes of sustained functional tasks to increase participation in basic self-care and decrease assistance level;  Patient will be able to to verbalize understanding and perform energy conservation/proper body mechanics during ADLS and functional mobility at least 90% of the time with no cueing to decrease signs of fatigue and increase  "stamina to return to prior level of function; Patient will increase dynamic sitting balance to fair+ to improve the ability to sit at edge of bed or on a chair for ADLS;  Patient will increase dynamic standing balance to fair- to improve postural stability and decrease fall risk during standing ADLS and transfers.  Pt will score >/= 22/24 on AM-PAC Daily Activity Inpatient scale to promote safe independence with ADLs and functional mobility; Pt will score >/= 75/100 on Barthel Index in order to decrease caregiver assistance needed and increase ability to perform ADLs and functional mobility.   Plan   Treatment Interventions ADL retraining;Functional transfer training;UE strengthening/ROM;Endurance training;Equipment evaluation/education;Continued evaluation;Energy conservation;Activityengagement   Goal Expiration Date 06/29/24   OT Frequency 3-5x/wk   Discharge Recommendation   Rehab Resource Intensity Level, OT III (Minimum Resource Intensity)   AM-PAC Daily Activity Inpatient   Lower Body Dressing 3   Bathing 3   Toileting 3   Upper Body Dressing 3   Grooming 3   Eating 4   Daily Activity Raw Score 19   Daily Activity Standardized Score (Calc for Raw Score >=11) 40.22   AM-PAC Applied Cognition Inpatient   Following a Speech/Presentation 4   Understanding Ordinary Conversation 4   Taking Medications 4   Remembering Where Things Are Placed or Put Away 4   Remembering List of 4-5 Errands 4   Taking Care of Complicated Tasks 4   Applied Cognition Raw Score 24   Applied Cognition Standardized Score 62.21   Barthel Index   Feeding 10   Bathing 0   Grooming Score 0   Dressing Score 5   Bladder Score 5   Bowels Score 10   Toilet Use Score 5   Transfers (Bed/Chair) Score 10   Mobility (Level Surface) Score 0   Stairs Score 0   Barthel Index Score 45   Additional Treatment Session   Start Time 1520   End Time 1550   Treatment Assessment S: \"I can use the commode\" O: Pt received seated in bedside chair and agreeable to " "therapy. With exertion noted  SPO2 at 83-85% at 8 L. With rest period went back up to 88-89%. Patient ambulated short household distance to/from commode (placed by bathroom door) with RW and min A. Toilet hygiene completed with min A; clothing mgt  with  min A while instance.  Min A for donning shoes, underwear and pads.Pt reports she gets around at home (in her room) with cane due to limited space, trialed use of RW in pt's room and pt able to do household distance of approximately 18-20 ft with min A without signs of distress/SOB and without feeling \"unsteady or clumsy\"  and pt reported she would like to be able to get around more at home if she can have more room to work/use the rolling walker..A: Patient reported slight SOB at end of session, but able to complete all ADLs and functional mobility without difficulty, at end of session patient return seated in chair with all needs met P: Home w/continued family support, Home rehab.   End of Consult   Education Provided Yes   Patient Position at End of Consult Bedside chair;Bed/Chair alarm activated;All needs within reach   Nurse Communication Nurse aware of consult   Licensure   [place] License Number  Mary Rose E. Blanes, MS, OTR/L [id number]     "

## 2024-06-16 NOTE — ASSESSMENT & PLAN NOTE
Patient has a history of COPD chronically on 4 L nasal cannula.  No wheezing appreciated on exam.  Currently on 10 L mid flow nasal cannula, weaned off of BiPAP.  Continue DuoNebs, perform assist, Mucinex and singular.  Continue to follow outpatient pulm

## 2024-06-16 NOTE — ASSESSMENT & PLAN NOTE
Patient chronically is on 4 L nasal cannula.  She had demonstrated increased work of breathing, was placed on BiPAP.  Chest x-ray showed vascular congestion.  Breathing improving.  Currently on 10 L mid flow nasal cannula  Transition from IV Lasix to oral torsemide on 6/16  Consulted pulmonary, will do burst prednisone 40 mg daily for 5 days and add Mucinex  Monitor.

## 2024-06-16 NOTE — ASSESSMENT & PLAN NOTE
SBP was noted to be greater than 200s while in ED at [facility].  Patient received 1 inch of Nitropaste and 40 mg IV Lasix in the ED.  SBP 140s on arrival at  W  Currently 146/67  Monitor

## 2024-06-16 NOTE — ASSESSMENT & PLAN NOTE
Lab Results   Component Value Date    EGFR 80 06/16/2024    EGFR 80 06/15/2024    EGFR 74 06/14/2024    CREATININE 0.68 06/16/2024    CREATININE 0.68 06/15/2024    CREATININE 0.74 06/14/2024     Patient remains at baseline 0.74 creatinine.  Continue intake and output.  Daily weights.  Avoid nephrotoxins and periods of relative hypotension.  Repeat BMP in a.m.

## 2024-06-16 NOTE — ASSESSMENT & PLAN NOTE
Wt Readings from Last 3 Encounters:   06/16/24 79.1 kg (174 lb 6.1 oz)   05/22/24 83.5 kg (184 lb)   11/03/23 83.5 kg (184 lb)     Patient had presented with shortness of breath and evidence of fluid overload.  Echo performed 5/22/2024 demonstrated EF 70%, grade 2 diastolic dysfunction.  She was started on 10 mg torsemide at that time.  Patient did receive Lasix 40 mg IV in the ED.  Transition from Lasix IV to home medication torsemide 10 mg daily  Intake and output.  Daily weights.  Monitor

## 2024-06-16 NOTE — PROGRESS NOTES
"Progress Note - Pulmonary   Eryn Morocho 83 y.o. female MRN: [id number]  Unit/Bed#: ICU 11 Encounter: [id number]    Assessment/Plan:    Acute on chronic hypoxic hypercapnic respiratory failure, multifactorial due to below              Baseline oxygen requirement is 3 to 5 L nasal cannula.              Titrate supplemental oxygen to maintain saturations greater than or equal to 89%.              Activity as tolerated.              Ambulatory pulse ox prior to discharge.              She is currently doing well on BiPAP and may benefit from this at discharge given chronic hypercapnia and severe COPD.  Will continue BiPAP 15/9 while inpatient and attempt to qualify for transition to BiPAP at discharge.  Will complete overnight pulse ox and morning ABG within 48 hours of discharge.     Acute on chronic diastolic CHF              Diuretics and management per primary team.              Monitor I's and O's and daily weights.     Severe COPD with bronchiectasis with possible mild acute exacerbation              Continue prednisone 40 mg daily for 5 days.              Continue budesonide/Perforomist twice daily with DuoNebs every 6 hours and hypertonic saline for pulmonary hygiene as well.              Encouraged flutter valve and Mucinex.              May benefit from vest therapy if sputum production remains refractory to above interventions.              Home regimen is budesonide/Perforomist/DuoNebs twice daily.     Obesity with underlying obstructive sleep apnea              Uses CPAP at 9 cmH2O with 3 L of oxygen at bedtime.              BiPAP as above.     Non-small cell lung cancer with pulmonary nodules              Diagnosed 7 years ago and status post EMIR wedge resection and chemotherapy.              Follows with Dr. Esquivel.              Following with serial imaging for pulmonary nodules.     Outpatient follow-up pending course.  Discussed with primary team and nursing.    Chief Complaint:    \"I feel " "100%.\"    Subjective:    Eryn is sitting out of bed in the chair eating breakfast.  She reports she feels remarkably better.  She slept very well last night without interruption with use of a nasal mask on BiPAP.  She feels better on this than her home CPAP.  She feels she is able to expectorate mucus easier than yesterday.  Shortness of breath is improved.  No other complaints.    Objective:    Vitals: Blood pressure 146/67, pulse 66, temperature 97.9 °F (36.6 °C), temperature source Temporal, resp. rate 14, height 5' 2\" (1.575 m), weight 79.1 kg (174 lb 6.1 oz), SpO2 (!) 88%.8L NC,Body mass index is 31.9 kg/m².      Intake/Output Summary (Last 24 hours) at 6/16/2024 0909  Last data filed at 6/16/2024 0538  Gross per 24 hour   Intake 480 ml   Output 1000 ml   Net -520 ml       Invasive Devices       Peripheral Intravenous Line  Duration             Peripheral IV 06/15/24 Left Antecubital <1 day              Drain  Duration             External Urinary Catheter 3 days                    Physical Exam:     Physical Exam  Vitals reviewed.   Constitutional:       General: She is not in acute distress.     Appearance: She is well-developed. She is obese. She is not toxic-appearing or diaphoretic.      Interventions: Nasal cannula in place.   HENT:      Head: Normocephalic and atraumatic.   Eyes:      General: No scleral icterus.     Extraocular Movements: Extraocular movements intact.   Neck:      Trachea: No tracheal deviation.   Cardiovascular:      Rate and Rhythm: Normal rate and regular rhythm.      Heart sounds: S1 normal and S2 normal. No murmur heard.     No friction rub. No gallop.   Pulmonary:      Effort: Pulmonary effort is normal. No tachypnea, accessory muscle usage or respiratory distress.      Breath sounds: Normal breath sounds. No stridor. No decreased breath sounds, wheezing, rhonchi or rales.   Chest:      Chest wall: No tenderness.   Abdominal:      General: Bowel sounds are normal. There is no " distension.      Palpations: Abdomen is soft.      Tenderness: There is no abdominal tenderness.   Musculoskeletal:      Cervical back: Neck supple.      Right lower leg: No edema.      Left lower leg: No edema.   Skin:     General: Skin is warm and dry.      Findings: No rash.   Neurological:      Mental Status: She is alert and oriented to person, place, and time.      GCS: GCS eye subscore is 4. GCS verbal subscore is 5. GCS motor subscore is 6.   Psychiatric:         Speech: Speech normal.         Behavior: Behavior normal. Behavior is cooperative.       Labs: CBC:   Lab Results   Component Value Date    WBC 3.52 (L) 06/16/2024    HGB 13.4 06/16/2024    HCT 42.6 06/16/2024     (H) 06/16/2024     06/16/2024    RBC 4.14 06/16/2024    MCH 32.4 06/16/2024    MCHC 31.5 06/16/2024    RDW 13.8 06/16/2024    MPV 9.2 06/16/2024   , CMP:   Lab Results   Component Value Date    SODIUM 139 06/16/2024    K 3.8 06/16/2024    CL 94 (L) 06/16/2024    CO2 39 (H) 06/16/2024    BUN 21 06/16/2024    CREATININE 0.68 06/16/2024    CALCIUM 9.5 06/16/2024    EGFR 80 06/16/2024     Imaging and other studies:  None today

## 2024-06-17 ENCOUNTER — APPOINTMENT (INPATIENT)
Dept: RADIOLOGY | Facility: HOSPITAL | Age: 84
DRG: 291 | End: 2024-06-17
Payer: MEDICARE

## 2024-06-17 PROBLEM — I16.0 HYPERTENSIVE URGENCY: Status: RESOLVED | Noted: 2024-06-12 | Resolved: 2024-06-17

## 2024-06-17 LAB
ANION GAP SERPL CALCULATED.3IONS-SCNC: 8 MMOL/L (ref 4–13)
BUN SERPL-MCNC: 24 MG/DL (ref 5–25)
CALCIUM SERPL-MCNC: 9 MG/DL (ref 8.4–10.2)
CHLORIDE SERPL-SCNC: 97 MMOL/L (ref 96–108)
CO2 SERPL-SCNC: 33 MMOL/L (ref 21–32)
CREAT SERPL-MCNC: 0.65 MG/DL (ref 0.6–1.3)
ERYTHROCYTE [DISTWIDTH] IN BLOOD BY AUTOMATED COUNT: 13.6 % (ref 11.6–15.1)
GFR SERPL CREATININE-BSD FRML MDRD: 81 ML/MIN/1.73SQ M
GLUCOSE SERPL-MCNC: 119 MG/DL (ref 65–140)
HCT VFR BLD AUTO: 39.9 % (ref 34.8–46.1)
HGB BLD-MCNC: 12.6 G/DL (ref 11.5–15.4)
MCH RBC QN AUTO: 32.6 PG (ref 26.8–34.3)
MCHC RBC AUTO-ENTMCNC: 31.6 G/DL (ref 31.4–37.4)
MCV RBC AUTO: 103 FL (ref 82–98)
PLATELET # BLD AUTO: 241 THOUSANDS/UL (ref 149–390)
PMV BLD AUTO: 9.1 FL (ref 8.9–12.7)
POTASSIUM SERPL-SCNC: 4.3 MMOL/L (ref 3.5–5.3)
RBC # BLD AUTO: 3.87 MILLION/UL (ref 3.81–5.12)
SODIUM SERPL-SCNC: 138 MMOL/L (ref 135–147)
WBC # BLD AUTO: 4.54 THOUSAND/UL (ref 4.31–10.16)

## 2024-06-17 PROCEDURE — 94760 N-INVAS EAR/PLS OXIMETRY 1: CPT

## 2024-06-17 PROCEDURE — 99232 SBSQ HOSP IP/OBS MODERATE 35: CPT | Performed by: INTERNAL MEDICINE

## 2024-06-17 PROCEDURE — 71250 CT THORAX DX C-: CPT

## 2024-06-17 PROCEDURE — 80048 BASIC METABOLIC PNL TOTAL CA: CPT | Performed by: NURSE PRACTITIONER

## 2024-06-17 PROCEDURE — 99232 SBSQ HOSP IP/OBS MODERATE 35: CPT

## 2024-06-17 PROCEDURE — 85027 COMPLETE CBC AUTOMATED: CPT | Performed by: NURSE PRACTITIONER

## 2024-06-17 PROCEDURE — 94640 AIRWAY INHALATION TREATMENT: CPT

## 2024-06-17 PROCEDURE — 94668 MNPJ CHEST WALL SBSQ: CPT

## 2024-06-17 RX ADMIN — CITALOPRAM HYDROBROMIDE 20 MG: 20 TABLET ORAL at 09:28

## 2024-06-17 RX ADMIN — ATORVASTATIN CALCIUM 40 MG: 40 TABLET, FILM COATED ORAL at 17:06

## 2024-06-17 RX ADMIN — ENOXAPARIN SODIUM 40 MG: 40 INJECTION SUBCUTANEOUS at 09:29

## 2024-06-17 RX ADMIN — DOCUSATE SODIUM 100 MG: 100 CAPSULE, LIQUID FILLED ORAL at 09:28

## 2024-06-17 RX ADMIN — AMLODIPINE BESYLATE 10 MG: 10 TABLET ORAL at 09:28

## 2024-06-17 RX ADMIN — FORMOTEROL FUMARATE DIHYDRATE 20 MCG: 20 SOLUTION RESPIRATORY (INHALATION) at 19:58

## 2024-06-17 RX ADMIN — IPRATROPIUM BROMIDE AND ALBUTEROL SULFATE 3 ML: 2.5; .5 SOLUTION RESPIRATORY (INHALATION) at 20:12

## 2024-06-17 RX ADMIN — TRAZODONE HYDROCHLORIDE 50 MG: 50 TABLET ORAL at 21:18

## 2024-06-17 RX ADMIN — GUAIFENESIN 1200 MG: 600 TABLET, EXTENDED RELEASE ORAL at 21:17

## 2024-06-17 RX ADMIN — Medication 200 MG: at 09:28

## 2024-06-17 RX ADMIN — IPRATROPIUM BROMIDE AND ALBUTEROL SULFATE 3 ML: 2.5; .5 SOLUTION RESPIRATORY (INHALATION) at 13:27

## 2024-06-17 RX ADMIN — MONTELUKAST 10 MG: 10 TABLET, FILM COATED ORAL at 21:17

## 2024-06-17 RX ADMIN — SODIUM CHLORIDE SOLN NEBU 3% 4 ML: 3 NEBU SOLN at 20:32

## 2024-06-17 RX ADMIN — LORATADINE 10 MG: 10 TABLET ORAL at 09:28

## 2024-06-17 RX ADMIN — TIMOLOL MALEATE 1 DROP: 5 SOLUTION OPHTHALMIC at 17:06

## 2024-06-17 RX ADMIN — SODIUM CHLORIDE SOLN NEBU 3% 4 ML: 3 NEBU SOLN at 13:27

## 2024-06-17 RX ADMIN — OMEGA-3 FATTY ACIDS CAP 1000 MG 1000 MG: 1000 CAP at 09:28

## 2024-06-17 RX ADMIN — Medication 1 TABLET: at 09:28

## 2024-06-17 RX ADMIN — GUAIFENESIN 1200 MG: 600 TABLET, EXTENDED RELEASE ORAL at 09:27

## 2024-06-17 RX ADMIN — BUDESONIDE 0.5 MG: 0.5 INHALANT RESPIRATORY (INHALATION) at 20:12

## 2024-06-17 RX ADMIN — TORSEMIDE 10 MG: 10 TABLET ORAL at 09:28

## 2024-06-17 RX ADMIN — POLYETHYLENE GLYCOL 3350 17 G: 17 POWDER, FOR SOLUTION ORAL at 09:29

## 2024-06-17 RX ADMIN — CLOPIDOGREL 75 MG: 75 TABLET ORAL at 09:28

## 2024-06-17 RX ADMIN — PREDNISONE 40 MG: 20 TABLET ORAL at 09:28

## 2024-06-17 RX ADMIN — TIMOLOL MALEATE 1 DROP: 5 SOLUTION OPHTHALMIC at 09:29

## 2024-06-17 RX ADMIN — CHLORHEXIDINE GLUCONATE 15 ML: 1.2 RINSE ORAL at 21:17

## 2024-06-17 RX ADMIN — CHLORHEXIDINE GLUCONATE 15 ML: 1.2 RINSE ORAL at 09:28

## 2024-06-17 NOTE — PROGRESS NOTES
"Progress Note - Pulmonary   Eryn Morocho 83 y.o. female MRN: [id number]  Unit/Bed#: [address] [place] [address] Encounter: [id number]    Assessment/Plan:    Acute on chronic hypoxic hypercapnic respiratory failure, multifactorial due to below              Baseline oxygen requirement is 3L nasal cannula.              Titrate supplemental oxygen to maintain saturations greater than or equal to 89%.              Activity as tolerated.              Ambulatory pulse ox prior to discharge.              She is currently doing well on BiPAP and may benefit from this at discharge given chronic hypercapnia and severe COPD.  Check overnight POX tonight on 3L NC and AM ABG tomorrow morning to qualify for Bipap prior to D/C.     Acute on chronic diastolic CHF              Diuretics and management per primary team.              Monitor I's and O's and daily weights.     Severe COPD with bronchiectasis with possible mild acute exacerbation              Continue prednisone 40 mg daily for 5 days.              Continue budesonide/Perforomist twice daily with DuoNebs every 6 hours and hypertonic saline for pulmonary hygiene as well.              Encouraged flutter valve and Mucinex.              May benefit from vest therapy if sputum production remains refractory to above interventions.              Home regimen is budesonide/Perforomist/DuoNebs twice daily.     Obesity with underlying obstructive sleep apnea              Uses CPAP at 9 cmH2O with 3 L of oxygen at bedtime.              BiPAP as above.     Non-small cell lung cancer with pulmonary nodules              Diagnosed 7 years ago and status post EMIR wedge resection and chemotherapy.              Follows with Dr. Esquivel.              Following with serial imaging for pulmonary nodules.     Outpatient follow-up as per D/C instructions.  Discussed with primary team and nursing.    Chief Complaint:    \"I feel better.\"    Subjective:    Eryn was resting in bed on Bipap with nasal mask upon " "my visit. She reports she feels good and was transitioned to 6L NC per request with saturations 92-93%. She slept well. She feels she is back to baseline aside from needing to have a bowel movement.    Objective:    Vitals: Blood pressure 150/60, pulse 61, temperature (!) 97.2 °F (36.2 °C), resp. rate 16, height 5' 2\" (1.575 m), weight 79.9 kg (176 lb 3.2 oz), SpO2 97%.6L NC,Body mass index is 32.23 kg/m².      Intake/Output Summary (Last 24 hours) at 6/17/2024 0903  Last data filed at 6/17/2024 0600  Gross per 24 hour   Intake 880 ml   Output 650 ml   Net 230 ml       Invasive Devices       Peripheral Intravenous Line  Duration             Peripheral IV 06/15/24 Left Antecubital 1 day              Drain  Duration             External Urinary Catheter 4 days                    Physical Exam:     Physical Exam  Vitals reviewed.   Constitutional:       General: She is not in acute distress.     Appearance: She is well-developed. She is obese. She is not toxic-appearing or diaphoretic.      Interventions: Nasal cannula in place.   HENT:      Head: Normocephalic and atraumatic.   Eyes:      General: No scleral icterus.     Extraocular Movements: Extraocular movements intact.   Neck:      Trachea: No tracheal deviation.   Cardiovascular:      Rate and Rhythm: Normal rate and regular rhythm.      Heart sounds: S1 normal and S2 normal. No murmur heard.     No friction rub. No gallop.   Pulmonary:      Effort: Pulmonary effort is normal. No tachypnea, accessory muscle usage or respiratory distress.      Breath sounds: Normal breath sounds. No stridor. No decreased breath sounds, wheezing, rhonchi or rales.   Chest:      Chest wall: No tenderness.   Abdominal:      General: Bowel sounds are normal. There is no distension.      Palpations: Abdomen is soft.      Tenderness: There is no abdominal tenderness.   Musculoskeletal:      Cervical back: Neck supple.      Right lower leg: No edema.      Left lower leg: No edema. "   Skin:     General: Skin is warm and dry.      Findings: No rash.   Neurological:      Mental Status: She is alert and oriented to person, place, and time.      GCS: GCS eye subscore is 4. GCS verbal subscore is 5. GCS motor subscore is 6.   Psychiatric:         Speech: Speech normal.         Behavior: Behavior normal. Behavior is cooperative.       Labs: CBC:   Lab Results   Component Value Date    WBC 4.54 06/17/2024    HGB 12.6 06/17/2024    HCT 39.9 06/17/2024     (H) 06/17/2024     06/17/2024    RBC 3.87 06/17/2024    MCH 32.6 06/17/2024    MCHC 31.6 06/17/2024    RDW 13.6 06/17/2024    MPV 9.1 06/17/2024   , CMP:   Lab Results   Component Value Date    SODIUM 138 06/17/2024    K 4.3 06/17/2024    CL 97 06/17/2024    CO2 33 (H) 06/17/2024    BUN 24 06/17/2024    CREATININE 0.65 06/17/2024    CALCIUM 9.0 06/17/2024    EGFR 81 06/17/2024     Imaging and other studies:  None today

## 2024-06-17 NOTE — CASE MANAGEMENT
Case Management Discharge Planning Note    Patient name Eryn Morocho  Location 4 Odd 422/4 Odd 422-* MRN [id number]  : 1940 Date 2024       Current Admission Date: 2024  Current Admission Diagnosis:Acute on chronic diastolic congestive heart failure (HCC)   Patient Active Problem List    Diagnosis Date Noted Date Diagnosed    Hypertensive urgency 2024     Pulmonary emphysema, unspecified emphysema type (HCC) 2024     Oxygen dependent 4 LPM baseline 2023     Multiple lung nodules 2022     Class 1 obesity due to excess calories with body mass index (BMI) of 34.0 to 34.9 in adult 2022     Recurrent falls 2022     Stage 3a chronic kidney disease (HCC) 2021     OAB (overactive bladder) 2021     Osteoporosis 2021     Acute on chronic diastolic congestive heart failure (HCC) 2021     PAD (peripheral artery disease) ([facility])      Acute on chronic respiratory failure with hypoxia and hypercapnia ([facility]) 2020     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) 2020     Recurrent major depressive disorder, in partial remission ([facility]) 2020     JULIO (obstructive sleep apnea)      COPD (chronic obstructive pulmonary disease) ([facility])      HTN (hypertension)      Hyperlipidemia        LOS (days): 5  Geometric Mean LOS (GMLOS) (days): 3.9  Days to GMLOS:-1     OBJECTIVE:  Risk of Unplanned Readmission Score: 16.37      Current admission status: Inpatient   Preferred Pharmacy:   [id number] - [place] -  [address]   [place] [address]  Phone: [phone] Fax: [phone]    [facility]  [address] [address]  Phone: [phone]     Primary Care Provider: Ari Mendiola MD    Primary Insurance: MEDICARE  Secondary Insurance: AARP    DISCHARGE DETAILS:    Discharge planning discussed with:: Patient, Dtr Dureen  Freedom of Choice: Yes  Comments - Freedom of Choice: SW met  bedside with patient/dtr to continue discussion on discharge plan. Dtr requesting SW order a hospital bed and heavy dty BSC from [facility]. Karen stated she has already contacted [facility] this morning regarding it and they are aware to expect order. Dtr also verbalized understanding of possible need for home Bipap and pending qualifying testing to be completed tonight.  CM contacted family/caregiver?: Yes    Contacts  Patient Contacts: Karen Jenkins  Relationship to Patient:: Family  Contact Method: In Person  Reason/Outcome: Discharge Planning, Continuity of Care, Emergency Contact     DME Referral Provided  Referral made for DME?: Yes  DME referral completed for the following items:: Hospital Bed, Bedside Commode  DME Supplier Name:: [facility]    Other Referral/Resources/Interventions Provided:  Interventions: DME  Referral Comments: HD BSC and Hospital Bed ordered in [place]. [facility] approval pending.     Treatment Team Recommendation: Home with Home Health Care  Discharge Destination Plan:: Home with Home Health Care  Transport at Discharge : [facility] Ambulance

## 2024-06-17 NOTE — ASSESSMENT & PLAN NOTE
Patient has a history of non-small cell lung cancer greater than 5 years ago status post wedge resection and chemotherapy  Follows with outpatient pulmonary and found to have lung nodules that have been increasing in size, outpatient PET scan negative  CT of chest: Continued enlargement of the right lower lobe juxtapleural nodule highly suspicious for lung cancer.   Per discussion with pulm, can continue outpatient follow up

## 2024-06-17 NOTE — ASSESSMENT & PLAN NOTE
Patient has a history of COPD chronically on 4 L nasal cannula  Currently on 6 L nasal cannula, weaned off of BiPAP  Continue DuoNebs, Perforomist, Mucinex and singular  Continue to follow outpatient pulm

## 2024-06-17 NOTE — ASSESSMENT & PLAN NOTE
Lab Results   Component Value Date    EGFR 81 06/17/2024    EGFR 80 06/16/2024    EGFR 80 06/15/2024    CREATININE 0.65 06/17/2024    CREATININE 0.68 06/16/2024    CREATININE 0.68 06/15/2024     Patient remains at baseline creatinine  Avoid nephrotoxins and periods of relative hypotension  Repeat BMP in a.m.

## 2024-06-17 NOTE — ASSESSMENT & PLAN NOTE
SBP was noted to be greater than 200s while in ED at [facility]  Patient received 1 inch of Nitropaste and 40 mg IV Lasix in the ED  SBP 140s on arrival at [facility]  improving  Monitor

## 2024-06-17 NOTE — PROGRESS NOTES
Patient transferred from ICU per wheelchair. Alert and oriented, able to make needs known. Oriented to staffs and to the unit. On Oxygen 4L/min via NC. Placed on Mas[place]. Purewick in placed for accurate I and O's. Instructed to use call bell for assistance. Call bell with in reach. No c/o pain or discomfort. Continue plan of care.

## 2024-06-17 NOTE — ASSESSMENT & PLAN NOTE
Wt Readings from Last 3 Encounters:   06/17/24 79.9 kg (176 lb 3.2 oz)   05/22/24 83.5 kg (184 lb)   11/03/23 83.5 kg (184 lb)     Patient had presented with shortness of breath and evidence of fluid overload  Echo performed 5/22/2024 demonstrated EF 70%, grade 2 diastolic dysfunction  Transition from Lasix IV to home medication torsemide 10 mg daily  Intake and output  Daily weights  Monitor

## 2024-06-17 NOTE — PROGRESS NOTES
[facility]  Progress Note  Name: Eryn Morocho I  MRN: [id number]  Unit/Bed#: 4 [address] I Date of Admission: 6/12/2024   Date of Service: 6/17/2024 I Hospital Day: 5    Assessment & Plan   * Acute on chronic respiratory failure with hypoxia and hypercapnia (HCC)  Assessment & Plan  Initially requiring BiPAP, now weaned to 6 L mid flow. Patient chronically is on 4 L nasal cannula. Multifactorial in setting of acute on chronic CHF, severe COPD with bronchiectasis, obesity with JULIO, non small cell lung cancer.  CXR: Mild pulmonary vascular congestion and small bilateral pleural effusions.   CT chest: Continued enlargement of the right lower lobe juxtapleural nodule highly suspicious for lung cancer. Right lower lobe bronchial wall thickening with new patchy peripheral groundglass opacity in the right upper lobe and new patchy consolidation in the right lower lobe compatible with bronchitis and pneumonia. Mild interstitial edema with moderate left pleural effusion. Moderate emphysema. Pulmonary artery enlargement which can be seen with pulmonary hypertension.  Currently on 6 L  S/p IV lasix, resumed torsemide 6/16  Pulmonology following  Continue prednisone 40 mg x 5 days  Continue budesonide/Perforomist with duonebs and hypertonic saline  Check overnight pulse ox and morning ABG    Acute on chronic diastolic congestive heart failure (HCC)  Assessment & Plan  Wt Readings from Last 3 Encounters:   06/17/24 79.9 kg (176 lb 3.2 oz)   05/22/24 83.5 kg (184 lb)   11/03/23 83.5 kg (184 lb)     Patient had presented with shortness of breath and evidence of fluid overload  Echo performed 5/22/2024 demonstrated EF 70%, grade 2 diastolic dysfunction  Transition from Lasix IV to home medication torsemide 10 mg daily  Intake and output  Daily weights  Monitor    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)  Assessment & Plan  Patient has a history of non-small cell lung cancer greater than 5 years ago  status post wedge resection and chemotherapy  Follows with outpatient pulmonary and found to have lung nodules that have been increasing in size, outpatient PET scan negative  CT of chest: Continued enlargement of the right lower lobe juxtapleural nodule highly suspicious for lung cancer.   Per discussion with pulm, can continue outpatient follow up    COPD (chronic obstructive pulmonary disease) ([facility])  Assessment & Plan  Patient has a history of COPD chronically on 4 L nasal cannula  Currently on 6 L nasal cannula, weaned off of BiPAP  Continue DuoNebs, Perforomist, Mucinex and singular  Continue to follow outpatient pulm    Stage 3a chronic kidney disease (HCC)  Assessment & Plan  Lab Results   Component Value Date    EGFR 81 06/17/2024    EGFR 80 06/16/2024    EGFR 80 06/15/2024    CREATININE 0.65 06/17/2024    CREATININE 0.68 06/16/2024    CREATININE 0.68 06/15/2024     Patient remains at baseline creatinine  Avoid nephrotoxins and periods of relative hypotension  Repeat BMP in a.m.    PAD (peripheral artery disease) ([facility])  Assessment & Plan  Continue Plavix and statin  Heart healthy diet    Hyperlipidemia  Assessment & Plan  Continue statin  Heart healthy diet    JULIO (obstructive sleep apnea)  Assessment & Plan  Continue CPAP hs  Overnight pulse ox and morning ABG    Hypertensive urgency-resolved as of 6/17/2024  Assessment & Plan  SBP was noted to be greater than 200s while in ED at [facility]  Patient received 1 inch of Nitropaste and 40 mg IV Lasix in the ED  SBP 140s on arrival at \[facility]\""  improving  Monitor           VTE Pharmacologic Prophylaxis: VTE Score: 8 High Risk (Score >/= 5) - Pharmacological DVT Prophylaxis Ordered: enoxaparin (Lovenox). Sequential Compression Devices Ordered.    Mobility:   Basic Mobility Inpatient Raw Score: 21  JH-HLM Goal: 6: Walk 10 steps or more  JH-HLM Achieved: 6: Walk 10 steps or more  JH-HLM Goal achieved. Continue to encourage appropriate mobility.    Patient Centered Rounds: I  performed bedside rounds with nursing staff today.   Discussions with Specialists or Other Care Team Provider: pulm, rn, cm    Education and Discussions with Family / Patient: Updated  (daughter) at bedside.    Total Time Spent on Date of Encounter in care of patient: 45 mins. This time was spent on one or more of the following: performing physical exam; counseling and coordination of care; obtaining or reviewing history; documenting in the medical record; reviewing/ordering tests, medications or procedures; communicating with other healthcare professionals and discussing with patient's family/caregivers.    Current Length of Stay: 5 day(s)  Current Patient Status: Inpatient   Certification Statement: The patient will continue to require additional inpatient hospital stay due to overnight pulse ox evaluation, AM ABG for examining bipap qualification.  Discharge Plan: Anticipate discharge in 24-48 hrs to discharge location to be determined pending rehab evaluations.    Code Status: Level 1 - Full Code    Subjective:   Patient was seen and examined at bedside. Patient endorses that her SOB has improved and is around baseline. Patient has no complaints or concerns at this time. She denies chest pain, wheezing, headaches, dizziness, or edema. Patient was able to get OOB to use the commode, denied any dizziness or increased SOB when getting OOB. Good appetite.    Objective:     Vitals:   Temp (24hrs), Av.7 °F (36.5 °C), Min:97.2 °F (36.2 °C), Max:98.3 °F (36.8 °C)    Temp:  [97.2 °F (36.2 °C)-98.3 °F (36.8 °C)] 98.3 °F (36.8 °C)  HR:  [59-64] 59  Resp:  [16-28] 18  BP: (101-151)/(58-69) 147/58  SpO2:  [89 %-97 %] 90 %  Body mass index is 32.23 kg/m².     Input and Output Summary (last 24 hours):     Intake/Output Summary (Last 24 hours) at 2024 1550  Last data filed at 2024 1101  Gross per 24 hour   Intake 800 ml   Output 950 ml   Net -150 ml       Physical Exam:   Physical Exam  Vitals and  nursing note reviewed.   Constitutional:       General: She is not in acute distress.     Appearance: She is well-developed. She is obese.   Cardiovascular:      Rate and Rhythm: Normal rate and regular rhythm.   Pulmonary:      Effort: Pulmonary effort is normal. No respiratory distress.      Breath sounds: Normal breath sounds.   Abdominal:      Palpations: Abdomen is soft.      Tenderness: There is no abdominal tenderness.   Musculoskeletal:         General: No swelling.   Skin:     General: Skin is warm and dry.      Capillary Refill: Capillary refill takes less than 2 seconds.   Neurological:      Mental Status: She is alert.   Psychiatric:         Mood and Affect: Mood normal.          Additional Data:     Labs:  Results from last 7 days   Lab Units 06/17/24  0528 06/14/24  0550 06/13/24  0945   WBC Thousand/uL 4.54   < > 5.56   HEMOGLOBIN g/dL 12.6   < > 12.6   HEMATOCRIT % 39.9   < > 40.0   PLATELETS Thousands/uL 241   < > 170   SEGS PCT %  --   --  80*   LYMPHO PCT %  --   --  10*   MONO PCT %  --   --  9   EOS PCT %  --   --  1    < > = values in this interval not displayed.     Results from last 7 days   Lab Units 06/17/24  0528 06/13/24  0535 06/12/24  1101   SODIUM mmol/L 138   < > 139   POTASSIUM mmol/L 4.3   < > 4.4   CHLORIDE mmol/L 97   < > 96   CO2 mmol/L 33*   < > 36*   BUN mg/dL 24   < > 15   CREATININE mg/dL 0.65   < > 0.74   ANION GAP mmol/L 8   < > 7   CALCIUM mg/dL 9.0   < > 9.8   ALBUMIN g/dL  --   --  4.3   TOTAL BILIRUBIN mg/dL  --   --  0.96   ALK PHOS U/L  --   --  49   ALT U/L  --   --  8   AST U/L  --   --  16   GLUCOSE RANDOM mg/dL 119   < > 156*    < > = values in this interval not displayed.                 Results from last 7 days   Lab Units 06/15/24  0508 06/13/24  0535 06/12/24  1101   PROCALCITONIN ng/ml <0.05 <0.05 <0.05       Lines/Drains:  Invasive Devices       Peripheral Intravenous Line  Duration             Peripheral IV 06/15/24 Left Antecubital 1 day                           Imaging: Reviewed radiology reports from this admission including: chest xray    Recent Cultures (last 7 days):         Last 24 Hours Medication List:   Current Facility-Administered Medications   Medication Dose Route Frequency Provider Last Rate    amLODIPine  10 mg Oral Daily Britni Eleazar, PA-C      atorvastatin  40 mg Oral Daily With Dinner Britni Eleazar, PA-C      bisacodyl  10 mg Oral Once Britni Eleazar, PA-C      budesonide  0.5 mg Nebulization BID Britni Eleazar, PA-C      chlorhexidine  15 mL Mouth/Throat Q12H ANNE Britni Eleazar, PA-C      citalopram  20 mg Oral Daily Britni Eleazar, PA-C      clopidogrel  75 mg Oral Daily Britni Eleazar, PA-C      co-enzyme Q-10  200 mg Oral QAM Britni Eleazar, PA-C      docusate sodium  100 mg Oral BID Britni Eleazar, PA-C      enoxaparin  40 mg Subcutaneous Daily Britni Eleazar, PA-C      fish oil  1,000 mg Oral QAM Britni Eleazar, PA-C      formoterol  20 mcg Nebulization BID Britni Eleazar, PA-C      guaiFENesin  1,200 mg Oral Q12H ANNE Britni Eleazar, PA-C      ipratropium-albuterol  3 mL Nebulization Q6H Britni Eleazar, PA-C      labetalol  10 mg Intravenous Q4H PRN Britni Eleazar, PA-C      loratadine  10 mg Oral Daily Britni Eleazar, PA-C      montelukast  10 mg Oral HS Britni Eleazar, PA-C      multivitamin-minerals  1 tablet Oral Daily Britni Eleazar, PA-C      polyethylene glycol  17 g Oral Daily Britni Eleazar, PA-C      predniSONE  40 mg Oral Daily Britni Eleazar, PA-C      sodium chloride  4 mL Nebulization TID Britni Eleazar, PA-C      timolol  1 drop Both Eyes BID Britni Eleazar, PA-C      torsemide  10 mg Oral Daily Britni Eleazar, PA-C      traZODone  50 mg Oral HS PRN Britni Eleazar, PA-C          Today, Patient Was Seen By: Britni Bush PA-C    **Please Note: This note may have been constructed using a voice recognition system.**

## 2024-06-18 PROBLEM — G47.33 OSA AND COPD OVERLAP SYNDROME (HCC): Status: ACTIVE | Noted: 2024-06-18

## 2024-06-18 PROBLEM — J96.11 CHRONIC RESPIRATORY FAILURE WITH HYPOXIA AND HYPERCAPNIA (HCC): Status: ACTIVE | Noted: 2024-06-18

## 2024-06-18 PROBLEM — J44.9 OSA AND COPD OVERLAP SYNDROME (HCC): Status: ACTIVE | Noted: 2024-06-18

## 2024-06-18 PROBLEM — J96.12 CHRONIC RESPIRATORY FAILURE WITH HYPOXIA AND HYPERCAPNIA (HCC): Status: ACTIVE | Noted: 2024-06-18

## 2024-06-18 LAB
ANION GAP SERPL CALCULATED.3IONS-SCNC: 7 MMOL/L (ref 4–13)
ARTERIAL PATENCY WRIST A: YES
BASE EXCESS BLDA CALC-SCNC: 11 MMOL/L
BODY TEMPERATURE: 98.6 DEGREES FEHRENHEIT
BUN SERPL-MCNC: 22 MG/DL (ref 5–25)
CALCIUM SERPL-MCNC: 9.3 MG/DL (ref 8.4–10.2)
CHLORIDE SERPL-SCNC: 94 MMOL/L (ref 96–108)
CO2 SERPL-SCNC: 40 MMOL/L (ref 21–32)
CREAT SERPL-MCNC: 0.66 MG/DL (ref 0.6–1.3)
ERYTHROCYTE [DISTWIDTH] IN BLOOD BY AUTOMATED COUNT: 13.5 % (ref 11.6–15.1)
GFR SERPL CREATININE-BSD FRML MDRD: 81 ML/MIN/1.73SQ M
GLUCOSE SERPL-MCNC: 113 MG/DL (ref 65–140)
HCO3 BLDA-SCNC: 38.8 MMOL/L (ref 22–28)
HCT VFR BLD AUTO: 44.2 % (ref 34.8–46.1)
HGB BLD-MCNC: 13.5 G/DL (ref 11.5–15.4)
MCH RBC QN AUTO: 32.1 PG (ref 26.8–34.3)
MCHC RBC AUTO-ENTMCNC: 30.5 G/DL (ref 31.4–37.4)
MCV RBC AUTO: 105 FL (ref 82–98)
NASAL CANNULA: 6
O2 CT BLDA-SCNC: 19.8 ML/DL (ref 16–23)
OXYHGB MFR BLDA: 90.8 % (ref 94–97)
PCO2 BLDA: 63.7 MM HG (ref 36–44)
PCO2 TEMP ADJ BLDA: 63.7 MM HG (ref 36–44)
PH BLD: 7.4 [PH] (ref 7.35–7.45)
PH BLDA: 7.4 [PH] (ref 7.35–7.45)
PLATELET # BLD AUTO: 277 THOUSANDS/UL (ref 149–390)
PMV BLD AUTO: 9 FL (ref 8.9–12.7)
PO2 BLD: 63.1 MM HG (ref 75–129)
PO2 BLDA: 63.1 MM HG (ref 75–129)
POTASSIUM SERPL-SCNC: 3.8 MMOL/L (ref 3.5–5.3)
RBC # BLD AUTO: 4.21 MILLION/UL (ref 3.81–5.12)
SODIUM SERPL-SCNC: 141 MMOL/L (ref 135–147)
SPECIMEN SOURCE: ABNORMAL
WBC # BLD AUTO: 5.74 THOUSAND/UL (ref 4.31–10.16)

## 2024-06-18 PROCEDURE — 85027 COMPLETE CBC AUTOMATED: CPT

## 2024-06-18 PROCEDURE — 99232 SBSQ HOSP IP/OBS MODERATE 35: CPT

## 2024-06-18 PROCEDURE — 94640 AIRWAY INHALATION TREATMENT: CPT

## 2024-06-18 PROCEDURE — 99232 SBSQ HOSP IP/OBS MODERATE 35: CPT | Performed by: INTERNAL MEDICINE

## 2024-06-18 PROCEDURE — 94762 N-INVAS EAR/PLS OXIMTRY CONT: CPT

## 2024-06-18 PROCEDURE — 94664 DEMO&/EVAL PT USE INHALER: CPT

## 2024-06-18 PROCEDURE — 80048 BASIC METABOLIC PNL TOTAL CA: CPT

## 2024-06-18 PROCEDURE — 94760 N-INVAS EAR/PLS OXIMETRY 1: CPT

## 2024-06-18 PROCEDURE — 97535 SELF CARE MNGMENT TRAINING: CPT

## 2024-06-18 PROCEDURE — 94761 N-INVAS EAR/PLS OXIMETRY MLT: CPT

## 2024-06-18 PROCEDURE — 94668 MNPJ CHEST WALL SBSQ: CPT

## 2024-06-18 PROCEDURE — 82805 BLOOD GASES W/O2 SATURATION: CPT | Performed by: NURSE PRACTITIONER

## 2024-06-18 RX ADMIN — OMEGA-3 FATTY ACIDS CAP 1000 MG 1000 MG: 1000 CAP at 08:48

## 2024-06-18 RX ADMIN — ATORVASTATIN CALCIUM 40 MG: 40 TABLET, FILM COATED ORAL at 17:48

## 2024-06-18 RX ADMIN — SODIUM CHLORIDE SOLN NEBU 3% 4 ML: 3 NEBU SOLN at 13:57

## 2024-06-18 RX ADMIN — IPRATROPIUM BROMIDE AND ALBUTEROL SULFATE 3 ML: 2.5; .5 SOLUTION RESPIRATORY (INHALATION) at 19:32

## 2024-06-18 RX ADMIN — AMLODIPINE BESYLATE 10 MG: 10 TABLET ORAL at 08:46

## 2024-06-18 RX ADMIN — CHLORHEXIDINE GLUCONATE 15 ML: 1.2 RINSE ORAL at 09:14

## 2024-06-18 RX ADMIN — MONTELUKAST 10 MG: 10 TABLET, FILM COATED ORAL at 21:08

## 2024-06-18 RX ADMIN — GUAIFENESIN 1200 MG: 600 TABLET, EXTENDED RELEASE ORAL at 08:45

## 2024-06-18 RX ADMIN — TIMOLOL MALEATE 1 DROP: 5 SOLUTION OPHTHALMIC at 18:27

## 2024-06-18 RX ADMIN — BUDESONIDE 0.5 MG: 0.5 INHALANT RESPIRATORY (INHALATION) at 07:48

## 2024-06-18 RX ADMIN — DOCUSATE SODIUM 100 MG: 100 CAPSULE, LIQUID FILLED ORAL at 08:47

## 2024-06-18 RX ADMIN — CITALOPRAM HYDROBROMIDE 20 MG: 20 TABLET ORAL at 08:47

## 2024-06-18 RX ADMIN — TORSEMIDE 10 MG: 10 TABLET ORAL at 08:48

## 2024-06-18 RX ADMIN — CLOPIDOGREL 75 MG: 75 TABLET ORAL at 08:45

## 2024-06-18 RX ADMIN — TRAZODONE HYDROCHLORIDE 50 MG: 50 TABLET ORAL at 21:08

## 2024-06-18 RX ADMIN — SODIUM CHLORIDE SOLN NEBU 3% 4 ML: 3 NEBU SOLN at 19:32

## 2024-06-18 RX ADMIN — TIMOLOL MALEATE 1 DROP: 5 SOLUTION OPHTHALMIC at 08:49

## 2024-06-18 RX ADMIN — Medication 1 TABLET: at 08:45

## 2024-06-18 RX ADMIN — Medication 200 MG: at 08:46

## 2024-06-18 RX ADMIN — PREDNISONE 40 MG: 20 TABLET ORAL at 08:47

## 2024-06-18 RX ADMIN — CHLORHEXIDINE GLUCONATE 15 ML: 1.2 RINSE ORAL at 21:08

## 2024-06-18 RX ADMIN — LORATADINE 10 MG: 10 TABLET ORAL at 08:48

## 2024-06-18 RX ADMIN — IPRATROPIUM BROMIDE AND ALBUTEROL SULFATE 3 ML: 2.5; .5 SOLUTION RESPIRATORY (INHALATION) at 13:57

## 2024-06-18 RX ADMIN — ENOXAPARIN SODIUM 40 MG: 40 INJECTION SUBCUTANEOUS at 08:49

## 2024-06-18 RX ADMIN — SODIUM CHLORIDE SOLN NEBU 3% 4 ML: 3 NEBU SOLN at 07:48

## 2024-06-18 RX ADMIN — GUAIFENESIN 1200 MG: 600 TABLET, EXTENDED RELEASE ORAL at 21:08

## 2024-06-18 RX ADMIN — FORMOTEROL FUMARATE DIHYDRATE 20 MCG: 20 SOLUTION RESPIRATORY (INHALATION) at 19:32

## 2024-06-18 RX ADMIN — IPRATROPIUM BROMIDE AND ALBUTEROL SULFATE 3 ML: 2.5; .5 SOLUTION RESPIRATORY (INHALATION) at 07:48

## 2024-06-18 RX ADMIN — DOCUSATE SODIUM 100 MG: 100 CAPSULE, LIQUID FILLED ORAL at 18:28

## 2024-06-18 RX ADMIN — BUDESONIDE 0.5 MG: 0.5 INHALANT RESPIRATORY (INHALATION) at 19:32

## 2024-06-18 NOTE — ASSESSMENT & PLAN NOTE
Wt Readings from Last 3 Encounters:   06/18/24 77.6 kg (171 lb)   05/22/24 83.5 kg (184 lb)   11/03/23 83.5 kg (184 lb)     Patient had presented with shortness of breath and evidence of fluid overload  Echo performed 5/22/2024 demonstrated EF 70%, grade 2 diastolic dysfunction  Transition from Lasix IV to home medication torsemide 10 mg daily  Intake and output  Daily weights  Monitor

## 2024-06-18 NOTE — RESPIRATORY THERAPY NOTE
Home Oxygen Qualifying Test     Patient name: Eryn Morocho        : 1940   Date of Test:  2024  Diagnosis:    Home Oxygen Test:    **Medicare Guidelines require item(s) 1-5 on all ambulatory patients or 1 and 2 on non-ambulatory patients.    1. Baseline SPO2 on Room Air at rest 82 %   If <= 88% on Room Air add O2 via NC to obtain SpO2 >=88%. If LPM needed, document LPM  needed to reach =>88%    SPO2 during exertion on Room Air 80  %  During exertion monitor SPO2. If SPO2 increases >=89%, do not add supplemental oxygen    SPO2 on Oxygen at Rest 91 % at 5 LPM    SPO2 during exertion on Oxygen 92 % at 12 LPM    Test performed during exertion activity.   Walking     [x]  Supplemental Home Oxygen is indicated.    []  Client does not qualify for home oxygen.    Respiratory Additional Notes-     Patient was tried on both nasal canula and O2 oxymizer pendant, and required 12L minimum for exertion.     Merary Elias, RRT

## 2024-06-18 NOTE — PROGRESS NOTES
Progress Note - Pulmonary   Eryn Morocho 83 y.o. female MRN: [id number]  Unit/Bed#: [address] [place] [address] Encounter: [id number]    Assessment/Plan:    Acute on chronic hypoxic hypercapnic respiratory failure, multifactorial due to below              Baseline oxygen requirement is 3L nasal cannula.              Titrate supplemental oxygen to maintain saturations greater than or equal to 89%.              Activity as tolerated.              O2 evaluation by RT today to get concentrator more than 5 L if qualifies.              Transition from CPAP to BiPAP as below.     Acute on chronic diastolic CHF              Diuretics and management per primary team.              Monitor I's and O's and daily weights.     Severe COPD with bronchiectasis with possible mild acute exacerbation              Continue prednisone 40 mg daily for 5 days.              Continue budesonide/Perforomist twice daily with DuoNebs every 6 hours and hypertonic saline at discharge.              Encouraged flutter valve and Mucinex.     Obesity with underlying obstructive sleep apnea with COPD overlap and chronic hypercapnic respiratory failure              Has used CPAP at 9 cmH2O as an outpatient for many years, but has failed this due to refractory daytime symptoms and hypercapnia.   Continue BiPAP 15/9 with 6 L of supplemental oxygen at bedtime.  Order placed and discussed with case management.  Patient is agreeable.  Will need compliance follow-up with Dr. Moreno.     Non-small cell lung cancer with pulmonary nodules              Diagnosed 7 years ago and status post EMIR wedge resection and chemotherapy.              Right lower lobe nodule was enlarging as an outpatient.  CT scan this admission shows continued enlargement concerning for malignancy.  Briefly discussed with the patient and her daughter and they will follow-up with Dr. Moreno in the outpatient setting to determine next steps.  She typically does not leave the house and is not very  "mobile.  Unsure if treatment would be an option if it is malignant.     Outpatient follow-up as per D/C instructions.  Discussed with primary team and nursing, as well as case management.    Chief Complaint:    \"I feel better.\"    Subjective:    Eryn is sitting out of bed in the chair.  She reports she feels significantly better.  She is eager to go home today.    Objective:    Vitals: Blood pressure 147/57, pulse 56, temperature 98 °F (36.7 °C), resp. rate 18, height 5' 2\" (1.575 m), weight 77.6 kg (171 lb), SpO2 99%.  6 L nasal cannula,Body mass index is 31.28 kg/m².      Intake/Output Summary (Last 24 hours) at 6/18/2024 1006  Last data filed at 6/17/2024 2100  Gross per 24 hour   Intake 540 ml   Output 1150 ml   Net -610 ml       Invasive Devices       Peripheral Intravenous Line  Duration             Peripheral IV 06/15/24 Left Antecubital 2 days                    Physical Exam:     Physical Exam  Vitals reviewed.   Constitutional:       General: She is not in acute distress.     Appearance: She is well-developed. She is obese. She is not toxic-appearing or diaphoretic.      Interventions: Nasal cannula in place.   HENT:      Head: Normocephalic and atraumatic.   Eyes:      General: No scleral icterus.     Extraocular Movements: Extraocular movements intact.   Neck:      Trachea: No tracheal deviation.   Cardiovascular:      Rate and Rhythm: Normal rate and regular rhythm.      Heart sounds: S1 normal and S2 normal. No murmur heard.     No friction rub. No gallop.   Pulmonary:      Effort: Pulmonary effort is normal. No tachypnea, accessory muscle usage or respiratory distress.      Breath sounds: Normal breath sounds. No stridor. No decreased breath sounds, wheezing, rhonchi or rales.   Chest:      Chest wall: No tenderness.   Abdominal:      General: Bowel sounds are normal. There is no distension.      Palpations: Abdomen is soft.      Tenderness: There is no abdominal tenderness.   Musculoskeletal:      " Cervical back: Neck supple.      Right lower leg: No edema.      Left lower leg: No edema.   Skin:     General: Skin is warm and dry.      Findings: No rash.   Neurological:      Mental Status: She is alert and oriented to person, place, and time.      GCS: GCS eye subscore is 4. GCS verbal subscore is 5. GCS motor subscore is 6.   Psychiatric:         Speech: Speech normal.         Behavior: Behavior normal. Behavior is cooperative.       Labs: ABG:   Lab Results   Component Value Date    PHART 7.402 06/18/2024    YUG3ALR 63.7 (H) 06/18/2024    PO2ART 63.1 (L) 06/18/2024    CNJ7IEL 38.8 (H) 06/18/2024    BEART 11.0 06/18/2024    SOURCE Radial, Left 06/18/2024   , CBC:   Lab Results   Component Value Date    WBC 5.74 06/18/2024    HGB 13.5 06/18/2024    HCT 44.2 06/18/2024     (H) 06/18/2024     06/18/2024    RBC 4.21 06/18/2024    MCH 32.1 06/18/2024    MCHC 30.5 (L) 06/18/2024    RDW 13.5 06/18/2024    MPV 9.0 06/18/2024   , CMP:   Lab Results   Component Value Date    SODIUM 141 06/18/2024    K 3.8 06/18/2024    CL 94 (L) 06/18/2024    CO2 40 (H) 06/18/2024    BUN 22 06/18/2024    CREATININE 0.66 06/18/2024    CALCIUM 9.3 06/18/2024    EGFR 81 06/18/2024     Imaging and other studies: I have personally reviewed pertinent reports.  , I have personally reviewed pertinent films in PACS, and CT chest shows continued enlargement of the right lower lobe nodule

## 2024-06-18 NOTE — PLAN OF CARE
Problem: OCCUPATIONAL THERAPY ADULT  Goal: Performs self-care activities at highest level of function for planned discharge setting.  See evaluation for individualized goals.  Description: Treatment Interventions: ADL retraining, Functional transfer training, Endurance training, Patient/family training, Equipment evaluation/education, Compensatory technique education, Continued evaluation, Energy conservation, Activityengagement          See flowsheet documentation for full assessment, interventions and recommendations.   6/18/2024 1409 by Arline Reyez OT  Note: Limitation: Decreased ADL status, Decreased UE strength, Decreased endurance, Decreased self-care trans, Decreased high-level ADLs  Prognosis: Good  Assessment: Pt seen for skilled OT Tx session focusing on activity engagement. Pt agreeable and motivated to participate reporting she would like to wash her hair. Pt required less physical assistance to complete sit <> stand and short distance functional mobility. O2 sats >90% throughout session while engaged in bathing using B UE at shoulder height. Continue to recommend level III rehab resource intensity when medically stable. Will continue to follow     Rehab Resource Intensity Level, OT: III (Minimum Resource Intensity)       6/18/2024 1409 by Arline Reyez OT  Note: Limitation: Decreased ADL status, Decreased UE strength, Decreased endurance, Decreased self-care trans, Decreased high-level ADLs  Prognosis: Good  Assessment: Pt seen for skilled OT Tx session focusing on activity engagement. Pt agreeable and motivated to participate reporting she would like to wash her hair. Pt required less physical assistance to complete sit <> stand and short distance functional mobility. O2 sats >90% throughout session while engaged in bathing using B UE at shoulder height. Continue to recommend level III rehab resource intensity when medically stable. Will continue to follow     Rehab Resource Intensity Level,  OT: III (Minimum Resource Intensity)

## 2024-06-18 NOTE — PROGRESS NOTES
[facility]  Progress Note  Name: Eryn Morocho I  MRN: [id number]  Unit/Bed#: 4 [place] 422-01 I Date of Admission: 6/12/2024   Date of Service: 6/18/2024 I Hospital Day: 6    Assessment & Plan   * Acute on chronic respiratory failure with hypoxia and hypercapnia (HCC)  Assessment & Plan  Initially requiring BiPAP, now weaned to 6 L mid flow. Patient chronically is on 4 L nasal cannula. Multifactorial in setting of acute on chronic CHF, severe COPD with bronchiectasis, obesity with JULIO, non small cell lung cancer.  CXR: Mild pulmonary vascular congestion and small bilateral pleural effusions.   CT chest: Continued enlargement of the right lower lobe juxtapleural nodule highly suspicious for lung cancer. Right lower lobe bronchial wall thickening with new patchy peripheral groundglass opacity in the right upper lobe and new patchy consolidation in the right lower lobe compatible with bronchitis and pneumonia. Mild interstitial edema with moderate left pleural effusion. Moderate emphysema. Pulmonary artery enlargement which can be seen with pulmonary hypertension.  Currently on 6 L  S/p IV lasix, resumed torsemide 6/16  Pulmonology following  Continue prednisone 40 mg x 5 days  Continue budesonide/Perforomist with duonebs and hypertonic saline  Home O2 eval showed patient requires 12 L with exertion  CT chest showed mild interstitial edema with moderate left pleural effusion  May need thoracentesis, will discuss with pulmonology    Acute on chronic diastolic congestive heart failure (HCC)  Assessment & Plan  Wt Readings from Last 3 Encounters:   06/18/24 77.6 kg (171 lb)   05/22/24 83.5 kg (184 lb)   11/03/23 83.5 kg (184 lb)     Patient had presented with shortness of breath and evidence of fluid overload  Echo performed 5/22/2024 demonstrated EF 70%, grade 2 diastolic dysfunction  Transition from Lasix IV to home medication torsemide 10 mg daily  Intake and output  Daily  weights  Monitor    Non-small cell cancer of left lung ([facility])  Assessment & Plan  Patient has a history of non-small cell lung cancer greater than 5 years ago status post wedge resection and chemotherapy  Follows with outpatient pulmonary and found to have lung nodules that have been increasing in size, outpatient PET scan negative  CT of chest: Continued enlargement of the right lower lobe juxtapleural nodule highly suspicious for lung cancer.   Per discussion with pulm, can continue outpatient follow up with Dr. Moreno    COPD (chronic obstructive pulmonary disease) ([facility])  Assessment & Plan  Patient has a history of COPD chronically on 4 L nasal cannula  Currently on 6 L nasal cannula, weaned off of BiPAP  Continue DuoNebs, Perforomist, Mucinex and singular  Continue to follow outpatient pulm    Stage 3a chronic kidney disease ([facility])  Assessment & Plan  Lab Results   Component Value Date    EGFR 81 06/18/2024    EGFR 81 06/17/2024    EGFR 80 06/16/2024    CREATININE 0.66 06/18/2024    CREATININE 0.65 06/17/2024    CREATININE 0.68 06/16/2024     Patient remains at baseline creatinine  Avoid nephrotoxins and periods of relative hypotension  Repeat BMP in a.m.    PAD (peripheral artery disease) ([facility])  Assessment & Plan  Continue Plavix and statin  Heart healthy diet    Hyperlipidemia  Assessment & Plan  Continue statin  Heart healthy diet    JULIO (obstructive sleep apnea)  Assessment & Plan  Continue CPAP hs  Patient qualifies for BiPAP, case management following           VTE Pharmacologic Prophylaxis: VTE Score: 8 High Risk (Score >/= 5) - Pharmacological DVT Prophylaxis Ordered: enoxaparin (Lovenox). Sequential Compression Devices Ordered.    Mobility:   Basic Mobility Inpatient Raw Score: 17  JH-HLM Goal: 5: Stand one or more mins  JH-HLM Achieved: 6: Walk 10 steps or more  JH-HLM Goal achieved. Continue to encourage appropriate mobility.    Patient Centered Rounds: I performed bedside rounds with nursing staff  today.   Discussions with Specialists or Other Care Team Provider: rn, cm, pulmonology    Education and Discussions with Family / Patient:  daughter updated.     Total Time Spent on Date of Encounter in care of patient: 35 mins. This time was spent on one or more of the following: performing physical exam; counseling and coordination of care; obtaining or reviewing history; documenting in the medical record; reviewing/ordering tests, medications or procedures; communicating with other healthcare professionals and discussing with patient's family/caregivers.    Current Length of Stay: 6 day(s)  Current Patient Status: Inpatient   Certification Statement: The patient will continue to require additional inpatient hospital stay due to respiratory failure, possible thoracentesis  Discharge Plan: Anticipate discharge in 24-48 hrs to home.    Code Status: Level 1 - Full Code    Subjective:   Patient reports she is feeling much better and wants to go home. Denies shortness of breath or chest pain. Reports good appetite.      Objective:     Vitals:   Temp (24hrs), Av.8 °F (36.6 °C), Min:97.4 °F (36.3 °C), Max:98.3 °F (36.8 °C)    Temp:  [97.4 °F (36.3 °C)-98.3 °F (36.8 °C)] 98 °F (36.7 °C)  HR:  [56-67] 56  Resp:  [18] 18  BP: (134-147)/(56-64) 147/57  SpO2:  [89 %-99 %] 99 %  Body mass index is 31.28 kg/m².     Input and Output Summary (last 24 hours):     Intake/Output Summary (Last 24 hours) at 2024 1349  Last data filed at 2024 0830  Gross per 24 hour   Intake 125 ml   Output 1000 ml   Net -875 ml       Physical Exam:   Physical Exam  Vitals and nursing note reviewed.   Constitutional:       General: She is not in acute distress.     Appearance: She is well-developed.   Cardiovascular:      Rate and Rhythm: Normal rate and regular rhythm.   Pulmonary:      Effort: Pulmonary effort is normal. No respiratory distress.      Breath sounds: Decreased breath sounds present.   Abdominal:      Palpations: Abdomen  is soft.      Tenderness: There is no abdominal tenderness.   Musculoskeletal:         General: No swelling.   Skin:     General: Skin is warm and dry.   Neurological:      Mental Status: She is alert.   Psychiatric:         Mood and Affect: Mood normal.          Additional Data:     Labs:  Results from last 7 days   Lab Units 06/18/24  0616 06/14/24  0550 06/13/24  0945   WBC Thousand/uL 5.74   < > 5.56   HEMOGLOBIN g/dL 13.5   < > 12.6   HEMATOCRIT % 44.2   < > 40.0   PLATELETS Thousands/uL 277   < > 170   SEGS PCT %  --   --  80*   LYMPHO PCT %  --   --  10*   MONO PCT %  --   --  9   EOS PCT %  --   --  1    < > = values in this interval not displayed.     Results from last 7 days   Lab Units 06/18/24  0616 06/13/24  0535 06/12/24  1101   SODIUM mmol/L 141   < > 139   POTASSIUM mmol/L 3.8   < > 4.4   CHLORIDE mmol/L 94*   < > 96   CO2 mmol/L 40*   < > 36*   BUN mg/dL 22   < > 15   CREATININE mg/dL 0.66   < > 0.74   ANION GAP mmol/L 7   < > 7   CALCIUM mg/dL 9.3   < > 9.8   ALBUMIN g/dL  --   --  4.3   TOTAL BILIRUBIN mg/dL  --   --  0.96   ALK PHOS U/L  --   --  49   ALT U/L  --   --  8   AST U/L  --   --  16   GLUCOSE RANDOM mg/dL 113   < > 156*    < > = values in this interval not displayed.                 Results from last 7 days   Lab Units 06/15/24  0508 06/13/24  0535 06/12/24  1101   PROCALCITONIN ng/ml <0.05 <0.05 <0.05       Lines/Drains:  Invasive Devices       Peripheral Intravenous Line  Duration             Peripheral IV 06/15/24 Left Antecubital 2 days                          Imaging: Reviewed radiology reports from this admission including: chest CT scan    Recent Cultures (last 7 days):         Last 24 Hours Medication List:   Current Facility-Administered Medications   Medication Dose Route Frequency Provider Last Rate    amLODIPine  10 mg Oral Daily Britni Bush PA-C      atorvastatin  40 mg Oral Daily With Dinner Britni Bush PA-C      bisacodyl  10 mg Oral Once Britni Bush PA-C       budesonide  0.5 mg Nebulization BID Britni Eleazar, PA-C      chlorhexidine  15 mL Mouth/Throat Q12H ANNE Britni Eleazar, PA-C      citalopram  20 mg Oral Daily Britni Eleazar, PA-C      clopidogrel  75 mg Oral Daily Britni Eleazar, PA-C      co-enzyme Q-10  200 mg Oral QAM Britni Eleazar, PA-C      docusate sodium  100 mg Oral BID Britni Eleazar, PA-C      enoxaparin  40 mg Subcutaneous Daily Britni Eleazar, PA-C      fish oil  1,000 mg Oral QAM Britni Eleazar, PA-C      formoterol  20 mcg Nebulization BID Britni Eleazar, PA-C      guaiFENesin  1,200 mg Oral Q12H ANNE Britni Rees, PA-C      ipratropium-albuterol  3 mL Nebulization Q6H Britni Eleazar, PA-C      labetalol  10 mg Intravenous Q4H PRN Britni Bush, PA-C      loratadine  10 mg Oral Daily Britni Eleazar, PA-C      montelukast  10 mg Oral HS Britni Bush, PA-C      multivitamin-minerals  1 tablet Oral Daily Britni Bush, PA-C      polyethylene glycol  17 g Oral Daily Britni Eleazar, PA-C      predniSONE  40 mg Oral Daily Britni Eleazar, PA-C      sodium chloride  4 mL Nebulization TID Britni Bush, PA-C      timolol  1 drop Both Eyes BID Britni Bush, PA-C      torsemide  10 mg Oral Daily Britni Eleazar, PA-C      traZODone  50 mg Oral HS PRN Britni Bush, PAÁLVARO          Today, Patient Was Seen By: Britni Bush PA-C    **Please Note: This note may have been constructed using a voice recognition system.**   Yes

## 2024-06-18 NOTE — ASSESSMENT & PLAN NOTE
Lab Results   Component Value Date    EGFR 81 06/18/2024    EGFR 81 06/17/2024    EGFR 80 06/16/2024    CREATININE 0.66 06/18/2024    CREATININE 0.65 06/17/2024    CREATININE 0.68 06/16/2024     Patient remains at baseline creatinine  Avoid nephrotoxins and periods of relative hypotension  Repeat BMP in a.m.

## 2024-06-18 NOTE — ASSESSMENT & PLAN NOTE
Patient has a history of non-small cell lung cancer greater than 5 years ago status post wedge resection and chemotherapy  Follows with outpatient pulmonary and found to have lung nodules that have been increasing in size, outpatient PET scan negative  CT of chest: Continued enlargement of the right lower lobe juxtapleural nodule highly suspicious for lung cancer.   Per discussion with pulm, can continue outpatient follow up with Dr. Moreno

## 2024-06-18 NOTE — ASSESSMENT & PLAN NOTE
Initially requiring BiPAP, now weaned to 6 L mid flow. Patient chronically is on 4 L nasal cannula. Multifactorial in setting of acute on chronic CHF, severe COPD with bronchiectasis, obesity with JULIO, non small cell lung cancer.  CXR: Mild pulmonary vascular congestion and small bilateral pleural effusions.   CT chest: Continued enlargement of the right lower lobe juxtapleural nodule highly suspicious for lung cancer. Right lower lobe bronchial wall thickening with new patchy peripheral groundglass opacity in the right upper lobe and new patchy consolidation in the right lower lobe compatible with bronchitis and pneumonia. Mild interstitial edema with moderate left pleural effusion. Moderate emphysema. Pulmonary artery enlargement which can be seen with pulmonary hypertension.  Currently on 6 L  S/p IV lasix, resumed torsemide 6/16  Pulmonology following  Continue prednisone 40 mg x 5 days  Continue budesonide/Perforomist with duonebs and hypertonic saline  Home O2 eval showed patient requires 12 L with exertion  CT chest showed mild interstitial edema with moderate left pleural effusion  May need thoracentesis, will discuss with pulmonology

## 2024-06-18 NOTE — OCCUPATIONAL THERAPY NOTE
"  Occupational Therapy Progress Note     Patient Name: Eryn Morocho  Today's Date: 6/18/2024  Problem List  Principal Problem:    Acute on chronic respiratory failure with hypoxia and hypercapnia (HCC)  Active Problems:    JULIO (obstructive sleep apnea)    COPD (chronic obstructive pulmonary disease) (HCC)    Hyperlipidemia    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)    PAD (peripheral artery disease) (HCC)    Acute on chronic diastolic congestive heart failure (HCC)    Stage 3a chronic kidney disease (HCC)    Chronic respiratory failure with hypoxia and hypercapnia (HCC)    JULIO and COPD overlap syndrome (HCC)         06/18/24 1357   OT Last Visit   OT Visit Date 06/18/24  (Tuesday)   Note Type   Note Type Treatment  (tx session [id number])   Pain Assessment   Pain Assessment Tool 0-10   Pain Score No Pain   Restrictions/Precautions   Weight Bearing Precautions Per Order No   Other Precautions O2;Fall Risk   Lifestyle   Autonomy Pt reports I w/ toileting and basic ADLs. Supportive daughter assists w/bathing and IADLs   Reciprocal Relationships Pt reports living w/ spouse, daughter. Supportive daughter is caregiver for pt and her    Service to Others Pt reports retired and stated \"I did it all\". 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, silas / cooking   Intrinsic Gratification Pt reports enjoying playing games on the computer, does her banking on the computer, crocheting and watching game shows on tv   ADL   Where Assessed Chair   Eating Assistance 7  Independent   Eating Deficit Setup   Eating Comments finished lunch seated OOB In chair prior to therapist arrival   Grooming Assistance 6  Modified Independent   Grooming Deficit Setup;Increased time to complete   Grooming Comments seated after set- up w/ + time to comb hair   UB Bathing Assistance 5  Supervision/Setup   UB Bathing Deficit Setup;Supervision/safety;Increased time to complete   UB Bathing Comments seated after set- up to wash her hair " "using shower cap   Toileting Assistance  5  Supervision/Setup   Toileting Deficit Setup;Bedside commode;Increased time to complete  (seated on commode w/ S and + time)   Bed Mobility   Supine to Sit Unable to assess   Sit to Supine Unable to assess   Additional Comments Pt seated OOB In chair upon arrival and at end of session w/ needs met, call bell in reach   Transfers   Sit to Stand 5  Supervision   Additional items Assist x 1;Armrests;Increased time required   Stand to Sit 5  Supervision   Additional items Assist x 1;Increased time required;Armrests   Toilet transfer 5  Supervision   Additional items Assist x 1;Increased time required;Armrests;Bedrails   Additional Comments Pt performed sit <> stand w/ S and B UE support using armrests   Functional Mobility   Functional Mobility 5  Supervision   Additional Comments engaged in short distance functional mobility w/ out use of AD; approx 2 feet to / from commode   Additional items   (no AD)   Toilet Transfers   Toilet Transfer From Other (Comment)  (bedside recliner chair)   Toilet Transfer Type To and from   Toilet Transfer to Standard bedside commode   Toilet Transfer Technique Ambulating   Toilet Transfers Supervision   Subjective   Subjective \"I think that I am going home today. I am waiting for the bed to be delivered\"   Cognition   Overall Cognitive Status WFL   Arousal/Participation Alert;Cooperative   Attention Within functional limits   Orientation Level Oriented X4   Memory Within functional limits   Following Commands Follows multistep commands without difficulty   Comments Identified pt by full name and birthdate. Agreeable and motivated to participate reporting she would like to wash her hair   Activity Tolerance   Activity Tolerance Patient tolerated treatment well   Medical Staff Made Aware spoke w/ RNKelechi; PT, Radha and RT   Assessment   Assessment Pt seen for skilled OT Tx session focusing on activity engagement. Pt agreeable and motivated to " participate reporting she would like to wash her hair. Pt required less physical assistance to complete sit <> stand and short distance functional mobility. O2 sats >90% throughout session while engaged in bathing using B UE at shoulder height. Continue to recommend level III rehab resource intensity when medically stable. Will continue to follow   Plan   Treatment Interventions ADL retraining;Functional transfer training;Endurance training;Patient/family training;Equipment evaluation/education;Compensatory technique education;Continued evaluation;Energy conservation;Activityengagement   Goal Expiration Date 06/29/24   OT Treatment Day 2  (Tuesday 6/18/24)   OT Frequency 3-5x/wk   Discharge Recommendation   Rehab Resource Intensity Level, OT III (Minimum Resource Intensity)   -PAC Daily Activity Inpatient   Lower Body Dressing 4   Bathing 3   Toileting 3   Upper Body Dressing 4   Grooming 4   Eating 4   Daily Activity Raw Score 22   Daily Activity Standardized Score (Calc for Raw Score >=11) 47.1   -PAC Applied Cognition Inpatient   Following a Speech/Presentation 4   Understanding Ordinary Conversation 4   Taking Medications 4   Remembering Where Things Are Placed or Put Away 4   Remembering List of 4-5 Errands 4   Taking Care of Complicated Tasks 3   Applied Cognition Raw Score 23   Applied Cognition Standardized Score 53.08   Barthel Index   Feeding 10   Bathing 0   Grooming Score 5   Dressing Score 5   Bladder Score 5   Bowels Score 10   Toilet Use Score 5   Transfers (Bed/Chair) Score 10   Mobility (Level Surface) Score 0   Stairs Score 0   Barthel Index Score 50   End of Consult   Education Provided Yes   Patient Position at End of Consult Bedside chair;All needs within reach   Nurse Communication Nurse aware of consult   Licensure   [place] License Number  Arline Reyez, OTR/L [id number]          The patient's raw score on the AM-PAC Daily Activity Inpatient Short Form is 22. A raw score of greater  than or equal to 19 suggests the patient may benefit from discharge to home. Please refer to the recommendation of the Occupational Therapist for safe discharge planning.      Arline Reyez, OTR/L  [id number]  [id number]

## 2024-06-18 NOTE — CASE MANAGEMENT
Case Management Discharge Planning Note    Patient name Eryn Morocho  Location [address] [place] [address]/[address] [place] [address]-* MRN [id number]  : 1940 Date 2024       Current Admission Date: 2024  Current Admission Diagnosis:Acute on chronic respiratory failure with hypoxia and hypercapnia (HCC)   Patient Active Problem List    Diagnosis Date Noted Date Diagnosed    Chronic respiratory failure with hypoxia and hypercapnia (HCC) 2024     JULIO and COPD overlap syndrome (HCC) 2024     Pulmonary emphysema, unspecified emphysema type (HCC) 2024     Oxygen dependent 4 LPM baseline 2023     Multiple lung nodules 2022     Class 1 obesity due to excess calories with body mass index (BMI) of 34.0 to 34.9 in adult 2022     Recurrent falls 2022     Stage 3a chronic kidney disease (HCC) 2021     OAB (overactive bladder) 2021     Osteoporosis 2021     Acute on chronic diastolic congestive heart failure (HCC) 2021     PAD (peripheral artery disease) ([facility])      Acute on chronic respiratory failure with hypoxia and hypercapnia ([facility]) 2020     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) 2020     Recurrent major depressive disorder, in partial remission ([facility]) 2020     JULIO (obstructive sleep apnea)      COPD (chronic obstructive pulmonary disease) ([facility])      HTN (hypertension)      Hyperlipidemia        LOS (days): 6  Geometric Mean LOS (GMLOS) (days): 3.9  Days to GMLOS:-2     OBJECTIVE:  Risk of Unplanned Readmission Score: 16.62         Current admission status: Inpatient   Preferred Pharmacy:   [id number] - [place] -  [address]   [place] [address]  Phone: [phone] Fax: [phone]    [facility]  [address] [facility] [address]  Phone: [phone]     Primary Care Provider: Ari Mendiola MD    Primary Insurance: MEDICARE  Secondary Insurance: AARP    DISCHARGE DETAILS:    DME Referral  Provided  Referral made for DME?: Yes  DME referral completed for the following items:: BiPAP  DME Supplier Name:: [facility]    Other Referral/Resources/Interventions Provided:  Interventions: DME  Referral Comments: Pulmonary AP assisted SW in ordering new Bipap in [place]. [facility] liaison Radha confirmed order received and is in process. Per Pulmonary AP, new ambulatory study is required as patient may have an increased O2 need and may need bigger concentrator. Per RT, patient required 12L on ambulation with an oximizer. SLIM AP notified. Per SLIM AP, will hold off on discharge for further evaluation by pulmonary. Radha at [facility] notified.

## 2024-06-19 VITALS
HEIGHT: 62 IN | SYSTOLIC BLOOD PRESSURE: 137 MMHG | HEART RATE: 65 BPM | DIASTOLIC BLOOD PRESSURE: 60 MMHG | BODY MASS INDEX: 31.68 KG/M2 | RESPIRATION RATE: 16 BRPM | OXYGEN SATURATION: 91 % | TEMPERATURE: 98 F | WEIGHT: 172.18 LBS

## 2024-06-19 LAB
ANION GAP SERPL CALCULATED.3IONS-SCNC: 4 MMOL/L (ref 4–13)
BUN SERPL-MCNC: 22 MG/DL (ref 5–25)
CALCIUM SERPL-MCNC: 9.3 MG/DL (ref 8.4–10.2)
CHLORIDE SERPL-SCNC: 97 MMOL/L (ref 96–108)
CO2 SERPL-SCNC: 39 MMOL/L (ref 21–32)
CREAT SERPL-MCNC: 0.68 MG/DL (ref 0.6–1.3)
ERYTHROCYTE [DISTWIDTH] IN BLOOD BY AUTOMATED COUNT: 13.4 % (ref 11.6–15.1)
GFR SERPL CREATININE-BSD FRML MDRD: 80 ML/MIN/1.73SQ M
GLUCOSE SERPL-MCNC: 111 MG/DL (ref 65–140)
HCT VFR BLD AUTO: 43.2 % (ref 34.8–46.1)
HGB BLD-MCNC: 13.1 G/DL (ref 11.5–15.4)
MCH RBC QN AUTO: 32 PG (ref 26.8–34.3)
MCHC RBC AUTO-ENTMCNC: 30.3 G/DL (ref 31.4–37.4)
MCV RBC AUTO: 105 FL (ref 82–98)
PLATELET # BLD AUTO: 275 THOUSANDS/UL (ref 149–390)
PMV BLD AUTO: 9.1 FL (ref 8.9–12.7)
POTASSIUM SERPL-SCNC: 4.3 MMOL/L (ref 3.5–5.3)
RBC # BLD AUTO: 4.1 MILLION/UL (ref 3.81–5.12)
SODIUM SERPL-SCNC: 140 MMOL/L (ref 135–147)
WBC # BLD AUTO: 5.37 THOUSAND/UL (ref 4.31–10.16)

## 2024-06-19 PROCEDURE — 80048 BASIC METABOLIC PNL TOTAL CA: CPT

## 2024-06-19 PROCEDURE — 99232 SBSQ HOSP IP/OBS MODERATE 35: CPT | Performed by: INTERNAL MEDICINE

## 2024-06-19 PROCEDURE — 94664 DEMO&/EVAL PT USE INHALER: CPT

## 2024-06-19 PROCEDURE — 94668 MNPJ CHEST WALL SBSQ: CPT

## 2024-06-19 PROCEDURE — 94640 AIRWAY INHALATION TREATMENT: CPT

## 2024-06-19 PROCEDURE — 99239 HOSP IP/OBS DSCHRG MGMT >30: CPT | Performed by: INTERNAL MEDICINE

## 2024-06-19 PROCEDURE — 94761 N-INVAS EAR/PLS OXIMETRY MLT: CPT

## 2024-06-19 PROCEDURE — 85027 COMPLETE CBC AUTOMATED: CPT

## 2024-06-19 PROCEDURE — 94760 N-INVAS EAR/PLS OXIMETRY 1: CPT

## 2024-06-19 RX ORDER — ECHINACEA PURPUREA EXTRACT 125 MG
1 TABLET ORAL
Status: DISCONTINUED | OUTPATIENT
Start: 2024-06-19 | End: 2024-06-19 | Stop reason: HOSPADM

## 2024-06-19 RX ORDER — PREDNISONE 20 MG/1
40 TABLET ORAL DAILY
Qty: 2 TABLET | Refills: 0 | Status: SHIPPED | OUTPATIENT
Start: 2024-06-20 | End: 2024-06-21

## 2024-06-19 RX ADMIN — SODIUM CHLORIDE SOLN NEBU 3% 4 ML: 3 NEBU SOLN at 14:01

## 2024-06-19 RX ADMIN — CHLORHEXIDINE GLUCONATE 15 ML: 1.2 RINSE ORAL at 08:26

## 2024-06-19 RX ADMIN — SODIUM CHLORIDE SOLN NEBU 3% 4 ML: 3 NEBU SOLN at 07:51

## 2024-06-19 RX ADMIN — DOCUSATE SODIUM 100 MG: 100 CAPSULE, LIQUID FILLED ORAL at 18:24

## 2024-06-19 RX ADMIN — IPRATROPIUM BROMIDE AND ALBUTEROL SULFATE 3 ML: 2.5; .5 SOLUTION RESPIRATORY (INHALATION) at 14:01

## 2024-06-19 RX ADMIN — GUAIFENESIN 1200 MG: 600 TABLET, EXTENDED RELEASE ORAL at 08:25

## 2024-06-19 RX ADMIN — CLOPIDOGREL 75 MG: 75 TABLET ORAL at 08:26

## 2024-06-19 RX ADMIN — IPRATROPIUM BROMIDE AND ALBUTEROL SULFATE 3 ML: 2.5; .5 SOLUTION RESPIRATORY (INHALATION) at 07:46

## 2024-06-19 RX ADMIN — ATORVASTATIN CALCIUM 40 MG: 40 TABLET, FILM COATED ORAL at 16:36

## 2024-06-19 RX ADMIN — SALINE NASAL SPRAY 1 SPRAY: 1.5 SOLUTION NASAL at 12:14

## 2024-06-19 RX ADMIN — ENOXAPARIN SODIUM 40 MG: 40 INJECTION SUBCUTANEOUS at 08:25

## 2024-06-19 RX ADMIN — PREDNISONE 40 MG: 20 TABLET ORAL at 08:26

## 2024-06-19 RX ADMIN — BUDESONIDE 0.5 MG: 0.5 INHALANT RESPIRATORY (INHALATION) at 07:46

## 2024-06-19 RX ADMIN — DOCUSATE SODIUM 100 MG: 100 CAPSULE, LIQUID FILLED ORAL at 08:26

## 2024-06-19 RX ADMIN — AMLODIPINE BESYLATE 10 MG: 10 TABLET ORAL at 08:26

## 2024-06-19 RX ADMIN — TIMOLOL MALEATE 1 DROP: 5 SOLUTION OPHTHALMIC at 08:27

## 2024-06-19 RX ADMIN — CITALOPRAM HYDROBROMIDE 20 MG: 20 TABLET ORAL at 08:26

## 2024-06-19 RX ADMIN — OMEGA-3 FATTY ACIDS CAP 1000 MG 1000 MG: 1000 CAP at 08:26

## 2024-06-19 RX ADMIN — TORSEMIDE 10 MG: 10 TABLET ORAL at 08:26

## 2024-06-19 RX ADMIN — FORMOTEROL FUMARATE DIHYDRATE 20 MCG: 20 SOLUTION RESPIRATORY (INHALATION) at 07:49

## 2024-06-19 RX ADMIN — Medication 200 MG: at 08:25

## 2024-06-19 RX ADMIN — Medication 1 TABLET: at 08:26

## 2024-06-19 RX ADMIN — LORATADINE 10 MG: 10 TABLET ORAL at 08:25

## 2024-06-19 RX ADMIN — TIMOLOL MALEATE 1 DROP: 5 SOLUTION OPHTHALMIC at 18:24

## 2024-06-19 RX ADMIN — IPRATROPIUM BROMIDE AND ALBUTEROL SULFATE 3 ML: 2.5; .5 SOLUTION RESPIRATORY (INHALATION) at 02:34

## 2024-06-19 NOTE — PROGRESS NOTES
Progress Note - Pulmonary   Eryn Morocho 83 y.o. female MRN: [id number]  Unit/Bed#: [address] [place] [address] Encounter: [id number]    Assessment/Plan:    Acute on chronic hypoxic hypercapnic respiratory failure, multifactorial due to below              Baseline oxygen requirement is 3L nasal cannula.              Titrate supplemental oxygen to maintain saturations greater than or equal to 89%.              Activity as tolerated.              O2 evaluation by RT today with Oxymizer to quantify needs.              Transition from CPAP to BiPAP as below.     Acute on chronic diastolic CHF              Diuretics and management per primary team.              Monitor I's and O's and daily weights.     Severe COPD with bronchiectasis with possible mild acute exacerbation              Continue prednisone 40 mg daily for 5 days.              Continue budesonide/Perforomist twice daily with DuoNebs every 6 hours and hypertonic saline at discharge.              Encouraged flutter valve and Mucinex.     Obesity with underlying obstructive sleep apnea with COPD overlap and chronic hypercapnic respiratory failure              Has used CPAP at 9 cmH2O as an outpatient for many years, but has failed this due to refractory daytime symptoms and hypercapnia.              Continue BiPAP 15/9 with 6 L of supplemental oxygen at bedtime.  Order placed and discussed with case management.  Patient is agreeable.  Will need compliance follow-up with Dr. Moreno.     Non-small cell lung cancer with pulmonary nodules              Diagnosed 7 years ago and status post EMIR wedge resection and chemotherapy.              Right lower lobe nodule was enlarging as an outpatient.  CT scan this admission shows continued enlargement concerning for malignancy.  Briefly discussed with the patient and her daughter and they will follow-up with Dr. Moreno in the outpatient setting to determine next steps.  She typically does not leave the house and is not very mobile.  " Unsure if treatment would be an option if it is malignant.     Outpatient follow-up as per D/C instructions.  Discussed with primary team and nursing, as well as case management. Will sign off. Please call with questions or concerns.    Chief Complaint:    \"I feel better.\"    Subjective:    Eryn is sitting OOB in the chair after having a BM. She is eager to go home today. She denies SOB. No other complaints.    Objective:    Vitals: Blood pressure 170/67, pulse 60, temperature 98 °F (36.7 °C), resp. rate 16, height 5' 2\" (1.575 m), weight 78.1 kg (172 lb 2.9 oz), SpO2 92%.6L NC,Body mass index is 31.49 kg/m².      Intake/Output Summary (Last 24 hours) at 6/19/2024 0854  Last data filed at 6/19/2024 0502  Gross per 24 hour   Intake 1800 ml   Output 950 ml   Net 850 ml       Invasive Devices       Peripheral Intravenous Line  Duration             Peripheral IV 06/15/24 Left Antecubital 3 days                    Physical Exam:     Physical Exam  Vitals reviewed.   Constitutional:       General: She is not in acute distress.     Appearance: She is well-developed. She is obese. She is not toxic-appearing or diaphoretic.      Interventions: Nasal cannula in place.   HENT:      Head: Normocephalic and atraumatic.   Eyes:      General: No scleral icterus.     Extraocular Movements: Extraocular movements intact.   Neck:      Trachea: No tracheal deviation.   Cardiovascular:      Rate and Rhythm: Normal rate and regular rhythm.      Heart sounds: S1 normal and S2 normal. No murmur heard.     No friction rub. No gallop.   Pulmonary:      Effort: Pulmonary effort is normal. No tachypnea, accessory muscle usage or respiratory distress.      Breath sounds: Normal breath sounds. No stridor. No decreased breath sounds, wheezing, rhonchi or rales.   Chest:      Chest wall: No tenderness.   Abdominal:      General: Bowel sounds are normal. There is no distension.      Palpations: Abdomen is soft.      Tenderness: There is no " abdominal tenderness.   Musculoskeletal:      Cervical back: Neck supple.      Right lower leg: No edema.      Left lower leg: No edema.   Skin:     General: Skin is warm and dry.      Findings: No rash.   Neurological:      Mental Status: She is alert and oriented to person, place, and time.      GCS: GCS eye subscore is 4. GCS verbal subscore is 5. GCS motor subscore is 6.   Psychiatric:         Speech: Speech normal.         Behavior: Behavior normal. Behavior is cooperative.         Labs: CBC:   Lab Results   Component Value Date    WBC 5.37 06/19/2024    HGB 13.1 06/19/2024    HCT 43.2 06/19/2024     (H) 06/19/2024     06/19/2024    RBC 4.10 06/19/2024    MCH 32.0 06/19/2024    MCHC 30.3 (L) 06/19/2024    RDW 13.4 06/19/2024    MPV 9.1 06/19/2024   , CMP:   Lab Results   Component Value Date    SODIUM 140 06/19/2024    K 4.3 06/19/2024    CL 97 06/19/2024    CO2 39 (H) 06/19/2024    BUN 22 06/19/2024    CREATININE 0.68 06/19/2024    CALCIUM 9.3 06/19/2024    EGFR 80 06/19/2024     Imaging and other studies:  None today

## 2024-06-19 NOTE — ASSESSMENT & PLAN NOTE
Patient has a history of COPD chronically on 4 L nasal cannula  Currently on 6 L NC  BiPAP HS  Prednisone taper completed today  Continue DuoNebs, Perforomist, Mucinex and singular  Continue to follow outpatient pulm

## 2024-06-19 NOTE — ASSESSMENT & PLAN NOTE
Wt Readings from Last 3 Encounters:   06/19/24 78.1 kg (172 lb 2.9 oz)   05/22/24 83.5 kg (184 lb)   11/03/23 83.5 kg (184 lb)     Patient had presented with shortness of breath and evidence of fluid overload  Echo performed 5/22/2024 demonstrated EF 70%, grade 2 diastolic dysfunction  Transition from Lasix IV to home medication torsemide 10 mg daily  Intake and output  Daily weights  Monitor

## 2024-06-19 NOTE — CASE MANAGEMENT
Case Management Discharge Planning Note    Patient name Eryn Morocho  Location [address] [address]/[address] [place] [address]-* MRN [id number]  : 1940 Date 2024       Current Admission Date: 2024  Current Admission Diagnosis:Acute on chronic respiratory failure with hypoxia and hypercapnia (HCC)   Patient Active Problem List    Diagnosis Date Noted Date Diagnosed    Chronic respiratory failure with hypoxia and hypercapnia (HCC) 2024     JULIO and COPD overlap syndrome (HCC) 2024     Pulmonary emphysema, unspecified emphysema type (HCC) 2024     Oxygen dependent 4 LPM baseline 2023     Multiple lung nodules 2022     Class 1 obesity due to excess calories with body mass index (BMI) of 34.0 to 34.9 in adult 2022     Recurrent falls 2022     Stage 3a chronic kidney disease (HCC) 2021     OAB (overactive bladder) 2021     Osteoporosis 2021     Acute on chronic diastolic congestive heart failure (HCC) 2021     PAD (peripheral artery disease) ([facility])      Acute on chronic respiratory failure with hypoxia and hypercapnia ([facility]) 2020     Non-small cell cancer of left lung ([facility]) 2020     Recurrent major depressive disorder, in partial remission ([facility]) 2020     JULIO (obstructive sleep apnea)      COPD (chronic obstructive pulmonary disease) ([facility])      HTN (hypertension)      Hyperlipidemia        LOS (days): 7  Geometric Mean LOS (GMLOS) (days): 3.9  Days to GMLOS:-3.1     OBJECTIVE:  Risk of Unplanned Readmission Score: 17.04       Current admission status: Inpatient   Preferred Pharmacy:   [id number] - [place] - [address]   [place] [address]  Phone: [phone] Fax: [phone]    [facility]  [address] [place] [address]  Phone: [phone]     Primary Care Provider: Ari Mendiola MD    Primary Insurance: MEDICARE  Secondary Insurance: AARP    DISCHARGE DETAILS:    Discharge  planning discussed with:: Patient and daughter Karen  Freedom of Choice: Yes    Comments - Freedom of Choice: Patient's preference is to return home and she is medically cleared per attending.  SW faxed home O2 evaluation and order for high-capacity O2 concentrator to Radha Green at [facility] and daughter confirmed that concentrator and hospital bed have been delivered.  Order for bipap was also faxed and Radha confirmed that patient's bipap will be delivered today as well.  JASKARAN requested BLS transport and pickup is scheduled for 1900 with [facility].  JASKARAN notified attending and nursing of pickup time and spoke with daughter Karen by phone to review plan.      CM contacted family/caregiver?: Yes  Were Treatment Team discharge recommendations reviewed with patient/caregiver?: Yes  Did patient/caregiver verbalize understanding of patient care needs?: Yes  Were patient/caregiver advised of the risks associated with not following Treatment Team discharge recommendations?: Yes    Contacts  Patient Contacts: Karen Jenkins (daughter)  Relationship to Patient:: Family  Contact Method: Phone  Phone Number: [phone]  Reason/Outcome: Emergency Contact, Discharge Planning    Requested Home Health Care         Is the patient interested in HHC at discharge?: Yes  Home Health Discipline requested:: Nursing, Occupational Therapy, Physical Therapy  Home Health Agency Name:: [facility]  Home Health Follow-Up Provider:: PCP  Home Health Services Needed:: COPD Management, Evaluate Functional Status and Safety, Gait/ADL Training, Heart Failure Management, Oxygen Via Nasal Cannula, Strengthening/Theraputic Exercises to Improve Function  Oxygen LPM Ordered (if applicable based on home health services needed):: 6 LPM  Homebound Criteria Met:: Requires the Assistance of Another Person for Safe Ambulation or to Leave the Home, Uses an Assist Device (i.e. cane, walker, etc)  Supporting Clincal Findings:: Dyspnea with Exertion, Fatigues  Easliy in Short Distances, Limited Endurance, Requires Oxygen    DME Referral Provided  DME referral completed for the following items:: Home Oxygen concentrator, Other (High capacity (10L))  DME Supplier Name:: [facility]    Other Referral/Resources/Interventions Provided:  Interventions: DME, HHC, Transportation    Treatment Team Recommendation: Home with Home Health Care  Discharge Destination Plan:: Home with Home Health Care  Transport at Discharge : [facility]  Dispatcher Contacted: Yes  Number/Name of Dispatcher: [facility] via [employer]  Transported by (Company and Unit #): [facility]  ETA of Transport (Date): 06/19/24  ETA of Transport (Time): 1900         IMM Given (Date):: 06/19/24  IMM Given to:: Patient (IMM reviewed with and initialed by patient.  Copy given to patient and copy placed in scan bin for chart.  Patient is in agreement with discharge determination.)

## 2024-06-19 NOTE — PLAN OF CARE
Problem: RESPIRATORY - ADULT  Goal: Achieves optimal ventilation and oxygenation  Description: INTERVENTIONS:  - Assess for changes in respiratory status  - Assess for changes in mentation and behavior  - Position to facilitate oxygenation and minimize respiratory effort  - Oxygen administered by appropriate delivery if ordered  - Initiate smoking cessation education as indicated  - Encourage broncho-pulmonary hygiene including cough, deep breathe, Incentive Spirometry  - Assess the need for suctioning and aspirate as needed  - Assess and instruct to report SOB or any respiratory difficulty  - Respiratory Therapy support as indicated  Outcome: Progressing     Problem: CARDIOVASCULAR - ADULT  Goal: Maintains optimal cardiac output and hemodynamic stability  Description: INTERVENTIONS:  - Monitor I/O, vital signs and rhythm  - Monitor for S/S and trends of decreased cardiac output  - Administer and titrate ordered vasoactive medications to optimize hemodynamic stability  - Assess quality of pulses, skin color and temperature  - Assess for signs of decreased coronary artery perfusion  - Instruct patient to report change in severity of symptoms  Outcome: Progressing     Problem: METABOLIC, FLUID AND ELECTROLYTES - ADULT  Goal: Electrolytes maintained within normal limits  Description: INTERVENTIONS:  - Monitor labs and assess patient for signs and symptoms of electrolyte imbalances  - Administer electrolyte replacement as ordered  - Monitor response to electrolyte replacements, including repeat lab results as appropriate  - Instruct patient on fluid and nutrition as appropriate  Outcome: Progressing     Problem: METABOLIC, FLUID AND ELECTROLYTES - ADULT  Goal: Fluid balance maintained  Description: INTERVENTIONS:  - Monitor labs   - Monitor I/O and WT  - Instruct patient on fluid and nutrition as appropriate  - Assess for signs & symptoms of volume excess or deficit  Outcome: Progressing     Problem: HEMATOLOGIC -  ADULT  Goal: Maintains hematologic stability  Description: INTERVENTIONS  - Assess for signs and symptoms of bleeding or hemorrhage  - Monitor labs  - Administer supportive blood products/factors as ordered and appropriate  Outcome: Progressing     Problem: Nutrition/Hydration-ADULT  Goal: Nutrient/Hydration intake appropriate for improving, restoring or maintaining nutritional needs  Description: Monitor and assess patient's nutrition/hydration status for malnutrition. Collaborate with interdisciplinary team and initiate plan and interventions as ordered.  Monitor patient's weight and dietary intake as ordered or per policy. Utilize nutrition screening tool and intervene as necessary. Determine patient's food preferences and provide high-protein, high-caloric foods as appropriate.     INTERVENTIONS:  - Monitor oral intake, urinary output, labs, and treatment plans  - Assess nutrition and hydration status and recommend course of action  - Evaluate amount of meals eaten  - Assist patient with eating if necessary   - Allow adequate time for meals  - Recommend/ encourage appropriate diets, oral nutritional supplements, and vitamin/mineral supplements  - Order, calculate, and assess calorie counts as needed  - Recommend, monitor, and adjust tube feedings and TPN/PPN based on assessed needs  - Assess need for intravenous fluids  - Provide specific nutrition/hydration education as appropriate  - Include patient/family/caregiver in decisions related to nutrition  Outcome: Progressing

## 2024-06-19 NOTE — ASSESSMENT & PLAN NOTE
Initially requiring BiPAP, now weaned to 6 L mid flow. Patient chronically is on 4 L nasal cannula. Multifactorial in setting of acute on chronic CHF, severe COPD with bronchiectasis, obesity with JULIO, non small cell lung cancer.  CXR: Mild pulmonary vascular congestion and small bilateral pleural effusions.   CT chest: Continued enlargement of the right lower lobe juxtapleural nodule highly suspicious for lung cancer. Right lower lobe bronchial wall thickening with new patchy peripheral groundglass opacity in the right upper lobe and new patchy consolidation in the right lower lobe compatible with bronchitis and pneumonia. Mild interstitial edema with moderate left pleural effusion. Moderate emphysema. Pulmonary artery enlargement which can be seen with pulmonary hypertension.  S/p IV lasix, resumed torsemide 6/16  Pulmonology following  Completed prednisone 40 mg x 5 days  Continue budesonide/Perforomist with duonebs and hypertonic saline  CT chest showed mild interstitial edema with moderate left pleural effusion  US completed (6/19/24), pleural effusion too small for thoracentesis  Home O2 evaluation completed. Requiring 10L NC with exertion.   Currently requiring 6L NC at rest

## 2024-06-19 NOTE — DISCHARGE INSTR - AVS FIRST PAGE
Follow ups:  -PCP  -Pulmonology  -Cardiology    New prescriptions:  -Prednisone 40mg daily x 1 day to complete 5 days total (sent to pharmacy)    Other instructions:  -6L NC while at rest  -10L NC with exertion  -Transition from CPAP to BiPAP at nighttime. Machine will be delivered to your home  -Continue home medications as prescribed

## 2024-06-19 NOTE — RESPIRATORY THERAPY NOTE
Home Oxygen Qualifying Test     Patient name: Eryn Morocho        : 1940   Date of Test:  2024  Diagnosis:    Home Oxygen Test:    **Medicare Guidelines require item(s) 1-5 on all ambulatory patients or 1 and 2 on non-ambulatory patients.    1. Baseline SPO2 on Room Air at rest 87%   If <= 88% on Room Air add O2 via NC to obtain SpO2 >=88%. If LPM needed, document LPM 6 needed to reach =>88%    SPO2 during exertion on Room Air 83  %  During exertion monitor SPO2. If SPO2 increases >=89%, do not add supplemental oxygen    SPO2 on Oxygen at Rest 92 % at 6 LPM    SPO2 during exertion on Oxygen 90 % at 10 LPM- See additional notes    Test performed during exertion activity.      [x]  Supplemental Home Oxygen is indicated.    []  Client does not qualify for home oxygen.    Respiratory Additional Notes-  Pt tested on Oxymizer [place] with exertion- on 10 LPM patient maintained Sp02- > 88% with minimal exertion - 4 laps from chair to door and back to chair. After 4th lap Sp02 dropped to 83%. Pt recovered quickly.     Lou Aguirre, RT

## 2024-06-19 NOTE — DISCHARGE SUMMARY
[facility]  Discharge- Eryn Morocho 1940, 83 y.o. female MRN: [id number]  Unit/Bed#: [address] [place] [address] Encounter: [id number]  Primary Care Provider: Ari Mendiola MD   Date and time admitted to hospital: 6/12/2024  3:50 PM    * Acute on chronic respiratory failure with hypoxia and hypercapnia (HCC)  Assessment & Plan  Initially requiring BiPAP, now weaned to 6 L mid flow. Patient chronically is on 4 L nasal cannula. Multifactorial in setting of acute on chronic CHF, severe COPD with bronchiectasis, obesity with JULIO, non small cell lung cancer.  CXR: Mild pulmonary vascular congestion and small bilateral pleural effusions.   CT chest: Continued enlargement of the right lower lobe juxtapleural nodule highly suspicious for lung cancer. Right lower lobe bronchial wall thickening with new patchy peripheral groundglass opacity in the right upper lobe and new patchy consolidation in the right lower lobe compatible with bronchitis and pneumonia. Mild interstitial edema with moderate left pleural effusion. Moderate emphysema. Pulmonary artery enlargement which can be seen with pulmonary hypertension.  S/p IV lasix, resumed torsemide 6/16  Pulmonology following  Completed prednisone 40 mg x 5 days  Continue budesonide/Perforomist with duonebs and hypertonic saline  CT chest showed mild interstitial edema with moderate left pleural effusion  US completed (6/19/24), pleural effusion too small for thoracentesis  Home O2 evaluation completed. Requiring 10L NC with exertion.   Currently requiring 6L NC at rest    COPD (chronic obstructive pulmonary disease) (HCC)  Assessment & Plan  Patient has a history of COPD chronically on 4 L nasal cannula  Currently on 6 L NC  BiPAP HS  Prednisone taper completed today  Continue DuoNebs, Perforomist, Mucinex and singular  Continue to follow outpatient pulm    JULIO (obstructive sleep apnea)  Assessment & Plan  Previously on CPAP, now qualifies for BiPAP  BiPAP  ordered for home per CM    Stage 3a chronic kidney disease (HCC)  Assessment & Plan  Lab Results   Component Value Date    EGFR 80 06/19/2024    EGFR 81 06/18/2024    EGFR 81 06/17/2024    CREATININE 0.68 06/19/2024    CREATININE 0.66 06/18/2024    CREATININE 0.65 06/17/2024     Patient remains at baseline creatinine  Avoid nephrotoxins and periods of relative hypotension  Repeat BMP as needed    Acute on chronic diastolic congestive heart failure (HCC)  Assessment & Plan  Wt Readings from Last 3 Encounters:   06/19/24 78.1 kg (172 lb 2.9 oz)   05/22/24 83.5 kg (184 lb)   11/03/23 83.5 kg (184 lb)     Patient had presented with shortness of breath and evidence of fluid overload  Echo performed 5/22/2024 demonstrated EF 70%, grade 2 diastolic dysfunction  Transition from Lasix IV to home medication torsemide 10 mg daily  Intake and output  Daily weights  Monitor    PAD (peripheral artery disease) (HCC)  Assessment & Plan  Continue Plavix and statin  Heart healthy diet    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)  Assessment & Plan  Patient has a history of non-small cell lung cancer greater than 5 years ago status post wedge resection and chemotherapy  Follows with outpatient pulmonary and found to have lung nodules that have been increasing in size, outpatient PET scan negative  CT of chest: Continued enlargement of the right lower lobe juxtapleural nodule highly suspicious for lung cancer.   Per discussion with pulm, can continue outpatient follow up with Dr. Moreno    Hyperlipidemia  Assessment & Plan  Continue statin  Heart healthy diet      Medical Problems       Resolved Problems  Date Reviewed: 6/19/2024   None       Discharging Physician / Practitioner: Ana Lilia Sylvester PA-C  PCP: Ari Mendiola MD  Admission Date:   Admission Orders (From admission, onward)       Ordered        06/12/24 1611  INPATIENT ADMISSION  Once                          Discharge Date: 06/19/24    Consultations During Hospital  Stay:  Pulmonology     Procedures Performed:   None    Significant Findings / Test Results:   CXR (6/12/24): Mild pulmonary vascular congestion and small bilateral pleural effusions.   CT chest (6/17/24): Continued enlargement of the right lower lobe juxtapleural nodule highly suspicious for lung cancer. Right lower lobe bronchial wall thickening with new patchy peripheral groundglass opacity in the right upper lobe and new patchy consolidation in the right lower lobe compatible with bronchitis and pneumonia. Mild interstitial edema with moderate left pleural effusion. Moderate emphysema. Pulmonary artery enlargement which can be seen with pulmonary hypertension.    Incidental Findings:   None     Test Results Pending at Discharge (will require follow up):   None     Outpatient Tests Requested:  None    Complications:  None    Reason for Admission: Acute on chronic respiratory failure     Hospital Course:   Eryn Morocho is a 83 y.o. female patient who originally presented to the hospital on 6/12/2024 due to progressive shortness of breath and associated lower extremity edema.  Patient was found to have acute on chronic respiratory failure in the setting of COPD, sleep apnea, heart failure exacerbation.  Patient was treated with IV Lasix with improvement in volume status and transition back to home torsemide on 6/16.  Patient did have pleural effusions noted on CT chest and underwent ultrasound study for possible thoracentesis.  Pulmonology reviewed and effusion too small for thoracentesis.  Patient was also treated with IV Solu-Medrol, scheduled nebs, respiratory protocol.  Patient able to be weaned down to 6 L nasal cannula at rest.  Prior baseline of 4 L nasal cannula.  With exertional activity patient still does require 10 L NC.  Pulmonology cleared patient for discharge today.  Patient completed steroid taper.  Patient will need close follow-up outpatient with pulmonology and cardiology.  Patient to be  "discharged home today with VNA services.  All patient and family questions answered to the best of my ability.      Please see above list of diagnoses and related plan for additional information.     Condition at Discharge: fair    Discharge Day Visit / Exam:   Subjective:  Patient reports improvement in her breathing today. Denies any new or worsening symptoms.     Vitals: Blood Pressure: 122/55 (06/19/24 1003)  Pulse: 60 (06/19/24 1003)  Temperature: (!) 96.9 °F (36.1 °C) (06/19/24 1003)  Temp Source: Oral (06/17/24 1532)  Respirations: 16 (06/19/24 0736)  Height: 5' 2\" (157.5 cm) (06/12/24 1600)  Weight - Scale: 78.1 kg (172 lb 2.9 oz) (06/19/24 0541)  SpO2: 94 % (06/19/24 1406)  Exam:   Physical Exam  Vitals and nursing note reviewed.   Constitutional:       General: She is not in acute distress.     Appearance: She is well-developed. She is obese. She is ill-appearing.   HENT:      Head: Normocephalic and atraumatic.   Eyes:      Conjunctiva/sclera: Conjunctivae normal.   Cardiovascular:      Rate and Rhythm: Normal rate and regular rhythm.      Heart sounds: No murmur heard.  Pulmonary:      Effort: Pulmonary effort is normal. No respiratory distress.      Breath sounds: Normal breath sounds.   Abdominal:      Palpations: Abdomen is soft.      Tenderness: There is no abdominal tenderness.   Musculoskeletal:         General: No swelling.      Cervical back: Neck supple.   Skin:     General: Skin is warm and dry.      Capillary Refill: Capillary refill takes less than 2 seconds.   Neurological:      Mental Status: She is alert. Mental status is at baseline.   Psychiatric:         Mood and Affect: Mood normal.          Discussion with Family: Updated  (daughter) via phone.    Discharge instructions/Information to patient and family:   See after visit summary for information provided to patient and family.      Provisions for Follow-Up Care:  See after visit summary for information related to " follow-up care and any pertinent home health orders.      Mobility at time of Discharge:   Basic Mobility Inpatient Raw Score: 17  JH-HLM Goal: 5: Stand one or more mins  JH-HLM Achieved: 6: Walk 10 steps or more  HLM Goal achieved. Continue to encourage appropriate mobility.     Disposition:   Home with VNA Services (Reminder: Complete face to face encounter)    Planned Readmission: None     Discharge Statement:  I spent 55 minutes discharging the patient. This time was spent on the day of discharge. I had direct contact with the patient on the day of discharge. Greater than 50% of the total time was spent examining patient, answering all patient questions, arranging and discussing plan of care with patient as well as directly providing post-discharge instructions.  Additional time then spent on discharge activities.    Discharge Medications:  See after visit summary for reconciled discharge medications provided to patient and/or family.      **Please Note: This note may have been constructed using a voice recognition system**

## 2024-06-19 NOTE — ASSESSMENT & PLAN NOTE
Lab Results   Component Value Date    EGFR 80 06/19/2024    EGFR 81 06/18/2024    EGFR 81 06/17/2024    CREATININE 0.68 06/19/2024    CREATININE 0.66 06/18/2024    CREATININE 0.65 06/17/2024     Patient remains at baseline creatinine  Avoid nephrotoxins and periods of relative hypotension  Repeat BMP as needed

## 2024-06-20 ENCOUNTER — TELEPHONE (OUTPATIENT)
Dept: FAMILY MEDICINE CLINIC | Facility: CLINIC | Age: 84
End: 2024-06-20

## 2024-06-20 ENCOUNTER — HOME CARE VISIT (OUTPATIENT)
Dept: HOME HEALTH SERVICES | Facility: HOME HEALTHCARE | Age: 84
End: 2024-06-20

## 2024-06-20 ENCOUNTER — TELEPHONE (OUTPATIENT)
Age: 84
End: 2024-06-20

## 2024-06-20 ENCOUNTER — TRANSITIONAL CARE MANAGEMENT (OUTPATIENT)
Dept: FAMILY MEDICINE CLINIC | Facility: CLINIC | Age: 84
End: 2024-06-20

## 2024-06-20 DIAGNOSIS — F33.41 RECURRENT MAJOR DEPRESSIVE DISORDER, IN PARTIAL REMISSION (HCC): Primary | ICD-10-CM

## 2024-06-20 LAB
DME PARACHUTE DELIVERY DATE ACTUAL: NORMAL
DME PARACHUTE DELIVERY DATE ACTUAL: NORMAL
DME PARACHUTE DELIVERY DATE REQUESTED: NORMAL
DME PARACHUTE DELIVERY DATE REQUESTED: NORMAL
DME PARACHUTE ITEM DESCRIPTION: NORMAL
DME PARACHUTE ORDER STATUS: NORMAL
DME PARACHUTE ORDER STATUS: NORMAL
DME PARACHUTE SUPPLIER NAME: NORMAL
DME PARACHUTE SUPPLIER NAME: NORMAL
DME PARACHUTE SUPPLIER PHONE: NORMAL
DME PARACHUTE SUPPLIER PHONE: NORMAL

## 2024-06-20 RX ORDER — TRAZODONE HYDROCHLORIDE 50 MG/1
50 TABLET ORAL
Qty: 5 TABLET | Refills: 0 | Status: SHIPPED | OUTPATIENT
Start: 2024-06-20 | End: 2024-06-24 | Stop reason: SDUPTHER

## 2024-06-20 NOTE — TELEPHONE ENCOUNTER
Patient's daughter requesting refill for trazodone; discontinued on med list. Patient is out of med    Reason for call:   [x] Refill   [] Prior Auth  [] Other:     Office:   [x] PCP/Provider - WAGNER Ascencio / [facility]    Medication: trazodone    Dose/Frequency: 100mg qhs    Quantity: 90    Pharmacy: [id number] [id number]     Does the patient have enough for 3 days?   [] Yes   [x] No - Send as HP to POD

## 2024-06-20 NOTE — TELEPHONE ENCOUNTER
Spoke with pt's daughter to advise   Can you send a full script for the pt?  I did advised that you are on vacation this week

## 2024-06-21 ENCOUNTER — HOME CARE VISIT (OUTPATIENT)
Dept: HOME HEALTH SERVICES | Facility: HOME HEALTHCARE | Age: 84
End: 2024-06-21
Payer: MEDICARE

## 2024-06-21 VITALS
HEIGHT: 62 IN | SYSTOLIC BLOOD PRESSURE: 118 MMHG | OXYGEN SATURATION: 93 % | WEIGHT: 171 LBS | RESPIRATION RATE: 22 BRPM | DIASTOLIC BLOOD PRESSURE: 50 MMHG | TEMPERATURE: 96.4 F | HEART RATE: 55 BPM | BODY MASS INDEX: 31.47 KG/M2

## 2024-06-21 PROCEDURE — G0299 HHS/HOSPICE OF RN EA 15 MIN: HCPCS

## 2024-06-21 PROCEDURE — 400013 VN SOC

## 2024-06-21 PROCEDURE — 10330081 VN NO-PAY CLAIM PROCEDURE

## 2024-06-24 ENCOUNTER — TELEPHONE (OUTPATIENT)
Dept: PULMONOLOGY | Facility: CLINIC | Age: 84
End: 2024-06-24

## 2024-06-24 ENCOUNTER — HOME CARE VISIT (OUTPATIENT)
Dept: HOME HEALTH SERVICES | Facility: HOME HEALTHCARE | Age: 84
End: 2024-06-24
Payer: MEDICARE

## 2024-06-24 VITALS — OXYGEN SATURATION: 96 % | SYSTOLIC BLOOD PRESSURE: 136 MMHG | HEART RATE: 68 BPM | DIASTOLIC BLOOD PRESSURE: 60 MMHG

## 2024-06-24 DIAGNOSIS — M25.471 RIGHT ANKLE SWELLING: ICD-10-CM

## 2024-06-24 DIAGNOSIS — F33.41 RECURRENT MAJOR DEPRESSIVE DISORDER, IN PARTIAL REMISSION (HCC): ICD-10-CM

## 2024-06-24 PROCEDURE — G0151 HHCP-SERV OF PT,EA 15 MIN: HCPCS

## 2024-06-24 RX ORDER — TRAZODONE HYDROCHLORIDE 50 MG/1
50 TABLET ORAL
Qty: 90 TABLET | Refills: 1 | Status: SHIPPED | OUTPATIENT
Start: 2024-06-24

## 2024-06-24 RX ORDER — TORSEMIDE 10 MG/1
10 TABLET ORAL DAILY
Qty: 90 TABLET | Refills: 1 | Status: SHIPPED | OUTPATIENT
Start: 2024-06-24

## 2024-06-24 NOTE — TELEPHONE ENCOUNTER
Called pt the schedule hosptial f/u with Dr. Tiffanie DANIELS. [facility] asking pt to call back.

## 2024-06-25 ENCOUNTER — HOME CARE VISIT (OUTPATIENT)
Dept: HOME HEALTH SERVICES | Facility: HOME HEALTHCARE | Age: 84
End: 2024-06-25
Payer: MEDICARE

## 2024-06-25 ENCOUNTER — TELEPHONE (OUTPATIENT)
Age: 84
End: 2024-06-25

## 2024-06-25 ENCOUNTER — TELEPHONE (OUTPATIENT)
Dept: INPATIENT UNIT | Facility: HOSPITAL | Age: 84
End: 2024-06-25

## 2024-06-25 VITALS
DIASTOLIC BLOOD PRESSURE: 66 MMHG | TEMPERATURE: 98.2 F | SYSTOLIC BLOOD PRESSURE: 126 MMHG | HEART RATE: 64 BPM | RESPIRATION RATE: 22 BRPM | OXYGEN SATURATION: 93 %

## 2024-06-25 PROCEDURE — G0299 HHS/HOSPICE OF RN EA 15 MIN: HCPCS

## 2024-06-25 PROCEDURE — G0155 HHCP-SVS OF CSW,EA 15 MIN: HCPCS

## 2024-06-25 NOTE — TELEPHONE ENCOUNTER
Meka FERRERA called to report patient new weight gain:     6/24- 171.2 lbs   6/25- 174.4 lbs    Gained 3.2 lbs overnight     VN reported no edema in any extremity, lungs clear. Patient stated she potentially ate too much spaghetti, had a tomato sauce and mentioned it may have been saltier than her normal intake. Overall patient is feeling well and no other symptoms to report. Please follow up with patient if needed.

## 2024-06-26 ENCOUNTER — HOME CARE VISIT (OUTPATIENT)
Dept: HOME HEALTH SERVICES | Facility: HOME HEALTHCARE | Age: 84
End: 2024-06-26
Payer: MEDICARE

## 2024-06-26 VITALS
HEART RATE: 64 BPM | DIASTOLIC BLOOD PRESSURE: 65 MMHG | SYSTOLIC BLOOD PRESSURE: 125 MMHG | BODY MASS INDEX: 31.46 KG/M2 | OXYGEN SATURATION: 92 % | WEIGHT: 172 LBS

## 2024-06-26 PROCEDURE — G0152 HHCP-SERV OF OT,EA 15 MIN: HCPCS

## 2024-06-27 PROCEDURE — G0180 MD CERTIFICATION HHA PATIENT: HCPCS | Performed by: INTERNAL MEDICINE

## 2024-06-28 ENCOUNTER — HOME CARE VISIT (OUTPATIENT)
Dept: HOME HEALTH SERVICES | Facility: HOME HEALTHCARE | Age: 84
End: 2024-06-28
Payer: MEDICARE

## 2024-06-28 VITALS
SYSTOLIC BLOOD PRESSURE: 114 MMHG | HEART RATE: 66 BPM | DIASTOLIC BLOOD PRESSURE: 60 MMHG | OXYGEN SATURATION: 91 % | RESPIRATION RATE: 18 BRPM | TEMPERATURE: 98.8 F

## 2024-06-28 PROCEDURE — G0300 HHS/HOSPICE OF LPN EA 15 MIN: HCPCS

## 2024-07-02 ENCOUNTER — OFFICE VISIT (OUTPATIENT)
Dept: FAMILY MEDICINE CLINIC | Facility: CLINIC | Age: 84
End: 2024-07-02
Payer: MEDICARE

## 2024-07-02 ENCOUNTER — HOME CARE VISIT (OUTPATIENT)
Dept: HOME HEALTH SERVICES | Facility: HOME HEALTHCARE | Age: 84
End: 2024-07-02
Payer: MEDICARE

## 2024-07-02 VITALS
OXYGEN SATURATION: 90 % | SYSTOLIC BLOOD PRESSURE: 136 MMHG | RESPIRATION RATE: 20 BRPM | TEMPERATURE: 97.1 F | HEART RATE: 70 BPM | DIASTOLIC BLOOD PRESSURE: 62 MMHG

## 2024-07-02 VITALS
WEIGHT: 171.8 LBS | DIASTOLIC BLOOD PRESSURE: 80 MMHG | RESPIRATION RATE: 16 BRPM | BODY MASS INDEX: 31.62 KG/M2 | HEART RATE: 70 BPM | HEIGHT: 62 IN | OXYGEN SATURATION: 95 % | SYSTOLIC BLOOD PRESSURE: 118 MMHG

## 2024-07-02 DIAGNOSIS — C34.92 NON-SMALL CELL CANCER OF LEFT LUNG (HCC): ICD-10-CM

## 2024-07-02 DIAGNOSIS — E55.9 VITAMIN D DEFICIENCY: ICD-10-CM

## 2024-07-02 DIAGNOSIS — I50.33 ACUTE ON CHRONIC DIASTOLIC CONGESTIVE HEART FAILURE (HCC): ICD-10-CM

## 2024-07-02 DIAGNOSIS — J96.12 CHRONIC RESPIRATORY FAILURE WITH HYPOXIA AND HYPERCAPNIA (HCC): ICD-10-CM

## 2024-07-02 DIAGNOSIS — J96.11 CHRONIC RESPIRATORY FAILURE WITH HYPOXIA AND HYPERCAPNIA (HCC): ICD-10-CM

## 2024-07-02 DIAGNOSIS — J43.9 PULMONARY EMPHYSEMA, UNSPECIFIED EMPHYSEMA TYPE (HCC): Primary | ICD-10-CM

## 2024-07-02 PROCEDURE — 99495 TRANSJ CARE MGMT MOD F2F 14D: CPT | Performed by: FAMILY MEDICINE

## 2024-07-02 PROCEDURE — G0299 HHS/HOSPICE OF RN EA 15 MIN: HCPCS

## 2024-07-02 NOTE — PROGRESS NOTES
Transition of Care Visit  Name: Eryn Morocho      : 1940      MRN: [id number]  Encounter Provider: Ari Mendiola MD  Encounter Date: 2024   Encounter department: [facility]    Assessment & Plan  1. Suspected lung cancer.  The patient's blood pressure is well-regulated, and her CO2 levels remain consistent at 39. Despite being on Lasix, torsemide, prednisone, and budesonide, she continues to retain fluid around her lungs, as evidenced by a CT scan. Her kidney function remains stable, and her weight remains stable. A PET scan conducted in 2023 revealed an enlarging right lower lobe nodule, which would not exclude a low-grade lung cancer. A repeat CT scan conducted in 2023 revealed a slowly growing node of 1.4 cm, potentially indicative of lung cancer. The patient was advised to engage in breathing exercises for her lungs and to incorporate walking into her routine. Should her weight increase, she was advised to take a second diuretic.    Follow-up  A follow-up appointment is scheduled for 2024.  No orders of the defined types were placed in this encounter.     1. Pulmonary emphysema, unspecified emphysema type (HCC)  2. 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 Chronic respiratory failure with hypoxia and hypercapnia (HCC)  4. Vitamin D deficiency  5. Acute on chronic diastolic congestive heart failure (HCC)        History of Present Illness     History of Present Illness  The patient is an 84-year-old who presents for evaluation of multiple medical concerns.    The patient's weight was recorded as 171 pounds. She has been engaging in exercises, including pumping exercises, three times daily. She utilizes a cane for mobility, although she does not utilize a walker due to clumsiness. She occasionally experiences instability, necessitating stair climbing and descending the hallway for bathroom visits. She has ceased using the hospital bed, opting for bed only at night, and  has ceased sleeping in a chair. Her oxygen supply is set at 6. She engages in breathing exercises thrice daily. Her appetite remains robust, and she consumes food provided by the accompanying adult female. Her bowel movements are regular. She discontinued Tylenol No. 3 due to constipation.      Transitional Care Management Review:   Eryn Morocho is a 83 y.o. female here for TCM follow up.     During the TCM phone call patient stated:  TCM Call       Date and time call was made  6/24/2024 11:40 AM    Hospital care reviewed  Records reviewed    Patient was hospitial[facility] at  [facility]    Date of Admission  06/12/24    Date of discharge  06/19/24    Diagnosis  Acute on chronic respiratory failure with hypoxia and hypercapnia    Disposition  Home    Were the patients medications reviewed and updated  Yes    Current Symptoms  None          TCM Call       Post hospital issues  None    Should patient be enrolled in anticoag monitoring?  No    Scheduled for follow up?  Yes    Patients specialists  [facility]    Did you obtain your prescribed medications  Yes    Do you need help managing your prescriptions or medications  Yes    Why type of assitance do you need  daughter helps as needed    Is transportation to your appointment needed  Yes    Specify why  Daughter brings patient to appointments    I have advised the patient to call PCP with any new or worsening symptoms  Clare Gilliam MA    Living Arrangements  Spouse or Significiant other; Children    Support System  Partner; Children    The type of support provided  Emotional; Financial; Physical    Do you have social support  Yes, as much as I need    Are you recieving any outpatient services  No    Are you recieving home care services  Yes    Types of home care services  Home PT; Nurse visit    Are you using any community resources  No    Current waiver services  No    Have you fallen in the last 12 months  No    Interperter language line needed  No     Counseling  Family          History of Present Illness  The patient is an 84-year-old who presents for evaluation of multiple medical concerns.    The patient's weight was recorded as 171 pounds. She has been engaging in exercises, including pumping exercises, three times daily. She utilizes a cane for mobility, although she does not utilize a walker due to clumsiness. She occasionally experiences instability, necessitating stair climbing and descending the hallway for bathroom visits. She has ceased using the hospital bed, opting for bed only at night, and has ceased sleeping in a chair. Her oxygen supply is set at 6. She engages in breathing exercises thrice daily. Her appetite remains robust, and she consumes food provided by the accompanying adult female. Her bowel movements are regular. She discontinued Tylenol No. 3 due to constipation.  Review of Systems   Constitutional:  Positive for activity change. Negative for fever and unexpected weight change.        Weight increase    HENT:  Negative for ear pain, nosebleeds and trouble swallowing.    Eyes:  Negative for visual disturbance.   Respiratory:  Positive for shortness of breath. Negative for chest tightness.    Cardiovascular:  Negative for chest pain, palpitations and leg swelling.   Gastrointestinal:  Negative for abdominal pain, constipation, diarrhea and nausea.   Endocrine: Negative for cold intolerance.   Genitourinary:  Negative for dysuria and urgency.   Musculoskeletal:  Positive for arthralgias, back pain and gait problem. Negative for joint swelling and myalgias.   Skin:  Negative for rash.   Neurological:  Positive for weakness. Negative for tremors, seizures and syncope.   Hematological:  Does not bruise/bleed easily.   Psychiatric/Behavioral:  Negative for agitation, behavioral problems, hallucinations and suicidal ideas. The patient is nervous/anxious.      Objective     /80 (BP Location: Left arm, Patient Position: Sitting, Cuff Size:  "Standard)   Pulse 70   Resp 16   Ht 5' 2\" (1.575 m)   Wt 77.9 kg (171 lb 12.8 oz)   SpO2 95%   BMI 31.42 kg/m²     Physical Exam    Physical Exam  Vitals and nursing note reviewed.   Constitutional:       Appearance: She is well-developed. She is obese.      Comments: 6L NC     wheelchair   HENT:      Head: Normocephalic and atraumatic.      Right Ear: There is no impacted cerumen.   Cardiovascular:      Rate and Rhythm: Normal rate and regular rhythm.      Heart sounds: Normal heart sounds. No murmur heard.  Pulmonary:      Effort: Pulmonary effort is normal.      Breath sounds: Normal breath sounds. No wheezing or rales.   Abdominal:      General: Bowel sounds are normal. There is no distension.      Palpations: Abdomen is soft.      Tenderness: There is no abdominal tenderness.   Musculoskeletal:         General: No tenderness. Normal range of motion.      Cervical back: Normal range of motion and neck supple.   Lymphadenopathy:      Cervical: No cervical adenopathy.   Skin:     General: Skin is warm and dry.      Capillary Refill: Capillary refill takes less than 2 seconds.      Findings: No erythema or rash.   Neurological:      Mental Status: She is alert and oriented to person, place, and time.      Cranial Nerves: No cranial nerve deficit.      Sensory: No sensory deficit.      Motor: Weakness present. No abnormal muscle tone.      Gait: Gait abnormal.   Psychiatric:         Behavior: Behavior normal.         Thought Content: Thought content normal.         Judgment: Judgment normal.       Medications have been reviewed by provider in current encounter    Administrative Statements         "

## 2024-07-03 PROBLEM — J96.21 ACUTE ON CHRONIC RESPIRATORY FAILURE WITH HYPOXIA AND HYPERCAPNIA (HCC): Status: RESOLVED | Noted: 2020-09-23 | Resolved: 2024-07-03

## 2024-07-03 PROBLEM — J96.22 ACUTE ON CHRONIC RESPIRATORY FAILURE WITH HYPOXIA AND HYPERCAPNIA (HCC): Status: RESOLVED | Noted: 2020-09-23 | Resolved: 2024-07-03

## 2024-07-03 NOTE — TELEPHONE ENCOUNTER
Spoke with pt's daughter. She is going to talk with her mother regarding an appointment with Dr. Moreno and then will call to schedule. Wants to wait until she sees the cardiologist first.

## 2024-07-05 ENCOUNTER — TELEPHONE (OUTPATIENT)
Age: 84
End: 2024-07-05

## 2024-07-05 NOTE — TELEPHONE ENCOUNTER
Patient's daughter calling to schedule a hospital follow up with Dr. Moreno.  No appointments available.

## 2024-07-09 ENCOUNTER — HOME CARE VISIT (OUTPATIENT)
Dept: HOME HEALTH SERVICES | Facility: HOME HEALTHCARE | Age: 84
End: 2024-07-09
Payer: MEDICARE

## 2024-07-09 VITALS
SYSTOLIC BLOOD PRESSURE: 138 MMHG | OXYGEN SATURATION: 92 % | RESPIRATION RATE: 18 BRPM | DIASTOLIC BLOOD PRESSURE: 70 MMHG | HEART RATE: 61 BPM | TEMPERATURE: 97.3 F

## 2024-07-09 PROCEDURE — G0299 HHS/HOSPICE OF RN EA 15 MIN: HCPCS

## 2024-07-16 ENCOUNTER — OFFICE VISIT (OUTPATIENT)
Dept: CARDIOLOGY CLINIC | Facility: CLINIC | Age: 84
End: 2024-07-16
Payer: MEDICARE

## 2024-07-16 ENCOUNTER — TELEPHONE (OUTPATIENT)
Dept: CARDIOLOGY CLINIC | Facility: CLINIC | Age: 84
End: 2024-07-16

## 2024-07-16 VITALS
BODY MASS INDEX: 31.28 KG/M2 | SYSTOLIC BLOOD PRESSURE: 124 MMHG | DIASTOLIC BLOOD PRESSURE: 62 MMHG | OXYGEN SATURATION: 89 % | WEIGHT: 170 LBS | HEART RATE: 61 BPM | HEIGHT: 62 IN

## 2024-07-16 DIAGNOSIS — I48.0 PAROXYSMAL ATRIAL FIBRILLATION (HCC): ICD-10-CM

## 2024-07-16 DIAGNOSIS — I1A.0 RESISTANT HYPERTENSION: Primary | ICD-10-CM

## 2024-07-16 DIAGNOSIS — I16.0 HYPERTENSIVE URGENCY: ICD-10-CM

## 2024-07-16 DIAGNOSIS — I50.33 ACUTE ON CHRONIC DIASTOLIC CONGESTIVE HEART FAILURE (HCC): ICD-10-CM

## 2024-07-16 DIAGNOSIS — E78.2 ELEVATED TRIGLYCERIDES WITH HIGH CHOLESTEROL: ICD-10-CM

## 2024-07-16 PROCEDURE — 99214 OFFICE O/P EST MOD 30 MIN: CPT | Performed by: INTERNAL MEDICINE

## 2024-07-16 PROCEDURE — 93000 ELECTROCARDIOGRAM COMPLETE: CPT | Performed by: INTERNAL MEDICINE

## 2024-07-16 PROCEDURE — 93242 EXT ECG>48HR<7D RECORDING: CPT | Performed by: INTERNAL MEDICINE

## 2024-07-16 RX ORDER — OMEGA-3-ACID ETHYL ESTERS 1 G/1
2 CAPSULE, LIQUID FILLED ORAL 2 TIMES DAILY
Qty: 360 CAPSULE | Refills: 1 | Status: SHIPPED | OUTPATIENT
Start: 2024-07-16

## 2024-07-16 NOTE — TELEPHONE ENCOUNTER
Registration Complete    Eryn Ledezma reviewed instructions with patient. Pt.verbalized understanding.

## 2024-07-16 NOTE — PROGRESS NOTES
[facility]  [address] [place] [address] [id number]   [place] [address]  Cardiology Consultation    Eryn Morocho  [id number]  1940      Consult for: Diastolic heart failure  Appreciate consult by: Ari Mendiola MD    1. Resistant hypertension  POCT ECG    diltiazem (CARDIZEM) 30 mg tablet    apixaban (Eliquis) 5 mg      2. Acute on chronic diastolic congestive heart failure (HCC)  POCT ECG    Ambulatory Referral to Cardiology    apixaban (Eliquis) 5 mg    AMB extended holter monitor    omega-3-acid ethyl esters (LOVAZA) 1 g capsule      3. Hypertensive urgency  POCT ECG    Ambulatory Referral to Cardiology    apixaban (Eliquis) 5 mg    omega-3-acid ethyl esters (LOVAZA) 1 g capsule      4. Paroxysmal atrial fibrillation (HCC)  diltiazem (CARDIZEM) 30 mg tablet    apixaban (Eliquis) 5 mg    AMB extended holter monitor      5. Elevated triglycerides with high cholesterol  omega-3-acid ethyl esters (LOVAZA) 1 g capsule         Discussion/Summary:   Acute on chronic diastolic heart failure recent hospitalization in the setting of severe COPD with bronchiectasis-she is noted to be in new onset atrial fibrillation.  No prior history.  We will discontinue her amlodipine.  We will start her on diltiazem for rate control.  Plan for a extended Holter monitor to rule out signs for sick sinus syndrome.  She has an elevated CHADS2 score.  We will add on Eliquis 5 mg twice a day.  Her atrial fibrillation is likely multifactorial.  Her volume status has been stable.  We will continue her on her torsemide 10 mg in the morning.  Her blood pressures are currently controlled.  Her weight is currently stable at 170 pounds.  She understands to inform us if her weight is up by 3 pounds in a day or 5 pounds in a week.    COPD currently on inhaler therapy.  She is chronically on oxygen therapy    Peripheral vascular disease with prior stenting-continue her Plavix and statin therapy.  Her lipids are not at  goal.  We will add on omega-3 2000 mg twice a day.  She will continue with her rosuvastatin 20 mg.  We will have updated lipids prior to next follow-up    History of non-small cell cancer status post wedge resection and chemotherapy-followed by surveillance CT scans.  She has a right lower lobe nodule highly suspicious for lung cancer.  She has close follow-up with pulmonary    Obstructive sleep apnea compliant with CPAP    HPI:   83-year-old with recent hospitalization for COPD exacerbation and volume overload.  She was discharged on torsemide diuretic.  Her weight has been stable.  She is down 2 pounds.  She denies having major palpitations.  We reviewed her EKG findings.    Past Medical History:   Diagnosis Date    Asthma     Back problem     Chronic pain     Colon cancer screening     recheck tattoo    COPD (chronic obstructive pulmonary disease) (HCC)     Oxygen 3.5L via NC    CPAP (continuous positive airway pressure) dependence     Diverticulosis     Emphysema of lung (HCC)     High blood pressure     Hyperlipidemia     Hypertension     Lung cancer (HCC)     Left Lung cancer; wedge resection     JULIO on CPAP     PAD (peripheral artery disease) (HCC)     Leg    Wears glasses      Social History     Socioeconomic History    Marital status: /Civil Union     Spouse name: Not on file    Number of children: 4    Years of education: 12    Highest education level: Not on file   Occupational History    Occupation: Retired -    Tobacco Use    Smoking status: Former     Current packs/day: 1.50     Average packs/day: 1.5 packs/day for 35.0 years (52.5 ttl pk-yrs)     Types: Cigarettes    Smokeless tobacco: Never    Tobacco comments:     Has smoked since age:   11 - As per [employer]    Vaping Use    Vaping status: Never Used   Substance and Sexual Activity    Alcohol use: Not Currently    Drug use: Never    Sexual activity: Not on file   Other Topics Concern    Not on file   Social History Narrative    Most  recent tobacco use screenin2020    Do you currently or have you served in the [place] Armed [employer]: No    Were you activated, into active duty, as a member of the [employer] or as a Reservist: No    Occupation: Retired;     Exercise level: None    Has smoked since age: 11    Alcohol intake: None    Caffeine intake: Occasional    Chewing tobacco: none    Marital status:     Illicit drugs: Denied    Diet: Cardiac    Seat belts used routinely: Yes    Sexual orientation: Heterosexual    Smoke alarm in home: Yes    Advance directive: No    General stress level: Medium    Salt Intake: HTN Diet    Sunscreen used routinely: Yes    Has the Patient had a mammogram to screen for breast cancer within 24 months: No    Guns present in home: No    Single or multi-level home/work: single level home    Live alone or with others: with others    Number of children: 4    Presence of domestic violence: No    Are you currently employed: No    Asbestos exposure: No    TB exposure: No    Environmental exposure: No    Animal exposure: Yes    Hard of hearing or deaf in one or both ears: No    Legally blind in one or both eyes: No    has cpap, oxygen and nebulizer-[employer]     - As per [place]      Social Determinants of Health     Financial Resource Strain: Low Risk  (2023)    Overall Financial Resource Strain (CARDIA)     Difficulty of Paying Living Expenses: Not very hard   Food Insecurity: No Food Insecurity (2024)    Hunger Vital Sign     Worried About Running Out of Food in the Last Year: Never true     Ran Out of Food in the Last Year: Never true   Transportation Needs: No Transportation Needs (2024)    PRAPARE - Transportation     Lack of Transportation (Medical): No     Lack of Transportation (Non-Medical): No   Physical Activity: Not on file   Stress: Not on file   Social Connections: Not on file   Intimate Partner Violence: Not on file   Housing Stability: Low Risk  (2024)    Housing  Stability Vital Sign     Unable to Pay for Housing in the Last Year: No     Number of Times Moved in the Last Year: 0     Homeless in the Last Year: No      Family History   Problem Relation Age of Onset    Colon cancer Father     Heart disease Sister     Lung cancer Daughter      Past Surgical History:   Procedure Laterality Date    APPENDECTOMY      age 11 yr    COLONOSCOPY  2018    repeat 2023    COLONOSCOPY      HYSTERECTOMY      complete    LUNG CANCER SURGERY Left     wedge reseection, Dr Anna       Current Outpatient Medications:     albuterol (2.5 mg/3 mL) 0.083 % nebulizer solution, Take 3 mL (2.5 mg total) by nebulization daily as needed for wheezing or shortness of breath, Disp: 180 mL, Rfl: 0    albuterol (PROVENTIL HFA,VENTOLIN HFA) 90 mcg/act inhaler, INHALE 2 PUFFS BY MOUTH EVERY 6 HOURS AS NEEDED FOR SHORTNESS OF BREATH, Disp: 8.5 g, Rfl: 1    apixaban (Eliquis) 5 mg, Take 1 tablet (5 mg total) by mouth 2 (two) times a day, Disp: 60 tablet, Rfl: 1    Calcium Carbonate (CALTRATE 600 PO), Take 3 tablets by mouth in the morning daily in AM, Disp: , Rfl:     carboxymethylcellulose 0.5 % SOLN, Administer 1 drop to both eyes 3 (three) times a day as needed for dry eyes, Disp: , Rfl:     citalopram (CeleXA) 20 mg tablet, Take 1 tablet (20 mg total) by mouth daily, Disp: 90 tablet, Rfl: 1    clopidogrel (PLAVIX) 75 mg tablet, TAKE 1 TABLET BY MOUTH DAILY, Disp: 90 tablet, Rfl: 1    Coenzyme Q10 (Co Q 10) 100 MG CAPS, Take 2 capsules (200 mg total) by mouth every morning, Disp: 180 capsule, Rfl: 1    Cyanocobalamin (Energy B12) 500 MCG CHEW, Chew 1 tablet in the morning. daily  Indications: Inadequate Vitamin B12, Disp: , Rfl:     diltiazem (CARDIZEM) 30 mg tablet, Take 1 tablet (30 mg total) by mouth 3 (three) times a day, Disp: 90 tablet, Rfl: 1    doxepin (SINEquan) 10 mg capsule, TAKE 1 TO 3 CAPSULES BY MOUTH EVERY NIGHT AT BEDTIME AS NEEDED FOR SLEEP, Disp: 45 capsule, Rfl: 0    formoterol  (Perforomist) 20 MCG/2ML nebulizer solution, Take 2 mL (20 mcg total) by nebulization 2 (two) times a day icd 10 code J44.9, Disp: 180 mL, Rfl: 1    guaiFENesin (MUCINEX) 600 mg 12 hr tablet, TAKE 2 TABLET BY MOUTH EVERY 12 HOURS, Disp: 120 tablet, Rfl: 5    ipratropium-albuterol (DUO-NEB) 0.5-2.5 mg/3 mL nebulizer solution, Take 3 mL by nebulization every 6 (six) hours as needed for wheezing or shortness of breath ICD 10 J44.9, Disp: 360 mL, Rfl: 2    levocetirizine (XYZAL) 5 MG tablet, TAKE 1 TABLET(5 MG) BY MOUTH EVERY EVENING, Disp: 90 tablet, Rfl: 1    montelukast (SINGULAIR) 10 mg tablet, TAKE 1 TABLET(10 MG) BY MOUTH DAILY AT BEDTIME, Disp: 90 tablet, Rfl: 1    Multiple Vitamins-Minerals (AIRBORNE GUMMIES PO), Take 1 tablet by mouth every morning, Disp: , Rfl:     Nebulizers (Vios LC Sprint) MISC, Use as directed, Disp: 1 each, Rfl: 0    Omega-3 Fatty Acids (fish oil) 1,000 mg, Take 1,000 mg by mouth every morning, Disp: , Rfl:     omega-3-acid ethyl esters (LOVAZA) 1 g capsule, Take 2 capsules (2 g total) by mouth 2 (two) times a day, Disp: 360 capsule, Rfl: 1    oxygen gas, Inhale 4 L/min continuous. VIA NC  Indications: Hypoxia, Disp: , Rfl:     rosuvastatin (CRESTOR) 20 MG tablet, TAKE 1 TABLET(20 MG) BY MOUTH DAILY, Disp: 90 tablet, Rfl: 1    timolol (TIMOPTIC) 0.5 % ophthalmic solution, Administer 1 drop to both eyes 2 (two) times a day, Disp: , Rfl:     torsemide (DEMADEX) 10 mg tablet, Take 1 tablet (10 mg total) by mouth daily, Disp: 90 tablet, Rfl: 1    traZODone (DESYREL) 50 mg tablet, Take 1 tablet (50 mg total) by mouth daily at bedtime as needed for sleep, Disp: 90 tablet, Rfl: 1    VITAMIN D PO, Take 25 mcg by mouth in the morning daily, Disp: , Rfl:     bisacodyl (DULCOLAX) 5 mg EC tablet, Take 2 tablets (10 mg total) by mouth once for 1 dose, Disp: 2 tablet, Rfl: 0    budesonide (PULMICORT) 0.5 mg/2 mL nebulizer solution, Take 2 mL (0.5 mg total) by nebulization 2 (two) times a day Rinse  "mouth after use., Disp: 120 mL, Rfl: 3    denosumab (Prolia) 60 mg/mL, Inject 1 mL (60 mg total) under the skin once for 1 dose (Patient taking differently: Inject 60 mg under the skin once Next dose 6/2023), Disp: 1 mL, Rfl: 0  Allergies   Allergen Reactions    Baclofen Nausea Only and Dizziness    Lisinopril Nausea Only and Dizziness    Losartan Nausea Only and Dizziness    Naproxen Nausea Only and Dizziness    Zinc Acetate Nausea Only and Dizziness     Vitals:    07/16/24 1253   BP: 124/62   BP Location: Right arm   Patient Position: Sitting   Cuff Size: Standard   Pulse: 61   SpO2: (!) 89%   Weight: 77.1 kg (170 lb)   Height: 5' 2\" (1.575 m)       Review of Systems:   Review of Systems   Constitutional:  Positive for fatigue. Negative for activity change, appetite change, chills, diaphoresis, fever and unexpected weight change.   HENT: Negative.  Negative for congestion, dental problem, drooling, ear discharge, ear pain, facial swelling, hearing loss, mouth sores, nosebleeds, postnasal drip, rhinorrhea, sinus pressure, sinus pain, sneezing, sore throat, tinnitus, trouble swallowing and voice change.    Eyes: Negative.  Negative for photophobia, pain, redness, itching and visual disturbance.   Respiratory:  Positive for shortness of breath. Negative for apnea, cough, choking, chest tightness, wheezing and stridor.    Cardiovascular: Negative.  Negative for chest pain, palpitations and leg swelling.   Gastrointestinal: Negative.  Negative for abdominal distention, abdominal pain, anal bleeding, blood in stool, constipation, diarrhea, nausea, rectal pain and vomiting.   Endocrine: Negative.  Negative for cold intolerance, heat intolerance, polydipsia, polyphagia and polyuria.   Genitourinary: Negative.  Negative for decreased urine volume, difficulty urinating, dyspareunia, dysuria, enuresis, flank pain, frequency, genital sores, hematuria, menstrual problem, pelvic pain, urgency, vaginal bleeding, vaginal " "discharge and vaginal pain.   Musculoskeletal:  Positive for arthralgias and back pain. Negative for gait problem, joint swelling, myalgias, neck pain and neck stiffness.   Skin: Negative.  Negative for color change, pallor, rash and wound.   Allergic/Immunologic: Negative.  Negative for environmental allergies, food allergies and immunocompromised state.   Neurological: Negative.  Negative for dizziness, tremors, seizures, syncope, facial asymmetry, speech difficulty, weakness, light-headedness, numbness and headaches.   Hematological: Negative.  Negative for adenopathy. Does not bruise/bleed easily.   Psychiatric/Behavioral: Negative.  Negative for agitation, behavioral problems, confusion, decreased concentration, dysphoric mood, hallucinations, self-injury, sleep disturbance and suicidal ideas. The patient is not nervous/anxious and is not hyperactive.    All other systems reviewed and are negative.      Vitals:    07/16/24 1253   BP: 124/62   BP Location: Right arm   Patient Position: Sitting   Cuff Size: Standard   Pulse: 61   SpO2: (!) 89%   Weight: 77.1 kg (170 lb)   Height: 5' 2\" (1.575 m)     Physical Examination:   Physical Exam  Constitutional:       General: She is not in acute distress.     Appearance: She is well-developed. She is ill-appearing. She is not diaphoretic.   HENT:      Head: Normocephalic and atraumatic.      Right Ear: External ear normal.      Left Ear: External ear normal.   Eyes:      General: No scleral icterus.        Right eye: No discharge.         Left eye: No discharge.      Conjunctiva/sclera: Conjunctivae normal.      Pupils: Pupils are equal, round, and reactive to light.   Neck:      Thyroid: No thyromegaly.      Vascular: No JVD.      Trachea: No tracheal deviation.   Cardiovascular:      Rate and Rhythm: Normal rate. Rhythm irregular.      Heart sounds: Murmur heard.      No friction rub. Gallop present.   Pulmonary:      Effort: No respiratory distress.      Breath " sounds: No stridor. No wheezing or rales.   Chest:      Chest wall: No tenderness.   Abdominal:      General: Bowel sounds are normal. There is no distension.      Palpations: Abdomen is soft. There is no mass.      Tenderness: There is no abdominal tenderness. There is no guarding or rebound.   Musculoskeletal:         General: Swelling present. No tenderness, deformity or edema. Normal range of motion.      Cervical back: Normal range of motion and neck supple.   Skin:     General: Skin is warm and dry.      Coloration: Skin is not pale.      Findings: No erythema or rash.   Neurological:      Mental Status: She is alert and oriented to person, place, and time.      Cranial Nerves: No cranial nerve deficit.      Motor: No abnormal muscle tone.      Coordination: Coordination normal.      Deep Tendon Reflexes: Reflexes are normal and symmetric. Reflexes normal.   Psychiatric:         Mood and Affect: Mood and affect normal.         Behavior: Behavior normal.         Thought Content: Thought content normal.         Judgment: Judgment normal.         Labs:     Lab Results   Component Value Date    WBC 5.37 06/19/2024    HGB 13.1 06/19/2024    HCT 43.2 06/19/2024     (H) 06/19/2024    RDW 13.4 06/19/2024     06/19/2024     BMP:  Lab Results   Component Value Date    SODIUM 140 06/19/2024    K 4.3 06/19/2024    CL 97 06/19/2024    CO2 39 (H) 06/19/2024    BUN 22 06/19/2024    CREATININE 0.68 06/19/2024    GLUC 111 06/19/2024    GLUF 126 (H) 05/17/2024    CALCIUM 9.3 06/19/2024    EGFR 80 06/19/2024    MG 2.1 06/16/2024     LFT:  Lab Results   Component Value Date    AST 16 06/12/2024    ALT 8 06/12/2024    ALKPHOS 49 06/12/2024    TP 7.4 06/12/2024    ALB 4.3 06/12/2024      Lab Results   Component Value Date    XFO8VZUIEMZZ 0.743 06/12/2024     Lab Results   Component Value Date    HGBA1C 5.7 (H) 05/17/2024     Lipid Profile:   Lab Results   Component Value Date    CHOLESTEROL 137 05/17/2024    HDL 35  "(L) 05/17/2024    LDLCALC 45 05/17/2024    TRIG 283 (H) 05/17/2024     Lab Results   Component Value Date    CHOLESTEROL 137 05/17/2024    CHOLESTEROL 135 09/06/2023     Lab Results   Component Value Date    TROPONINI <0.03 09/03/2020     Lab Results   Component Value Date    NTBNP 391 12/09/2022      Recent Results (from the past 672 hour(s))   Basic metabolic panel    Collection Time: 06/19/24  4:55 AM   Result Value Ref Range    Sodium 140 135 - 147 mmol/L    Potassium 4.3 3.5 - 5.3 mmol/L    Chloride 97 96 - 108 mmol/L    CO2 39 (H) 21 - 32 mmol/L    ANION GAP 4 4 - 13 mmol/L    BUN 22 5 - 25 mg/dL    Creatinine 0.68 0.60 - 1.30 mg/dL    Glucose 111 65 - 140 mg/dL    Calcium 9.3 8.4 - 10.2 mg/dL    eGFR 80 ml/min/1.73sq m   CBC    Collection Time: 06/19/24  4:55 AM   Result Value Ref Range    WBC 5.37 4.31 - 10.16 Thousand/uL    RBC 4.10 3.81 - 5.12 Million/uL    Hemoglobin 13.1 11.5 - 15.4 g/dL    Hematocrit 43.2 34.8 - 46.1 %     (H) 82 - 98 fL    MCH 32.0 26.8 - 34.3 pg    MCHC 30.3 (L) 31.4 - 37.4 g/dL    RDW 13.4 11.6 - 15.1 %    Platelets 275 149 - 390 Thousands/uL    MPV 9.1 8.9 - 12.7 fL       Imaging & Testing   I have personally reviewed pertinent reports.      Cardiac Testing     EKG: Personally reviewed.    A-fib new onset nonspecific ST changes      Garrett Agarwal MD [facility]  [phone]  Please call with any questions or suggestions    Counseling :  A description of the counseling:   Goals and Barriers:  Patient's ability to self care:  Medication side effect reviewed with patient in detail and all their questions answered.    \"Portions of the record may have been created with voice recognition software. Occasional wrong word or \"sound a like\" substitutions may have occurred due to the inherent limitations of voice recognition software. Read the chart carefully and recognize, using context, where substitutions have occurred. Please call if you have any questions. \"    "

## 2024-07-16 NOTE — PATIENT INSTRUCTIONS
"Patient Education     Low-sodium diet   The Basics   Written by the doctors and editors at [facility]   What is sodium? -- This is the main ingredient in table salt. It is also found in lots of foods. The body needs a very small amount of sodium to work normally, but most people eat much more sodium than their body needs.  Who should eat less sodium? -- Nearly everyone eats too much sodium. The average American takes in 3400 milligrams of sodium each day. Experts say that most people should have no more than 2300 milligrams a day.  Some people with certain health conditions should follow a low-sodium diet. Ask your doctor how much sodium you should have.  Why should I eat less sodium? -- Reducing the amount of sodium you eat can have lots of health benefits:   It can lower your blood pressure. This means that it can help lower your risk of stroke, heart attack, kidney damage, and lots of other health problems.   It can reduce the amount of fluid in your body, which means that your heart doesn't have to work as hard.   It can keep the kidneys from having to work too hard. This is especially important in people who have kidney disease.   It can reduce swelling in the ankles and belly, which can be uncomfortable and make it hard to move.   It can lower the chances of forming kidney stones.   It can help keep your bones strong.  Which foods have the most sodium? -- Processed foods have the most sodium. These foods usually come in cans, boxes, jars, and bags. They tend to have a lot of sodium even if they don't taste salty. In fact, many sweet foods have a lot of sodium in them. The only way to know for sure how much sodium is in a food is to check the label (figure 1).  Here are some examples of foods that often have too much sodium:   Canned soups   Rice and noodle mixes   Sauces, dressings, and condiments (such as ketchup and mustard)   Pre-made frozen meals (also called \"TV dinners\")   Deli meats, hot dogs, and " "cheeses   Smoked, cured, or pickled foods   Salted snack foods and nuts   Restaurant meals  What should I do to reduce the amount of sodium in my diet? -- Many people think that eating a low-sodium diet just means not adding salt to their food. But this is not true. Not adding salt at the table or when cooking will help a little. But almost all of the sodium you eat is already in the food you buy at the grocery store or at restaurants (figure 2).  Here are some tips to help you eat less sodium:   Avoid processed foods when possible. This is the most important thing you can do to eat less sodium. Processed foods include most foods that are sold in cans, boxes, jars, and bags.   Instead of buying pre-made, processed foods, buy fresh or fresh-frozen fruits and vegetables. (\"Fresh-frozen\" means that the food is frozen without anything added to it.)   Buy meats, fish, chicken, and turkey that are fresh instead of canned or sold at the deli counter. (Meats sold at the deli counter are high in sodium.)   Try to eat at restaurants less often.   When possible, try to make meals from scratch at home using fresh ingredients.   If you do buy canned or packaged foods, choose ones that are labeled \"sodium free\" or \"very low sodium\" (table 1). Or choose foods that have less than 400 milligrams of sodium in each serving. The amount of sodium in each serving appears on the nutrition label (figure 1).  The table has some examples of foods to avoid and foods to choose instead (table 2).  Whatever changes you make, make them slowly. Choose 1 thing to do differently, and do that for a while. If you can keep doing that change easily, add another change. For instance, if you usually eat green beans from a can, try buying fresh or fresh-frozen green beans and cooking them at home without adding salt. If that works for you, keep doing it. Then, choose another thing to change.  If you try making a change and it doesn't work right away, don't " "give up. See if you can reduce sodium in other ways. The important thing is to take small steps and to keep doing the changes that work for you.  Can I still eat out at restaurants sometimes? -- One of best ways to limit your sodium is to only eat out at restaurants every once in a while. When you do eat out, try to choose places that offer healthier choices and fresh ingredients.  No matter where you eat, when choosing your food:   Ask your  if your meal can be made without salt.   Avoid foods that come with sauces or dips.   Choose plain grilled meats or fish and steamed vegetables.   Ask for oil and vinegar for your salad, rather than dressing.   If a meal you really want has more sodium than you should have, consider saving half of it to eat another day.  What if food just does not taste as good without sodium? -- Starting a low-sodium diet can be hard. The good news is that your taste buds can get used to having less sodium. But you have to give them some time to adjust.  It can also help to try other ways to add flavor to your foods. Try things like herbs, spices, lemon juice, and vinegar.  What about salt substitutes? -- Flavoring your food with a salt substitute is a good way to reduce how much salt you eat. But check with your doctor or nurse before trying this. Some salt substitutes can be dangerous if you have certain health problems or take certain medicines.  Do medicines have sodium? -- Yes, some medicines contain sodium. If you are buying medicines you can get without a prescription, look to see how much sodium they have. Avoid products that have \"sodium carbonate\" or \"sodium bicarbonate\" unless your doctor prescribes them. (Sodium bicarbonate is baking soda.)  All topics are updated as new evidence becomes available and our peer review process is complete.  This topic retrieved from [employer] on: Mar 22, 2024.  Topic 49632 Version 14.0  Release: 32.2.4 - C32.80  © 2024 UpToDate, Inc. and/or its " "affiliates. All rights reserved.  figure 1: Food labels can be tricky     To figure out how much sodium you are eating, check the label to find out how much sodium is in 1 serving. If you are having more than 1 serving, multiply that amount by the number of servings you plan to eat. For instance, if you are going to eat this whole can of soup, you should multiply 850 by 2. That means that you will be having 1700 milligrams of sodium. That's more sodium than many people are supposed to have in 1 day.  Graphic 17317 Version 8.0  figure 2: Sources of sodium in your diet     Graphic 49275 Version 2.0  table 1: A guide to common nutrient claims and what they mean  Salt/sodium-free  Less than 5 mg of sodium per serving   Very low sodium  35 mg or less of sodium per serving   Low sodium  140 mg or less of sodium per serving   Reduced sodium  At least 25% less sodium than the regular product   Light or lite in sodium  At least 50% less sodium than the regular product   No salt added or unsalted  No salt is added during processing, but these products may not be salt/sodium-free unless stated   mg: milligram; %: percent.  Graphic 07371 Version 7.0  table 2: Ways to cut down on salt (sodium)  Avoid these foods  Try these foods instead    Cured and smoked foods like guillen, sausage, smoked fish and meats, hot dogs, ham, lunch meats, corned beef, and pickles Fresh turkey, chicken, and lean beef   Canned fish (tuna, sardines) Unsalted tuna or sardines   Canned meats Fresh unprocessed meats, vegetable protein, and fish  Frozen and canned meats, vegetable protein, and fish that are labeled \"low-sodium\"   Salted pretzels, crackers, potato chips, tortilla chips, and nuts Low-sodium and unsalted versions of these foods   Most cheeses Low-sodium cheeses (check label for actual sodium content)   Sauces (tomato and cream, etc), tomato juices Low-sodium versions of these foods, such as low-sodium tomato juice   Processed, instant, and " "convenience foods like frozen dinners, packaged meals, canned soups, and boxed pasta blends Cook and freeze your own low-sodium meals, soups, and broths    If you do need to use convenience or processed foods, read the labels. Choose items with 140 to 200 mg of sodium per serving.    For an entire convenience meal (frozen dinner), try to find options with less than 500 to 600 mg of sodium.    If you used canned foods, look for those labeled \"sodium-free.\" Or you can rinse the canned food under water to lower the sodium content.   Fast foods and foods prepared at restaurants (unless without cheese, sauces, or added salt) Fresh foods and foods with sauces on the side  Request that food be prepared without cheese or added salt   Graphic 34603 Version 10.0  Consumer Information Use and Disclaimer   Disclaimer: This generalized information is a limited summary of diagnosis, treatment, and/or medication information. It is not meant to be comprehensive and should be used as a tool to help the user understand and/or assess potential diagnostic and treatment options. It does NOT include all information about conditions, treatments, medications, side effects, or risks that may apply to a specific patient. It is not intended to be medical advice or a substitute for the medical advice, diagnosis, or treatment of a health care provider based on the health care provider's examination and assessment of a patient's specific and unique circumstances. Patients must speak with a health care provider for complete information about their health, medical questions, and treatment options, including any risks or benefits regarding use of medications. This information does not endorse any treatments or medications as safe, effective, or approved for treating a specific patient. UpToDate, Inc. and its affiliates disclaim any warranty or liability relating to this information or the use thereof.The use of this information is governed by the " Terms of Use, available at https://www.woltersAdaptevauwer.com/en/know/clinical-effectiveness-terms. 2024© [employer] and its affiliates and/or licensors. All rights reserved.  Copyright   © 2024 [employer] and/or its affiliates. All rights reserved.

## 2024-07-18 ENCOUNTER — TELEPHONE (OUTPATIENT)
Age: 84
End: 2024-07-18

## 2024-07-18 DIAGNOSIS — I48.0 PAROXYSMAL ATRIAL FIBRILLATION (HCC): Primary | ICD-10-CM

## 2024-07-18 DIAGNOSIS — I50.33 ACUTE ON CHRONIC DIASTOLIC CONGESTIVE HEART FAILURE (HCC): ICD-10-CM

## 2024-07-18 DIAGNOSIS — I1A.0 RESISTANT HYPERTENSION: ICD-10-CM

## 2024-07-18 DIAGNOSIS — I48.0 PAROXYSMAL ATRIAL FIBRILLATION (HCC): ICD-10-CM

## 2024-07-18 DIAGNOSIS — I16.0 HYPERTENSIVE URGENCY: ICD-10-CM

## 2024-07-18 NOTE — TELEPHONE ENCOUNTER
Received call from pharmacy regarding Eliquis.  Pharmacist states it requires prior auth.    They also need to confirm if Eliquis is in addition to pt taking Plavix, or replacement of it.  They also want to make provider aware that it interacts with Diltiazem, can caused increase Eliquis levels.      Please advise.    Pharmacy call back# [phone]

## 2024-07-19 NOTE — TELEPHONE ENCOUNTER
PA for eliquis 5 mg  Denied    Reason:(Screenshot if applicable)        Message sent to office clinical pool Yes    Denial letter scanned into Media Yes    Appeal started No ( Provider will need to decide if appeal is warranted and send clinical documentation to Prior Authorization Team for initiation.)    **Please follow up with your patient regarding denial and next steps**

## 2024-07-19 NOTE — TELEPHONE ENCOUNTER
PA for Eliquis 5 mg submitted     Submitted via    []CMM-KEY   [x][employer]-Case ID # [id number]   []Faxed to plan   []Other website   []Phone call Case ID #     Office notes sent, clinical questions answered. Awaiting determination    Turnaround time for your insurance to make a decision on your Prior Authorization can take 7-21 business days.

## 2024-07-26 ENCOUNTER — OFFICE VISIT (OUTPATIENT)
Dept: PULMONOLOGY | Facility: CLINIC | Age: 84
End: 2024-07-26
Payer: MEDICARE

## 2024-07-26 VITALS
BODY MASS INDEX: 31.09 KG/M2 | SYSTOLIC BLOOD PRESSURE: 122 MMHG | HEIGHT: 62 IN | TEMPERATURE: 97.8 F | DIASTOLIC BLOOD PRESSURE: 64 MMHG | HEART RATE: 78 BPM | OXYGEN SATURATION: 92 %

## 2024-07-26 DIAGNOSIS — J96.11 CHRONIC RESPIRATORY FAILURE WITH HYPOXIA AND HYPERCAPNIA (HCC): ICD-10-CM

## 2024-07-26 DIAGNOSIS — C34.92 NON-SMALL CELL CANCER OF LEFT LUNG (HCC): ICD-10-CM

## 2024-07-26 DIAGNOSIS — R91.8 MULTIPLE LUNG NODULES: ICD-10-CM

## 2024-07-26 DIAGNOSIS — J43.9 PULMONARY EMPHYSEMA, UNSPECIFIED EMPHYSEMA TYPE (HCC): Primary | ICD-10-CM

## 2024-07-26 DIAGNOSIS — J96.12 CHRONIC RESPIRATORY FAILURE WITH HYPOXIA AND HYPERCAPNIA (HCC): ICD-10-CM

## 2024-07-26 PROCEDURE — 99215 OFFICE O/P EST HI 40 MIN: CPT | Performed by: INTERNAL MEDICINE

## 2024-07-26 NOTE — PROGRESS NOTES
Ambulatory Visit  Name: Eryn Morocho      : 1940      MRN: [id number]  Encounter Provider: Bora Moreno MD  Encounter Date: 2024   Encounter department: [facility] & [facility]    Assessment & Plan   1. Pulmonary emphysema, unspecified emphysema type (HCC)  Assessment & Plan:  Mrs. Eryn Morocho hasCOPD for more than 15 years and is  on home oxygen therapy at 3 L/m for chronic hypoxemic respiratory failure. She has shortness of breath on exertion and her exercise tolerance is about half  a block.   She was a smoker for a long time for about 35 years smoking 1-1/2 packs per day. 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 On clinical examination she was saturating well with oxygen supplementation. Her chest auscultation revealed bilateral basilar inspiratory coarse crackles. Her previous CT scan showed bilateral extensive emphysematous changes. Her high-resolution CT scan of the chest showed bilateral emphysematous changes and mild bronchiectasis. Her PFT showed moderately severe airflow obstruction with severe uncorrected diffusion defect. she is on treatment with nebulized ipratropium albuterol, formoterol and budesonide.  I had a long discussion with her and her daughter and answered all their questions.   2. 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)  Assessment & Plan:  She was diagnosed with non-small cell lung cancer few years back and had underwent wedge resection by Dr. Anna. She also received chemotherapy. She has been remaining cancer free. She follows with Dr. Esquivel from oncology.  She has an enlarging right lower lobe lung nodule now which has been PET negative.   3. Multiple lung nodules  Assessment & Plan:  She had multiple calcified granuloma, scarring and lung nodules.  These need to be followed up especially with her history of lung cancer in the past.  She was also found to have thyroid  nodules.  Her repeat CT scan showed enlargement of the right lower lobe lung nodule from 0.5 to 1.1 cm in size.  Her latest CT scan showed increase in size of the lung nodule to 1.8 cm from 1.2 cm on the previous scan.  This is suspicious for second primary lung neoplasm.  This did not light up on previous CT PET scan 1.5 years back.  Have ordered a repeat CT PET scan.  It is doubtful that she may be a candidate for curative resection.  She currently does not want to make any decision regarding further care.  However she would like to proceed with CT PET scan. I had A long discussion with her and her daughter-in-law answered all their questions.  Orders:  -     NM PET CT skull base to mid thigh; Future; Expected date: 07/26/2024  4. Chronic respiratory failure with hypoxia and hypercapnia (HCC)  Assessment & Plan:  He has chronic respiratory failure and has been on oxygen supplementation at 4 L/min at rest and up to 5 LPM during exertion.  This is secondary to her advanced COPD.  She has ambulatory dysfunction and uses a cane/wheelchair.      History of Present Illness     Eryn Morocho is a 84 y.o. female with multiple medical problems was admitted to [facility] last month with worsening shortness of breath she was found to be in acute on chronic congestive heart failure as well as worsening COPD.  She is currently on diuretic therapy and also takes DuoNeb and Pulmicort.  Her exercise capacity is limited and she has ambulatory dysfunction using a cane/wheelchair.  She has occasional cough and no wheeze.  No chest pain no palpitations.  Her swelling of feet has resolved.  She has been on oxygen supplementation currently needing up to 5 L/min.  She has previous history of non-small cell lung cancer and had underwent wedge resection by Dr. Anna many years back.  She has had multiple lung nodules and the index lung nodule is in the right lower lobe which has been increasing in size.  Currently this has  increased to 1.8 cm from 1.2 cm on the previous scan.  This is highly suspicious for primary likely a second primary lung neoplasm.  This did not light up 1 and half years back when we did a PET scan.  She denies any weight loss or anorexia or hemoptysis.  I doubt she will be a candidate for surgical intervention in case this turns out to be malignant.  I discussed this with her at length and she is okay to proceed with a CT PET scan for further evaluation.  She wants to make decision regarding further treatment after knowing the results of CT PET scan.  She has history of obstructive sleep apnea and has been on CPAP therapy.  She has been changed to BiPAP currently and she states that she has been using this regularly.  She has occasional left nasal block.  She has been using oxygen with the BiPAP.  Her BiPAP compliance records are not available.  She feels tired during the day.  She denied any fever or chills.  No hoarseness of voice or dysphagia.  She was accompanied by her daughter who lives with her.  He has hearing problems.  Has history of CKD and hypertension.  He is on systemic anticoagulation with Xarelto now.    Review of Systems   Constitutional:  Positive for fatigue. Negative for appetite change, chills and fever.   HENT:  Positive for hearing loss. Negative for rhinorrhea, sneezing, sore throat and trouble swallowing.    Respiratory:  Positive for cough, shortness of breath and wheezing. Negative for chest tightness.    Cardiovascular:  Negative for chest pain, palpitations and leg swelling.   Gastrointestinal:  Positive for constipation. Negative for abdominal pain, diarrhea, nausea and vomiting.   Genitourinary:  Positive for urgency. Negative for dysuria and frequency.   Musculoskeletal:  Positive for back pain and gait problem. Negative for arthralgias.   Skin:  Negative for rash.   Allergic/Immunologic: Negative for environmental allergies.   Neurological:  Negative for dizziness, syncope,  "light-headedness and headaches.   Psychiatric/Behavioral:  Positive for sleep disturbance. Negative for agitation and confusion. The patient is nervous/anxious.        Objective     /64 (BP Location: Left arm, Patient Position: Sitting, Cuff Size: Standard)   Pulse 78   Temp 97.8 °F (36.6 °C) (Tympanic)   Ht 5' 2\" (1.575 m)   SpO2 92%   BMI 31.09 kg/m²     Physical Exam  Vitals reviewed.   Constitutional:       General: She is not in acute distress.     Appearance: She is obese. She is not ill-appearing, toxic-appearing or diaphoretic.   HENT:      Head: Normocephalic.      Nose:      Comments: Deviated septum to left side     Mouth/Throat:      Mouth: Mucous membranes are moist.   Eyes:      General: No scleral icterus.     Conjunctiva/sclera: Conjunctivae normal.   Cardiovascular:      Rate and Rhythm: Normal rate and regular rhythm.      Heart sounds: Normal heart sounds. No murmur heard.  Pulmonary:      Effort: Pulmonary effort is normal. No respiratory distress.      Breath sounds: No stridor. Rales (occasional crackles bilaterally basally) present. No wheezing or rhonchi.   Chest:      Chest wall: No tenderness.   Abdominal:      General: Bowel sounds are normal.      Palpations: Abdomen is soft.      Tenderness: There is no abdominal tenderness. There is no guarding.   Musculoskeletal:      Cervical back: Neck supple. No rigidity.      Right lower leg: No edema.      Left lower leg: No edema.   Lymphadenopathy:      Cervical: No cervical adenopathy.   Skin:     Coloration: Skin is not jaundiced or pale.      Findings: No rash.   Neurological:      Mental Status: She is alert and oriented to person, place, and time.      Gait: Gait abnormal (uses cane/wheel chair).   Psychiatric:         Mood and Affect: Mood normal.         Behavior: Behavior normal.         Thought Content: Thought content normal.         Judgment: Judgment normal.       Administrative Statements     I have spent a total time of " 45 minutes in caring for this patient on the day of the visit/encounter including Diagnostic results, Risks and benefits of tx options, Patient and family education, Counseling / Coordination of care, Documenting in the medical record, Reviewing / ordering tests, medicine, procedures  , and Obtaining or reviewing history  .

## 2024-07-29 NOTE — ASSESSMENT & PLAN NOTE
She was diagnosed with non-small cell lung cancer few years back and had underwent wedge resection by Dr. Anna. She also received chemotherapy. She has been remaining cancer free. She follows with Dr. Esquivel from oncology.  She has an enlarging right lower lobe lung nodule now which has been PET negative.

## 2024-07-29 NOTE — ASSESSMENT & PLAN NOTE
Mrs. Eryn Morocho hasCOPD for more than 15 years and is  on home oxygen therapy at 3 L/m for chronic hypoxemic respiratory failure. She has shortness of breath on exertion and her exercise tolerance is about half  a block.   She was a smoker for a long time for about 35 years smoking 1-1/2 packs per day. 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 On clinical examination she was saturating well with oxygen supplementation. Her chest auscultation revealed bilateral basilar inspiratory coarse crackles. Her previous CT scan showed bilateral extensive emphysematous changes. Her high-resolution CT scan of the chest showed bilateral emphysematous changes and mild bronchiectasis. Her PFT showed moderately severe airflow obstruction with severe uncorrected diffusion defect. she is on treatment with nebulized ipratropium albuterol, formoterol and budesonide.  I had a long discussion with her and her daughter and answered all their questions.

## 2024-07-29 NOTE — ASSESSMENT & PLAN NOTE
She had multiple calcified granuloma, scarring and lung nodules.  These need to be followed up especially with her history of lung cancer in the past.  She was also found to have thyroid nodules.  Her repeat CT scan showed enlargement of the right lower lobe lung nodule from 0.5 to 1.1 cm in size.  Her latest CT scan showed increase in size of the lung nodule to 1.8 cm from 1.2 cm on the previous scan.  This is suspicious for second primary lung neoplasm.  This did not light up on previous CT PET scan 1.5 years back.  Have ordered a repeat CT PET scan.  It is doubtful that she may be a candidate for curative resection.  She currently does not want to make any decision regarding further care.  However she would like to proceed with CT PET scan. I had A long discussion with her and her daughter-in-law answered all their questions.

## 2024-07-29 NOTE — ASSESSMENT & PLAN NOTE
He has chronic respiratory failure and has been on oxygen supplementation at 4 L/min at rest and up to 5 LPM during exertion.  This is secondary to her advanced COPD.  She has ambulatory dysfunction and uses a cane/wheelchair.

## 2024-07-31 ENCOUNTER — CLINICAL SUPPORT (OUTPATIENT)
Dept: CARDIOLOGY CLINIC | Facility: CLINIC | Age: 84
End: 2024-07-31
Payer: MEDICARE

## 2024-07-31 DIAGNOSIS — I50.33 ACUTE ON CHRONIC DIASTOLIC CONGESTIVE HEART FAILURE (HCC): ICD-10-CM

## 2024-07-31 DIAGNOSIS — I48.0 PAROXYSMAL ATRIAL FIBRILLATION (HCC): ICD-10-CM

## 2024-07-31 PROCEDURE — 93244 EXT ECG>48HR<7D REV&INTERPJ: CPT | Performed by: INTERNAL MEDICINE

## 2024-08-02 ENCOUNTER — TELEPHONE (OUTPATIENT)
Age: 84
End: 2024-08-02

## 2024-08-02 NOTE — TELEPHONE ENCOUNTER
Daughter called the RX Refill Line. Message is being forwarded to the office.     Daughter is requesting a refill of Trazodone 100 mg. Currently on 50 mg and has been taking 2 tablets, tried taking 1 tablet the past six days and was not able to sleep. Would like to go back to the 100 mg since that seemed to help the patient sleep. Would like that sent to [facility] [address] [place]. Patient out of medication. Not on active med list to que up.    Please contact daughter at [phone] if any issues.

## 2024-08-05 NOTE — TELEPHONE ENCOUNTER
Patient's daughter was advised. Eryn can wait till Dr Mendiola returns.   Also daughter has been monitoring patients weight and she did go up 2 pounds. She will be keeping a record.

## 2024-08-05 NOTE — TELEPHONE ENCOUNTER
Trazodone 50mg filled 6/24/24 qty: 90 by Dr. Mendiola    Could a short supply be sent for her until Dr. Mendiola returns next week?

## 2024-08-05 NOTE — TELEPHONE ENCOUNTER
Patient completely out of the 50 mg dose?  Should have picked up refill at the end of July which would have been 90 tablets.  At this point they should still have at least 60 tablets left of the 50 mg dose even if they are doubling.  That should be enough to get through until Dr. Mendiola's return

## 2024-08-08 ENCOUNTER — TELEPHONE (OUTPATIENT)
Dept: CARDIOLOGY CLINIC | Facility: CLINIC | Age: 84
End: 2024-08-08

## 2024-08-08 NOTE — TELEPHONE ENCOUNTER
----- Message from Garrett Agarwal MD sent at 8/8/2024  3:43 PM EDT -----  Patient had a min HR of 47 bpm, max HR of 148 bpm, and avg HR of 62 bpm. Predominant underlying rhythm was Sinus Rhythm. 1 run of Ventricular Tachycardia occurred lasting 8 beats with a max rate of 145 bpm (avg 110 bpm). 8 Supraventricular Tachycardia runs occurred, the run with the fastest interval lasting 9 beats with a max rate of 148 bpm, the longest lasting 11 beats with an avg rate of 106 bpm. Isolated SVEs were rare (<1.0%), SVE Couplets were rare (<1.0%), and SVE Triplets were rare (<1.0%). Isolated VEs were rare (<1.0%), VE Couplets were rare (<1.0%), and no VE Triplets were present. Ventricular Bigeminy was present.       Final interpretation: Average heart rate 62 bpm.  She has some salvos of fast heart rate primarily at night.  No evidence of any major sustained arrhythmias.  No evidence of atrial fibrillation during current monitor.  Continue diltiazem medication.  Will discuss further on follow-up

## 2024-08-11 DIAGNOSIS — F33.41 RECURRENT MAJOR DEPRESSIVE DISORDER, IN PARTIAL REMISSION (HCC): ICD-10-CM

## 2024-08-11 RX ORDER — TRAZODONE HYDROCHLORIDE 100 MG/1
100 TABLET ORAL
Qty: 90 TABLET | Refills: 0 | Status: SHIPPED | OUTPATIENT
Start: 2024-08-11

## 2024-08-28 ENCOUNTER — RA CDI HCC (OUTPATIENT)
Dept: OTHER | Facility: HOSPITAL | Age: 84
End: 2024-08-28

## 2024-08-28 NOTE — PROGRESS NOTES
Progress Note - Cardiology Office  [facility]    Eryn Morocho 84 y.o. adult MRN: [id number]  : 1940  Encounter: [id number]      Assessment:     Chronic diastolic heart failure.  Paroxysmal atrial fibrillation.  Hyperlipidemia.  COPD.  Non-small cell cancer of left lung.  Chronic respiratory failure with hypoxia and hypercapnia.  Peripheral arterial disease.  CKD stage III.  Obstructive sleep apnea.    Discussion Summary and Plan:    Chronic diastolic heart failure.  - Does not appear in acute exacerbation.  Patient appears euvolemic on physical exam.  - 24 TTE: LVEF 70%. Diastolic function is moderately abnormal, consistent with grade II (pseudonormal) relaxation.  Right ventricle systolic function normal.  Left atrium moderately dilated.  Aortic valve sclerosis.  Mitral valve with mild annular calcification, mild regurgitation.  Mild tricuspid valve regurgitation.  - Currently on Cardizem 30 mg 3 times daily.  - Currently on torsemide 10 mg daily.  - Discussed with patient the importance of daily weight check.  Asked that she please contact our office if there is a 3 pound weight gain in 1 day or 5 pound weight gain in 1 week.   - CHF education.   - Discussed with patient recommendations for repeat BMP.  She states that she has an appointment with her primary on 24, she was referred she get her blood work done with her PCP.    Paroxysmal atrial fibrillation.  - 24 EKG: Sinus rhythm, 60 bpm.  - Currently on Cardizem 30 mg 3 times daily.  - [id number] stroke risk score: 5 points, moderate to high risk.  - Continue Xarelto 20 mg daily.  - 24 AMB extended holter monitor: Patient had a min HR of 47 bpm, max HR of 148 bpm, and avg HR of 62 bpm. Predominant underlying rhythm was Sinus Rhythm. 1 run of Ventricular Tachycardia occurred lasting 8 beats with a max rate of 145 bpm (avg 110 bpm). 8 Supraventricular Tachycardia runs occurred, the run with the fastest  interval lasting 9 beats with a max rate of 148 bpm, the longest lasting 11 beats with an avg rate of 106 bpm. Isolated SVEs were rare (<1.0%), SVE Couplets were rare (<1.0%), and SVE Triplets were rare (<1.0%). Isolated VEs were rare (<1.0%), VE Couplets were rare (<1.0%), and no VE Triplets were present. Ventricular Bigeminy was present.     Hyperlipidemia.  - 5/17/24 lipid panel: Cholesterol 137, triglycerides 283, HDL 35, LDL 45.  - Currently on co-Q10, Lovaza 2 g twice daily, and Crestor 20 mg daily.    COPD.  - Chronically on 4 LPM of supplemental O2 at rest and up to 5 LPM of supplemental O2 on exertion.  - Follows outpatient with [facility].    Non-small cell cancer of left lung.  - s/p resection and chemotherapy.  - Follows outpatient with [facility].    Chronic respiratory failure with hypoxia and hypercapnia.  - Chronically on 4 LPM of supplemental O2 at rest and up to 5 LPM of supplemental O2 on exertion.  - Follows outpatient with [facility].    Peripheral arterial disease.  - Patient with reported history of bilateral lower extremity arterial stenting, unknown vessel.  - Currently on Plavix 75 mg daily and Crestor 20 mg daily.    CKD stage III.  - Baseline creatinine appears between 0.6 and 0.7.  - Care per PCP.     Obstructive sleep apnea.  - Patient reports compliance to CPAP.      Patient / Caretaker was advised and educated to call our office  immediately if  patient has any new symptoms of chest pain/shortness of breath, near-syncope, syncope, light headedness sustained palpitations  or any other cardiovascular symptoms before their scheduled follow-up appointment.  Office number was provided #[phone].    Please call [phone] if any questions.  Counseling :  A description of the counseling.  Goals and Barriers.  Patient's ability to self care: Yes  Medication side effect reviewed with patient in detail and all their questions answered to their  satisfaction.    HPI :     Eryn Morocho is a 84 y.o. female with PMHx of chronic diastolic heart failure, paroxysmal atrial fibrillation (on Xarelto), HLD, COPD, non-small cell cancer of left lung s/p resection and chemotherapy, chronic respiratory failure (on 4 LPM of supplemental O2), PAD, CKDIII, JULIO (on CPAP) who presents for routine outpatient cardiology follow-up.      Patient was last seen in outpatient cardiology office on 7/16/24.  Patient had recently been hospitalized at [facility] from 6/12/24-6/19/24 for acute on chronic respiratory failure with hypoxia and hypercapnia as well as acute on chronic diastolic heart failure.      Patient reports since last office visit 7/16/24 patient states that she is overall been doing okay.  She is accompanied by her daughter today who agrees patient is doing well.  They provide documented daily weights.  Patient's documented weight today 167.2 lbs.       Patient states that she has chronic shortness of breath at baseline secondary to her respiratory issues, is currently stable.  She denies experiencing chest, palpitations, lightheadedness, dizziness, headache, nausea, vomiting.      Review of Systems   Constitutional:  Negative for activity change, appetite change, chills, diaphoresis, fatigue, fever and unexpected weight change.   Respiratory:  Positive for shortness of breath (chronic, stable). Negative for cough, chest tightness and wheezing.    Cardiovascular:  Negative for chest pain, palpitations and leg swelling.   Gastrointestinal:  Negative for abdominal distention, abdominal pain, constipation, diarrhea, nausea and vomiting.   Musculoskeletal: Negative.    Neurological:  Negative for dizziness, syncope, weakness, light-headedness, numbness and headaches.       Historical Information   Past Medical History:   Diagnosis Date    Asthma     Back problem     Chronic pain     Colon cancer screening     recheck tattoo    COPD (chronic obstructive  pulmonary disease) (HCC)     Oxygen 3.5L via NC    CPAP (continuous positive airway pressure) dependence     Diverticulosis     Emphysema of lung (HCC)     High blood pressure     Hyperlipidemia     Hypertension     Lung cancer (HCC)     Left Lung cancer; wedge resection     JULIO on CPAP     PAD (peripheral artery disease) (HCC)     Leg    Wears glasses      Past Surgical History:   Procedure Laterality Date    APPENDECTOMY      age 11 yr    COLONOSCOPY  2018    repeat 2023    COLONOSCOPY      HYSTERECTOMY      complete    LUNG CANCER SURGERY Left     wedge reseection, Dr Anna     Social History     Substance and Sexual Activity   Alcohol Use Not Currently     Social History     Substance and Sexual Activity   Drug Use Never     Social History     Tobacco Use   Smoking Status Former    Current packs/day: 1.50    Average packs/day: 1.5 packs/day for 35.0 years (52.5 ttl pk-yrs)    Types: Cigarettes   Smokeless Tobacco Never   Tobacco Comments    Has smoked since age:   11 - As per [place]      Family History:   Family History   Problem Relation Age of Onset    Colon cancer Father     Heart disease Sister     Lung cancer Daughter        Meds/Allergies     Allergies   Allergen Reactions    Baclofen Nausea Only and Dizziness    Lisinopril Nausea Only and Dizziness    Losartan Nausea Only and Dizziness    Naproxen Nausea Only and Dizziness    Zinc Acetate Nausea Only and Dizziness       Current Outpatient Medications:     albuterol (2.5 mg/3 mL) 0.083 % nebulizer solution, Take 3 mL (2.5 mg total) by nebulization daily as needed for wheezing or shortness of breath, Disp: 180 mL, Rfl: 0    albuterol (PROVENTIL HFA,VENTOLIN HFA) 90 mcg/act inhaler, INHALE 2 PUFFS BY MOUTH EVERY 6 HOURS AS NEEDED FOR SHORTNESS OF BREATH, Disp: 8.5 g, Rfl: 1    Calcium Carbonate (CALTRATE 600 PO), Take 3 tablets by mouth in the morning daily in AM, Disp: , Rfl:     carboxymethylcellulose 0.5 % SOLN, Administer 1 drop to both eyes 3 (three)  times a day as needed for dry eyes, Disp: , Rfl:     citalopram (CeleXA) 20 mg tablet, Take 1 tablet (20 mg total) by mouth daily, Disp: 90 tablet, Rfl: 1    clopidogrel (PLAVIX) 75 mg tablet, TAKE 1 TABLET BY MOUTH DAILY, Disp: 90 tablet, Rfl: 1    Coenzyme Q10 (Co Q 10) 100 MG CAPS, Take 2 capsules (200 mg total) by mouth every morning, Disp: 180 capsule, Rfl: 1    Cyanocobalamin (Energy B12) 500 MCG CHEW, Chew 1 tablet in the morning. daily  Indications: Inadequate Vitamin B12, Disp: , Rfl:     diltiazem (CARDIZEM) 30 mg tablet, Take 1 tablet (30 mg total) by mouth 3 (three) times a day, Disp: 90 tablet, Rfl: 1    formoterol (Perforomist) 20 MCG/2ML nebulizer solution, Take 2 mL (20 mcg total) by nebulization 2 (two) times a day icd 10 code J44.9, Disp: 180 mL, Rfl: 1    guaiFENesin (MUCINEX) 600 mg 12 hr tablet, TAKE 2 TABLET BY MOUTH EVERY 12 HOURS, Disp: 120 tablet, Rfl: 5    ipratropium-albuterol (DUO-NEB) 0.5-2.5 mg/3 mL nebulizer solution, Take 3 mL by nebulization every 6 (six) hours as needed for wheezing or shortness of breath ICD 10 J44.9, Disp: 360 mL, Rfl: 2    levocetirizine (XYZAL) 5 MG tablet, TAKE 1 TABLET(5 MG) BY MOUTH EVERY EVENING, Disp: 90 tablet, Rfl: 1    montelukast (SINGULAIR) 10 mg tablet, TAKE 1 TABLET(10 MG) BY MOUTH DAILY AT BEDTIME, Disp: 90 tablet, Rfl: 1    Multiple Vitamins-Minerals (AIRBORNE GUMMIES PO), Take 1 tablet by mouth every morning, Disp: , Rfl:     Nebulizers (Vios LC Sprint) MISC, Use as directed, Disp: 1 each, Rfl: 0    Omega-3 Fatty Acids (fish oil) 1,000 mg, Take 1,000 mg by mouth every morning, Disp: , Rfl:     omega-3-acid ethyl esters (LOVAZA) 1 g capsule, Take 2 capsules (2 g total) by mouth 2 (two) times a day, Disp: 360 capsule, Rfl: 1    oxygen gas, Inhale 4 L/min continuous. VIA NC  Indications: Hypoxia, Disp: , Rfl:     rivaroxaban (Xarelto) 20 mg tablet, Take 1 tablet (20 mg total) by mouth daily with breakfast, Disp: 30 tablet, Rfl: 6    rosuvastatin  "(CRESTOR) 20 MG tablet, TAKE 1 TABLET(20 MG) BY MOUTH DAILY, Disp: 90 tablet, Rfl: 1    timolol (TIMOPTIC) 0.5 % ophthalmic solution, Administer 1 drop to both eyes 2 (two) times a day, Disp: , Rfl:     torsemide (DEMADEX) 10 mg tablet, Take 1 tablet (10 mg total) by mouth daily, Disp: 90 tablet, Rfl: 1    traZODone (DESYREL) 100 mg tablet, Take 1 tablet (100 mg total) by mouth daily at bedtime as needed for sleep, Disp: 90 tablet, Rfl: 0    VITAMIN D PO, Take 25 mcg by mouth in the morning daily, Disp: , Rfl:     bisacodyl (DULCOLAX) 5 mg EC tablet, Take 2 tablets (10 mg total) by mouth once for 1 dose, Disp: 2 tablet, Rfl: 0    budesonide (PULMICORT) 0.5 mg/2 mL nebulizer solution, Take 2 mL (0.5 mg total) by nebulization 2 (two) times a day Rinse mouth after use., Disp: 120 mL, Rfl: 3    denosumab (Prolia) 60 mg/mL, Inject 1 mL (60 mg total) under the skin once for 1 dose (Patient taking differently: Inject 60 mg under the skin once Next dose 6/2023), Disp: 1 mL, Rfl: 0    Vitals: Blood pressure 118/60, pulse 60, height 5' 2\" (1.575 m), SpO2 98%.    Body mass index is 31.09 kg/m².  Wt Readings from Last 3 Encounters:   07/16/24 77.1 kg (170 lb)   07/02/24 77.9 kg (171 lb 12.8 oz)   06/26/24 78 kg (172 lb)     Vitals:     BP Readings from Last 3 Encounters:   09/03/24 118/60   07/26/24 122/64   07/16/24 124/62       Physical Exam:  Physical Exam  Vitals reviewed.   Constitutional:       General: She is not in acute distress.  Cardiovascular:      Rate and Rhythm: Normal rate and regular rhythm.      Pulses: Normal pulses.      Heart sounds: No murmur heard.  Pulmonary:      Effort: Pulmonary effort is normal. No respiratory distress.      Breath sounds: Normal breath sounds.   Abdominal:      General: Abdomen is flat. There is no distension.      Palpations: Abdomen is soft.      Tenderness: There is no abdominal tenderness.   Musculoskeletal:      Right lower leg: No edema.      Left lower leg: No edema. "   Skin:     General: Skin is warm and dry.   Neurological:      Mental Status: She is alert and oriented to person, place, and time.       Diagnostic Studies Review Cardio:      EK/03/24 EKG: Sinus rhythm, 60 bpm.    Cardiac testing:   AMB extended holter monitor   Result date: 24     Impression: Patient had a min HR of 47 bpm, max HR of 148 bpm, and avg HR of 62 bpm. Predominant underlying rhythm was Sinus Rhythm. 1 run of Ventricular Tachycardia occurred lasting 8 beats with a max rate of 145 bpm (avg 110 bpm). 8 Supraventricular Tachycardia runs occurred, the run with the fastest interval lasting 9 beats with a max rate of 148 bpm, the longest lasting 11 beats with an avg rate of 106 bpm. Isolated SVEs were rare (<1.0%), SVE Couplets were rare (<1.0%), and SVE Triplets were rare (<1.0%). Isolated VEs were rare (<1.0%), VE Couplets were rare (<1.0%), and no VE Triplets were present. Ventricular Bigeminy was present.     Echo complete   Result date: 24    Left Ventricle Left ventricular cavity size is normal. Wall thickness is mildly increased. There is mild asymmetric hypertrophy of the basal septal wall. The left ventricular ejection fraction is 70%. Systolic function is vigorous. Wall motion is normal. Diastolic function is moderately abnormal, consistent with grade II (pseudonormal) relaxation.   Right Ventricle Right ventricular cavity size is normal. Systolic function is normal.   Left Atrium The atrium is moderately dilated.   Right Atrium The atrium is normal in size.   Aortic Valve The aortic valve is trileaflet. The leaflets are mildly thickened. The leaflets are mildly calcified. There is mildly reduced mobility. There is no evidence of regurgitation. There is aortic valve sclerosis.   Mitral Valve There is mild annular calcification.  There is mild regurgitation. There is no evidence of stenosis.   Tricuspid Valve Tricuspid valve structure is normal. There is mild regurgitation. There  "is no evidence of stenosis. Right ventricular systolic pressure could not be assessed.   Pulmonic Valve Pulmonic valve structure is normal. There is trace regurgitation. There is no evidence of stenosis.   Ascending Aorta The aortic root is normal in size.   IVC/SVC The inferior vena cava is normal in size. Respirophasic changes were normal.   Pericardium There is no pericardial effusion. The pericardium is normal in appearance.       Lab Review   Lab Results   Component Value Date    WBC 5.37 06/19/2024    HGB 13.1 06/19/2024    HCT 43.2 06/19/2024     (H) 06/19/2024    RDW 13.4 06/19/2024     06/19/2024     BMP:  Lab Results   Component Value Date    SODIUM 140 06/19/2024    K 4.3 06/19/2024    CL 97 06/19/2024    CO2 39 (H) 06/19/2024    BUN 22 06/19/2024    CREATININE 0.68 06/19/2024    GLUC 111 06/19/2024    GLUF 126 (H) 05/17/2024    CALCIUM 9.3 06/19/2024    EGFR 80 06/19/2024    MG 2.1 06/16/2024     LFT:  Lab Results   Component Value Date    AST 16 06/12/2024    ALT 8 06/12/2024    ALKPHOS 49 06/12/2024    TP 7.4 06/12/2024    ALB 4.3 06/12/2024      No components found for: \"TSH3\"  Lab Results   Component Value Date    ECD9LTZQIBIB 0.743 06/12/2024     Lab Results   Component Value Date    HGBA1C 5.7 (H) 05/17/2024     Lipid Profile:   Lab Results   Component Value Date    CHOLESTEROL 137 05/17/2024    HDL 35 (L) 05/17/2024    LDLCALC 45 05/17/2024    TRIG 283 (H) 05/17/2024     Lab Results   Component Value Date    CHOLESTEROL 137 05/17/2024    CHOLESTEROL 135 09/06/2023       Stacey Ramos PA-C   "

## 2024-09-03 ENCOUNTER — OFFICE VISIT (OUTPATIENT)
Dept: CARDIOLOGY CLINIC | Facility: CLINIC | Age: 84
End: 2024-09-03
Payer: MEDICARE

## 2024-09-03 VITALS
BODY MASS INDEX: 31.09 KG/M2 | DIASTOLIC BLOOD PRESSURE: 60 MMHG | OXYGEN SATURATION: 98 % | HEIGHT: 62 IN | HEART RATE: 60 BPM | SYSTOLIC BLOOD PRESSURE: 118 MMHG

## 2024-09-03 DIAGNOSIS — E78.5 HYPERLIPIDEMIA, UNSPECIFIED HYPERLIPIDEMIA TYPE: ICD-10-CM

## 2024-09-03 DIAGNOSIS — I73.9 PAD (PERIPHERAL ARTERY DISEASE) (HCC): Primary | ICD-10-CM

## 2024-09-03 DIAGNOSIS — I50.33 ACUTE ON CHRONIC DIASTOLIC CONGESTIVE HEART FAILURE (HCC): ICD-10-CM

## 2024-09-03 DIAGNOSIS — I10 HYPERTENSION, UNSPECIFIED TYPE: ICD-10-CM

## 2024-09-03 PROCEDURE — 99214 OFFICE O/P EST MOD 30 MIN: CPT | Performed by: PHYSICIAN ASSISTANT

## 2024-09-03 PROCEDURE — 93000 ELECTROCARDIOGRAM COMPLETE: CPT | Performed by: PHYSICIAN ASSISTANT

## 2024-09-16 ENCOUNTER — OFFICE VISIT (OUTPATIENT)
Dept: FAMILY MEDICINE CLINIC | Facility: CLINIC | Age: 84
End: 2024-09-16
Payer: MEDICARE

## 2024-09-16 VITALS
OXYGEN SATURATION: 87 % | HEIGHT: 62 IN | BODY MASS INDEX: 30.36 KG/M2 | DIASTOLIC BLOOD PRESSURE: 80 MMHG | HEART RATE: 56 BPM | SYSTOLIC BLOOD PRESSURE: 118 MMHG | WEIGHT: 165 LBS | RESPIRATION RATE: 16 BRPM

## 2024-09-16 DIAGNOSIS — E78.2 MIXED HYPERLIPIDEMIA: ICD-10-CM

## 2024-09-16 DIAGNOSIS — Z23 ENCOUNTER FOR IMMUNIZATION: ICD-10-CM

## 2024-09-16 DIAGNOSIS — I1A.0 RESISTANT HYPERTENSION: ICD-10-CM

## 2024-09-16 DIAGNOSIS — I48.0 PAROXYSMAL ATRIAL FIBRILLATION (HCC): ICD-10-CM

## 2024-09-16 DIAGNOSIS — I10 PRIMARY HYPERTENSION: ICD-10-CM

## 2024-09-16 DIAGNOSIS — R91.8 MULTIPLE LUNG NODULES: ICD-10-CM

## 2024-09-16 DIAGNOSIS — J96.11 CHRONIC RESPIRATORY FAILURE WITH HYPOXIA AND HYPERCAPNIA (HCC): Primary | ICD-10-CM

## 2024-09-16 DIAGNOSIS — J96.12 CHRONIC RESPIRATORY FAILURE WITH HYPOXIA AND HYPERCAPNIA (HCC): Primary | ICD-10-CM

## 2024-09-16 DIAGNOSIS — Z23 NEED FOR COVID-19 VACCINE: ICD-10-CM

## 2024-09-16 DIAGNOSIS — I50.32 CHRONIC DIASTOLIC CONGESTIVE HEART FAILURE (HCC): ICD-10-CM

## 2024-09-16 DIAGNOSIS — C34.92 NON-SMALL CELL CANCER OF LEFT LUNG (HCC): ICD-10-CM

## 2024-09-16 DIAGNOSIS — E66.09 CLASS 1 OBESITY DUE TO EXCESS CALORIES WITH SERIOUS COMORBIDITY AND BODY MASS INDEX (BMI) OF 30.0 TO 30.9 IN ADULT: ICD-10-CM

## 2024-09-16 PROBLEM — Z99.81 OXYGEN DEPENDENT: Status: RESOLVED | Noted: 2023-07-17 | Resolved: 2024-09-16

## 2024-09-16 PROCEDURE — 99214 OFFICE O/P EST MOD 30 MIN: CPT | Performed by: FAMILY MEDICINE

## 2024-09-16 PROCEDURE — 90480 ADMN SARSCOV2 VAC 1/ONLY CMP: CPT | Performed by: FAMILY MEDICINE

## 2024-09-16 PROCEDURE — 90662 IIV NO PRSV INCREASED AG IM: CPT | Performed by: FAMILY MEDICINE

## 2024-09-16 PROCEDURE — G0008 ADMIN INFLUENZA VIRUS VAC: HCPCS | Performed by: FAMILY MEDICINE

## 2024-09-16 PROCEDURE — 91320 SARSCV2 VAC 30MCG TRS-SUC IM: CPT | Performed by: FAMILY MEDICINE

## 2024-09-16 NOTE — PROGRESS NOTES
Ambulatory Visit  Name: Eryn Morocho      : 1940      MRN: [id number]  Encounter Provider: Ari Mendiola MD  Encounter Date: 2024   Encounter department: [facility]    Assessment & Plan  Chronic respiratory failure with hypoxia and hypercapnia (HCC)         Encounter for immunization    Orders:    influenza vaccine, high-dose, PF 0.5 mL (Fluzone High Dose)    Need for COVID-19 vaccine    Orders:    COVID-19 Pfizer mRNA vaccine 12 yr and older (Comirnaty pre-filled syringe)    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)         Multiple lung nodules         Primary hypertension         Mixed hyperlipidemia         Chronic diastolic congestive heart failure (HCC)  Wt Readings from Last 3 Encounters:   24 74.8 kg (165 lb)   24 77.1 kg (170 lb)   24 77.9 kg (171 lb 12.8 oz)                    Class 1 obesity due to excess calories with serious comorbidity and body mass index (BMI) of 30.0 to 30.9 in adult            Assessment & Plan  1. Chronic Obstructive Pulmonary Disease (COPD).  The current regimen of torsemide 10 mg and Cardizem 30 mg three times daily was deemed appropriate by the cardiologist. A repeat of potassium and kidney function tests was suggested. The potential risks associated with chronic oxygen use, including lung damage and inflammation due to free radicals, were discussed. The target oxygen saturation range for chronic COPD patients was discussed, aiming for 88 to 92 percent. The patient's deteriorating lung function and weakening muscles were also noted. She will continue on 6 L of oxygen. A refill of her current medications will be provided.    2. Bilateral Hearing Loss.  Upon examination, the right ear had slightly more wax than the left, but not enough to cause significant hearing impairment. She was advised to apply a few drops of baby oil twice a week to lubricate the ear canal, followed by gentle wiping with a tissue.    3. Bilateral Lower  Extremity Pain.  The leg pain could be originating from the back. Elevating her legs could improve circulation and alleviate symptoms. She was advised to elevate her legs while lying in bed and to perform leg exercises to improve circulation.    4. Weight Loss.  The weight loss could be a potential indicator of lung cancer. However, a slow decline in weight is not necessarily alarming. She was advised to monitor her weight and report any significant changes. If she experiences a weight loss of 10 pounds within a month, a PET CT scan will be considered.    5. Health Maintenance.  She will receive her influenza and COVID-19 vaccines today.    Follow-up  A follow-up visit is scheduled for 01/2025, weather permitting.        History of Present Illness     History of Present Illness  The patient presents for evaluation of multiple medical concerns. She is accompanied by an adult female.    She continues to require 6 L of oxygen, a regimen initiated during her hospital stay. Her oxygen saturation level is maintained at 90 percent. She has transitioned from using a CPAP machine to a BiPAP machine. She engages in breathing exercises three times daily and has increased her physical activity, including walking and frequent bathroom visits. She no longer relies on a portable toilet. She has been advised against undergoing a CT PET scan for one of her lung nodules due to her age and the nodule's slow growth rate.    She experiences difficulty hearing in both ears, with the right ear being more problematic than the left. Her ears were cleaned during her last visit, but she has forgotten the instructions for maintaining them.    Upon waking, she struggles to get out of bed due to pain in both legs, extending from the groin to the toes. To alleviate this, she rubs her legs while lying in bed, which can delay her rising time by up to an hour or more. She sleeps with two pillows, one under her head and one between her legs, and  "alternates between sleeping on her side and back.    She has lost weight, dropping from 171 pounds to 165 pounds since her hospitalization. This weight loss is attributed to a low-sodium diet recommended by her cardiologist, which includes a high intake of vegetables. She monitors her weight daily using a home scale.    She is blind in one eye and has to take shots in this eye. She has to wear glasses, but in the sun, she has to wear sunglasses. She goes to [facility]. She cannot even watch television without it going blurry. The doctor sent her to have a procedure done on her carotid artery. He said it has not grown at all or not bad enough to need intervention, so she canceled the appointment. She is tired of going for tests.     Review of Systems   Constitutional:  Positive for activity change. Negative for fever and unexpected weight change.        Weight increase    HENT:  Negative for ear pain, nosebleeds and trouble swallowing.    Eyes:  Negative for visual disturbance.   Respiratory:  Positive for shortness of breath. Negative for chest tightness.    Cardiovascular:  Negative for chest pain, palpitations and leg swelling.   Gastrointestinal:  Negative for abdominal pain, constipation, diarrhea and nausea.   Endocrine: Negative for cold intolerance.   Genitourinary:  Negative for dysuria and urgency.   Musculoskeletal:  Positive for arthralgias, back pain and gait problem. Negative for joint swelling and myalgias.   Skin:  Negative for rash.   Neurological:  Positive for weakness. Negative for tremors, seizures and syncope.   Hematological:  Does not bruise/bleed easily.   Psychiatric/Behavioral:  Negative for agitation, behavioral problems, hallucinations and suicidal ideas. The patient is nervous/anxious.      Objective     /80 (BP Location: Right arm, Patient Position: Sitting, Cuff Size: Standard)   Pulse 56   Resp 16   Ht 5' 2\" (1.575 m)   Wt 74.8 kg (165 lb) Comment: unable to " obtain/wheelchair bound  SpO2 (!) 87% Comment: wears oxygen  BMI 30.18 kg/m²     Physical Exam    Physical Exam  Vitals and nursing note reviewed.   Constitutional:       Appearance: She is well-developed. She is obese.      Comments: Oxygen NC 6L on portable tank  Wheelchair    HENT:      Head: Normocephalic and atraumatic.      Right Ear: External ear normal.      Left Ear: External ear normal.      Nose: Nose normal.   Eyes:      Conjunctiva/sclera: Conjunctivae normal.      Pupils: Pupils are equal, round, and reactive to light.   Cardiovascular:      Rate and Rhythm: Normal rate and regular rhythm.      Heart sounds: Normal heart sounds. No murmur heard.  Pulmonary:      Effort: Pulmonary effort is normal.      Breath sounds: Normal breath sounds. No wheezing.   Abdominal:      General: Bowel sounds are normal.      Palpations: Abdomen is soft.   Musculoskeletal:         General: No tenderness. Normal range of motion.      Cervical back: Normal range of motion and neck supple.   Lymphadenopathy:      Cervical: No cervical adenopathy.   Skin:     General: Skin is warm and dry.      Capillary Refill: Capillary refill takes less than 2 seconds.   Neurological:      Mental Status: She is alert and oriented to person, place, and time.      Motor: Weakness present.      Gait: Gait abnormal.   Psychiatric:         Behavior: Behavior normal.         Thought Content: Thought content normal.         Judgment: Judgment normal.

## 2024-09-16 NOTE — ASSESSMENT & PLAN NOTE
Wt Readings from Last 3 Encounters:   09/16/24 74.8 kg (165 lb)   07/16/24 77.1 kg (170 lb)   07/02/24 77.9 kg (171 lb 12.8 oz)

## 2024-09-17 RX ORDER — DILTIAZEM HYDROCHLORIDE 30 MG/1
TABLET, FILM COATED ORAL
Qty: 90 TABLET | Refills: 1 | Status: SHIPPED | OUTPATIENT
Start: 2024-09-17

## 2024-10-16 ENCOUNTER — APPOINTMENT (EMERGENCY)
Dept: RADIOLOGY | Facility: HOSPITAL | Age: 84
DRG: 193 | End: 2024-10-16
Payer: MEDICARE

## 2024-10-16 ENCOUNTER — HOSPITAL ENCOUNTER (INPATIENT)
Facility: HOSPITAL | Age: 84
LOS: 6 days | Discharge: HOME HEALTH | DRG: 193 | End: 2024-10-23
Attending: EMERGENCY MEDICINE | Admitting: INTERNAL MEDICINE
Payer: MEDICARE

## 2024-10-16 DIAGNOSIS — J96.01 ACUTE HYPOXIC RESPIRATORY FAILURE (HCC): ICD-10-CM

## 2024-10-16 DIAGNOSIS — C34.92 NON-SMALL CELL CANCER OF LEFT LUNG (HCC): ICD-10-CM

## 2024-10-16 DIAGNOSIS — R05.9 COUGH: ICD-10-CM

## 2024-10-16 DIAGNOSIS — R06.00 DYSPNEA: Primary | ICD-10-CM

## 2024-10-16 DIAGNOSIS — I50.33 ACUTE ON CHRONIC DIASTOLIC CONGESTIVE HEART FAILURE (HCC): ICD-10-CM

## 2024-10-16 DIAGNOSIS — J43.9 PULMONARY EMPHYSEMA, UNSPECIFIED EMPHYSEMA TYPE (HCC): ICD-10-CM

## 2024-10-16 DIAGNOSIS — J96.20 ACUTE ON CHRONIC RESPIRATORY FAILURE (HCC): ICD-10-CM

## 2024-10-16 DIAGNOSIS — G47.33 OSA AND COPD OVERLAP SYNDROME (HCC): ICD-10-CM

## 2024-10-16 DIAGNOSIS — J44.9 OSA AND COPD OVERLAP SYNDROME (HCC): ICD-10-CM

## 2024-10-16 DIAGNOSIS — J90 PLEURAL EFFUSION: ICD-10-CM

## 2024-10-16 PROBLEM — I48.0 PAROXYSMAL ATRIAL FIBRILLATION (HCC): Status: ACTIVE | Noted: 2024-10-16

## 2024-10-16 LAB
2HR DELTA HS TROPONIN: 8 NG/L
4HR DELTA HS TROPONIN: 13 NG/L
ALBUMIN SERPL BCG-MCNC: 3.8 G/DL (ref 3.5–5)
ALP SERPL-CCNC: 47 U/L (ref 34–104)
ALT SERPL W P-5'-P-CCNC: 6 U/L (ref 7–52)
ANION GAP SERPL CALCULATED.3IONS-SCNC: 6 MMOL/L (ref 4–13)
ARTERIAL PATENCY WRIST A: YES
AST SERPL W P-5'-P-CCNC: 8 U/L (ref 5–45)
ATRIAL RATE: 77 BPM
BASE EXCESS BLDA CALC-SCNC: 5.5 MMOL/L
BASOPHILS # BLD AUTO: 0.02 THOUSANDS/ΜL (ref 0–0.1)
BASOPHILS NFR BLD AUTO: 1 % (ref 0–1)
BILIRUB SERPL-MCNC: 0.53 MG/DL (ref 0.2–1)
BNP SERPL-MCNC: 414 PG/ML (ref 0–100)
BODY TEMPERATURE: 98.7 DEGREES FEHRENHEIT
BUN SERPL-MCNC: 16 MG/DL (ref 5–25)
CALCIUM SERPL-MCNC: 8.8 MG/DL (ref 8.4–10.2)
CARDIAC TROPONIN I PNL SERPL HS: 22 NG/L
CARDIAC TROPONIN I PNL SERPL HS: 30 NG/L
CARDIAC TROPONIN I PNL SERPL HS: 35 NG/L
CHLORIDE SERPL-SCNC: 98 MMOL/L (ref 96–108)
CO2 SERPL-SCNC: 35 MMOL/L (ref 21–32)
CREAT SERPL-MCNC: 0.69 MG/DL (ref 0.6–1.3)
EOSINOPHIL # BLD AUTO: 0.12 THOUSAND/ΜL (ref 0–0.61)
EOSINOPHIL NFR BLD AUTO: 3 % (ref 0–6)
ERYTHROCYTE [DISTWIDTH] IN BLOOD BY AUTOMATED COUNT: 15.2 % (ref 11.6–15.1)
FLUAV AG UPPER RESP QL IA.RAPID: NEGATIVE
FLUBV AG UPPER RESP QL IA.RAPID: NEGATIVE
GLUCOSE SERPL-MCNC: 153 MG/DL (ref 65–140)
HCO3 BLDA-SCNC: 32.3 MMOL/L (ref 22–28)
HCT VFR BLD AUTO: 44.7 % (ref 36.5–46.1)
HGB BLD-MCNC: 13.9 G/DL (ref 12–15.4)
IMM GRANULOCYTES # BLD AUTO: 0.02 THOUSAND/UL (ref 0–0.2)
IMM GRANULOCYTES NFR BLD AUTO: 1 % (ref 0–2)
L PNEUMO1 AG UR QL IA.RAPID: NEGATIVE
LYMPHOCYTES # BLD AUTO: 0.61 THOUSANDS/ΜL (ref 0.6–4.47)
LYMPHOCYTES NFR BLD AUTO: 14 % (ref 14–44)
MCH RBC QN AUTO: 33.1 PG (ref 26.8–34.3)
MCHC RBC AUTO-ENTMCNC: 31.1 G/DL (ref 31.4–37.4)
MCV RBC AUTO: 106 FL (ref 82–98)
MONOCYTES # BLD AUTO: 0.25 THOUSAND/ΜL (ref 0.17–1.22)
MONOCYTES NFR BLD AUTO: 6 % (ref 4–12)
NASAL CANNULA: 9
NEUTROPHILS # BLD AUTO: 3.37 THOUSANDS/ΜL (ref 1.85–7.62)
NEUTS SEG NFR BLD AUTO: 75 % (ref 43–75)
NRBC BLD AUTO-RTO: 0 /100 WBCS
O2 CT BLDA-SCNC: 18.8 ML/DL (ref 16–23)
OXYHGB MFR BLDA: 93.1 % (ref 94–97)
P AXIS: 74 DEGREES
PCO2 BLDA: 55.8 MM HG (ref 36–44)
PCO2 TEMP ADJ BLDA: 56 MM HG (ref 36–44)
PH BLD: 7.38 [PH] (ref 7.35–7.45)
PH BLDA: 7.38 [PH] (ref 7.35–7.45)
PLATELET # BLD AUTO: 182 THOUSANDS/UL (ref 149–390)
PMV BLD AUTO: 9.4 FL (ref 8.9–12.7)
PO2 BLD: 78.9 MM HG (ref 75–129)
PO2 BLDA: 78.4 MM HG (ref 75–129)
POTASSIUM SERPL-SCNC: 3.8 MMOL/L (ref 3.5–5.3)
PR INTERVAL: 154 MS
PROCALCITONIN SERPL-MCNC: <0.05 NG/ML
PROT SERPL-MCNC: 6.8 G/DL (ref 6.4–8.4)
QRS AXIS: 40 DEGREES
QRSD INTERVAL: 102 MS
QT INTERVAL: 406 MS
QTC INTERVAL: 459 MS
RBC # BLD AUTO: 4.2 MILLION/UL (ref 3.88–5.12)
S PNEUM AG UR QL: NEGATIVE
SARS-COV+SARS-COV-2 AG RESP QL IA.RAPID: NEGATIVE
SODIUM SERPL-SCNC: 139 MMOL/L (ref 135–147)
SPECIMEN SOURCE: ABNORMAL
T WAVE AXIS: 79 DEGREES
VENTRICULAR RATE: 77 BPM
WBC # BLD AUTO: 4.39 THOUSAND/UL (ref 4.31–10.16)

## 2024-10-16 PROCEDURE — 87070 CULTURE OTHR SPECIMN AEROBIC: CPT

## 2024-10-16 PROCEDURE — 94664 DEMO&/EVAL PT USE INHALER: CPT

## 2024-10-16 PROCEDURE — 87804 INFLUENZA ASSAY W/OPTIC: CPT | Performed by: EMERGENCY MEDICINE

## 2024-10-16 PROCEDURE — 82805 BLOOD GASES W/O2 SATURATION: CPT

## 2024-10-16 PROCEDURE — 80053 COMPREHEN METABOLIC PANEL: CPT | Performed by: EMERGENCY MEDICINE

## 2024-10-16 PROCEDURE — 87449 NOS EACH ORGANISM AG IA: CPT

## 2024-10-16 PROCEDURE — 0202U NFCT DS 22 TRGT SARS-COV-2: CPT

## 2024-10-16 PROCEDURE — 85025 COMPLETE CBC W/AUTO DIFF WBC: CPT | Performed by: EMERGENCY MEDICINE

## 2024-10-16 PROCEDURE — 94640 AIRWAY INHALATION TREATMENT: CPT

## 2024-10-16 PROCEDURE — 83880 ASSAY OF NATRIURETIC PEPTIDE: CPT | Performed by: EMERGENCY MEDICINE

## 2024-10-16 PROCEDURE — 94660 CPAP INITIATION&MGMT: CPT

## 2024-10-16 PROCEDURE — 93010 ELECTROCARDIOGRAM REPORT: CPT | Performed by: INTERNAL MEDICINE

## 2024-10-16 PROCEDURE — 94760 N-INVAS EAR/PLS OXIMETRY 1: CPT

## 2024-10-16 PROCEDURE — 99291 CRITICAL CARE FIRST HOUR: CPT | Performed by: EMERGENCY MEDICINE

## 2024-10-16 PROCEDURE — 87811 SARS-COV-2 COVID19 W/OPTIC: CPT | Performed by: EMERGENCY MEDICINE

## 2024-10-16 PROCEDURE — 94644 CONT INHLJ TX 1ST HOUR: CPT

## 2024-10-16 PROCEDURE — 36415 COLL VENOUS BLD VENIPUNCTURE: CPT | Performed by: EMERGENCY MEDICINE

## 2024-10-16 PROCEDURE — 99223 1ST HOSP IP/OBS HIGH 75: CPT | Performed by: INTERNAL MEDICINE

## 2024-10-16 PROCEDURE — 36600 WITHDRAWAL OF ARTERIAL BLOOD: CPT

## 2024-10-16 PROCEDURE — 84145 PROCALCITONIN (PCT): CPT | Performed by: EMERGENCY MEDICINE

## 2024-10-16 PROCEDURE — 84484 ASSAY OF TROPONIN QUANT: CPT | Performed by: EMERGENCY MEDICINE

## 2024-10-16 PROCEDURE — 71045 X-RAY EXAM CHEST 1 VIEW: CPT

## 2024-10-16 PROCEDURE — 93005 ELECTROCARDIOGRAM TRACING: CPT

## 2024-10-16 PROCEDURE — 87205 SMEAR GRAM STAIN: CPT

## 2024-10-16 PROCEDURE — 99285 EMERGENCY DEPT VISIT HI MDM: CPT

## 2024-10-16 RX ORDER — FUROSEMIDE 10 MG/ML
40 INJECTION INTRAMUSCULAR; INTRAVENOUS 2 TIMES DAILY
Status: DISCONTINUED | OUTPATIENT
Start: 2024-10-17 | End: 2024-10-19

## 2024-10-16 RX ORDER — TIMOLOL MALEATE 5 MG/ML
1 SOLUTION/ DROPS OPHTHALMIC 2 TIMES DAILY
Status: DISCONTINUED | OUTPATIENT
Start: 2024-10-16 | End: 2024-10-23 | Stop reason: HOSPADM

## 2024-10-16 RX ORDER — FUROSEMIDE 10 MG/ML
60 INJECTION INTRAMUSCULAR; INTRAVENOUS ONCE
Status: COMPLETED | OUTPATIENT
Start: 2024-10-16 | End: 2024-10-16

## 2024-10-16 RX ORDER — SODIUM CHLORIDE FOR INHALATION 0.9 %
12 VIAL, NEBULIZER (ML) INHALATION ONCE
Status: COMPLETED | OUTPATIENT
Start: 2024-10-16 | End: 2024-10-16

## 2024-10-16 RX ORDER — GUAIFENESIN 600 MG/1
600 TABLET, EXTENDED RELEASE ORAL EVERY 12 HOURS SCHEDULED
Status: DISCONTINUED | OUTPATIENT
Start: 2024-10-16 | End: 2024-10-23 | Stop reason: HOSPADM

## 2024-10-16 RX ORDER — BUDESONIDE 0.5 MG/2ML
0.5 INHALANT ORAL
Status: DISCONTINUED | OUTPATIENT
Start: 2024-10-16 | End: 2024-10-23 | Stop reason: HOSPADM

## 2024-10-16 RX ORDER — ATORVASTATIN CALCIUM 40 MG/1
40 TABLET, FILM COATED ORAL
Status: DISCONTINUED | OUTPATIENT
Start: 2024-10-16 | End: 2024-10-23 | Stop reason: HOSPADM

## 2024-10-16 RX ORDER — CITALOPRAM HYDROBROMIDE 10 MG/1
20 TABLET ORAL DAILY
Status: DISCONTINUED | OUTPATIENT
Start: 2024-10-17 | End: 2024-10-23 | Stop reason: HOSPADM

## 2024-10-16 RX ORDER — TRAZODONE HYDROCHLORIDE 100 MG/1
100 TABLET ORAL
Status: DISCONTINUED | OUTPATIENT
Start: 2024-10-16 | End: 2024-10-23 | Stop reason: HOSPADM

## 2024-10-16 RX ORDER — CLOPIDOGREL BISULFATE 75 MG/1
75 TABLET ORAL DAILY
Status: DISCONTINUED | OUTPATIENT
Start: 2024-10-17 | End: 2024-10-23 | Stop reason: HOSPADM

## 2024-10-16 RX ORDER — MONTELUKAST SODIUM 10 MG/1
10 TABLET ORAL
Status: DISCONTINUED | OUTPATIENT
Start: 2024-10-16 | End: 2024-10-23 | Stop reason: HOSPADM

## 2024-10-16 RX ORDER — IPRATROPIUM BROMIDE AND ALBUTEROL SULFATE 2.5; .5 MG/3ML; MG/3ML
3 SOLUTION RESPIRATORY (INHALATION)
Status: DISCONTINUED | OUTPATIENT
Start: 2024-10-16 | End: 2024-10-23 | Stop reason: HOSPADM

## 2024-10-16 RX ORDER — FORMOTEROL FUMARATE DIHYDRATE 20 UG/2ML
20 SOLUTION RESPIRATORY (INHALATION)
Status: DISCONTINUED | OUTPATIENT
Start: 2024-10-16 | End: 2024-10-23 | Stop reason: HOSPADM

## 2024-10-16 RX ORDER — IPRATROPIUM BROMIDE AND ALBUTEROL SULFATE .5; 3 MG/3ML; MG/3ML
1 SOLUTION RESPIRATORY (INHALATION) ONCE
Status: COMPLETED | OUTPATIENT
Start: 2024-10-16 | End: 2024-10-16

## 2024-10-16 RX ORDER — ALBUTEROL SULFATE 5 MG/ML
10 SOLUTION RESPIRATORY (INHALATION) ONCE
Status: COMPLETED | OUTPATIENT
Start: 2024-10-16 | End: 2024-10-16

## 2024-10-16 RX ORDER — DILTIAZEM HYDROCHLORIDE 30 MG/1
30 TABLET, FILM COATED ORAL EVERY 8 HOURS SCHEDULED
Status: DISCONTINUED | OUTPATIENT
Start: 2024-10-16 | End: 2024-10-23 | Stop reason: HOSPADM

## 2024-10-16 RX ORDER — CHLORAL HYDRATE 500 MG
1000 CAPSULE ORAL DAILY
Status: DISCONTINUED | OUTPATIENT
Start: 2024-10-17 | End: 2024-10-23 | Stop reason: HOSPADM

## 2024-10-16 RX ORDER — CEFTRIAXONE 1 G/50ML
1000 INJECTION, SOLUTION INTRAVENOUS EVERY 24 HOURS
Status: DISCONTINUED | OUTPATIENT
Start: 2024-10-16 | End: 2024-10-19

## 2024-10-16 RX ORDER — ACETAMINOPHEN 325 MG/1
650 TABLET ORAL EVERY 4 HOURS PRN
Status: DISCONTINUED | OUTPATIENT
Start: 2024-10-16 | End: 2024-10-23 | Stop reason: HOSPADM

## 2024-10-16 RX ORDER — ALBUTEROL SULFATE 2.5 MG/3ML
1 SOLUTION RESPIRATORY (INHALATION) ONCE
Status: COMPLETED | OUTPATIENT
Start: 2024-10-16 | End: 2024-10-16

## 2024-10-16 RX ADMIN — CEFTRIAXONE 1000 MG: 1 INJECTION, SOLUTION INTRAVENOUS at 15:52

## 2024-10-16 RX ADMIN — FUROSEMIDE 60 MG: 10 INJECTION, SOLUTION INTRAMUSCULAR; INTRAVENOUS at 12:32

## 2024-10-16 RX ADMIN — FORMOTEROL FUMARATE DIHYDRATE 20 MCG: 20 SOLUTION RESPIRATORY (INHALATION) at 21:18

## 2024-10-16 RX ADMIN — ATORVASTATIN CALCIUM 40 MG: 40 TABLET, FILM COATED ORAL at 15:42

## 2024-10-16 RX ADMIN — TRAZODONE HYDROCHLORIDE 100 MG: 100 TABLET ORAL at 22:13

## 2024-10-16 RX ADMIN — BUDESONIDE 0.5 MG: 0.5 INHALANT RESPIRATORY (INHALATION) at 21:01

## 2024-10-16 RX ADMIN — ISODIUM CHLORIDE 12 ML: 0.03 SOLUTION RESPIRATORY (INHALATION) at 11:26

## 2024-10-16 RX ADMIN — TIMOLOL MALEATE 1 DROP: 5 SOLUTION/ DROPS OPHTHALMIC at 17:58

## 2024-10-16 RX ADMIN — DILTIAZEM HYDROCHLORIDE 30 MG: 30 TABLET ORAL at 15:42

## 2024-10-16 RX ADMIN — IPRATROPIUM BROMIDE AND ALBUTEROL SULFATE 3 ML: 2.5; .5 SOLUTION RESPIRATORY (INHALATION) at 16:31

## 2024-10-16 RX ADMIN — DILTIAZEM HYDROCHLORIDE 30 MG: 30 TABLET ORAL at 21:54

## 2024-10-16 RX ADMIN — ALBUTEROL SULFATE 10 MG: 2.5 SOLUTION RESPIRATORY (INHALATION) at 11:25

## 2024-10-16 RX ADMIN — GUAIFENESIN 600 MG: 600 TABLET, EXTENDED RELEASE ORAL at 21:54

## 2024-10-16 RX ADMIN — MONTELUKAST 10 MG: 10 TABLET, FILM COATED ORAL at 21:54

## 2024-10-16 RX ADMIN — IPRATROPIUM BROMIDE 1 MG: 0.5 SOLUTION RESPIRATORY (INHALATION) at 11:26

## 2024-10-16 RX ADMIN — IPRATROPIUM BROMIDE AND ALBUTEROL SULFATE 3 ML: 2.5; .5 SOLUTION RESPIRATORY (INHALATION) at 21:01

## 2024-10-16 NOTE — ASSESSMENT & PLAN NOTE
History of severe COPD chronically on 6 L nc  Do not suspect acute exacerbation  Continue scheduled duonebs, performist, Singulair  Procalcitonin negative, recheck in am  Check urinary antigens  Sputum culture  RP2 panel

## 2024-10-16 NOTE — ASSESSMENT & PLAN NOTE
Patient with history of diastolic CHF, severe COPD, history of NSCLC s/p wedge resection/chemo, chronic respiratory failure on 4-6 L nc presented to ED with progressive shortness of breath for past week with productive cough.  CXR: Findings most suggestive of CHF with basilar edema and pleural effusions. Could not entirely exclude pneumonia.    Procalcitonin negative  Afebrile, no leukocytosis  Suspect in setting of acute CHF  Follow up ABG  Continue IV lasix as below  Continue scheduled duonebs  Start ceftriaxone for now  Currently on 12 L mid flow, wean as tolerated

## 2024-10-16 NOTE — ED PROVIDER NOTES
Time reflects when diagnosis was documented in both MDM as applicable and the Disposition within this note       Time User Action Codes Description Comment    10/16/2024 12:21 PM Nacho Moreira Add [R06.00] Dyspnea     10/16/2024 12:21 PM Nacho Moreira Add [R05.9] Cough     10/16/2024 12:21 PM Nacho Moreira Add [J96.01] Acute hypoxic respiratory failure (HCC)           ED Disposition       ED Disposition   Admit    Condition   Stable    Date/Time   Wed Oct 16, 2024 12:21 PM    Comment   Case was discussed with DEBBIE and the patient's admission status was agreed to be Admission Status: observation status to the service of Dr. Em .               Assessment & Plan       Medical Decision Making  Differential diagnosis includes but is not limited to pneumonia, bronchitis, COPD exacerbation, CHF exacerbation.  I ordered and reviewed lab work including CBC, CMP, procalcitonin, BNP, troponin.  I placed patient on mid flow oxygen.  I treated patient with HEART neb, Solu-Medrol.  Patient with a mildly elevated BNP, lab work otherwise grossly unremarkable.  Patient with improved appearance on reassessment, able to decrease mid flow oxygen.  Given prior history of CHF, diuresed with Lasix.  Chest x-ray demonstrating bilateral pulmonary infiltrates.  I discussed patient with the hospitalist and admitted patient for further evaluation and management.    Amount and/or Complexity of Data Reviewed  External Data Reviewed: notes.     Details: Patient evaluated by primary care provider on 9/16/2024 for chronic conditions including chronic respiratory failure with hypoxia and hypercapnia, immunization.  Noted to be on torsemide and Cardizem managed by cardiology.  Per this note patient is on 6 L of oxygen.  Labs: ordered.  Radiology: ordered and independent interpretation performed.    Risk  Prescription drug management.  Decision regarding hospitalization.             Medications   furosemide (LASIX) injection  60 mg (has no administration in time range)   albuterol (FOR EMS ONLY) (2.5 mg/3 mL) 0.083 % inhalation solution 2.5 mg (0 mg Does not apply Given to EMS 10/16/24 1024)   ipratropium-albuterol (FOR EMS ONLY) (DUO-NEB) 0.5-2.5 mg/3 mL inhalation solution 3 mL (0 mL Does not apply Given to EMS 10/16/24 1024)   albuterol inhalation solution 10 mg (10 mg Nebulization Given 10/16/24 1125)   ipratropium (ATROVENT) 0.02 % inhalation solution 1 mg (1 mg Nebulization Given 10/16/24 1126)   sodium chloride 0.9 % inhalation solution 12 mL (12 mL Nebulization Given 10/16/24 1126)       ED Risk Strat Scores                           SBIRT 22yo+      Flowsheet Row Most Recent Value   LEIF: How many times in the past year have you...    Used an illegal drug or used a prescription medication for non-medical reasons? Never Filed at: 10/16/2024 1023                            History of Present Illness       Chief Complaint   Patient presents with    Shortness of Breath     Pt reports feeling short of breath/ generalized weakness since this morning. Hx COPD, emphysema.        Past Medical History:   Diagnosis Date    Asthma     Back problem     Chronic pain     Colon cancer screening     recheck tattoo    COPD (chronic obstructive pulmonary disease) (HCC)     Oxygen 3.5L via NC    CPAP (continuous positive airway pressure) dependence     Diverticulosis     Emphysema of lung (HCC)     High blood pressure     Hyperlipidemia     Hypertension     Lung cancer (HCC)     Left Lung cancer; wedge resection     JULIO on CPAP     PAD (peripheral artery disease) (HCC)     Leg    Wears glasses       Past Surgical History:   Procedure Laterality Date    APPENDECTOMY      age 11 yr    COLONOSCOPY  2018    repeat 2023    COLONOSCOPY      HYSTERECTOMY      complete    LUNG CANCER SURGERY Left     wedge reseection, Dr Anna      Family History   Problem Relation Age of Onset    Colon cancer Father     Heart disease Sister     Lung cancer Daughter        Social History     Tobacco Use    Smoking status: Former     Current packs/day: 1.50     Average packs/day: 1.5 packs/day for 35.0 years (52.5 ttl pk-yrs)     Types: Cigarettes    Smokeless tobacco: Never    Tobacco comments:     Has smoked since age:   11 - As per Alina    Vaping Use    Vaping status: Never Used   Substance Use Topics    Alcohol use: Not Currently    Drug use: Never      E-Cigarette/Vaping    E-Cigarette Use Never User       E-Cigarette/Vaping Substances    Nicotine No     THC No     CBD No     Flavoring No     Other No     Unknown No       I have reviewed and agree with the history as documented.     Patient is an 84-year-old female history of COPD on chronic 4-5 L oxygen presenting for evaluation of dyspnea.  Patient states that over the course of the last week she has had progressive shortness of breath, cough productive of yellow sputum, generalized weakness.  Patient denies chest pain, nausea, vomiting, diarrhea, palpitations, syncope or near syncope.  Patient states today she was too weak to get out of bed.  Patient states over the course of the past 3 weeks she has noted some component of orthopnea.  Patient denies any significant worsening lower extremity edema, denies recent weight gain, states good compliance with torsemide.  Patient denies any recent travel or sick contacts.        Review of Systems   Constitutional:  Negative for chills, fatigue, fever and unexpected weight change.   Respiratory:  Positive for cough and shortness of breath.    Cardiovascular:  Negative for chest pain and leg swelling.   Gastrointestinal:  Negative for diarrhea, nausea and vomiting.   Musculoskeletal:  Negative for arthralgias and myalgias.   Neurological:  Positive for weakness. Negative for numbness and headaches.   Psychiatric/Behavioral:  Negative for confusion.    All other systems reviewed and are negative.          Objective       ED Triage Vitals   Temperature Pulse Blood Pressure Respirations  SpO2 Patient Position - Orthostatic VS   10/16/24 1029 10/16/24 1019 10/16/24 1019 10/16/24 1019 10/16/24 1019 --   98.2 °F (36.8 °C) 95 169/83 (!) 26 98 %       Temp src Heart Rate Source BP Location FiO2 (%) Pain Score    -- -- -- -- --              Vitals      Date and Time Temp Pulse SpO2 Resp BP Pain Score FACES Pain Rating User   10/16/24 1215 -- 80 97 % 23 171/83 -- -- DQ   10/16/24 1200 -- 96 98 % 25 171/83 -- -- DQ   10/16/24 1029 98.2 °F (36.8 °C) 87 90 % -- -- -- -- DQ   10/16/24 1024 -- -- 85 % -- -- -- -- DQ   10/16/24 1019 -- 95 98 % 26 169/83 -- -- DQ            Physical Exam  Vitals and nursing note reviewed.   Constitutional:       General: She is not in acute distress.     Appearance: She is well-developed. She is ill-appearing. She is not diaphoretic.      Comments: Somewhat ill-appearing and mild respiratory distress   HENT:      Head: Normocephalic and atraumatic.      Comments: Moist mucous membranes     Right Ear: External ear normal.      Left Ear: External ear normal.      Nose: Nose normal.      Mouth/Throat:      Mouth: Oropharynx is clear and moist.      Pharynx: No oropharyngeal exudate.   Eyes:      Conjunctiva/sclera: Conjunctivae normal.      Pupils: Pupils are equal, round, and reactive to light.   Cardiovascular:      Rate and Rhythm: Normal rate and regular rhythm.      Heart sounds: Normal heart sounds. No murmur heard.     No friction rub. No gallop.      Comments: Regular rate and rhythm, no murmurs rubs or gallops.  Extremities warm and well-perfused without mottling  Pulmonary:      Effort: Pulmonary effort is normal. No respiratory distress.      Breath sounds: Rales present. No wheezing.      Comments: Mild respiratory distress.  Respiratory rate in the high 20s.  Satting in the mid 80s on 4 L nasal cannula.  Increased to 8 L mid flow.  Rales in the lung bases bilaterally  Chest:      Chest wall: No tenderness.   Abdominal:      General: Bowel sounds are normal. There is no  distension.      Palpations: Abdomen is soft. There is no mass.      Tenderness: There is no abdominal tenderness. There is no guarding or rebound.   Musculoskeletal:         General: No deformity or edema.      Comments: Trace bilateral lower extremity edema   Skin:     General: Skin is warm and dry.      Capillary Refill: Capillary refill takes less than 2 seconds.   Neurological:      Mental Status: She is alert and oriented to person, place, and time.   Psychiatric:         Mood and Affect: Mood and affect normal.         Behavior: Behavior normal.         Results Reviewed       Procedure Component Value Units Date/Time    HS Troponin I 4hr [[id number]]     Lab Status: No result Specimen: Blood     HS Troponin 0hr (reflex protocol) [[id number]]  (Normal) Collected: 10/16/24 1111    Lab Status: Final result Specimen: Blood from Line, Venous Updated: 10/16/24 1141     hs TnI 0hr 22 ng/L     HS Troponin I 2hr [[id number]]     Lab Status: No result Specimen: Blood     B-Type Natriuretic Peptide(BNP) [[id number]]  (Abnormal) Collected: 10/16/24 1101    Lab Status: Final result Specimen: Blood Updated: 10/16/24 1135      pg/mL     Procalcitonin [[id number]]  (Normal) Collected: 10/16/24 1101    Lab Status: Final result Specimen: Blood Updated: 10/16/24 1128     Procalcitonin <0.05 ng/ml     FLU/COVID Rapid Antigen (30 min. TAT) - Preferred screening test in ED [[id number]]  (Normal) Collected: 10/16/24 1100    Lab Status: Final result Specimen: Nares from Nose Updated: 10/16/24 1121     SARS COV Rapid Antigen Negative     Influenza A Rapid Antigen Negative     Influenza B Rapid Antigen Negative    Narrative:      This test has been performed using the [employer] 2 FLU+SARS Antigen test under the Emergency Use Authorization (EUA). This test has been validated by the  and verified by the performing laboratory. The Irais uses lateral flow immunofluorescent sandwich assay to detect SARS-COV, Influenza A and  Influenza B Antigen.     The [employer]idel Ira[employer] 2 SARS Antigen test does not differentiate between SARS-CoV and SARS-CoV-2.     Negative results are presumptive and may be confirmed with a molecular assay, if necessary, for patient management. Negative results do not rule out SARS-CoV-2 or influenza infection and should not be used as the sole basis for treatment or patient management decisions. A negative test result may occur if the level of antigen in a sample is below the limit of detection of this test.     Positive results are indicative of the presence of viral antigens, but do not rule out bacterial infection or co-infection with other viruses.     All test results should be used as an adjunct to clinical observations and other information available to the provider.    FOR PEDIATRIC PATIENTS - copy/paste COVID Guidelines URL to browser: [email or web address]    Comprehensive metabolic panel [[id number]]  (Abnormal) Collected: 10/16/24 1101    Lab Status: Final result Specimen: Blood Updated: 10/16/24 1118     Sodium 139 mmol/L      Potassium 3.8 mmol/L      Chloride 98 mmol/L      CO2 35 mmol/L      ANION GAP 6 mmol/L      BUN 16 mg/dL      Creatinine 0.69 mg/dL      Glucose 153 mg/dL      Calcium 8.8 mg/dL      AST 8 U/L      ALT 6 U/L      Alkaline Phosphatase 47 U/L      Total Protein 6.8 g/dL      Albumin 3.8 g/dL      Total Bilirubin 0.53 mg/dL      eGFR --    Narrative:      Notes:     1. eGFR calculation is only valid for adults 18 years and older.  2. EGFR calculation cannot be performed for patients who are transgender, non-binary, or whose legal sex, sex at birth, and gender identity differ.    CBC and differential [[id number]]  (Abnormal) Collected: 10/16/24 1101    Lab Status: Final result Specimen: Blood Updated: 10/16/24 1103     WBC 4.39 Thousand/uL      RBC 4.20 Million/uL      Hemoglobin 13.9 g/dL      Hematocrit 44.7 %       fL      MCH  33.1 pg      MCHC 31.1 g/dL      RDW 15.2 %      MPV 9.4 fL      Platelets 182 Thousands/uL      nRBC 0 /100 WBCs      Segmented % 75 %      Immature Grans % 1 %      Lymphocytes % 14 %      Monocytes % 6 %      Eosinophils Relative 3 %      Basophils Relative 1 %      Absolute Neutrophils 3.37 Thousands/µL      Absolute Immature Grans 0.02 Thousand/uL      Absolute Lymphocytes 0.61 Thousands/µL      Absolute Monocytes 0.25 Thousand/µL      Eosinophils Absolute 0.12 Thousand/µL      Basophils Absolute 0.02 Thousands/µL             XR chest 1 view portable   ED Interpretation by Nacho Moreira MD (10/16 5980)   Bilateral pulmonary edema versus pneumonia left worse than right      Final Interpretation by Andrei Rainey MD (10/16 1206)      Findings most suggestive of CHF with basilar edema and pleural effusions. Could not entirely exclude pneumonia. Correlate for any fever or cough. Findings concordant with the ER interpretation            Workstation performed: [id number]             CriticalCare Time    Date/Time: 10/16/2024 12:26 PM    Performed by: Nacho Moreira MD  Authorized by: Nacho Moreira MD    Critical care provider statement:     Critical care time (minutes):  42    Critical care time was exclusive of:  Separately billable procedures and treating other patients and teaching time    Critical care was necessary to treat or prevent imminent or life-threatening deterioration of the following conditions:  Cardiac failure and respiratory failure    Critical care was time spent personally by me on the following activities:  Blood draw for specimens, ordering and performing treatments and interventions, obtaining history from patient or surrogate, development of treatment plan with patient or surrogate, discussions with primary provider, evaluation of patient's response to treatment, examination of patient, review of old charts, re-evaluation of patient's condition, ordering and review  of radiographic studies and ordering and review of laboratory studies  Comments:      Transition to mid flow oxygen and treated with a heart neb for respiratory distress in the setting of pulmonary edema superimposed on baseline COPD      ED Medication and Procedure Management   Prior to Admission Medications   Prescriptions Last Dose Informant Patient Reported? Taking?   Calcium Carbonate (CALTRATE 600 PO)  Self Yes No   Sig: Take 3 tablets by mouth in the morning daily in AM   Coenzyme Q10 (Co Q 10) 100 MG CAPS  Self No No   Sig: Take 2 capsules (200 mg total) by mouth every morning   Cyanocobalamin (Energy B12) 500 MCG CHEW  Self Yes No   Sig: Chew 1 tablet in the morning. daily  Indications: Inadequate Vitamin B12   Multiple Vitamins-Minerals (AIRBORNE GUMMIES PO)  Self Yes No   Sig: Take 1 tablet by mouth every morning   Nebulizers (Vios LC Sprint) MISC  Self No No   Sig: Use as directed   Omega-3 Fatty Acids (fish oil) 1,000 mg  Self Yes No   Sig: Take 1,000 mg by mouth every morning   VITAMIN D PO  Self Yes No   Sig: Take 25 mcg by mouth in the morning daily   albuterol (2.5 mg/3 mL) 0.083 % nebulizer solution  Self No No   Sig: Take 3 mL (2.5 mg total) by nebulization daily as needed for wheezing or shortness of breath   albuterol (PROVENTIL HFA,VENTOLIN HFA) 90 mcg/act inhaler  Self No No   Sig: INHALE 2 PUFFS BY MOUTH EVERY 6 HOURS AS NEEDED FOR SHORTNESS OF BREATH   bisacodyl (DULCOLAX) 5 mg EC tablet   No No   Sig: Take 2 tablets (10 mg total) by mouth once for 1 dose   budesonide (PULMICORT) 0.5 mg/2 mL nebulizer solution   No No   Sig: Take 2 mL (0.5 mg total) by nebulization 2 (two) times a day Rinse mouth after use.   carboxymethylcellulose 0.5 % SOLN  Self Yes No   Sig: Administer 1 drop to both eyes 3 (three) times a day as needed for dry eyes   citalopram (CeleXA) 20 mg tablet  Self No No   Sig: Take 1 tablet (20 mg total) by mouth daily   clopidogrel (PLAVIX) 75 mg tablet  Self No No   Sig: TAKE  1 TABLET BY MOUTH DAILY   denosumab (Prolia) 60 mg/mL   No No   Sig: Inject 1 mL (60 mg total) under the skin once for 1 dose   Patient taking differently: Inject 60 mg under the skin once Next dose 6/2023   diltiazem (CARDIZEM) 30 mg tablet   No No   Sig: TAKE 1 TABLET(30 MG) BY MOUTH THREE TIMES DAILY   formoterol (Perforomist) 20 MCG/2ML nebulizer solution  Self No No   Sig: Take 2 mL (20 mcg total) by nebulization 2 (two) times a day icd 10 code J44.9   guaiFENesin (MUCINEX) 600 mg 12 hr tablet  Self No No   Sig: TAKE 2 TABLET BY MOUTH EVERY 12 HOURS   ipratropium-albuterol (DUO-NEB) 0.5-2.5 mg/3 mL nebulizer solution  Self No No   Sig: Take 3 mL by nebulization every 6 (six) hours as needed for wheezing or shortness of breath ICD 10 J44.9   levocetirizine (XYZAL) 5 MG tablet  Self No No   Sig: TAKE 1 TABLET(5 MG) BY MOUTH EVERY EVENING   montelukast (SINGULAIR) 10 mg tablet  Self No No   Sig: TAKE 1 TABLET(10 MG) BY MOUTH DAILY AT BEDTIME   omega-3-acid ethyl esters (LOVAZA) 1 g capsule   No No   Sig: Take 2 capsules (2 g total) by mouth 2 (two) times a day   oxygen gas  Self Yes No   Sig: Inhale 4 L/min continuous. VIA NC  Indications: Hypoxia   rivaroxaban (Xarelto) 20 mg tablet   No No   Sig: Take 1 tablet (20 mg total) by mouth daily with breakfast   rosuvastatin (CRESTOR) 20 MG tablet  Self No No   Sig: TAKE 1 TABLET(20 MG) BY MOUTH DAILY   timolol (TIMOPTIC) 0.5 % ophthalmic solution  Self Yes No   Sig: Administer 1 drop to both eyes 2 (two) times a day   torsemide (DEMADEX) 10 mg tablet  Self No No   Sig: Take 1 tablet (10 mg total) by mouth daily   traZODone (DESYREL) 100 mg tablet   No No   Sig: Take 1 tablet (100 mg total) by mouth daily at bedtime as needed for sleep      Facility-Administered Medications: None     Patient's Medications   Discharge Prescriptions    No medications on file     No discharge procedures on file.  ED SEPSIS DOCUMENTATION   Time reflects when diagnosis was documented in  both MDM as applicable and the Disposition within this note       Time User Action Codes Description Comment    10/16/2024 12:21 PM Nacho Moreira [R06.00] Dyspnea     10/16/2024 12:21 PM Nacho Moreira [R05.9] Cough     10/16/2024 12:21 PM Nacho Moreira [J96.01] Acute hypoxic respiratory failure (HCC)                  Nacho Moreira MD  10/16/24 1227       Nacho Moreira MD  10/16/24 1228

## 2024-10-16 NOTE — ASSESSMENT & PLAN NOTE
History of non-small cell lung cancer s/p left upper lobe wedge resection and chemotherapy  CT chest in June 2024 showed enlargement of right lower lobe nodule highly suspicious for second primary lung neoplasm  Has outpatient PET scan ordered, but per PCP documentation is not recommended due to age and slow growth rate

## 2024-10-16 NOTE — ASSESSMENT & PLAN NOTE
Lab Results   Component Value Date    EGFR 80 06/19/2024    EGFR 81 06/18/2024    EGFR 81 06/17/2024    CREATININE 0.69 10/16/2024    CREATININE 0.68 06/19/2024    CREATININE 0.66 06/18/2024   Cr currently stable at baseline  Daily bmp

## 2024-10-16 NOTE — H&P
H&P - Hospitalist   Name: Eryn Mroocho 84 y.o. adult I MRN: [id number]  Unit/Bed#: [address] [place] [address] I Date of Admission: 10/16/2024   Date of Service: 10/16/2024 I Hospital Day: 0     Assessment & Plan  Acute on chronic respiratory failure (HCC)  Patient with history of diastolic CHF, severe COPD, history of NSCLC s/p wedge resection/chemo, chronic respiratory failure on 4-6 L nc presented to ED with progressive shortness of breath for past week with productive cough.  CXR: Findings most suggestive of CHF with basilar edema and pleural effusions. Could not entirely exclude pneumonia.    Procalcitonin negative  Afebrile, no leukocytosis  Suspect in setting of acute CHF  Follow up ABG  Continue IV lasix as below  Continue scheduled duonebs  Start ceftriaxone for now  Currently on 12 L mid flow, wean as tolerated  Acute on chronic diastolic congestive heart failure (HCC)  Wt Readings from Last 3 Encounters:   09/16/24 74.8 kg (165 lb)   07/16/24 77.1 kg (170 lb)   07/02/24 77.9 kg (171 lb 12.8 oz)     ECHO 5/2024: EF 70%, systolic function is vigorous, diastolic function is moderately abnormal, consistent with grade II relaxation  CXR: Findings most suggestive of CHF with basilar edema and pleural effusions.    Home regimen includes torsemide 10 mg daily  Received 60 mg IV lasix in ED  Start IV lasix 40 mg bid  Monitor Is/Os and daily weights  Low sodium diet and fluid restriction  COPD (chronic obstructive pulmonary disease) (HCC)  History of severe COPD chronically on 6 L nc  Do not suspect acute exacerbation  Continue scheduled ross, performist, Singulair  Procalcitonin negative, recheck in am  Check urinary antigens  Sputum culture  RP2 panel  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)  History of non-small cell lung cancer s/p left upper lobe wedge resection and chemotherapy  CT chest in June 2024 showed enlargement of right lower lobe nodule highly suspicious for second primary lung neoplasm  Has  outpatient PET scan ordered, but per PCP documentation is not recommended due to age and slow growth rate  JULIO (obstructive sleep apnea)  Wears BiPAP hs  Hyperlipidemia  Continue statin  Recurrent major depressive disorder, in partial remission (HCC)  Continue Celexa  PAD (peripheral artery disease) (HCC)  Continue plavix and statin  Paroxysmal atrial fibrillation (HCC)  Continue Cardizem 30 mg tid  Continue Xarelto  EKG shows NSR  Stage 3a chronic kidney disease (HCC)  Lab Results   Component Value Date    EGFR 80 06/19/2024    EGFR 81 06/18/2024    EGFR 81 06/17/2024    CREATININE 0.69 10/16/2024    CREATININE 0.68 06/19/2024    CREATININE 0.66 06/18/2024   Cr currently stable at baseline  Daily bmp      VTE Pharmacologic Prophylaxis: VTE Score: 7 High Risk (Score >/= 5) - Pharmacological DVT Prophylaxis Ordered: rivaroxaban (Xarelto). Sequential Compression Devices Ordered.  Code Status: Level 1 - Full Code   Discussion with family: Updated  (daughter) at bedside.    Anticipated Length of Stay: Patient will be admitted on an observation basis with an anticipated length of stay of less than 2 midnights secondary to acute respiratory failure, CHF exacerbation.    History of Present Illness   Chief Complaint: shortness of breath    Eryn Morocho is a 84 y.o. adult with a PMH of diastolic heart failure, severe COPD, NSCLC s/p wedge resection/chemo, JULIO, paroxysmal atrial fibrillation, CKD, HLD, PAD who presents with progressive shortness of breath over the past week. Patient also reports weakness and productive cough with yellow sputum. She reports her  at home is also sick. She reports she used to be on 6 L nc but has been using 4 L recently. She reports compliance with BiPAP every night. She says her daughter takes her weight every morning and recently it has been around 168 lbs, denies recent weight gain or weight loss. Reports compliance with home diuretics and other medications. Denies  fever, chills, chest pain, nausea, vomiting, abdominal pain. At time of evaluation on floor, patient resting comfortably in bed and very talkative. States she feels much better than when she came into the ED.    Review of Systems   Constitutional:  Negative for chills and fever.   HENT:  Negative for ear pain and sore throat.    Eyes:  Negative for pain and visual disturbance.   Respiratory:  Positive for cough and shortness of breath.    Cardiovascular:  Negative for chest pain and palpitations.   Gastrointestinal:  Negative for abdominal pain and vomiting.   Genitourinary:  Negative for dysuria and hematuria.   Musculoskeletal:  Negative for arthralgias and back pain.   Skin:  Negative for color change and rash.   Neurological:  Positive for weakness. Negative for seizures and syncope.   All other systems reviewed and are negative.      Historical Information   Past Medical History:   Diagnosis Date    Asthma     Back problem     Chronic pain     Colon cancer screening     recheck tattoo    COPD (chronic obstructive pulmonary disease) (HCC)     Oxygen 3.5L via NC    CPAP (continuous positive airway pressure) dependence     Diverticulosis     Emphysema of lung (HCC)     High blood pressure     Hyperlipidemia     Hypertension     Lung cancer (HCC)     Left Lung cancer; wedge resection     JULIO on CPAP     PAD (peripheral artery disease) (HCC)     Leg    Wears glasses      Past Surgical History:   Procedure Laterality Date    APPENDECTOMY      age 11 yr    COLONOSCOPY  2018    repeat 2023    COLONOSCOPY      HYSTERECTOMY      complete    LUNG CANCER SURGERY Left     wedge reseection, Dr Anna     Social History     Tobacco Use    Smoking status: Former     Current packs/day: 1.50     Average packs/day: 1.5 packs/day for 35.0 years (52.5 ttl pk-yrs)     Types: Cigarettes    Smokeless tobacco: Never    Tobacco comments:     Has smoked since age:   11 - As per Alina    Vaping Use    Vaping status: Never Used    Substance and Sexual Activity    Alcohol use: Not Currently    Drug use: Never    Sexual activity: Not on file     E-Cigarette/Vaping    E-Cigarette Use Never User      E-Cigarette/Vaping Substances    Nicotine No     THC No     CBD No     Flavoring No     Other No     Unknown No      Family History   Problem Relation Age of Onset    Colon cancer Father     Heart disease Sister     Lung cancer Daughter      Social History:  Marital Status: /Civil Union   Occupation:   Patient Pre-hospital Living Situation: Home  Patient Pre-hospital Level of Mobility: unable to be assessed at time of evaluation  Patient Pre-hospital Diet Restrictions: low sodium    Meds/Allergies   I have reviewed home medications with patient personally.  Prior to Admission medications    Medication Sig Start Date End Date Taking? Authorizing Provider   albuterol (2.5 mg/3 mL) 0.083 % nebulizer solution Take 3 mL (2.5 mg total) by nebulization daily as needed for wheezing or shortness of breath 2/16/24   Ari Mendiola MD   albuterol (PROVENTIL HFA,VENTOLIN HFA) 90 mcg/act inhaler INHALE 2 PUFFS BY MOUTH EVERY 6 HOURS AS NEEDED FOR SHORTNESS OF BREATH 4/16/24   Ari Mendiola MD   bisacodyl (DULCOLAX) 5 mg EC tablet Take 2 tablets (10 mg total) by mouth once for 1 dose 2/2/23 11/3/23  Sami Alex PA-C   budesonide (PULMICORT) 0.5 mg/2 mL nebulizer solution Take 2 mL (0.5 mg total) by nebulization 2 (two) times a day Rinse mouth after use. 4/24/24 5/24/24  Bora Moreno MD   Calcium Carbonate (CALTRATE 600 PO) Take 3 tablets by mouth in the morning daily in AM    Historical Provider, MD   carboxymethylcellulose 0.5 % SOLN Administer 1 drop to both eyes 3 (three) times a day as needed for dry eyes    Historical Provider, MD   citalopram (CeleXA) 20 mg tablet Take 1 tablet (20 mg total) by mouth daily 5/3/24   Ari Mendiola MD   clopidogrel (PLAVIX) 75 mg tablet TAKE 1 TABLET BY MOUTH DAILY 5/9/24   Ari Mendiola MD   Coenzyme Q10 (Co Q  10) 100 MG CAPS Take 2 capsules (200 mg total) by mouth every morning 12/14/23   Ari Mendiola MD   Cyanocobalamin (Energy B12) 500 MCG CHEW Chew 1 tablet in the morning. daily  Indications: Inadequate Vitamin B12    Historical Provider, MD   denosumab (Prolia) 60 mg/mL Inject 1 mL (60 mg total) under the skin once for 1 dose  Patient taking differently: Inject 60 mg under the skin once Next dose 6/2023 8/31/21 7/26/24  Ari Mendiola MD   diltiazem (CARDIZEM) 30 mg tablet TAKE 1 TABLET(30 MG) BY MOUTH THREE TIMES DAILY 9/17/24   Garrett Agarwal MD   formoterol (Perforomist) 20 MCG/2ML nebulizer solution Take 2 mL (20 mcg total) by nebulization 2 (two) times a day icd 10 code J44.9 2/16/24   Ari Mendiola MD   guaiFENesin (MUCINEX) 600 mg 12 hr tablet TAKE 2 TABLET BY MOUTH EVERY 12 HOURS 6/9/24   Ari Mendiola MD   ipratropium-albuterol (DUO-NEB) 0.5-2.5 mg/3 mL nebulizer solution Take 3 mL by nebulization every 6 (six) hours as needed for wheezing or shortness of breath ICD 10 J44.9 4/24/24   Bora Moreno MD   levocetirizine (XYZAL) 5 MG tablet TAKE 1 TABLET(5 MG) BY MOUTH EVERY EVENING 5/27/24   Ari Mendiola MD   montelukast (SINGULAIR) 10 mg tablet TAKE 1 TABLET(10 MG) BY MOUTH DAILY AT BEDTIME 5/27/24   Ari Mendiola MD   Multiple Vitamins-Minerals (AIRBORNE GUMMIES PO) Take 1 tablet by mouth every morning    Historical Provider, MD   Nebulizers (Vios LC Sprint) MISC Use as directed 12/14/23   Ari Mendiola MD   Omega-3 Fatty Acids (fish oil) 1,000 mg Take 1,000 mg by mouth every morning    Historical Provider, MD   omega-3-acid ethyl esters (LOVAZA) 1 g capsule Take 2 capsules (2 g total) by mouth 2 (two) times a day 7/16/24   Garrett Agarwal MD   oxygen gas Inhale 4 L/min continuous. VIA NC  Indications: Hypoxia    Historical Provider, MD   rivaroxaban (Xarelto) 20 mg tablet Take 1 tablet (20 mg total) by mouth daily with breakfast 7/22/24   Garrett Agarwal, MD   rosuvastatin (CRESTOR) 20 MG tablet TAKE 1 TABLET(20  MG) BY MOUTH DAILY 5/27/24   Ari Mendiola MD   timolol (TIMOPTIC) 0.5 % ophthalmic solution Administer 1 drop to both eyes 2 (two) times a day 6/24/20   Historical Provider, MD   torsemide (DEMADEX) 10 mg tablet Take 1 tablet (10 mg total) by mouth daily 6/24/24   Ari Mendiola MD   traZODone (DESYREL) 100 mg tablet Take 1 tablet (100 mg total) by mouth daily at bedtime as needed for sleep 8/11/24   Ari Mendiola MD   VITAMIN D PO Take 25 mcg by mouth in the morning daily    Historical Provider, MD     Allergies   Allergen Reactions    Baclofen Nausea Only and Dizziness    Lisinopril Nausea Only and Dizziness    Losartan Nausea Only and Dizziness    Naproxen Nausea Only and Dizziness    Zinc Acetate Nausea Only and Dizziness       Objective :  Temp:  [98.2 °F (36.8 °C)-98.7 °F (37.1 °C)] 98.7 °F (37.1 °C)  HR:  [76-96] 77  BP: (161-171)/(65-83) 171/65  Resp:  [20-26] 20  SpO2:  [85 %-98 %] 90 %  O2 Device: Mid flow nasal cannula  Nasal Cannula O2 Flow Rate (L/min):  [4 L/min-15 L/min] 12 L/min    Physical Exam  Vitals and nursing note reviewed.   Constitutional:       General: She is not in acute distress.     Appearance: She is well-developed.      Comments: Resting comfortably in bed, very talkative   Cardiovascular:      Rate and Rhythm: Normal rate and regular rhythm.   Pulmonary:      Effort: Pulmonary effort is normal. No respiratory distress.      Breath sounds: Decreased breath sounds (bases bilaterally) present.      Comments: Saturating 90% on 10 L mid flow  Bilateral crackles at bases  Abdominal:      Palpations: Abdomen is soft.      Tenderness: There is no abdominal tenderness.   Musculoskeletal:         General: No swelling.   Skin:     General: Skin is warm and dry.   Neurological:      Mental Status: She is alert.   Psychiatric:         Mood and Affect: Mood normal.         Lines/Drains:  Lines/Drains/Airways       Active Status       Name Placement date Placement time Site Days    External Urinary  Catheter 10/16/24  1228  -- less than 1                          Lab Results: I have reviewed the following results:  Results from last 7 days   Lab Units 10/16/24  1101   WBC Thousand/uL 4.39   HEMOGLOBIN g/dL 13.9   HEMATOCRIT % 44.7   PLATELETS Thousands/uL 182   SEGS PCT % 75   LYMPHO PCT % 14   MONO PCT % 6   EOS PCT % 3     Results from last 7 days   Lab Units 10/16/24  1101   SODIUM mmol/L 139   POTASSIUM mmol/L 3.8   CHLORIDE mmol/L 98   CO2 mmol/L 35*   BUN mg/dL 16   CREATININE mg/dL 0.69   ANION GAP mmol/L 6   CALCIUM mg/dL 8.8   ALBUMIN g/dL 3.8   TOTAL BILIRUBIN mg/dL 0.53   ALK PHOS U/L 47   ALT U/L 6*   AST U/L 8   GLUCOSE RANDOM mg/dL 153*             Lab Results   Component Value Date    HGBA1C 5.7 (H) 05/17/2024    HGBA1C 5.7 (H) 09/06/2023    HGBA1C 5.3 02/08/2023     Results from last 7 days   Lab Units 10/16/24  1101   PROCALCITONIN ng/ml <0.05       Imaging Results Review: I reviewed radiology reports from this admission including: chest xray.  Other Study Results Review: EKG was reviewed.     Administrative Statements     ** Please Note: This note has been constructed using a voice recognition system. **

## 2024-10-16 NOTE — ASSESSMENT & PLAN NOTE
Wt Readings from Last 3 Encounters:   09/16/24 74.8 kg (165 lb)   07/16/24 77.1 kg (170 lb)   07/02/24 77.9 kg (171 lb 12.8 oz)     ECHO 5/2024: EF 70%, systolic function is vigorous, diastolic function is moderately abnormal, consistent with grade II relaxation  CXR: Findings most suggestive of CHF with basilar edema and pleural effusions.    Home regimen includes torsemide 10 mg daily  Received 60 mg IV lasix in ED  Start IV lasix 40 mg bid  Monitor Is/Os and daily weights  Low sodium diet and fluid restriction

## 2024-10-17 LAB
ANION GAP SERPL CALCULATED.3IONS-SCNC: 8 MMOL/L (ref 4–13)
B PARAP IS1001 DNA NPH QL NAA+NON-PROBE: NOT DETECTED
B PERT.PT PRMT NPH QL NAA+NON-PROBE: NOT DETECTED
BUN SERPL-MCNC: 16 MG/DL (ref 5–25)
C PNEUM DNA NPH QL NAA+NON-PROBE: NOT DETECTED
CALCIUM SERPL-MCNC: 8.6 MG/DL (ref 8.4–10.2)
CHLORIDE SERPL-SCNC: 98 MMOL/L (ref 96–108)
CO2 SERPL-SCNC: 33 MMOL/L (ref 21–32)
CREAT SERPL-MCNC: 0.62 MG/DL (ref 0.6–1.3)
ERYTHROCYTE [DISTWIDTH] IN BLOOD BY AUTOMATED COUNT: 15.3 % (ref 11.6–15.1)
FLUAV RNA NPH QL NAA+NON-PROBE: NOT DETECTED
FLUBV RNA NPH QL NAA+NON-PROBE: NOT DETECTED
GLUCOSE P FAST SERPL-MCNC: 137 MG/DL (ref 65–99)
GLUCOSE SERPL-MCNC: 137 MG/DL (ref 65–140)
HADV DNA NPH QL NAA+NON-PROBE: NOT DETECTED
HCOV 229E RNA NPH QL NAA+NON-PROBE: NOT DETECTED
HCOV HKU1 RNA NPH QL NAA+NON-PROBE: NOT DETECTED
HCOV NL63 RNA NPH QL NAA+NON-PROBE: NOT DETECTED
HCOV OC43 RNA NPH QL NAA+NON-PROBE: NOT DETECTED
HCT VFR BLD AUTO: 39 % (ref 36.5–46.1)
HGB BLD-MCNC: 12.1 G/DL (ref 12–15.4)
HMPV RNA NPH QL NAA+NON-PROBE: NOT DETECTED
HPIV1 RNA NPH QL NAA+NON-PROBE: NOT DETECTED
HPIV2 RNA NPH QL NAA+NON-PROBE: NOT DETECTED
HPIV3 RNA NPH QL NAA+NON-PROBE: NOT DETECTED
HPIV4 RNA NPH QL NAA+NON-PROBE: NOT DETECTED
M PNEUMO DNA NPH QL NAA+NON-PROBE: NOT DETECTED
MAGNESIUM SERPL-MCNC: 1.9 MG/DL (ref 1.9–2.7)
MCH RBC QN AUTO: 33 PG (ref 26.8–34.3)
MCHC RBC AUTO-ENTMCNC: 31 G/DL (ref 31.4–37.4)
MCV RBC AUTO: 106 FL (ref 82–98)
PLATELET # BLD AUTO: 154 THOUSANDS/UL (ref 149–390)
PMV BLD AUTO: 8.9 FL (ref 8.9–12.7)
POTASSIUM SERPL-SCNC: 3.9 MMOL/L (ref 3.5–5.3)
PROCALCITONIN SERPL-MCNC: <0.05 NG/ML
RBC # BLD AUTO: 3.67 MILLION/UL (ref 3.88–5.12)
RSV RNA NPH QL NAA+NON-PROBE: NOT DETECTED
RV+EV RNA NPH QL NAA+NON-PROBE: NOT DETECTED
SARS-COV-2 RNA NPH QL NAA+NON-PROBE: NOT DETECTED
SODIUM SERPL-SCNC: 139 MMOL/L (ref 135–147)
WBC # BLD AUTO: 3.77 THOUSAND/UL (ref 4.31–10.16)

## 2024-10-17 PROCEDURE — 85027 COMPLETE CBC AUTOMATED: CPT

## 2024-10-17 PROCEDURE — 80048 BASIC METABOLIC PNL TOTAL CA: CPT

## 2024-10-17 PROCEDURE — 94640 AIRWAY INHALATION TREATMENT: CPT

## 2024-10-17 PROCEDURE — 94660 CPAP INITIATION&MGMT: CPT

## 2024-10-17 PROCEDURE — 83735 ASSAY OF MAGNESIUM: CPT

## 2024-10-17 PROCEDURE — 97163 PT EVAL HIGH COMPLEX 45 MIN: CPT

## 2024-10-17 PROCEDURE — 97167 OT EVAL HIGH COMPLEX 60 MIN: CPT

## 2024-10-17 PROCEDURE — 97535 SELF CARE MNGMENT TRAINING: CPT

## 2024-10-17 PROCEDURE — 84145 PROCALCITONIN (PCT): CPT

## 2024-10-17 PROCEDURE — 97110 THERAPEUTIC EXERCISES: CPT

## 2024-10-17 PROCEDURE — 94760 N-INVAS EAR/PLS OXIMETRY 1: CPT

## 2024-10-17 PROCEDURE — 99232 SBSQ HOSP IP/OBS MODERATE 35: CPT

## 2024-10-17 RX ADMIN — FUROSEMIDE 40 MG: 10 INJECTION, SOLUTION INTRAMUSCULAR; INTRAVENOUS at 08:18

## 2024-10-17 RX ADMIN — IPRATROPIUM BROMIDE AND ALBUTEROL SULFATE 3 ML: 2.5; .5 SOLUTION RESPIRATORY (INHALATION) at 13:03

## 2024-10-17 RX ADMIN — TRAZODONE HYDROCHLORIDE 100 MG: 100 TABLET ORAL at 22:15

## 2024-10-17 RX ADMIN — BUDESONIDE 0.5 MG: 0.5 INHALANT RESPIRATORY (INHALATION) at 19:53

## 2024-10-17 RX ADMIN — CITALOPRAM HYDROBROMIDE 20 MG: 10 TABLET ORAL at 08:18

## 2024-10-17 RX ADMIN — RIVAROXABAN 20 MG: 20 TABLET, FILM COATED ORAL at 08:18

## 2024-10-17 RX ADMIN — MONTELUKAST 10 MG: 10 TABLET, FILM COATED ORAL at 22:15

## 2024-10-17 RX ADMIN — FORMOTEROL FUMARATE DIHYDRATE 20 MCG: 20 SOLUTION RESPIRATORY (INHALATION) at 07:15

## 2024-10-17 RX ADMIN — FORMOTEROL FUMARATE DIHYDRATE 20 MCG: 20 SOLUTION RESPIRATORY (INHALATION) at 19:53

## 2024-10-17 RX ADMIN — CLOPIDOGREL 75 MG: 75 TABLET ORAL at 08:18

## 2024-10-17 RX ADMIN — DILTIAZEM HYDROCHLORIDE 30 MG: 30 TABLET ORAL at 14:24

## 2024-10-17 RX ADMIN — DILTIAZEM HYDROCHLORIDE 30 MG: 30 TABLET ORAL at 22:15

## 2024-10-17 RX ADMIN — TIMOLOL MALEATE 1 DROP: 5 SOLUTION/ DROPS OPHTHALMIC at 17:47

## 2024-10-17 RX ADMIN — IPRATROPIUM BROMIDE AND ALBUTEROL SULFATE 3 ML: 2.5; .5 SOLUTION RESPIRATORY (INHALATION) at 02:53

## 2024-10-17 RX ADMIN — BUDESONIDE 0.5 MG: 0.5 INHALANT RESPIRATORY (INHALATION) at 07:14

## 2024-10-17 RX ADMIN — TIMOLOL MALEATE 1 DROP: 5 SOLUTION/ DROPS OPHTHALMIC at 08:18

## 2024-10-17 RX ADMIN — OMEGA-3 FATTY ACIDS CAP 1000 MG 1000 MG: 1000 CAP at 08:18

## 2024-10-17 RX ADMIN — DILTIAZEM HYDROCHLORIDE 30 MG: 30 TABLET ORAL at 06:16

## 2024-10-17 RX ADMIN — GUAIFENESIN 600 MG: 600 TABLET, EXTENDED RELEASE ORAL at 22:15

## 2024-10-17 RX ADMIN — IPRATROPIUM BROMIDE AND ALBUTEROL SULFATE 3 ML: 2.5; .5 SOLUTION RESPIRATORY (INHALATION) at 19:53

## 2024-10-17 RX ADMIN — GUAIFENESIN 600 MG: 600 TABLET, EXTENDED RELEASE ORAL at 08:18

## 2024-10-17 RX ADMIN — IPRATROPIUM BROMIDE AND ALBUTEROL SULFATE 3 ML: 2.5; .5 SOLUTION RESPIRATORY (INHALATION) at 07:14

## 2024-10-17 RX ADMIN — CEFTRIAXONE 1000 MG: 1 INJECTION, SOLUTION INTRAVENOUS at 15:14

## 2024-10-17 RX ADMIN — FUROSEMIDE 40 MG: 10 INJECTION, SOLUTION INTRAMUSCULAR; INTRAVENOUS at 17:48

## 2024-10-17 RX ADMIN — ATORVASTATIN CALCIUM 40 MG: 40 TABLET, FILM COATED ORAL at 17:46

## 2024-10-17 NOTE — PROGRESS NOTES
Progress Note - Hospitalist   Name: Eryn Morocho 84 y.o. adult I MRN: [id number]  Unit/Bed#: [address] [place], [place] [address] I Date of Admission: 10/16/2024   Date of Service: 10/17/2024 I Hospital Day: 0    Assessment & Plan  Acute on chronic respiratory failure (HCC)  Patient with history of diastolic CHF, severe COPD, history of NSCLC s/p wedge resection/chemo, chronic respiratory failure on 4-6 L nc presented to ED with progressive shortness of breath for past week with productive cough.  CXR: Findings most suggestive of CHF with basilar edema and pleural effusions. Could not entirely exclude pneumonia.  ABG 7.3/56/78/93 on 9 L  , higher than previous elevated levels  Procalcitonin negative x 2  Afebrile, no leukocytosis  Suspect in setting of acute CHF  Continue IV lasix as below  Continue scheduled duonebs  Continue ceftriaxone for now  Currently on 10 L mid flow, wean as tolerated  Acute on chronic diastolic congestive heart failure (HCC)  Wt Readings from Last 3 Encounters:   10/17/24 81.5 kg (179 lb 11.2 oz)   09/16/24 74.8 kg (165 lb)   07/16/24 77.1 kg (170 lb)     ECHO 5/2024: EF 70%, systolic function is vigorous, diastolic function is moderately abnormal, consistent with grade II relaxation  CXR: Findings most suggestive of CHF with basilar edema and pleural effusions.  Weight noted to be 175 lbs was 165 lbs one month ago    Home regimen includes torsemide 10 mg daily  Received 60 mg IV lasix in ED  Start IV lasix 40 mg bid  Monitor Is/Os and daily weights  Low sodium diet and fluid restriction  COPD (chronic obstructive pulmonary disease) (HCC)  History of severe COPD chronically on 4-6 L nc  Do not suspect acute exacerbation  Continue scheduled duonebs, performist, Singulair  Procalcitonin negative x 2  Urinary antigens negative  RP2 panel negative  Sputum culture pending  Continue rocephin for now  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)  History of non-small cell lung cancer s/p left upper lobe wedge  resection and chemotherapy  CT chest in June 2024 showed enlargement of right lower lobe nodule highly suspicious for second primary lung neoplasm  Has outpatient PET scan ordered, but per PCP documentation is not recommended due to age and slow growth rate  JULIO (obstructive sleep apnea)  continue BiPAP hs  Patient reports compliance at home  Hyperlipidemia  Continue statin  Recurrent major depressive disorder, in partial remission (HCC)  Continue Celexa  PAD (peripheral artery disease) (HCC)  Continue plavix and statin  Paroxysmal atrial fibrillation (HCC)  Continue Cardizem 30 mg tid  Continue Xarelto  EKG shows NSR  Stage 3a chronic kidney disease (HCC)  Lab Results   Component Value Date    EGFR 80 06/19/2024    EGFR 81 06/18/2024    EGFR 81 06/17/2024    CREATININE 0.62 10/17/2024    CREATININE 0.69 10/16/2024    CREATININE 0.68 06/19/2024   Cr currently stable at baseline  Daily bmp    VTE Pharmacologic Prophylaxis: VTE Score: 7 High Risk (Score >/= 5) - Pharmacological DVT Prophylaxis Ordered: rivaroxaban (Xarelto). Sequential Compression Devices Ordered.    Mobility:   Basic Mobility Inpatient Raw Score: 12  JH-HLM Goal: 4: Move to chair/commode  JH-HLM Achieved: 2: Bed activities/Dependent transfer  JH-HLM Goal NOT achieved. Continue with multidisciplinary rounding and encourage appropriate mobility to improve upon JH-HLM goals.    Patient Centered Rounds: I performed bedside rounds with nursing staff today.  Discussions with Specialists or Other Care Team Provider: rnemilie    Education and Discussions with Family / Patient: Updated  (daughter) at bedside.    Current Length of Stay: 0 day(s)  Current Patient Status: Observation  Certification Statement: The patient will continue to require additional inpatient hospital stay due to acute CHF, acute respiratory failure  Discharge Plan: Anticipate discharge in 48-72 hrs to discharge location to be determined pending rehab evaluations.    Code  Status: Level 1 - Full Code    Subjective   Patient reports feeling much better this morning, stating she feels like she could go home. She denies further shortness of breath and states she is not coughing as much. She reports she has been urinating a lot. Urine output documented at 350 though patient reported she urinated a lot in the ED which was not documented and has had episodes if incontinence as well that was not documented.     Objective :  Temp:  [97.7 °F (36.5 °C)-98.9 °F (37.2 °C)] 98 °F (36.7 °C)  HR:  [58-96] 69  BP: (138-171)/(49-83) 145/52  Resp:  [18-26] 19  SpO2:  [85 %-98 %] 93 %  O2 Device: BiPAP  Nasal Cannula O2 Flow Rate (L/min):  [4 L/min-15 L/min] 9 L/min  FiO2 (%):  [50] 50    Body mass index is 32.87 kg/m².     Input and Output Summary (last 24 hours):     Intake/Output Summary (Last 24 hours) at 10/17/2024 0928  Last data filed at 10/16/2024 2201  Gross per 24 hour   Intake 500 ml   Output 350 ml   Net 150 ml       Physical Exam  Vitals and nursing note reviewed.   Constitutional:       General: She is not in acute distress.     Appearance: She is well-developed.   Cardiovascular:      Rate and Rhythm: Normal rate and regular rhythm.   Pulmonary:      Effort: Pulmonary effort is normal. No respiratory distress.      Breath sounds: Decreased breath sounds (bases bilaterally) and rales present.      Comments: Saturating 90% on 10 L mid flow  Bilateral crackles at bases  Abdominal:      Palpations: Abdomen is soft.      Tenderness: There is no abdominal tenderness.   Musculoskeletal:         General: No swelling.   Skin:     General: Skin is warm and dry.   Neurological:      Mental Status: She is alert.   Psychiatric:         Mood and Affect: Mood normal.         Lines/Drains:  Lines/Drains/Airways       Active Status       Name Placement date Placement time Site Days    External Urinary Catheter 10/16/24  1228  -- less than 1                      Telemetry:  Telemetry Orders (From  admission, onward)               24 Hour Telemetry Monitoring  Continuous x 24 Hours (Telem)        Expiring   Question:  Reason for 24 Hour Telemetry  Answer:  Decompensated CHF- and any one of the following: continuous diuretic infusion or total diuretic dose >200 mg daily, associated electrolyte derangement (I.e. K < 3.0), ionotropic drip (continuous infusion), hx of ventricular arrhythmia, or new EF < 35%                     Telemetry Reviewed: Normal Sinus Rhythm  Indication for Continued Telemetry Use: No indication for continued use. Will discontinue.                Lab Results: I have reviewed the following results:   Results from last 7 days   Lab Units 10/17/24  0612 10/16/24  1101   WBC Thousand/uL 3.77* 4.39   HEMOGLOBIN g/dL 12.1 13.9   HEMATOCRIT % 39.0 44.7   PLATELETS Thousands/uL 154 182   SEGS PCT %  --  75   LYMPHO PCT %  --  14   MONO PCT %  --  6   EOS PCT %  --  3     Results from last 7 days   Lab Units 10/17/24  0612 10/16/24  1101   SODIUM mmol/L 139 139   POTASSIUM mmol/L 3.9 3.8   CHLORIDE mmol/L 98 98   CO2 mmol/L 33* 35*   BUN mg/dL 16 16   CREATININE mg/dL 0.62 0.69   ANION GAP mmol/L 8 6   CALCIUM mg/dL 8.6 8.8   ALBUMIN g/dL  --  3.8   TOTAL BILIRUBIN mg/dL  --  0.53   ALK PHOS U/L  --  47   ALT U/L  --  6*   AST U/L  --  8   GLUCOSE RANDOM mg/dL 137 153*                 Results from last 7 days   Lab Units 10/17/24  0612 10/16/24  1101   PROCALCITONIN ng/ml <0.05 <0.05       Recent Cultures (last 7 days):   Results from last 7 days   Lab Units 10/16/24  1705   LEGIONELLA URINARY ANTIGEN  Negative       Imaging Results Review: I reviewed radiology reports from this admission including: chest xray.  Other Study Results Review: No additional pertinent studies reviewed.    Last 24 Hours Medication List:     Current Facility-Administered Medications:     acetaminophen (TYLENOL) tablet 650 mg, Q4H PRN    atorvastatin (LIPITOR) tablet 40 mg, Daily With Dinner    budesonide (PULMICORT)  inhalation solution 0.5 mg, BID    cefTRIAXone (ROCEPHIN) IVPB (premix in dextrose) 1,000 mg 50 mL, Q24H, Last Rate: 1,000 mg (10/16/24 1552)    citalopram (CeleXA) tablet 20 mg, Daily    clopidogrel (PLAVIX) tablet 75 mg, Daily    diltiazem (CARDIZEM) tablet 30 mg, Q8H ANNE    fish oil capsule 1,000 mg, Daily    formoterol (PERFOROMIST) nebulizer solution 20 mcg, BID    furosemide (LASIX) injection 40 mg, BID    guaiFENesin (MUCINEX) 12 hr tablet 600 mg, Q12H ANNE    ipratropium-albuterol (DUO-NEB) 0.5-2.5 mg/3 mL inhalation solution 3 mL, Q6H    montelukast (SINGULAIR) tablet 10 mg, HS    rivaroxaban (XARELTO) tablet 20 mg, Daily With Breakfast    timolol (TIMOPTIC) 0.5 % ophthalmic solution 1 drop, BID    traZODone (DESYREL) tablet 100 mg, HS PRN    Administrative Statements   Today, Patient Was Seen By: Britni Bush PA-C    **Please Note: This note may have been constructed using a voice recognition system.**

## 2024-10-17 NOTE — OCCUPATIONAL THERAPY NOTE
Occupational Therapy Evaluation/Treatment       10/17/24 1002   OT Last Visit   OT Visit Date 10/17/24   Note Type   Note type Evaluation   Pain Assessment   Pain Assessment Tool 0-10   Pain Score No Pain   Restrictions/Precautions   Other Precautions Chair Alarm;Bed Alarm;O2;Fall Risk;Pain   Home Living   Type of Home House   Home Layout One level;Performs ADLs on one level;Able to live on main level with bedroom/bathroom   Bathroom Shower/Tub Tub/shower unit   Bathroom Toilet Standard   Bathroom Equipment Grab bars in shower;Commode;Grab bars around toilet   Bathroom Accessibility Accessible   Home Equipment Walker;Cane  (pt reports ambulating with cane at baseline)   Prior Function   Level of [place] Independent with ADLs;Independent with functional mobility;Needs assistance with IADLS   Lives With Spouse;Daughter   Receives Help From Family  (receives help from daughter; pt reports spouse is non-ambulatory)   IADLs Independent with medication management;Family/Friend/Other provides transportation;Family/Friend/Other provides meals   Vocational Retired   General   Additional Pertinent History Presents with progressive shortness of breath, weakness, and productive cough.   Subjective   Subjective Pt agreeable to OT evaluation   ADL   Eating Assistance 7  Independent   Grooming Assistance 5  Supervision/Setup   UB Bathing Assistance 5  Supervision/Setup   LB Bathing Assistance 3  Moderate Assistance   UB Dressing Assistance 5  Supervision/Setup   LB Dressing Assistance 3  Moderate Assistance   Toileting Assistance  3  Moderate Assistance   Bed Mobility   Supine to Sit 5  Supervision   Additional items Verbal cues;Increased time required   Sit to Supine 5  Supervision   Additional items Verbal cues;Increased time required   Transfers   Sit to Stand 4  Minimal assistance   Additional items Assist x 1;Verbal cues;Increased time required   Stand to Sit 4  Minimal assistance   Additional items Assist x 1;Verbal  cues;Increased time required   Stand pivot 4  Minimal assistance   Additional items Assist x 1;Verbal cues;Increased time required   Toilet transfer 4  Minimal assistance   Additional items Assist x 1;Verbal cues;Commode;Increased time required   Functional Mobility   Functional Mobility 4  Minimal assistance   Additional Comments few steps along EOB with RW   Additional items Rolling walker   Balance   Static Sitting Fair +   Dynamic Sitting Fair   Static Standing Fair -   Dynamic Standing Poor +   Activity Tolerance   Activity Tolerance Patient limited by fatigue   Nurse Made Aware spoke with RN R[place]al   RUE Assessment   RUE Assessment   (at least 3+/5 MMT as seen during fxl assessment)   LUE Assessment   LUE Assessment   (at least 3+/5 MMT as seen during fxl assessment)   Cognition   Overall Cognitive Status WFL   Arousal/Participation Alert;Cooperative   Attention Within functional limits   Orientation Level Oriented X4   Memory Within functional limits   Following Commands Follows multistep commands with increased time or repetition   Assessment   Limitation Decreased ADL status;Decreased Safe judgement during ADL;Decreased endurance;Decreased self-care trans;Decreased high-level ADLs  (decreased balance and mobility)   Prognosis Fair   Assessment Patient evaluated by Occupational Therapy.  Patient admitted with Acute on chronic respiratory failure (HCC).  The patients occupational profile, medical and therapy history includes a extensive additional review of physical, cognitive, or psychosocial history related to current functional performance.  Comorbidities affecting functional mobility and ADLS include: acute on chronic diastolic CHF, COPD, non-small cell lung cancer s/p wedge resection/chemo, JULIO, HLD, recurrent major depressive disorder, PAD, CKD3, paroxysmal a-fib. Prior to admission, patient was independent with functional mobility with cane, independent with ADLS, and requiring assist for IADLS.  The  evaluation identifies the following performance deficits: weakness, impaired balance, decreased endurance, decreased coordination, increased fall risk, new onset of impairment of functional mobility, decreased ADLS, decreased IADLS, decreased activity tolerance, decreased safety awareness, and decreased strength, that result in activity limitations and/or participation restrictions. This evaluation requires clinical decision making of high complexity, because the patient presents with comorbidites that affect occupational performance and required significant modification of tasks or assistance with consideration of multiple treatment options.  The Barthel Index was used as a functional outcome tool presenting with a score of 40, indicating marked limitations of functional mobility and ADLS.  The patient's raw score on the AM-PAC Daily Activity Inpatient Short Form is 16. A raw score of less than 19 suggests the patient may benefit from discharge to post-acute rehabilitation services. Please refer to the recommendation of the Occupational Therapist for safe discharge planning.  Patient will benefit from skilled Occupational Therapy services to address above deficits and facilitate a safe return to prior level of function.   Goals   Patient Goals to get stronger and go home   STG Time Frame   (1-7 days)   Short Term Goal  Goals established to promote Patient Goals: to get stronger and go home: Grooming: independent seated; Bathing: min assist; Upper Body Dressing independent; Lower Body Dressing: min assist; Toileting: min assist; Patient will increase stand pivot commode transfer to supervision with rolling walker to increase performance and safety with ADLS and functional mobility; Patient will increase standing tolerance to 3 minutes during ADL task to decrease assistance level and decrease fall risk; Patient will increase bed mobility to independent in preparation for ADLS and transfers; Patient will increase  functional mobility to and from bathroom with rolling walker with min assist to increase performance with ADLS and to use a toilet; Patient will tolerate 5 minutes of UE ROM/strengthening to increase general activity tolerance and performance in ADLS/IADLS; Patient will improve functional activity tolerance to 5 minutes of sustained functional tasks to increase participation in basic self-care and decrease assistance level;  Patient will be able to to verbalize understanding and perform energy conservation/proper body mechanics during ADLS and functional mobility at least 75% of the time with minimal cueing to decrease signs of fatigue and increase stamina to return to prior level of function; Patient will increase dynamic sitting balance to fair+ to improve the ability to sit at edge of bed or on a chair for ADLS;  Patient will increase dynamic standing balance to fair- to improve postural stability and decrease fall risk during standing ADLS and transfers.   LTG Time Frame   (8-14 days)   Long Term Goal Bathing: supervision; Lower Body Dressing: supervision; Toileting: supervision; Patient will increase ambulatory standard toilet transfer to supervision with rolling walker to increase performance and safety with ADLS and functional mobility; Patient will increase standing tolerance to 6 minutes during ADL task to decrease assistance level and decrease fall risk; Patient will increase functional mobility to and from bathroom with rolling walker with supervision to increase performance with ADLS and to use a toilet; Patient will tolerate 10 minutes of UE ROM/strengthening to increase general activity tolerance and performance in ADLS/IADLS; Patient will improve functional activity tolerance to 10 minutes of sustained functional tasks to increase participation in basic self-care and decrease assistance level;  Patient will be able to to verbalize understanding and perform energy conservation/proper body mechanics  during ADLS and functional mobility at least 90% of the time with no cueing to decrease signs of fatigue and increase stamina to return to prior level of function; Patient will increase dynamic sitting balance to good to improve the ability to sit at edge of bed or on a chair for ADLS;  Patient will increase dynamic standing balance to fair to improve postural stability and decrease fall risk during standing ADLS and transfers.  Pt will score >/= 20/24 on AM-PAC Daily Activity Inpatient scale to promote safe independence with ADLs and functional mobility; Pt will score >/= 70/100 on Barthel Index in order to decrease caregiver assistance needed and increase ability to perform ADLs and functional mobility.   Plan   Treatment Interventions ADL retraining;Functional transfer training;UE strengthening/ROM;Endurance training;Patient/family training;Equipment evaluation/education;Activityengagement;Compensatory technique education;Continued evaluation   Goal Expiration Date 10/31/24   OT Frequency 3-5x/wk   Discharge Recommendation   Rehab Resource Intensity Level, OT II (Moderate Resource Intensity)   AM-PAC Daily Activity Inpatient   Lower Body Dressing 2   Bathing 2   Toileting 2   Upper Body Dressing 3   Grooming 3   Eating 4   Daily Activity Raw Score 16   Daily Activity Standardized Score (Calc for Raw Score >=11) 35.96   AM-PAC Applied Cognition Inpatient   Following a Speech/Presentation 4   Understanding Ordinary Conversation 4   Taking Medications 4   Remembering Where Things Are Placed or Put Away 4   Remembering List of 4-5 Errands 3   Taking Care of Complicated Tasks 3   Applied Cognition Raw Score 22   Applied Cognition Standardized Score 47.83   Barthel Index   Feeding 10   Bathing 0   Grooming Score 5   Dressing Score 5   Bladder Score 0   Bowels Score 5   Toilet Use Score 5   Transfers (Bed/Chair) Score 10   Mobility (Level Surface) Score 0   Stairs Score 0   Barthel Index Score 40   Additional Treatment  Session   Start Time 0950   End Time 1002   Treatment Assessment S: denies pain. O/A: Pt completed stand pivot transfer to [facility], voided but unable to move bowels. Completed frontal hygiene seated with set-up. Min A for sit<>stand with RW. During stand pivot pt with bilateral knees buckling, able to catch self with RW. Requested to return to bed at end of session. Pt is generally weak and deconditioned impacting fxl performance. P: Pt would benefit from cont OT services to maximize safety and fxl performance of ADLs. Recommend level II moderate resource intensity, pending medical optimization and pt progress.     Ofelia Garcia OTR/L   [place] License # [id number]  [place] License # [id number]

## 2024-10-17 NOTE — ASSESSMENT & PLAN NOTE
Lab Results   Component Value Date    EGFR 80 06/19/2024    EGFR 81 06/18/2024    EGFR 81 06/17/2024    CREATININE 0.62 10/17/2024    CREATININE 0.69 10/16/2024    CREATININE 0.68 06/19/2024   Cr currently stable at baseline  Daily bmp

## 2024-10-17 NOTE — ASSESSMENT & PLAN NOTE
History of severe COPD chronically on 4-6 L nc  Do not suspect acute exacerbation  Continue scheduled duonebs, performist, Singulair  Procalcitonin negative x 2  Urinary antigens negative  RP2 panel negative  Sputum culture pending  Continue rocephin for now

## 2024-10-17 NOTE — PHYSICAL THERAPY NOTE
PHYSICAL THERAPY EVALUATION/TREATMENT     10/17/24 1400   PT Last Visit   PT Visit Date 10/17/24   Note Type   Note type Evaluation   Pain Assessment   Pain Assessment Tool 0-10   Pain Score 6  (Chronic low back pain)   Restrictions/Precautions   Other Precautions Chair Alarm;Bed Alarm;Fall Risk;Pain;O2;Visual impairment;Hard of hearing   Home Living   Type of Home House   Home Layout One level;Stairs to enter without rails  (1 small step to enter)   Bathroom Equipment Shower chair;Commode   Home Equipment Walker;Cane;Wheelchair-manual   Additional Comments Patient states utilizing a cane prior to admission   Prior Function   Level of [place] Independent with ADLs;Independent with functional mobility;Needs assistance with IADLS   Lives With Daughter   Receives Help From Family   IADLs Family/Friend/Other provides transportation;Family/Friend/Other provides meals;Independent with medication management   Comments Patient states daughter assisting as needed typically she is independent with ADLs although recently requiring increased assist due to knee buckling and fear of falling   General   Additional Pertinent History Chart reviewed, patient admitted with acute on chronic respiratory failure.  Patient's with an extended history of oxygen dependency from home with family assist and able to function with short distance ambulation with a cane all prior to admission.   Family/Caregiver Present No   Cognition   Overall Cognitive Status WFL   Arousal/Participation Cooperative   Attention Within functional limits   Orientation Level Oriented X4   Following Commands Follows multistep commands with increased time or repetition   Comments Patient states that her daughter makes her exercise at home and do her breathing exercises as well   Subjective   Subjective Patient states wanting to go home, not feeling comfortable with short-term rehab   RLE Assessment   RLE Assessment   (Range of motion within functional  limits, strength 3+/5)   LLE Assessment   LLE Assessment   (Range of motion within functional limits, strength 3+/5)   Coordination   Movements are Fluid and Coordinated 0   Coordination and Movement Description Decreased coordination with transferring gait activity   Bed Mobility   Supine to Sit 5  Supervision   Additional items HOB elevated;Bedrails   Sit to Supine 5  Supervision   Transfers   Sit to Stand 4  Minimal assistance   Additional items Assist x 1   Stand to Sit 4  Minimal assistance   Additional items Assist x 1   Stand pivot 4  Minimal assistance   Additional items Assist x 1;Verbal cues  (Cueing for proper walker usage to prevent knee buckling)   Ambulation/Elevation   Gait Assistance   (Min to mod)   Additional items Assist x 1;Verbal cues;Tactile cues   Assistive Device Rolling walker   Distance 3-4 steps assist for walker guidance and cueing for setting to maintain knee extension.  Energy conservation also discussed during activity   Balance   Static Sitting Fair +   Dynamic Sitting Fair   Static Standing Fair -   Dynamic Standing Poor +   Ambulatory Poor +   Activity Tolerance   Activity Tolerance Patient limited by fatigue;Treatment limited secondary to medical complications (Comment)  (Limited by respiratory status)   Nurse Made Aware Yes   Assessment   Prognosis Good   Problem List Decreased range of motion;Decreased endurance;Decreased strength;Impaired balance;Decreased mobility;Decreased coordination;Pain;Impaired hearing;Impaired vision   Assessment Patient seen for Physical Therapy evaluation. Patient admitted with Acute on chronic respiratory failure (HCC).  Comorbidities affecting patient's physical performance include: acute on chronic respiratory failure. Personal factors affecting patient at time of initial evaluation include: ambulating with assistive device, inability to ambulate household distances, inability to navigate community distances, inability to navigate level surfaces  without external assistance, inability to perform dynamic tasks in community, limited home support, inability to perform physical activity, inability to perform ADLS, and inability to perform IADLS . Prior to admission, patient was independent with functional mobility with cane, independent with ADLS, requiring assist for IADLS, ambulating household distance, and home with family assist.  Please find objective findings from Physical Therapy assessment regarding body systems outlined above with impairments and limitations including weakness, decreased ROM, impaired balance, decreased endurance, impaired coordination, gait deviations, pain, decreased activity tolerance, decreased functional mobility tolerance, fall risk, and SOB upon exertion.  The Barthel Index was used as a functional outcome tool presenting with a score of Barthel Index Score: 40 today indicating marked limitations of functional mobility and ADLS.  Patient's clinical presentation is currently unstable/unpredictable as seen in patient's presentation of vital sign response, changing level of pain, increased fall risk, new onset of impairment of functional mobility, decreased endurance, and new onset of weakness. Pt would benefit from continued Physical Therapy treatment to address deficits as defined above and maximize level of functional mobility. As demonstrated by objective findings, the assigned level of complexity for this evaluation is high.The patient's [facility] Basic Mobility Inpatient Short Form Raw Score is 14. A Raw score of less than or equal to 16 suggests the patient may benefit from discharge to post-acute rehabilitation services. Please also refer to the recommendation of the Physical Therapist for safe discharge planning.   Goals   Patient Goals To go home and get back to her limited activity   STG Expiration Date 10/24/24   Short Term Goal #1 Transfers and gait with roller walker with supervision   Short Term Goal #2 Gait endurance to  25 feet   LTG Expiration Date 10/31/24   Long Term Goal #1 Strength bilateral lower extremities 4 -/4   Plan   Treatment/Interventions ADL retraining;Functional transfer training;LE strengthening/ROM;Therapeutic exercise;Endurance training;Patient/family training;Equipment eval/education;Bed mobility;Gait training;Compensatory technique education   PT Frequency Other (Comment)  (5 times per week)   Discharge Recommendation   Rehab Resource Intensity Level, PT II (Moderate Resource Intensity)   Additional Comments Patient states being resistance to going to short-term rehab would prefer to return home   AM-PAC Basic Mobility Inpatient   Turning in Flat Bed Without Bedrails 3   Lying on Back to Sitting on Edge of Flat Bed Without Bedrails 3   Moving Bed to Chair 2   Standing Up From Chair Using Arms 3   Walk in Room 2   Climb 3-5 Stairs With Railing 1   Basic Mobility Inpatient Raw Score 14   Basic Mobility Standardized Score 35.55   [facility] Highest Level Of Mobility   -[facility] Goal 4: Move to chair/commode   -HLM Achieved 4: Move to chair/commode   Barthel Index   Feeding 10   Bathing 0   Grooming Score 0   Dressing Score 5   Bladder Score 5   Bowels Score 5   Toilet Use Score 5   Transfers (Bed/Chair) Score 10   Mobility (Level Surface) Score 0   Stairs Score 0   Barthel Index Score 40   Additional Treatment Session   Start Time 1345   End Time 1400   Treatment Assessment S: Patient states breathing improving O: Bilateral lower extremity exercise completed as listed belowA: Patient will benefit from continued physical therapy with progression as tolerated and increasing functional mobility with clinical staff as well   Exercises   Quad Sets Supine;10 reps;Bilateral   Heelslides Supine;5 reps;Bilateral   Hip Abduction Supine;5 reps;Bilateral   Knee AROM Short Arc Quad Supine;5 reps;Bilateral   Ankle Pumps Supine;5 reps;Bilateral   Licensure   [place] License Number  Radha Armas PT [id number]

## 2024-10-17 NOTE — ASSESSMENT & PLAN NOTE
Wt Readings from Last 3 Encounters:   10/17/24 81.5 kg (179 lb 11.2 oz)   09/16/24 74.8 kg (165 lb)   07/16/24 77.1 kg (170 lb)     ECHO 5/2024: EF 70%, systolic function is vigorous, diastolic function is moderately abnormal, consistent with grade II relaxation  CXR: Findings most suggestive of CHF with basilar edema and pleural effusions.  Weight noted to be 175 lbs was 165 lbs one month ago    Home regimen includes torsemide 10 mg daily  Received 60 mg IV lasix in ED  Start IV lasix 40 mg bid  Monitor Is/Os and daily weights  Low sodium diet and fluid restriction

## 2024-10-17 NOTE — ASSESSMENT & PLAN NOTE
Patient with history of diastolic CHF, severe COPD, history of NSCLC s/p wedge resection/chemo, chronic respiratory failure on 4-6 L nc presented to ED with progressive shortness of breath for past week with productive cough.  CXR: Findings most suggestive of CHF with basilar edema and pleural effusions. Could not entirely exclude pneumonia.  ABG 7.3/56/78/93 on 9 L  , higher than previous elevated levels  Procalcitonin negative x 2  Afebrile, no leukocytosis  Suspect in setting of acute CHF  Continue IV lasix as below  Continue scheduled duonebs  Continue ceftriaxone for now  Currently on 10 L mid flow, wean as tolerated

## 2024-10-18 ENCOUNTER — HOME HEALTH ADMISSION (OUTPATIENT)
Dept: HOME HEALTH SERVICES | Facility: HOME HEALTHCARE | Age: 84
End: 2024-10-18
Payer: MEDICARE

## 2024-10-18 LAB
25(OH)D3 SERPL-MCNC: 42 NG/ML (ref 30–100)
ANION GAP SERPL CALCULATED.3IONS-SCNC: 5 MMOL/L (ref 4–13)
BACTERIA SPT RESP CULT: ABNORMAL
BUN SERPL-MCNC: 15 MG/DL (ref 5–25)
CALCIUM SERPL-MCNC: 8.6 MG/DL (ref 8.4–10.2)
CHLORIDE SERPL-SCNC: 97 MMOL/L (ref 96–108)
CO2 SERPL-SCNC: 36 MMOL/L (ref 21–32)
CREAT SERPL-MCNC: 0.66 MG/DL (ref 0.6–1.3)
ERYTHROCYTE [DISTWIDTH] IN BLOOD BY AUTOMATED COUNT: 14.9 % (ref 11.6–15.1)
FOLATE SERPL-MCNC: 14.8 NG/ML
GLUCOSE SERPL-MCNC: 134 MG/DL (ref 65–140)
GRAM STN SPEC: ABNORMAL
HCT VFR BLD AUTO: 38.6 % (ref 36.5–46.1)
HGB BLD-MCNC: 12.2 G/DL (ref 12–15.4)
MCH RBC QN AUTO: 33.2 PG (ref 26.8–34.3)
MCHC RBC AUTO-ENTMCNC: 31.6 G/DL (ref 31.4–37.4)
MCV RBC AUTO: 105 FL (ref 82–98)
PLATELET # BLD AUTO: 161 THOUSANDS/UL (ref 149–390)
PMV BLD AUTO: 9.5 FL (ref 8.9–12.7)
POTASSIUM SERPL-SCNC: 4.4 MMOL/L (ref 3.5–5.3)
RBC # BLD AUTO: 3.67 MILLION/UL (ref 3.88–5.12)
SODIUM SERPL-SCNC: 138 MMOL/L (ref 135–147)
VIT B12 SERPL-MCNC: 532 PG/ML (ref 180–914)
WBC # BLD AUTO: 4.23 THOUSAND/UL (ref 4.31–10.16)

## 2024-10-18 PROCEDURE — 80048 BASIC METABOLIC PNL TOTAL CA: CPT

## 2024-10-18 PROCEDURE — 94760 N-INVAS EAR/PLS OXIMETRY 1: CPT

## 2024-10-18 PROCEDURE — 94640 AIRWAY INHALATION TREATMENT: CPT

## 2024-10-18 PROCEDURE — 82306 VITAMIN D 25 HYDROXY: CPT | Performed by: NURSE PRACTITIONER

## 2024-10-18 PROCEDURE — 82607 VITAMIN B-12: CPT | Performed by: NURSE PRACTITIONER

## 2024-10-18 PROCEDURE — 94660 CPAP INITIATION&MGMT: CPT

## 2024-10-18 PROCEDURE — 99232 SBSQ HOSP IP/OBS MODERATE 35: CPT | Performed by: NURSE PRACTITIONER

## 2024-10-18 PROCEDURE — 85027 COMPLETE CBC AUTOMATED: CPT

## 2024-10-18 PROCEDURE — 97535 SELF CARE MNGMENT TRAINING: CPT

## 2024-10-18 PROCEDURE — 82746 ASSAY OF FOLIC ACID SERUM: CPT | Performed by: NURSE PRACTITIONER

## 2024-10-18 RX ADMIN — DILTIAZEM HYDROCHLORIDE 30 MG: 30 TABLET ORAL at 17:05

## 2024-10-18 RX ADMIN — IPRATROPIUM BROMIDE AND ALBUTEROL SULFATE 3 ML: 2.5; .5 SOLUTION RESPIRATORY (INHALATION) at 02:27

## 2024-10-18 RX ADMIN — IPRATROPIUM BROMIDE AND ALBUTEROL SULFATE 3 ML: 2.5; .5 SOLUTION RESPIRATORY (INHALATION) at 13:46

## 2024-10-18 RX ADMIN — DILTIAZEM HYDROCHLORIDE 30 MG: 30 TABLET ORAL at 06:22

## 2024-10-18 RX ADMIN — ATORVASTATIN CALCIUM 40 MG: 40 TABLET, FILM COATED ORAL at 17:05

## 2024-10-18 RX ADMIN — IPRATROPIUM BROMIDE AND ALBUTEROL SULFATE 3 ML: 2.5; .5 SOLUTION RESPIRATORY (INHALATION) at 19:56

## 2024-10-18 RX ADMIN — TIMOLOL MALEATE 1 DROP: 5 SOLUTION/ DROPS OPHTHALMIC at 09:15

## 2024-10-18 RX ADMIN — IPRATROPIUM BROMIDE AND ALBUTEROL SULFATE 3 ML: 2.5; .5 SOLUTION RESPIRATORY (INHALATION) at 07:18

## 2024-10-18 RX ADMIN — FUROSEMIDE 40 MG: 10 INJECTION, SOLUTION INTRAMUSCULAR; INTRAVENOUS at 09:15

## 2024-10-18 RX ADMIN — FORMOTEROL FUMARATE DIHYDRATE 20 MCG: 20 SOLUTION RESPIRATORY (INHALATION) at 19:56

## 2024-10-18 RX ADMIN — RIVAROXABAN 20 MG: 20 TABLET, FILM COATED ORAL at 09:15

## 2024-10-18 RX ADMIN — FORMOTEROL FUMARATE DIHYDRATE 20 MCG: 20 SOLUTION RESPIRATORY (INHALATION) at 08:01

## 2024-10-18 RX ADMIN — GUAIFENESIN 600 MG: 600 TABLET, EXTENDED RELEASE ORAL at 09:15

## 2024-10-18 RX ADMIN — FUROSEMIDE 40 MG: 10 INJECTION, SOLUTION INTRAMUSCULAR; INTRAVENOUS at 17:05

## 2024-10-18 RX ADMIN — CEFTRIAXONE 1000 MG: 1 INJECTION, SOLUTION INTRAVENOUS at 17:05

## 2024-10-18 RX ADMIN — CITALOPRAM HYDROBROMIDE 20 MG: 10 TABLET ORAL at 10:24

## 2024-10-18 RX ADMIN — DILTIAZEM HYDROCHLORIDE 30 MG: 30 TABLET ORAL at 22:40

## 2024-10-18 RX ADMIN — TRAZODONE HYDROCHLORIDE 100 MG: 100 TABLET ORAL at 22:39

## 2024-10-18 RX ADMIN — GUAIFENESIN 600 MG: 600 TABLET, EXTENDED RELEASE ORAL at 22:40

## 2024-10-18 RX ADMIN — CLOPIDOGREL 75 MG: 75 TABLET ORAL at 09:15

## 2024-10-18 RX ADMIN — TIMOLOL MALEATE 1 DROP: 5 SOLUTION/ DROPS OPHTHALMIC at 17:06

## 2024-10-18 RX ADMIN — BUDESONIDE 0.5 MG: 0.5 INHALANT RESPIRATORY (INHALATION) at 07:18

## 2024-10-18 RX ADMIN — BUDESONIDE 0.5 MG: 0.5 INHALANT RESPIRATORY (INHALATION) at 19:56

## 2024-10-18 RX ADMIN — OMEGA-3 FATTY ACIDS CAP 1000 MG 1000 MG: 1000 CAP at 09:15

## 2024-10-18 RX ADMIN — MONTELUKAST 10 MG: 10 TABLET, FILM COATED ORAL at 22:40

## 2024-10-18 NOTE — PROGRESS NOTES
Progress Note - Hospitalist   Name: Eryn Morocho 84 y.o. adult I MRN: [id number]  Unit/Bed#: [address] [place] [address] I Date of Admission: 10/16/2024   Date of Service: 10/18/2024 I Hospital Day: 1    Assessment & Plan  Acute on chronic respiratory failure (HCC)  Patient with history of diastolic CHF, severe COPD, history of NSCLC s/p wedge resection/chemo, chronic respiratory failure on 4-6 L nc presented to ED with progressive shortness of breath for past week with productive cough.  CXR: Findings most suggestive of CHF with basilar edema and pleural effusions. Could not entirely exclude pneumonia.  ABG 7.3/56/78/93 on 9 L  , higher than previous elevated levels  Procalcitonin negative x 2  Afebrile, no leukocytosis  Suspect in setting of acute CHF  Continue IV lasix as noted  Continue scheduled duonebs  Continue ceftriaxone for now  Currently on 9.5 L mid flow, wean as tolerated  Acute on chronic diastolic congestive heart failure (HCC)  Wt Readings from Last 3 Encounters:   10/18/24 73.6 kg (162 lb 4.8 oz)   09/16/24 74.8 kg (165 lb)   07/16/24 77.1 kg (170 lb)     ECHO 5/2024: EF 70%, systolic function is vigorous, diastolic function is moderately abnormal, consistent with grade II relaxation  CXR: Findings most suggestive of CHF with basilar edema and pleural effusions.  Weight noted to be 175 lbs was 165 lbs one month ago    Home regimen includes torsemide 10 mg daily  Received 60 mg IV lasix in ED  Continue IV lasix 40 mg bid  Monitor Is/Os and daily weights  Low sodium diet and fluid restriction  COPD (chronic obstructive pulmonary disease) (HCC)  History of severe COPD chronically on 4-6 L nc  Do not suspect acute exacerbation  Continue scheduled duonebs, performist, Singulair  Procalcitonin negative x 2  Urinary antigens negative  RP2 panel negative  Sputum culture negative  Continue rocephin for now  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)  History of non-small cell lung cancer s/p left upper lobe  wedge resection and chemotherapy  CT chest in June 2024 showed enlargement of right lower lobe nodule highly suspicious for second primary lung neoplasm  Has outpatient PET scan ordered, but per PCP documentation is not recommended due to age and slow growth rate  JULIO (obstructive sleep apnea)  continue BiPAP hs  Patient reports compliance at home  Hyperlipidemia  Continue statin  Heart healthy diet  Recurrent major depressive disorder, in partial remission (HCC)  Continue Celexa  Supportive care   PAD (peripheral artery disease) (HCC)  Continue plavix and statin  Paroxysmal atrial fibrillation (HCC)  Continue Cardizem 30 mg tid  Continue Xarelto  EKG shows NSR  Stage 3a chronic kidney disease (HCC)  Lab Results   Component Value Date    EGFR 80 06/19/2024    EGFR 81 06/18/2024    EGFR 81 06/17/2024    CREATININE 0.66 10/18/2024    CREATININE 0.62 10/17/2024    CREATININE 0.69 10/16/2024   Cr currently stable at baseline  Daily bmp    VTE Pharmacologic Prophylaxis: VTE Score: 7 High Risk (Score >/= 5) - Pharmacological DVT Prophylaxis Ordered: rivaroxaban (Xarelto). Sequential Compression Devices Ordered.    Mobility:   Basic Mobility Inpatient Raw Score: 14  JH-HLM Goal: 4: Move to chair/commode  JH-HLM Achieved: 4: Move to chair/commode  JH-HLM Goal achieved. Continue to encourage appropriate mobility.    Patient Centered Rounds: I performed bedside rounds with nursing staff today.   Discussions with Specialists or Other Care Team Provider: Multidisciplinary team     Education and Discussions with Family / Patient: Updated  (daughter) at bedside.    Current Length of Stay: 1 day(s)  Current Patient Status: Inpatient   Certification Statement: The patient will continue to require additional inpatient hospital stay due to IV abx, IV lasix, I and Os, repeat labs, wean O2 as tolerated   Discharge Plan: Anticipate discharge in 24-48 hrs to discharge location to be determined pending rehab  "evaluations.    Code Status: Level 1 - Full Code    Subjective   Patient seen sitting up in bed, winded. Just returned from bedside commode to bed. O2 sats noted to be in lower 80s, recouped to low 90s after about five minutes of breathing, no talking. Patient is asking if she can go home today; we discussed her O2 needs and diuretics; she verbalized understanding. \"But as soon as I am better, I am going home!\"     Objective :  Temp:  [97.8 °F (36.6 °C)-98.6 °F (37 °C)] 98.6 °F (37 °C)  HR:  [58-78] 69  BP: (129-159)/(53-66) 146/53  Resp:  [20] 20  SpO2:  [85 %-96 %] 88 %  O2 Device: Mid flow nasal cannula  Nasal Cannula O2 Flow Rate (L/min):  [9 L/min-10 L/min] 9 L/min  FiO2 (%):  [50] 50    Body mass index is 29.69 kg/m².     Input and Output Summary (last 24 hours):     Intake/Output Summary (Last 24 hours) at 10/18/2024 1117  Last data filed at 10/18/2024 1001  Gross per 24 hour   Intake 1180 ml   Output 1175 ml   Net 5 ml       Physical Exam  Vitals and nursing note reviewed.   Constitutional:       Appearance: She is ill-appearing.   HENT:      Head: Normocephalic.      Ears:      Comments: Hard of hearing      Nose: Nose normal.      Mouth/Throat:      Mouth: Mucous membranes are moist.   Eyes:      Extraocular Movements: Extraocular movements intact.      Conjunctiva/sclera: Conjunctivae normal.      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      Pulses: Normal pulses.      Heart sounds: Normal heart sounds.   Pulmonary:      Breath sounds: Rhonchi present.      Comments: Diminished; RAE   Abdominal:      General: Bowel sounds are normal. There is no distension.      Tenderness: There is no abdominal tenderness.   Genitourinary:     Comments: Voiding spontaneously   Musculoskeletal:      Comments: Generalized deconditioning    Skin:     General: Skin is warm and dry.      Capillary Refill: Capillary refill takes less than 2 seconds. "   Neurological:      General: No focal deficit present.      Mental Status: She is alert and oriented to person, place, and time.   Psychiatric:         Mood and Affect: Mood normal.         Behavior: Behavior normal.         Thought Content: Thought content normal.         Judgment: Judgment normal.           Lines/Drains:  Lines/Drains/Airways       Active Status       Name Placement date Placement time Site Days    External Urinary Catheter 10/16/24  1228  -- 1                            Lab Results: I have reviewed the following results:   Results from last 7 days   Lab Units 10/18/24  0529 10/17/24  0612 10/16/24  1101   WBC Thousand/uL 4.23*   < > 4.39   HEMOGLOBIN g/dL 12.2   < > 13.9   HEMATOCRIT % 38.6   < > 44.7   PLATELETS Thousands/uL 161   < > 182   SEGS PCT %  --   --  75   LYMPHO PCT %  --   --  14   MONO PCT %  --   --  6   EOS PCT %  --   --  3    < > = values in this interval not displayed.     Results from last 7 days   Lab Units 10/18/24  0529 10/17/24  0612 10/16/24  1101   SODIUM mmol/L 138   < > 139   POTASSIUM mmol/L 4.4   < > 3.8   CHLORIDE mmol/L 97   < > 98   CO2 mmol/L 36*   < > 35*   BUN mg/dL 15   < > 16   CREATININE mg/dL 0.66   < > 0.69   ANION GAP mmol/L 5   < > 6   CALCIUM mg/dL 8.6   < > 8.8   ALBUMIN g/dL  --   --  3.8   TOTAL BILIRUBIN mg/dL  --   --  0.53   ALK PHOS U/L  --   --  47   ALT U/L  --   --  6*   AST U/L  --   --  8   GLUCOSE RANDOM mg/dL 134   < > 153*    < > = values in this interval not displayed.                 Results from last 7 days   Lab Units 10/17/24  0612 10/16/24  1101   PROCALCITONIN ng/ml <0.05 <0.05       Recent Cultures (last 7 days):   Results from last 7 days   Lab Units 10/16/24  1746 10/16/24  1705   SPUTUM CULTURE  2+ Growth of  --    GRAM STAIN RESULT  1+ Epithelial cells per low power field*  Rare Gram variable rods*  No polys seen*  --    LEGIONELLA URINARY ANTIGEN   --  Negative       Imaging Results Review: I reviewed radiology reports  from this admission including: chest xray.  Other Study Results Review: No additional pertinent studies reviewed.    Last 24 Hours Medication List:     Current Facility-Administered Medications:     acetaminophen (TYLENOL) tablet 650 mg, Q4H PRN    atorvastatin (LIPITOR) tablet 40 mg, Daily With Dinner    budesonide (PULMICORT) inhalation solution 0.5 mg, BID    cefTRIAXone (ROCEPHIN) IVPB (premix in dextrose) 1,000 mg 50 mL, Q24H, Last Rate: Stopped (10/17/24 1618)    citalopram (CeleXA) tablet 20 mg, Daily    clopidogrel (PLAVIX) tablet 75 mg, Daily    diltiazem (CARDIZEM) tablet 30 mg, Q8H ANNE    fish oil capsule 1,000 mg, Daily    formoterol (PERFOROMIST) nebulizer solution 20 mcg, BID    furosemide (LASIX) injection 40 mg, BID    guaiFENesin (MUCINEX) 12 hr tablet 600 mg, Q12H ANNE    ipratropium-albuterol (DUO-NEB) 0.5-2.5 mg/3 mL inhalation solution 3 mL, Q6H    montelukast (SINGULAIR) tablet 10 mg, HS    rivaroxaban (XARELTO) tablet 20 mg, Daily With Breakfast    timolol (TIMOPTIC) 0.5 % ophthalmic solution 1 drop, BID    traZODone (DESYREL) tablet 100 mg, HS PRN    Administrative Statements   Today, Patient Was Seen By: PRANAY Martino  I have spent a total time of greater than 45 minutes in caring for this patient on the day of the visit/encounter including Diagnostic results, Counseling / Coordination of care, Documenting in the medical record, Reviewing / ordering tests, medicine, procedures  , and Communicating with other healthcare professionals .    **Please Note: This note may have been constructed using a voice recognition system.**

## 2024-10-18 NOTE — PLAN OF CARE
Problem: OCCUPATIONAL THERAPY ADULT  Goal: Performs self-care activities at highest level of function for planned discharge setting.  See evaluation for individualized goals.  Description: Treatment Interventions: ADL retraining, Functional transfer training, UE strengthening/ROM, Endurance training, Patient/family training, Equipment evaluation/education, Activityengagement, Compensatory technique education, Continued evaluation          See flowsheet documentation for full assessment, interventions and recommendations.   Outcome: Progressing  Note: Limitation: Decreased ADL status, Decreased Safe judgement during ADL, Decreased endurance, Decreased self-care trans, Decreased high-level ADLs (decreased balance and mobility)  Prognosis: Fair  Assessment: Pt seen for skilled OT tx session focusing on activity engagement, challenging activity tolerance, pt education. Pt agreeable and motivated to participate upon therapist arrival w/ call bell activated reporting she needs to use the bathroom. Pt required consistent physical assistance to complete bed mobility, sit <> stand. Pt completed toileting w/ min A seated on commode include personal hygiene while seated after set- up. Therapist educate pt on breathing / pacing. O2 sats dropped to mid 70's during toileting and required rest break, time to recover. From an OT perspective, eileennue to recommend level II rehab resource intensity when medically stable for discharge from acute care at this time when medically stable. Will continu to follow     Rehab Resource Intensity Level, OT: II (Moderate Resource Intensity)

## 2024-10-18 NOTE — ASSESSMENT & PLAN NOTE
Lab Results   Component Value Date    EGFR 80 06/19/2024    EGFR 81 06/18/2024    EGFR 81 06/17/2024    CREATININE 0.66 10/18/2024    CREATININE 0.62 10/17/2024    CREATININE 0.69 10/16/2024   Cr currently stable at baseline  Daily bmp

## 2024-10-18 NOTE — ASSESSMENT & PLAN NOTE
History of severe COPD chronically on 4-6 L nc  Do not suspect acute exacerbation  Continue scheduled duonebs, performist, Singulair  Procalcitonin negative x 2  Urinary antigens negative  RP2 panel negative  Sputum culture negative  Continue rocephin for now

## 2024-10-18 NOTE — CASE MANAGEMENT
Case Management Assessment & Discharge Planning Note    Patient name Eryn Morocho  Location 4 [place] 420/4 [place] 420-* MRN [id number]  : 1940 Date 10/18/2024       Current Admission Date: 10/16/2024  Current Admission Diagnosis:Acute on chronic respiratory failure (HCC)   Patient Active Problem List    Diagnosis Date Noted Date Diagnosed    Acute on chronic respiratory failure (HCC) 10/16/2024     Paroxysmal atrial fibrillation (HCC) 10/16/2024     Chronic respiratory failure with hypoxia and hypercapnia (HCC) 2024     JULIO and COPD overlap syndrome (HCC) 2024     Pulmonary emphysema, unspecified emphysema type (HCC) 2024     Multiple lung nodules 2022     Class 1 obesity due to excess calories with body mass index (BMI) of 34.0 to 34.9 in adult 2022     Recurrent falls 2022     Stage 3a chronic kidney disease (HCC) 2021     OAB (overactive bladder) 2021     Osteoporosis 2021     Acute on chronic diastolic congestive heart failure (HCC) 2021     PAD (peripheral artery disease) ([facility])      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) 2020     Recurrent major depressive disorder, in partial remission ([facility]) 2020     JULIO (obstructive sleep apnea)      COPD (chronic obstructive pulmonary disease) ([facility])      HTN (hypertension)      Hyperlipidemia        LOS (days): 1  Geometric Mean LOS (GMLOS) (days): 3.9  Days to GMLOS:3     OBJECTIVE:    Risk of Unplanned Readmission Score: 15.6         Current admission status: Inpatient       Preferred Pharmacy:   [id number] - [place] -  [address]   [place] [address]  Phone: [phone] Fax: [phone]    [facility]  [address] [address]  Phone: [phone]     Primary Care Provider: Ari Mendiola MD    Primary Insurance: MEDICARE  Secondary Insurance: AARP    ASSESSMENT:  Active Health Care Proxies    There are no active  Health Care Proxies on file.  Readmission Root Cause  30 Day Readmission: No    Patient Information  Admitted from:: Home  Mental Status: Alert  During Assessment patient was accompanied by: Daughter  Assessment information provided by:: Patient  Primary Caregiver: Family  Caregiver's Name:: Karen Alexander (dtr)  Caregiver's Relationship to Patient:: Family Member  Caregiver's Telephone Number:: [phone]  Support Systems: Spouse/significant other, Daughter  County of Residence: [place]  What city do you live in[place]  Home entry access options. Select all that apply.: Stairs  Number of steps to enter home.: 1  Type of Current Residence: [facility]  Living Arrangements: Lives w/ Spouse/significant other, Lives w/ Daughter    Activities of Daily Living Prior to Admission  Functional Status: Independent  Completes ADLs independently?: No  Level of ADL dependence: Assistance  Ambulates independently?: Yes  Does patient use assisted devices?: Yes  Assisted Devices (DME) used: Hospital Bed, Walker, Wheelchair, Straight Cane, Bedside Commode, BiPAP, Nebulizer, Portable Oxygen concentrator, Portable Oxygen tanks  DME Company Name (respiratory supplies): [employer]  O2 Rate(s): 4-6L  Does patient have a history of Outpatient Therapy (PT/OT)?: No  Does the patient have a history of Short-Term Rehab?: No  Does patient have a history of HHC?: Yes (Hx with [facility])  Does patient currently have HHC?: No    Current Home Health Care  Home Health Agency Name:: [facility]    Patient Information Continued  Income Source: Pension/nursing home  Does patient have prescription coverage?: Yes  Does patient receive dialysis treatments?: No     Means of Transportation  Means of Transport to Appts:: Family transport    Social Determinants of Health (SDOH)      Flowsheet Row Most Recent Value   Housing Stability    In the last 12 months, was there a time when you were not able to pay the mortgage or rent on time? N   In the past 12  months, how many times have you moved where you were living? 0   At any time in the past 12 months, were you homeless or living in a shelter (including now)? N   Transportation Needs    In the past 12 months, has lack of transportation kept you from medical appointments or from getting medications? no   In the past 12 months, has lack of transportation kept you from meetings, work, or from getting things needed for daily living? No   Food Insecurity    Within the past 12 months, you worried that your food would run out before you got the money to buy more. Never true   Within the past 12 months, the food you bought just didn't last and you didn't have money to get more. Never true   Utilities    In the past 12 months has the electric, gas, oil, or water company threatened to shut off services in your home? No          DISCHARGE DETAILS:    Discharge planning discussed with:: Patient, Dtr Karen  Freedom of Choice: Yes  Comments - Freedom of Choice: SW met bedside with patient and dtr to introduce role, complete assessment, and discuss discharge planning needs. Patient and dtr both verbalize understanding of recommendation by therapy for STR; however, both verbalize choice for patient to discharge home with referral to [facility] services. Patient/dtr state that they are only consenting to orders for PT/OT and do not want SN ordered. Patient/dtr state agency of choice is [facility]. Patient/dtr both deny having any other questions, concerns, or anticipated discharge needs that SW can assist with at this time.  CM contacted family/caregiver?: Yes  Were Treatment Team discharge recommendations reviewed with patient/caregiver?: Yes  Did patient/caregiver verbalize understanding of patient care needs?: Yes  Were patient/caregiver advised of the risks associated with not following Treatment Team discharge recommendations?: Yes    Contacts  Patient Contacts: Karen Alice (daughter)  Relationship to Patient:: Family  Contact  Method: In Person  Reason/Outcome: Emergency Contact, Discharge Planning, Continuity of Care    Requested Home Health Care         Is the patient interested in HHC at discharge?: Yes  Home Health Discipline requested:: Occupational Therapy, Physical Therapy  [facility] Health Agency [facility]  Home Health Follow-Up Provider:: NICHOLE  Home Health Services Needed:: Evaluate Functional Status and Safety, Gait/ADL Training, Strengthening/Theraputic Exercises to Improve Function  Homebound Criteria Met:: Uses an Assist Device (i.e. cane, walker, etc), Requires the Assistance of Another Person for Safe Ambulation or to Leave the Home  Supporting Clincal Findings:: Requires Oxygen, Limited Endurance, Fatigues Easliy in Short Distances, Dyspnea with Exertion     Other Referral/Resources/Interventions Provided:  Interventions: HHC  Referral Comments: [facility] Referral accepted. Reservation completed per patient choice. Contact information listed on AVS.     Treatment Team Recommendation: Short Term Rehab  Discharge Destination Plan:: Home with Home Health Care  Transport at Discharge : Family

## 2024-10-18 NOTE — OCCUPATIONAL THERAPY NOTE
Occupational Therapy Progress Note     Patient Name: Eryn Morocho  Today's Date: 10/18/2024  Problem List  Principal Problem:    Acute on chronic respiratory failure (HCC)  Active Problems:    JULIO (obstructive sleep apnea)    COPD (chronic obstructive pulmonary disease) (HCC)    Hyperlipidemia    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)    Recurrent major depressive disorder, in partial remission (HCC)    PAD (peripheral artery disease) (HCC)    Acute on chronic diastolic congestive heart failure (HCC)    Stage 3a chronic kidney disease (HCC)    Paroxysmal atrial fibrillation (HCC)       10/18/24 1531   OT Last Visit   OT Visit Date 10/18/24  (Friday)   Note Type   Note Type Treatment  (tx [id number])   Pain Assessment   Pain Assessment Tool 0-10   Pain Score No Pain   Restrictions/Precautions   Weight Bearing Precautions Per Order No   Other Precautions Chair Alarm;Bed Alarm;O2;Multiple lines  (Henrik, O2)   Lifestyle   Autonomy Pt reports supportive daughter is her caregiver at home   Reciprocal Relationships Pt reports raising 5 children   Service to Others 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   Intrinsic Gratification Will continue to assess   ADL   Where Assessed Edge of bed  (vs commode)   Eating Assistance 6  Modified independent   Eating Deficit Setup   Grooming Assistance 5  Supervision/Setup   Grooming Deficit Setup;Supervision/safety;Increased time to complete   Grooming Comments seated on commode w/ + time after set- up   Toileting Assistance  4  Minimal Assistance   Toileting Deficit Setup;Bedside commode;Increased time to complete;Supervison/safety   Toileting Comments voided seated on commode   Bed Mobility   Supine to Sit 5  Supervision   Additional items Assist x 1;HOB elevated;Increased time required  (to pt's R)   Sit to Supine 5  Supervision   Additional items Assist x 1;Increased time required;Bedrails   Additional Comments Pt declined OOB to chair. Pt reports she can  "breathe better in bed. Pt supine HOB elevated at end of session w/ needs met, call bell in reach and bed alarm activated.   Transfers   Sit to Stand 4  Minimal assistance   Additional items Assist x 1;Increased time required;Verbal cues   Stand to Sit 4  Minimal assistance   Additional items Assist x 1;Increased time required;Verbal cues   Toilet transfer 4  Minimal assistance   Additional items Assist x 1;Increased time required;Commode   Additional Comments Pt performed sit <> stand 2X w/ min A. O2 sats dropped to mid 70's during toileting. Educated pt on breathing / pacing. Required few minutes to recover   Functional Mobility   Functional Mobility 4  Minimal assistance   Additional Comments 2 feet using RW EOB <> commode   Additional items Rolling walker   Toilet Transfers   Toilet Transfer From Bed;Rolling walker   Toilet Transfer Type To and from   Toilet Transfer to Standard bedside commode   Toilet Transfer Technique Ambulating   Toilet Transfers Minimal assistance;Verbal cues   Subjective   Subjective \"I raised 5 children by myself so I had a lot of jobs; sometimes 2. I went where the money was\"   Cognition   Overall Cognitive Status WFL   Arousal/Participation Cooperative;Alert   Attention Attends with cues to redirect   Orientation Level Oriented to person;Oriented to place;Oriented to situation   Memory   (appears WFL)   Following Commands Follows multistep commands with increased time or repetition   Comments Identified pt by full name and birthdate   Activity Tolerance   Activity Tolerance Patient limited by fatigue  (+ WOB; decreased O2 sats)   Medical Staff Made Aware spoke w/ RN, Gen   Assessment   Assessment Pt seen for skilled OT tx session focusing on activity engagement, challenging activity tolerance, pt education. Pt agreeable and motivated to participate upon therapist arrival w/ call bell activated reporting she needs to use the bathroom. Pt required consistent physical assistance to " complete bed mobility, sit <> stand. Pt completed toileting w/ min A seated on commode include personal hygiene while seated after set- up. Therapist educate pt on breathing / pacing. O2 sats dropped to mid 70's during toileting and required rest break, time to recover. From an OT perspective, isabelle to recommend level II rehab resource intensity when medically stable for discharge from acute care at this time when medically stable. Will continu to follow   Plan   Treatment Interventions ADL retraining;Functional transfer training;Endurance training;Patient/family training;Equipment evaluation/education;Compensatory technique education;Continued evaluation;Energy conservation;Activityengagement   Goal Expiration Date 10/31/24   OT Treatment Day 1  (Friday 10/18/24)   OT Frequency 3-5x/wk   Discharge Recommendation   Rehab Resource Intensity Level, OT II (Moderate Resource Intensity)   AM-PAC Daily Activity Inpatient   Lower Body Dressing 2   Bathing 2   Toileting 2   Upper Body Dressing 3   Grooming 4   Eating 4   Daily Activity Raw Score 17   Daily Activity Standardized Score (Calc for Raw Score >=11) 37.26   AM-PAC Applied Cognition Inpatient   Following a Speech/Presentation 4   Understanding Ordinary Conversation 4   Taking Medications 4   Remembering Where Things Are Placed or Put Away 4   Remembering List of 4-5 Errands 4   Taking Care of Complicated Tasks 3   Applied Cognition Raw Score 23   Applied Cognition Standardized Score 53.08   Barthel Index   Feeding 10   Bathing 0   Grooming Score 5   Dressing Score 5   Bladder Score 5   Bowels Score 10   Toilet Use Score 5   Transfers (Bed/Chair) Score 10   Mobility (Level Surface) Score 0   Stairs Score 0   Barthel Index Score 50   End of Consult   Education Provided Yes   Patient Position at End of Consult Supine;Bed/Chair alarm activated;All needs within reach   Nurse Communication Nurse aware of consult   Licensure   [place] License Number  Arline Reyez,  OTR/L [id number]        The patient's raw score on the AM-PAC Daily Activity Inpatient Short Form is 17. A raw score of less than 19 suggests the patient may benefit from discharge to post-acute rehabilitation services. Please refer to the recommendation of the Occupational Therapist for safe discharge planning.    Arline Reyez, OTR/L  [id number]  [id number]

## 2024-10-18 NOTE — ASSESSMENT & PLAN NOTE
Wt Readings from Last 3 Encounters:   10/18/24 73.6 kg (162 lb 4.8 oz)   09/16/24 74.8 kg (165 lb)   07/16/24 77.1 kg (170 lb)     ECHO 5/2024: EF 70%, systolic function is vigorous, diastolic function is moderately abnormal, consistent with grade II relaxation  CXR: Findings most suggestive of CHF with basilar edema and pleural effusions.  Weight noted to be 175 lbs was 165 lbs one month ago    Home regimen includes torsemide 10 mg daily  Received 60 mg IV lasix in ED  Continue IV lasix 40 mg bid  Monitor Is/Os and daily weights  Low sodium diet and fluid restriction

## 2024-10-18 NOTE — PLAN OF CARE
Problem: RESPIRATORY - ADULT  Goal: Achieves optimal ventilation and oxygenation  Description: INTERVENTIONS:  - Assess for changes in respiratory status  - Assess for changes in mentation and behavior  - Position to facilitate oxygenation and minimize respiratory effort  - Oxygen administered by appropriate delivery if ordered  - Initiate smoking cessation education as indicated  - Encourage broncho-pulmonary hygiene including cough, deep breathe, Incentive Spirometry  - Assess the need for suctioning and aspirate as needed  - Assess and instruct to report SOB or any respiratory difficulty  - Respiratory Therapy support as indicated  Outcome: Progressing     Problem: PAIN - ADULT  Goal: Verbalizes/displays adequate comfort level or baseline comfort level  Description: Interventions:  - Encourage patient to monitor pain and request assistance  - Assess pain using appropriate pain scale  - Administer analgesics based on type and severity of pain and evaluate response  - Implement non-pharmacological measures as appropriate and evaluate response  - Consider cultural and social influences on pain and pain management  - Notify physician/advanced practitioner if interventions unsuccessful or patient reports new pain  Outcome: Progressing     Problem: INFECTION - ADULT  Goal: Absence or prevention of progression during hospitalization  Description: INTERVENTIONS:  - Assess and monitor for signs and symptoms of infection  - Monitor lab/diagnostic results  - Monitor all insertion sites, i.e. indwelling lines, tubes, and drains  - Monitor endotracheal if appropriate and nasal secretions for changes in amount and color  - [place] appropriate cooling/warming therapies per order  - Administer medications as ordered  - Instruct and encourage patient and family to use good hand hygiene technique  - Identify and instruct in appropriate isolation precautions for identified infection/condition  Outcome: Progressing  Goal:  Absence of fever/infection during neutropenic period  Description: INTERVENTIONS:  - Monitor WBC    Outcome: Progressing     Problem: SAFETY ADULT  Goal: Patient will remain free of falls  Description: INTERVENTIONS:  - Educate patient/family on patient safety including physical limitations  - Instruct patient to call for assistance with activity   - Consult OT/PT to assist with strengthening/mobility   - Keep Call bell within reach  - Keep bed low and locked with side rails adjusted as appropriate  - Keep care items and personal belongings within reach  - Initiate and maintain comfort rounds  - Make Fall Risk Sign visible to staff  - Offer Toileting every 2 Hours, in advance of need  - Initiate/Maintain bed alarm  - Obtain necessary fall risk management equipment: slipper socks  - Apply yellow socks and bracelet for high fall risk patients  - Consider moving patient to room near nurses station  Outcome: Progressing  Goal: Maintain or return to baseline ADL function  Description: INTERVENTIONS:  -  Assess patient's ability to carry out ADLs; assess patient's baseline for ADL function and identify physical deficits which impact ability to perform ADLs (bathing, care of mouth/teeth, toileting, grooming, dressing, etc.)  - Assess/evaluate cause of self-care deficits   - Assess range of motion  - Assess patient's mobility; develop plan if impaired  - Assess patient's need for assistive devices and provide as appropriate  - Encourage maximum independence but intervene and supervise when necessary  - Involve family in performance of ADLs  - Assess for home care needs following discharge   - Consider OT consult to assist with ADL evaluation and planning for discharge  - Provide patient education as appropriate  Outcome: Progressing  Goal: Maintains/Returns to pre admission functional level  Description: INTERVENTIONS:  - Perform AM-PAC 6 Click Basic Mobility/ Daily Activity assessment daily.  - Set and communicate daily  mobility goal to care team and patient/family/caregiver.   - Collaborate with rehabilitation services on mobility goals if consulted  - Perform Range of Motion 3 times a day.  - Reposition patient every 2 hours.  - Dangle patient 3 times a day  - Stand patient 3 times a day  - Ambulate patient 3 times a day  - Out of bed to chair 3 times a day   - Out of bed for meals 3 times a day  - Out of bed for toileting  - Record patient progress and toleration of activity level   Outcome: Progressing     Problem: DISCHARGE PLANNING  Goal: Discharge to home or other facility with appropriate resources  Description: INTERVENTIONS:  - Identify barriers to discharge w/patient and caregiver  - Arrange for needed discharge resources and transportation as appropriate  - Identify discharge learning needs (meds, wound care, etc.)  - Arrange for interpretive services to assist at discharge as needed  - Refer to Case Management Department for coordinating discharge planning if the patient needs post-hospital services based on physician/advanced practitioner order or complex needs related to functional status, cognitive ability, or social support system  Outcome: Progressing     Problem: Knowledge Deficit  Goal: Patient/family/caregiver demonstrates understanding of disease process, treatment plan, medications, and discharge instructions  Description: Complete learning assessment and assess knowledge base.  Interventions:  - Provide teaching at level of understanding  - Provide teaching via preferred learning methods  Outcome: Progressing     Problem: Nutrition/Hydration-ADULT  Goal: Nutrient/Hydration intake appropriate for improving, restoring or maintaining nutritional needs  Description: Monitor and assess patient's nutrition/hydration status for malnutrition. Collaborate with interdisciplinary team and initiate plan and interventions as ordered.  Monitor patient's weight and dietary intake as ordered or per policy. Utilize nutrition  screening tool and intervene as necessary. Determine patient's food preferences and provide high-protein, high-caloric foods as appropriate.     INTERVENTIONS:  - Monitor oral intake, urinary output, labs, and treatment plans  - Assess nutrition and hydration status and recommend course of action  - Evaluate amount of meals eaten  - Assist patient with eating if necessary   - Allow adequate time for meals  - Recommend/ encourage appropriate diets, oral nutritional supplements, and vitamin/mineral supplements  - Order, calculate, and assess calorie counts as needed  - Recommend, monitor, and adjust tube feedings and TPN/PPN based on assessed needs  - Assess need for intravenous fluids  - Provide specific nutrition/hydration education as appropriate  - Include patient/family/caregiver in decisions related to nutrition  Outcome: Progressing     Problem: Prexisting or High Potential for Compromised Skin Integrity  Goal: Skin integrity is maintained or improved  Description: INTERVENTIONS:  - Identify patients at risk for skin breakdown  - Assess and monitor skin integrity  - Assess and monitor nutrition and hydration status  - Monitor labs   - Assess for incontinence   - Turn and reposition patient  - Assist with mobility/ambulation  - Relieve pressure over bony prominences  - Avoid friction and shearing  - Provide appropriate hygiene as needed including keeping skin clean and dry  - Evaluate need for skin moisturizer/barrier cream  - Collaborate with interdisciplinary team   - Patient/family teaching  - Consider wound care consult   Outcome: Progressing

## 2024-10-18 NOTE — ASSESSMENT & PLAN NOTE
Patient with history of diastolic CHF, severe COPD, history of NSCLC s/p wedge resection/chemo, chronic respiratory failure on 4-6 L nc presented to ED with progressive shortness of breath for past week with productive cough.  CXR: Findings most suggestive of CHF with basilar edema and pleural effusions. Could not entirely exclude pneumonia.  ABG 7.3/56/78/93 on 9 L  , higher than previous elevated levels  Procalcitonin negative x 2  Afebrile, no leukocytosis  Suspect in setting of acute CHF  Continue IV lasix as noted  Continue scheduled duonebs  Continue ceftriaxone for now  Currently on 9.5 L mid flow, wean as tolerated

## 2024-10-19 LAB
ANION GAP SERPL CALCULATED.3IONS-SCNC: 5 MMOL/L (ref 4–13)
BUN SERPL-MCNC: 16 MG/DL (ref 5–25)
CALCIUM SERPL-MCNC: 8.6 MG/DL (ref 8.4–10.2)
CHLORIDE SERPL-SCNC: 96 MMOL/L (ref 96–108)
CO2 SERPL-SCNC: 37 MMOL/L (ref 21–32)
CREAT SERPL-MCNC: 0.63 MG/DL (ref 0.6–1.3)
ERYTHROCYTE [DISTWIDTH] IN BLOOD BY AUTOMATED COUNT: 14.9 % (ref 11.6–15.1)
GLUCOSE SERPL-MCNC: 141 MG/DL (ref 65–140)
HCT VFR BLD AUTO: 39.9 % (ref 36.5–46.1)
HGB BLD-MCNC: 12.7 G/DL (ref 12–15.4)
MCH RBC QN AUTO: 33.9 PG (ref 26.8–34.3)
MCHC RBC AUTO-ENTMCNC: 31.8 G/DL (ref 31.4–37.4)
MCV RBC AUTO: 106 FL (ref 82–98)
PLATELET # BLD AUTO: 198 THOUSANDS/UL (ref 149–390)
PMV BLD AUTO: 10.5 FL (ref 8.9–12.7)
POTASSIUM SERPL-SCNC: 3.7 MMOL/L (ref 3.5–5.3)
RBC # BLD AUTO: 3.75 MILLION/UL (ref 3.88–5.12)
SODIUM SERPL-SCNC: 138 MMOL/L (ref 135–147)
WBC # BLD AUTO: 3.57 THOUSAND/UL (ref 4.31–10.16)

## 2024-10-19 PROCEDURE — 99232 SBSQ HOSP IP/OBS MODERATE 35: CPT | Performed by: NURSE PRACTITIONER

## 2024-10-19 PROCEDURE — 80048 BASIC METABOLIC PNL TOTAL CA: CPT | Performed by: NURSE PRACTITIONER

## 2024-10-19 PROCEDURE — 94640 AIRWAY INHALATION TREATMENT: CPT

## 2024-10-19 PROCEDURE — 94760 N-INVAS EAR/PLS OXIMETRY 1: CPT

## 2024-10-19 PROCEDURE — 85027 COMPLETE CBC AUTOMATED: CPT | Performed by: NURSE PRACTITIONER

## 2024-10-19 PROCEDURE — 94660 CPAP INITIATION&MGMT: CPT

## 2024-10-19 RX ORDER — FUROSEMIDE 10 MG/ML
40 INJECTION INTRAMUSCULAR; INTRAVENOUS DAILY
Status: DISCONTINUED | OUTPATIENT
Start: 2024-10-20 | End: 2024-10-22

## 2024-10-19 RX ADMIN — BUDESONIDE 0.5 MG: 0.5 INHALANT RESPIRATORY (INHALATION) at 20:27

## 2024-10-19 RX ADMIN — MONTELUKAST 10 MG: 10 TABLET, FILM COATED ORAL at 21:04

## 2024-10-19 RX ADMIN — OMEGA-3 FATTY ACIDS CAP 1000 MG 1000 MG: 1000 CAP at 08:56

## 2024-10-19 RX ADMIN — TRAZODONE HYDROCHLORIDE 100 MG: 100 TABLET ORAL at 22:26

## 2024-10-19 RX ADMIN — DILTIAZEM HYDROCHLORIDE 30 MG: 30 TABLET ORAL at 21:05

## 2024-10-19 RX ADMIN — CLOPIDOGREL 75 MG: 75 TABLET ORAL at 08:56

## 2024-10-19 RX ADMIN — FORMOTEROL FUMARATE DIHYDRATE 20 MCG: 20 SOLUTION RESPIRATORY (INHALATION) at 08:40

## 2024-10-19 RX ADMIN — IPRATROPIUM BROMIDE AND ALBUTEROL SULFATE 3 ML: 2.5; .5 SOLUTION RESPIRATORY (INHALATION) at 20:27

## 2024-10-19 RX ADMIN — DILTIAZEM HYDROCHLORIDE 30 MG: 30 TABLET ORAL at 05:36

## 2024-10-19 RX ADMIN — TIMOLOL MALEATE 1 DROP: 5 SOLUTION/ DROPS OPHTHALMIC at 08:56

## 2024-10-19 RX ADMIN — ATORVASTATIN CALCIUM 40 MG: 40 TABLET, FILM COATED ORAL at 17:06

## 2024-10-19 RX ADMIN — TIMOLOL MALEATE 1 DROP: 5 SOLUTION/ DROPS OPHTHALMIC at 17:06

## 2024-10-19 RX ADMIN — RIVAROXABAN 20 MG: 20 TABLET, FILM COATED ORAL at 08:56

## 2024-10-19 RX ADMIN — IPRATROPIUM BROMIDE AND ALBUTEROL SULFATE 3 ML: 2.5; .5 SOLUTION RESPIRATORY (INHALATION) at 02:52

## 2024-10-19 RX ADMIN — CITALOPRAM HYDROBROMIDE 20 MG: 10 TABLET ORAL at 08:56

## 2024-10-19 RX ADMIN — DILTIAZEM HYDROCHLORIDE 30 MG: 30 TABLET ORAL at 13:42

## 2024-10-19 RX ADMIN — GUAIFENESIN 600 MG: 600 TABLET, EXTENDED RELEASE ORAL at 08:56

## 2024-10-19 RX ADMIN — IPRATROPIUM BROMIDE AND ALBUTEROL SULFATE 3 ML: 2.5; .5 SOLUTION RESPIRATORY (INHALATION) at 14:07

## 2024-10-19 RX ADMIN — IPRATROPIUM BROMIDE AND ALBUTEROL SULFATE 3 ML: 2.5; .5 SOLUTION RESPIRATORY (INHALATION) at 08:28

## 2024-10-19 RX ADMIN — FUROSEMIDE 40 MG: 10 INJECTION, SOLUTION INTRAMUSCULAR; INTRAVENOUS at 08:56

## 2024-10-19 RX ADMIN — GUAIFENESIN 600 MG: 600 TABLET, EXTENDED RELEASE ORAL at 21:04

## 2024-10-19 RX ADMIN — BUDESONIDE 0.5 MG: 0.5 INHALANT RESPIRATORY (INHALATION) at 08:28

## 2024-10-19 RX ADMIN — FORMOTEROL FUMARATE DIHYDRATE 20 MCG: 20 SOLUTION RESPIRATORY (INHALATION) at 20:27

## 2024-10-19 NOTE — ASSESSMENT & PLAN NOTE
History of severe COPD chronically on 4-6 L nc  Do not suspect acute exacerbation  Continue scheduled duonebs, performist, Singulair  Procalcitonin negative x 2  Urinary antigens negative  RP2 panel negative  Sputum culture negative  Patient received 3 doses of ceftriaxone, discontinue and monitor off antibiotics

## 2024-10-19 NOTE — PLAN OF CARE
Problem: RESPIRATORY - ADULT  Goal: Achieves optimal ventilation and oxygenation  Description: INTERVENTIONS:  - Assess for changes in respiratory status  - Assess for changes in mentation and behavior  - Position to facilitate oxygenation and minimize respiratory effort  - Oxygen administered by appropriate delivery if ordered  - Initiate smoking cessation education as indicated  - Encourage broncho-pulmonary hygiene including cough, deep breathe, Incentive Spirometry  - Assess the need for suctioning and aspirate as needed  - Assess and instruct to report SOB or any respiratory difficulty  - Respiratory Therapy support as indicated  Outcome: Progressing     Problem: PAIN - ADULT  Goal: Verbalizes/displays adequate comfort level or baseline comfort level  Description: Interventions:  - Encourage patient to monitor pain and request assistance  - Assess pain using appropriate pain scale  - Administer analgesics based on type and severity of pain and evaluate response  - Implement non-pharmacological measures as appropriate and evaluate response  - Consider cultural and social influences on pain and pain management  - Notify physician/advanced practitioner if interventions unsuccessful or patient reports new pain  Outcome: Progressing     Problem: INFECTION - ADULT  Goal: Absence or prevention of progression during hospitalization  Description: INTERVENTIONS:  - Assess and monitor for signs and symptoms of infection  - Monitor lab/diagnostic results  - Monitor all insertion sites, i.e. indwelling lines, tubes, and drains  - Monitor endotracheal if appropriate and nasal secretions for changes in amount and color  - [place] appropriate cooling/warming therapies per order  - Administer medications as ordered  - Instruct and encourage patient and family to use good hand hygiene technique  - Identify and instruct in appropriate isolation precautions for identified infection/condition  Outcome: Progressing  Goal:  Absence of fever/infection during neutropenic period  Description: INTERVENTIONS:  - Monitor WBC    Outcome: Progressing     Problem: SAFETY ADULT  Goal: Patient will remain free of falls  Description: INTERVENTIONS:  - Educate patient/family on patient safety including physical limitations  - Instruct patient to call for assistance with activity   - Consult OT/PT to assist with strengthening/mobility   - Keep Call bell within reach  - Keep bed low and locked with side rails adjusted as appropriate  - Keep care items and personal belongings within reach  - Initiate and maintain comfort rounds  - Make Fall Risk Sign visible to staff  - Offer Toileting every 2 Hours, in advance of need  - Initiate/Maintain  bed alarm  - Obtain necessary fall risk management equipment: socks   - Apply yellow socks and bracelet for high fall risk patients  - Consider moving patient to room near nurses station  Outcome: Progressing  Goal: Maintain or return to baseline ADL function  Description: INTERVENTIONS:  -  Assess patient's ability to carry out ADLs; assess patient's baseline for ADL function and identify physical deficits which impact ability to perform ADLs (bathing, care of mouth/teeth, toileting, grooming, dressing, etc.)  - Assess/evaluate cause of self-care deficits   - Assess range of motion  - Assess patient's mobility; develop plan if impaired  - Assess patient's need for assistive devices and provide as appropriate  - Encourage maximum independence but intervene and supervise when necessary  - Involve family in performance of ADLs  - Assess for home care needs following discharge   - Consider OT consult to assist with ADL evaluation and planning for discharge  - Provide patient education as appropriate  Outcome: Progressing  Goal: Maintains/Returns to pre admission functional level  Description: INTERVENTIONS:  - Perform AM-PAC 6 Click Basic Mobility/ Daily Activity assessment daily.  - Set and communicate daily mobility  goal to care team and patient/family/caregiver.   - Collaborate with rehabilitation services on mobility goals if consulted  - Perform Range of Motion 3 times a day.  - Reposition patient every 2 hours.  - Dangle patient 3 times a day  - Stand patient 3 times a day  - Ambulate patient 3 times a day  - Out of bed to chair 3 times a day   - Out of bed for meals 3 times a day  - Out of bed for toileting  - Record patient progress and toleration of activity level   Outcome: Progressing     Problem: DISCHARGE PLANNING  Goal: Discharge to home or other facility with appropriate resources  Description: INTERVENTIONS:  - Identify barriers to discharge w/patient and caregiver  - Arrange for needed discharge resources and transportation as appropriate  - Identify discharge learning needs (meds, wound care, etc.)  - Arrange for interpretive services to assist at discharge as needed  - Refer to Case Management Department for coordinating discharge planning if the patient needs post-hospital services based on physician/advanced practitioner order or complex needs related to functional status, cognitive ability, or social support system  Outcome: Progressing     Problem: Knowledge Deficit  Goal: Patient/family/caregiver demonstrates understanding of disease process, treatment plan, medications, and discharge instructions  Description: Complete learning assessment and assess knowledge base.  Interventions:  - Provide teaching at level of understanding  - Provide teaching via preferred learning methods  Outcome: Progressing     Problem: Nutrition/Hydration-ADULT  Goal: Nutrient/Hydration intake appropriate for improving, restoring or maintaining nutritional needs  Description: Monitor and assess patient's nutrition/hydration status for malnutrition. Collaborate with interdisciplinary team and initiate plan and interventions as ordered.  Monitor patient's weight and dietary intake as ordered or per policy. Utilize nutrition  screening tool and intervene as necessary. Determine patient's food preferences and provide high-protein, high-caloric foods as appropriate.     INTERVENTIONS:  - Monitor oral intake, urinary output, labs, and treatment plans  - Assess nutrition and hydration status and recommend course of action  - Evaluate amount of meals eaten  - Assist patient with eating if necessary   - Allow adequate time for meals  - Recommend/ encourage appropriate diets, oral nutritional supplements, and vitamin/mineral supplements  - Order, calculate, and assess calorie counts as needed  - Recommend, monitor, and adjust tube feedings and TPN/PPN based on assessed needs  - Assess need for intravenous fluids  - Provide specific nutrition/hydration education as appropriate  - Include patient/family/caregiver in decisions related to nutrition  Outcome: Progressing     Problem: Prexisting or High Potential for Compromised Skin Integrity  Goal: Skin integrity is maintained or improved  Description: INTERVENTIONS:  - Identify patients at risk for skin breakdown  - Assess and monitor skin integrity  - Assess and monitor nutrition and hydration status  - Monitor labs   - Assess for incontinence   - Turn and reposition patient  - Assist with mobility/ambulation  - Relieve pressure over bony prominences  - Avoid friction and shearing  - Provide appropriate hygiene as needed including keeping skin clean and dry  - Evaluate need for skin moisturizer/barrier cream  - Collaborate with interdisciplinary team   - Patient/family teaching  - Consider wound care consult   Outcome: Progressing

## 2024-10-19 NOTE — PROGRESS NOTES
ED Neck/Back Problem





- General


Chief Complaint: Neck Problem


Stated Complaint: NECK PAIN


Time Seen by Provider: 06/02/17 17:11


Mode of Arrival: Ambulatory


Notes: 


34-year-old female presented to ED for pain in the left side of her neck that 

started on she woke up.  She states the pain is been getting worse since then.  

Feels like a pulling burning feeling to the left side of her neck.  States she 

has been using ice on it with no relief.


TRAVEL OUTSIDE OF THE [place] IN LAST 30 DAYS: No





- HPI


Patient complains to provider of: Pain, Neck


Onset: Other - When she woke up Tuesday


Onset: Gradual


Timing: Still present, Worse


Quality of pain: Other - Burning pulling


Severity: Severe


Pain Level: 5


Context: Turning - Neck


Recent injury: No


Associated symptoms: None


Exacerbated by: Movement of neck


Relieved by: Nothing


Similar symptoms previously: Yes


Recently seen / treated by doctor: No





- Related Data


Allergies/Adverse Reactions: 


 





No Known Allergies Allergy (Verified 06/02/17 16:48)


 











Past Medical History





- General


Information source: Patient





- Social History


Smoking Status: Never Smoker


Chew tobacco use (# tins/day): No


Frequency of alcohol use: Social


Drug Abuse: None


Occupation: Call center


Lives with: Family - Children


Family History: DM, Hyperlipidemia, Hypertension, Malignancy.  denies: Arthritis

, CAD, COPD, CVA, Thyroid Disfunction


Patient has suicidal ideation: No


Patient has homicidal ideation: No





- Past Medical History


Cardiac Medical History: Reports: Hx Hypercholesterolemia


Pulmonary Medical History: Reports: Hx Asthma - childhood


EENT Medical History: Reports: None


Neurological Medical History: Reports: None


Endocrine Medical History: Reports: None


Renal/ Medical History: Reports: None


Malignancy Medical History: Reports: None


GI Medical History: Reports: Hx Colonoscopy


Musculoskeltal Medical History: Reports None


Skin Medical History: Reports None


Psychiatric Medical History: Reports: None


Traumatic Medical History: Reports: None


Infectious Medical History: Reports: None


Surgical Hx: Negative


Past Surgical History: Reports: Hx Oral Surgery





- Immunizations


Immunizations up to date: Yes


Hx Diphtheria, Pertussis, Tetanus Vaccination: No - unknown


Hx Pneumococcal Vaccination: 10/01/11





Review of Systems





- Review of Systems


Constitutional: No symptoms reported


EENT: No symptoms reported


Cardiovascular: No symptoms reported


Respiratory: No symptoms reported


Gastrointestinal: No symptoms reported


Genitourinary: No symptoms reported


Female Genitourinary: No symptoms reported


Musculoskeletal: Neck pain - Pain in  left neck muscle


Skin: No symptoms reported


Hematologic/Lymphatic: No symptoms reported


Neurological/Psychological: No symptoms reported





Physical Exam





- Vital signs


Vitals: 


 











Temp Pulse Resp BP Pulse Ox


 


 98.5 F   100   16   131/95 H  98 


 


 06/02/17 16:48  06/02/17 16:48  06/02/17 16:48  06/02/17 16:48  06/02/17 16:48











Interpretation: Normal





- General


General appearance: Appears well, Alert





- HEENT


Head: Normocephalic, Atraumatic


Eyes: Normal


Pupils: PERRL





- Respiratory


Respiratory status: No respiratory distress


Chest status: Nontender


Breath sounds: Normal


Chest palpation: Normal





- Cardiovascular


Rhythm: Regular


Heart sounds: Normal auscultation


Murmur: No





- Abdominal


Inspection: Normal


Distension: No distension


Bowel sounds: Normal


Tenderness: Nontender


Organomegaly: No organomegaly





- Back


Back: Normal, Nontender, Tender, Other - Pain left neck muscles.  No: Deformity/

step-off, CVA tenderness, Vertebra tenderness, Scars, Scoliosis, Wounds





- Extremities


General upper extremity: Normal inspection, Nontender, Normal color, Normal ROM

, Normal temperature


General lower extremity: Normal inspection, Nontender, Normal color, Normal ROM

, Normal temperature, Normal weight bearing.  No: Ellie's sign





- Neurological


Neuro grossly intact: Yes


Cognition: Normal


Orientation: AAOx4


[place]iket Coma Scale Eye Opening: Spontaneous


[place] Coma Scale Verbal: Oriented


[place] Coma Scale Motor: Obeys Commands


[place] Coma Scale Total: 15


Speech: Normal


Motor strength normal: LUE, RUE, LLE, RLE


Sensory: Normal





- Psychological


Associated symptoms: Normal affect, Normal mood





- Skin


Skin Temperature: Warm


Skin Moisture: Dry


Skin Color: Normal





Course





- Re-evaluation


Re-evalutation: 





06/02/17 17:45


Patient treated with ibuprofen in the emergency room, patient sent home with 

prescription for Flexeril states she will take over-the-counter ibuprofen as 

she does not have insurance.  Request the patient follow-up with her primary 

doctor to insure muscle cramping is likely.





- Vital Signs


Vital signs: 


 











Temp Pulse Resp BP Pulse Ox


 


 98.5 F   100   16   131/95 H  98 


 


 06/02/17 16:48  06/02/17 16:48  06/02/17 16:48  06/02/17 16:48  06/02/17 16:48














Discharge





- Discharge


Clinical Impression: 


Neck muscle strain


Qualifiers:


 Encounter type: initial encounter Qualified Code(s): S16.1XXA - Strain of 

muscle, fascia and tendon at neck level, initial encounter





Condition: Stable


Disposition: HOME, SELF-CARE


Instructions:  Family Physicians / Practices, Use of Over-The-Counter Ibuprofen 

([facility])


Additional Instructions: 


NECK INJURY (CERVICAL STRAIN):


     You have a neck strain.  This is an injury to the muscles and ligaments in 

the neck.  There is no evidence of a fracture of the neck bones.  Also, no 

injury to the spinal cord or nerve roots was detected.


     Usually, stiffness and pain INCREASE for the first 24-48 hours after the 

injury.  The pain will gradually resolve and the neck will become more mobile.  

Most patients are back at work or school within a few days.  Typically, 

complete healing takes about two or three weeks.


     The usual initial treatment is rest and cold packs.  A neck collar may be 

placed to keep the muscles of the neck at rest. Antiinflammatory and muscle 

relaxing medication are often used to reduce the spasm and irritation.


     You should call the doctor, or go to the hospital, if you develop numbness 

or weakness in any extremity, problems with your bladder or bowel, or pain 

radiating down the arms.








MUSCLE STRAIN:


     You have strained a muscle -- torn the fibers within the muscle. This 

often occurs with strenuous exertion, or during an injury that suddenly 

stretches the muscle.  The seriousness of a strain varies. Some strains heal 

within days, others cause problems for months.


     X-rays cannot show a muscle strain.  X-rays are taken only if symptoms 

suggest that a fracture could be present.


     The usual treatment of a muscle strain is rest and ice packs. Sometimes, a 

sling, splint, or crutches may be necessary to rest the muscle.  The muscle can 

be used again once pain subsides.  Severe strains require a special exercise 

and stretching program to prevent permanent stiffness and disability.  Your 

doctor will advise you if this will be necessary.


     Call the doctor immediately if pain or swelling becomes severe, or if 

numbness or discoloration develop.





USE OF TYLENOL (ACETAMINOPHEN):


     Acetaminophen may be taken for pain relief or fever control. It's much 

safer than aspirin, offering a wider range of "safe" dosages.  It is safe 

during pregnancy.  Some brand names are Tylenol, Panadol, Datril, Anacin 3, 

Tempra, and Liquiprin. Acetaminophen can be repeated every four hours.  The 

following are maximum recommended dosages:





WEIGHT         Dose             Drops                  Elixir        Chewable(

80mg)


(LBS.)                            drprs=droppers    tsp=teaspoon


6               40 mg            0.4 ml (1/2)


6-11            80 mg            0.8 ml (full)              tsp               

   1       tab


12-16         120 mg           1 1/2 drprs             3/4  tsp               1 

1/2  tabs


17-23         160 mg             2  drprs             1    tsp                 

  2       tabs


24-30         240 mg             3  drprs             1 1/2 tsp                

3       tabs


30-35         320 mg                                       2    tsp            

       4       tabs


36-41         360 mg                                       2 1/4   tsp         

     4 1/2 tabs


42-47         400 mg                                       2 1/2   tsp         

     5      tabs


48-53         480 mg                                       3    tsp            

       6      tabs


54-59         520 mg                                       3  1/4  tsp         

     6 1/2 tabs


60-64         560 mg                                       3  1/2  tsp         

     7      tabs 


65-70         600 mg                                       3  3/4  tsp         

     7 1/2 tabs


71-76         640 mg                                       4   tsp             

      8      tabs


77-82         720 mg                                       4 1/2   tsp         

    9      tabs


83-88         800 mg                                       5   tsp             

    10      tabs





>89 pounds or adults          650 mg to 900 mg





Acetaminophen can be repeated every four hours.  Maximum dose not to exceed 

4000 mg a day.





   These maximum recommended dosages are slightly higher than the dosages 

written on the product container, but these dosages are very safe and below the 

toxic dosage for acetaminophen.








ICE PACKS:


     Apply ice packs frequently against the painful area.  Many different 

schedules are recommended, such as "20 minutes on, 20 minutes off" or "one hour 

ice, two hours rest."  If you need to work, you may need to go longer between 

ice treatments.  You should plan to have the area ice packed AT LEAST one 

fourth of the time.


     The ice should be applied over the wrap, tape, or splint, or over a layer 

of cloth -- not directly against the skin.  Some ice bags have a built-in cloth 

and can be put directly on the skin.





WARM PACKS:


     After approximately two days, apply gentle heat (such as a heating pad or 

hot water bottle) for about 20 to 30 minutes about every two hours -- at least 

four times daily.  Warmth and elevation will help you make a more rapid recovery

, and will ease the pain considerably.


     Do not use HOT heat, and never apply heat for longer than 30 minutes.  The 

continuous heat can invisibly damage skin and muscles -- even when no burn is 

seen on the surface.  Damaged muscles can make you MORE sore.








MUSCLE RELAXERS: 


     Muscle relaxing medications are usually prescribed for acute muscle spasm 

or injury to the neck and back.  They are often combined with antiinflammatory 

pain medication for increased relief.


     You may stop the muscle relaxer when the pain and stiffness have improved.

  Start the medication again if spasms recur.  


     Muscle relaxers may cause drowsiness, especially with the first dose.  Do 

not operate machinery or drive while under the effects of the medication.  Most 

muscle relaxers last up to 24 hours.  Do not combine the medication with 

alcohol.





FOLLOW-UP CARE:


If you have been referred to a physician for follow-up care, call the physician

s office for an appointment as you were instructed or within the next two days.

  If you experience worsening or a significant change in your symptoms, notify 

the physician immediately or return to the Emergency Department at any time for 

re-evaluation.


Prescriptions: 


Cyclobenzaprine HCl [Flexeril 5 mg Tablet] 5 mg PO TID #15 tablet


Forms:  Elevated Blood Pressure, Return to Work Progress Note - Hospitalist   Name: Eryn Morocho 84 y.o. adult I MRN: [id number]  Unit/Bed#: [address] [place], [place] [id number] I Date of Admission: 10/16/2024   Date of Service: 10/19/2024 I Hospital Day: 2    Assessment & Plan  Acute on chronic respiratory failure (HCC)  Patient with history of diastolic CHF, severe COPD, history of NSCLC s/p wedge resection/chemo, chronic respiratory failure on 4-6 L nc presented to ED with progressive shortness of breath for past week with productive cough.  CXR: Findings most suggestive of CHF with basilar edema and pleural effusions. Could not entirely exclude pneumonia.  ABG 7.3/56/78/93 on 9 L  , higher than previous elevated levels  Procalcitonin negative x 2  Afebrile, no leukocytosis  Suspect in setting of acute CHF  Continue IV lasix as noted  Continue scheduled duonebs  Received 3 doses of ceftriaxone, will discontinue and monitor patient off antibiotic as patient has been afebrile and WBCs within normal limits  Denies cough   Currently on 9 L mid flow, wean as tolerated  Acute on chronic diastolic congestive heart failure (HCC)  Wt Readings from Last 3 Encounters:   10/19/24 77.2 kg (170 lb 3.2 oz)   09/16/24 74.8 kg (165 lb)   07/16/24 77.1 kg (170 lb)     ECHO 5/2024: EF 70%, systolic function is vigorous, diastolic function is moderately abnormal, consistent with grade II relaxation  CXR: Findings most suggestive of CHF with basilar edema and pleural effusions.  Weight noted to be 175 lbs was 165 lbs one month ago    Home regimen includes torsemide 10 mg daily  Received 60 mg IV lasix in ED  Decrease IV lasix 40 mg daily  Monitor Is/Os and daily weights  Low sodium diet and fluid restriction  COPD (chronic obstructive pulmonary disease) (Formerly [facility])  History of severe COPD chronically on 4-6 L nc  Do not suspect acute exacerbation  Continue scheduled duonebs, performist, Singulair  Procalcitonin negative x 2  Urinary antigens negative  RP2 panel negative  Sputum culture  negative  Patient received 3 doses of ceftriaxone, discontinue and monitor off antibiotics  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)  History of non-small cell lung cancer s/p left upper lobe wedge resection and chemotherapy  CT chest in June 2024 showed enlargement of right lower lobe nodule highly suspicious for second primary lung neoplasm  Has outpatient PET scan ordered, but per PCP documentation is not recommended due to age and slow growth rate  JULIO (obstructive sleep apnea)  continue BiPAP hs  Patient reports compliance at home  Hyperlipidemia  Continue statin  Heart healthy diet  Recurrent major depressive disorder, in partial remission (HCC)  Continue Celexa  Supportive care   PAD (peripheral artery disease) (HCC)  Continue plavix and statin  Paroxysmal atrial fibrillation (HCC)  Continue Cardizem 30 mg tid  Continue Xarelto  EKG shows NSR  Stage 3a chronic kidney disease (HCC)  Lab Results   Component Value Date    EGFR 80 06/19/2024    EGFR 81 06/18/2024    EGFR 81 06/17/2024    CREATININE 0.63 10/19/2024    CREATININE 0.66 10/18/2024    CREATININE 0.62 10/17/2024   Cr currently stable at baseline  Daily bmp    VTE Pharmacologic Prophylaxis: VTE Score: 7 High Risk (Score >/= 5) - Pharmacological DVT Prophylaxis Ordered: rivaroxaban (Xarelto). Sequential Compression Devices Ordered.    Mobility:   Basic Mobility Inpatient Raw Score: 14  JH-HLM Goal: 4: Move to chair/commode  JH-HLM Achieved: 2: Bed activities/Dependent transfer  JH-HLM Goal NOT achieved. Continue with multidisciplinary rounding and encourage appropriate mobility to improve upon JH-HLM goals.    Patient Centered Rounds: I performed bedside rounds with nursing staff today.   Discussions with Specialists or Other Care Team Provider: Case management    Education and Discussions with Family / Patient: Updated  (daughter) at bedside.    Current Length of Stay: 2 day(s)  Current Patient Status: Inpatient   Certification Statement:  "The patient will continue to require additional inpatient hospital stay due to IV Lasix, increased O2 requirements from baseline of 4 to 6 L at home, repeat labs  Discharge Plan: Anticipate discharge in 24-48 hrs to home with home services.    Code Status: Level 1 - Full Code    Subjective   Patient seen sitting up in bed had just had breakfast.  Reports that the breakfast was not very appealing to her however no nausea or vomiting.  Reports that she did have some problems with the BiPAP last night, felt very claustrophobic, indicated that she has nasal pillows at home not the fullface mask.  She feels that her breathing has improved, feels less winded.  Reports that she is urinating \"an awful lot\".  Denies any pain or burning with urination.    Objective :  Temp:  [98.3 °F (36.8 °C)-98.4 °F (36.9 °C)] 98.3 °F (36.8 °C)  HR:  [64-80] 80  BP: (147-163)/(61-68) 151/65  Resp:  [16-18] 18  SpO2:  [89 %-95 %] 90 %  O2 Device: Mid flow nasal cannula  Nasal Cannula O2 Flow Rate (L/min):  [8 L/min-9 L/min] 9 L/min  FiO2 (%):  [50] 50    Body mass index is 31.13 kg/m².     Input and Output Summary (last 24 hours):     Intake/Output Summary (Last 24 hours) at 10/19/2024 1202  Last data filed at 10/19/2024 0501  Gross per 24 hour   Intake --   Output 650 ml   Net -650 ml       Physical Exam  Vitals and nursing note reviewed.   Constitutional:       General: She is not in acute distress.     Appearance: Normal appearance.   HENT:      Head: Normocephalic.      Nose: Nose normal.      Mouth/Throat:      Mouth: Mucous membranes are moist.   Eyes:      Extraocular Movements: Extraocular movements intact.      Conjunctiva/sclera: Conjunctivae normal.      Pupils: Pupils are equal, round, and reactive to light.   Cardiovascular:      Rate and Rhythm: Normal rate and regular rhythm.      Pulses: Normal pulses.   Pulmonary:      Breath sounds: No wheezing.      Comments: Patient noted to be less labored than previous exam with her " breathing, remains diminished, no crackles noted, no wheezes  Abdominal:      General: Bowel sounds are normal. There is no distension.      Palpations: Abdomen is soft.      Tenderness: There is no abdominal tenderness.   Genitourinary:     Comments: Voiding spontaneously  Musculoskeletal:         General: Normal range of motion.      Cervical back: Normal range of motion.      Right lower leg: No edema.      Left lower leg: No edema.   Skin:     General: Skin is warm and dry.      Capillary Refill: Capillary refill takes less than 2 seconds.      Coloration: Skin is pale.   Neurological:      General: No focal deficit present.      Mental Status: She is alert and oriented to person, place, and time.   Psychiatric:         Mood and Affect: Mood normal.         Behavior: Behavior normal.         Thought Content: Thought content normal.         Judgment: Judgment normal.           Lines/Drains:  Lines/Drains/Airways       Active Status       Name Placement date Placement time Site Days    External Urinary Catheter 10/16/24  1228  -- 2                            Lab Results: I have reviewed the following results:   Results from last 7 days   Lab Units 10/19/24  0539 10/17/24  0612 10/16/24  1101   WBC Thousand/uL 3.57*   < > 4.39   HEMOGLOBIN g/dL 12.7   < > 13.9   HEMATOCRIT % 39.9   < > 44.7   PLATELETS Thousands/uL 198   < > 182   SEGS PCT %  --   --  75   LYMPHO PCT %  --   --  14   MONO PCT %  --   --  6   EOS PCT %  --   --  3    < > = values in this interval not displayed.     Results from last 7 days   Lab Units 10/19/24  0743 10/17/24  0612 10/16/24  1101   SODIUM mmol/L 138   < > 139   POTASSIUM mmol/L 3.7   < > 3.8   CHLORIDE mmol/L 96   < > 98   CO2 mmol/L 37*   < > 35*   BUN mg/dL 16   < > 16   CREATININE mg/dL 0.63   < > 0.69   ANION GAP mmol/L 5   < > 6   CALCIUM mg/dL 8.6   < > 8.8   ALBUMIN g/dL  --   --  3.8   TOTAL BILIRUBIN mg/dL  --   --  0.53   ALK PHOS U/L  --   --  47   ALT U/L  --   --  6*    AST U/L  --   --  8   GLUCOSE RANDOM mg/dL 141*   < > 153*    < > = values in this interval not displayed.                 Results from last 7 days   Lab Units 10/17/24  0612 10/16/24  1101   PROCALCITONIN ng/ml <0.05 <0.05       Recent Cultures (last 7 days):   Results from last 7 days   Lab Units 10/16/24  1746 10/16/24  1705   SPUTUM CULTURE  2+ Growth of  --    GRAM STAIN RESULT  1+ Epithelial cells per low power field*  Rare Gram variable rods*  No polys seen*  --    LEGIONELLA URINARY ANTIGEN   --  Negative       Imaging Results Review: No pertinent imaging studies reviewed.  Other Study Results Review: No additional pertinent studies reviewed.    Last 24 Hours Medication List:     Current Facility-Administered Medications:     acetaminophen (TYLENOL) tablet 650 mg, Q4H PRN    atorvastatin (LIPITOR) tablet 40 mg, Daily With Dinner    budesonide (PULMICORT) inhalation solution 0.5 mg, BID    citalopram (CeleXA) tablet 20 mg, Daily    clopidogrel (PLAVIX) tablet 75 mg, Daily    diltiazem (CARDIZEM) tablet 30 mg, Q8H ANNE    fish oil capsule 1,000 mg, Daily    formoterol (PERFOROMIST) nebulizer solution 20 mcg, BID    [START ON 10/20/2024] furosemide (LASIX) injection 40 mg, Daily    guaiFENesin (MUCINEX) 12 hr tablet 600 mg, Q12H ANNE    ipratropium-albuterol (DUO-NEB) 0.5-2.5 mg/3 mL inhalation solution 3 mL, Q6H    montelukast (SINGULAIR) tablet 10 mg, HS    rivaroxaban (XARELTO) tablet 20 mg, Daily With Breakfast    timolol (TIMOPTIC) 0.5 % ophthalmic solution 1 drop, BID    traZODone (DESYREL) tablet 100 mg, HS PRN    Administrative Statements   Today, Patient Was Seen By: PRANAY Martino  I have spent a total time of greater than 45 minutes in caring for this patient on the day of the visit/encounter including Diagnostic results, Counseling / Coordination of care, Documenting in the medical record, Reviewing / ordering tests, medicine, procedures  , and Communicating with other healthcare  professionals .    **Please Note: This note may have been constructed using a voice recognition system.**

## 2024-10-19 NOTE — ASSESSMENT & PLAN NOTE
Patient with history of diastolic CHF, severe COPD, history of NSCLC s/p wedge resection/chemo, chronic respiratory failure on 4-6 L nc presented to ED with progressive shortness of breath for past week with productive cough.  CXR: Findings most suggestive of CHF with basilar edema and pleural effusions. Could not entirely exclude pneumonia.  ABG 7.3/56/78/93 on 9 L  , higher than previous elevated levels  Procalcitonin negative x 2  Afebrile, no leukocytosis  Suspect in setting of acute CHF  Continue IV lasix as noted  Continue scheduled duonebs  Received 3 doses of ceftriaxone, will discontinue and monitor patient off antibiotic as patient has been afebrile and WBCs within normal limits  Denies cough   Currently on 9 L mid flow, wean as tolerated

## 2024-10-19 NOTE — ASSESSMENT & PLAN NOTE
Lab Results   Component Value Date    EGFR 80 06/19/2024    EGFR 81 06/18/2024    EGFR 81 06/17/2024    CREATININE 0.63 10/19/2024    CREATININE 0.66 10/18/2024    CREATININE 0.62 10/17/2024   Cr currently stable at baseline  Daily bmp

## 2024-10-19 NOTE — PLAN OF CARE
Problem: RESPIRATORY - ADULT  Goal: Achieves optimal ventilation and oxygenation  Description: INTERVENTIONS:  - Assess for changes in respiratory status  - Assess for changes in mentation and behavior  - Position to facilitate oxygenation and minimize respiratory effort  - Oxygen administered by appropriate delivery if ordered  - Initiate smoking cessation education as indicated  - Encourage broncho-pulmonary hygiene including cough, deep breathe, Incentive Spirometry  - Assess the need for suctioning and aspirate as needed  - Assess and instruct to report SOB or any respiratory difficulty  - Respiratory Therapy support as indicated  Outcome: Progressing     Problem: PAIN - ADULT  Goal: Verbalizes/displays adequate comfort level or baseline comfort level  Description: Interventions:  - Encourage patient to monitor pain and request assistance  - Assess pain using appropriate pain scale  - Administer analgesics based on type and severity of pain and evaluate response  - Implement non-pharmacological measures as appropriate and evaluate response  - Consider cultural and social influences on pain and pain management  - Notify physician/advanced practitioner if interventions unsuccessful or patient reports new pain  Outcome: Progressing     Problem: INFECTION - ADULT  Goal: Absence or prevention of progression during hospitalization  Description: INTERVENTIONS:  - Assess and monitor for signs and symptoms of infection  - Monitor lab/diagnostic results  - Monitor all insertion sites, i.e. indwelling lines, tubes, and drains  - Monitor endotracheal if appropriate and nasal secretions for changes in amount and color  - [place] appropriate cooling/warming therapies per order  - Administer medications as ordered  - Instruct and encourage patient and family to use good hand hygiene technique  - Identify and instruct in appropriate isolation precautions for identified infection/condition  Outcome: Progressing  Goal:  Absence of fever/infection during neutropenic period  Description: INTERVENTIONS:  - Monitor WBC    Outcome: Progressing     Problem: SAFETY ADULT  Goal: Patient will remain free of falls  Description: INTERVENTIONS:  - Educate patient/family on patient safety including physical limitations  - Instruct patient to call for assistance with activity   - Consult OT/PT to assist with strengthening/mobility   - Keep Call bell within reach  - Keep bed low and locked with side rails adjusted as appropriate  - Keep care items and personal belongings within reach  - Initiate and maintain comfort rounds  - Make Fall Risk Sign visible to staff  - Offer Toileting every 4 Hours, in advance of need  - Initiate/Maintain bed/chair alarm  - Apply yellow socks and bracelet for high fall risk patients  - Consider moving patient to room near nurses station  Outcome: Progressing  Goal: Maintain or return to baseline ADL function  Description: INTERVENTIONS:  -  Assess patient's ability to carry out ADLs; assess patient's baseline for ADL function and identify physical deficits which impact ability to perform ADLs (bathing, care of mouth/teeth, toileting, grooming, dressing, etc.)  - Assess/evaluate cause of self-care deficits   - Assess range of motion  - Assess patient's mobility; develop plan if impaired  - Assess patient's need for assistive devices and provide as appropriate  - Encourage maximum independence but intervene and supervise when necessary  - Involve family in performance of ADLs  - Assess for home care needs following discharge   - Consider OT consult to assist with ADL evaluation and planning for discharge  - Provide patient education as appropriate  Outcome: Progressing  Goal: Maintains/Returns to pre admission functional level  Description: INTERVENTIONS:  - Perform AM-PAC 6 Click Basic Mobility/ Daily Activity assessment daily.  - Set and communicate daily mobility goal to care team and patient/family/caregiver.   -  Collaborate with rehabilitation services on mobility goals if consulted  - Perform Range of Motion 3 times a day.  - Stand patient 3 times a day  - Ambulate patient 1 times a day  - Out of bed to chair 3 times a day   - Out of bed for meals 3 times a day  - Out of bed for toileting  - Record patient progress and toleration of activity level   Outcome: Progressing     Problem: DISCHARGE PLANNING  Goal: Discharge to home or other facility with appropriate resources  Description: INTERVENTIONS:  - Identify barriers to discharge w/patient and caregiver  - Arrange for needed discharge resources and transportation as appropriate  - Identify discharge learning needs (meds, wound care, etc.)  - Arrange for interpretive services to assist at discharge as needed  - Refer to Case Management Department for coordinating discharge planning if the patient needs post-hospital services based on physician/advanced practitioner order or complex needs related to functional status, cognitive ability, or social support system  Outcome: Progressing     Problem: Knowledge Deficit  Goal: Patient/family/caregiver demonstrates understanding of disease process, treatment plan, medications, and discharge instructions  Description: Complete learning assessment and assess knowledge base.  Interventions:  - Provide teaching at level of understanding  - Provide teaching via preferred learning methods  Outcome: Progressing     Problem: Nutrition/Hydration-ADULT  Goal: Nutrient/Hydration intake appropriate for improving, restoring or maintaining nutritional needs  Description: Monitor and assess patient's nutrition/hydration status for malnutrition. Collaborate with interdisciplinary team and initiate plan and interventions as ordered.  Monitor patient's weight and dietary intake as ordered or per policy. Utilize nutrition screening tool and intervene as necessary. Determine patient's food preferences and provide high-protein, high-caloric foods as  appropriate.     INTERVENTIONS:  - Monitor oral intake, urinary output, labs, and treatment plans  - Assess nutrition and hydration status and recommend course of action  - Evaluate amount of meals eaten  - Assist patient with eating if necessary   - Allow adequate time for meals  - Recommend/ encourage appropriate diets, oral nutritional supplements, and vitamin/mineral supplements  - Order, calculate, and assess calorie counts as needed  - Recommend, monitor, and adjust tube feedings and TPN/PPN based on assessed needs  - Assess need for intravenous fluids  - Provide specific nutrition/hydration education as appropriate  - Include patient/family/caregiver in decisions related to nutrition  Outcome: Progressing     Problem: Prexisting or High Potential for Compromised Skin Integrity  Goal: Skin integrity is maintained or improved  Description: INTERVENTIONS:  - Identify patients at risk for skin breakdown  - Assess and monitor skin integrity  - Assess and monitor nutrition and hydration status  - Monitor labs   - Assess for incontinence   - Turn and reposition patient  - Assist with mobility/ambulation  - Relieve pressure over bony prominences  - Avoid friction and shearing  - Provide appropriate hygiene as needed including keeping skin clean and dry  - Evaluate need for skin moisturizer/barrier cream  - Collaborate with interdisciplinary team   - Patient/family teaching  - Consider wound care consult   Outcome: Progressing

## 2024-10-19 NOTE — ASSESSMENT & PLAN NOTE
Wt Readings from Last 3 Encounters:   10/19/24 77.2 kg (170 lb 3.2 oz)   09/16/24 74.8 kg (165 lb)   07/16/24 77.1 kg (170 lb)     ECHO 5/2024: EF 70%, systolic function is vigorous, diastolic function is moderately abnormal, consistent with grade II relaxation  CXR: Findings most suggestive of CHF with basilar edema and pleural effusions.  Weight noted to be 175 lbs was 165 lbs one month ago    Home regimen includes torsemide 10 mg daily  Received 60 mg IV lasix in ED  Decrease IV lasix 40 mg daily  Monitor Is/Os and daily weights  Low sodium diet and fluid restriction

## 2024-10-20 ENCOUNTER — APPOINTMENT (INPATIENT)
Dept: RADIOLOGY | Facility: HOSPITAL | Age: 84
DRG: 193 | End: 2024-10-20
Payer: MEDICARE

## 2024-10-20 LAB
ANION GAP SERPL CALCULATED.3IONS-SCNC: 4 MMOL/L (ref 4–13)
BUN SERPL-MCNC: 16 MG/DL (ref 5–25)
CALCIUM SERPL-MCNC: 8.5 MG/DL (ref 8.4–10.2)
CHLORIDE SERPL-SCNC: 96 MMOL/L (ref 96–108)
CO2 SERPL-SCNC: 40 MMOL/L (ref 21–32)
CREAT SERPL-MCNC: 0.59 MG/DL (ref 0.6–1.3)
ERYTHROCYTE [DISTWIDTH] IN BLOOD BY AUTOMATED COUNT: 14.7 % (ref 11.6–15.1)
GLUCOSE SERPL-MCNC: 125 MG/DL (ref 65–140)
HCT VFR BLD AUTO: 38.3 % (ref 36.5–46.1)
HGB BLD-MCNC: 11.9 G/DL (ref 12–15.4)
MCH RBC QN AUTO: 33 PG (ref 26.8–34.3)
MCHC RBC AUTO-ENTMCNC: 31.1 G/DL (ref 31.4–37.4)
MCV RBC AUTO: 106 FL (ref 82–98)
PLATELET # BLD AUTO: 165 THOUSANDS/UL (ref 149–390)
PMV BLD AUTO: 9.3 FL (ref 8.9–12.7)
POTASSIUM SERPL-SCNC: 4 MMOL/L (ref 3.5–5.3)
RBC # BLD AUTO: 3.61 MILLION/UL (ref 3.88–5.12)
SODIUM SERPL-SCNC: 140 MMOL/L (ref 135–147)
WBC # BLD AUTO: 3.1 THOUSAND/UL (ref 4.31–10.16)

## 2024-10-20 PROCEDURE — 94760 N-INVAS EAR/PLS OXIMETRY 1: CPT

## 2024-10-20 PROCEDURE — 94660 CPAP INITIATION&MGMT: CPT

## 2024-10-20 PROCEDURE — 85027 COMPLETE CBC AUTOMATED: CPT | Performed by: NURSE PRACTITIONER

## 2024-10-20 PROCEDURE — 71045 X-RAY EXAM CHEST 1 VIEW: CPT

## 2024-10-20 PROCEDURE — 99232 SBSQ HOSP IP/OBS MODERATE 35: CPT | Performed by: NURSE PRACTITIONER

## 2024-10-20 PROCEDURE — 80048 BASIC METABOLIC PNL TOTAL CA: CPT | Performed by: NURSE PRACTITIONER

## 2024-10-20 PROCEDURE — 94640 AIRWAY INHALATION TREATMENT: CPT

## 2024-10-20 RX ADMIN — TIMOLOL MALEATE 1 DROP: 5 SOLUTION/ DROPS OPHTHALMIC at 08:24

## 2024-10-20 RX ADMIN — GUAIFENESIN 600 MG: 600 TABLET, EXTENDED RELEASE ORAL at 08:24

## 2024-10-20 RX ADMIN — BUDESONIDE 0.5 MG: 0.5 INHALANT RESPIRATORY (INHALATION) at 07:38

## 2024-10-20 RX ADMIN — CITALOPRAM HYDROBROMIDE 20 MG: 10 TABLET ORAL at 08:23

## 2024-10-20 RX ADMIN — FORMOTEROL FUMARATE DIHYDRATE 20 MCG: 20 SOLUTION RESPIRATORY (INHALATION) at 21:06

## 2024-10-20 RX ADMIN — IPRATROPIUM BROMIDE AND ALBUTEROL SULFATE 3 ML: 2.5; .5 SOLUTION RESPIRATORY (INHALATION) at 13:28

## 2024-10-20 RX ADMIN — BUDESONIDE 0.5 MG: 0.5 INHALANT RESPIRATORY (INHALATION) at 20:54

## 2024-10-20 RX ADMIN — DILTIAZEM HYDROCHLORIDE 30 MG: 30 TABLET ORAL at 13:07

## 2024-10-20 RX ADMIN — DILTIAZEM HYDROCHLORIDE 30 MG: 30 TABLET ORAL at 05:01

## 2024-10-20 RX ADMIN — IPRATROPIUM BROMIDE AND ALBUTEROL SULFATE 3 ML: 2.5; .5 SOLUTION RESPIRATORY (INHALATION) at 07:38

## 2024-10-20 RX ADMIN — FORMOTEROL FUMARATE DIHYDRATE 20 MCG: 20 SOLUTION RESPIRATORY (INHALATION) at 07:38

## 2024-10-20 RX ADMIN — RIVAROXABAN 20 MG: 20 TABLET, FILM COATED ORAL at 08:24

## 2024-10-20 RX ADMIN — OMEGA-3 FATTY ACIDS CAP 1000 MG 1000 MG: 1000 CAP at 08:24

## 2024-10-20 RX ADMIN — TRAZODONE HYDROCHLORIDE 100 MG: 100 TABLET ORAL at 21:33

## 2024-10-20 RX ADMIN — IPRATROPIUM BROMIDE AND ALBUTEROL SULFATE 3 ML: 2.5; .5 SOLUTION RESPIRATORY (INHALATION) at 20:54

## 2024-10-20 RX ADMIN — IPRATROPIUM BROMIDE AND ALBUTEROL SULFATE 3 ML: 2.5; .5 SOLUTION RESPIRATORY (INHALATION) at 02:56

## 2024-10-20 RX ADMIN — GUAIFENESIN 600 MG: 600 TABLET, EXTENDED RELEASE ORAL at 21:33

## 2024-10-20 RX ADMIN — ATORVASTATIN CALCIUM 40 MG: 40 TABLET, FILM COATED ORAL at 17:00

## 2024-10-20 RX ADMIN — FUROSEMIDE 40 MG: 10 INJECTION, SOLUTION INTRAMUSCULAR; INTRAVENOUS at 08:24

## 2024-10-20 RX ADMIN — MONTELUKAST 10 MG: 10 TABLET, FILM COATED ORAL at 21:33

## 2024-10-20 RX ADMIN — TIMOLOL MALEATE 1 DROP: 5 SOLUTION/ DROPS OPHTHALMIC at 17:00

## 2024-10-20 RX ADMIN — DILTIAZEM HYDROCHLORIDE 30 MG: 30 TABLET ORAL at 21:34

## 2024-10-20 RX ADMIN — CLOPIDOGREL 75 MG: 75 TABLET ORAL at 08:24

## 2024-10-20 NOTE — ASSESSMENT & PLAN NOTE
History of severe COPD chronically on 4-6 L nc  Do not suspect acute exacerbation  Continue scheduled duonebs, performist, Singulair  Procalcitonin negative x 2  Urinary antigens negative  RP2 panel negative  Sputum culture negative  Patient received 3 doses of ceftriaxone, discontinue and monitor off antibiotics  Remains afebrile

## 2024-10-20 NOTE — ASSESSMENT & PLAN NOTE
Lab Results   Component Value Date    EGFR 80 06/19/2024    EGFR 81 06/18/2024    EGFR 81 06/17/2024    CREATININE 0.59 (L) 10/20/2024    CREATININE 0.63 10/19/2024    CREATININE 0.66 10/18/2024   Cr currently stable at baseline  Daily bmp

## 2024-10-20 NOTE — ASSESSMENT & PLAN NOTE
Wt Readings from Last 3 Encounters:   10/20/24 78 kg (171 lb 14.4 oz)   09/16/24 74.8 kg (165 lb)   07/16/24 77.1 kg (170 lb)     ECHO 5/2024: EF 70%, systolic function is vigorous, diastolic function is moderately abnormal, consistent with grade II relaxation  CXR: Findings most suggestive of CHF with basilar edema and pleural effusions.  Weight noted to be 175 lbs was 165 lbs one month ago    Home regimen includes torsemide 10 mg daily  Received 60 mg IV lasix in ED  Continue IV lasix 40 mg daily  Monitor Is/Os and daily weights  Low sodium diet and fluid restriction  As noted above patient is continuing to have persistent hypoxia with conversation, repeat chest x-ray ordered, follow-up on results and plan as noted above

## 2024-10-20 NOTE — PLAN OF CARE
Problem: RESPIRATORY - ADULT  Goal: Achieves optimal ventilation and oxygenation  Description: INTERVENTIONS:  - Assess for changes in respiratory status  - Assess for changes in mentation and behavior  - Position to facilitate oxygenation and minimize respiratory effort  - Oxygen administered by appropriate delivery if ordered  - Initiate smoking cessation education as indicated  - Encourage broncho-pulmonary hygiene including cough, deep breathe, Incentive Spirometry  - Assess the need for suctioning and aspirate as needed  - Assess and instruct to report SOB or any respiratory difficulty  - Respiratory Therapy support as indicated  Outcome: Progressing     Problem: PAIN - ADULT  Goal: Verbalizes/displays adequate comfort level or baseline comfort level  Description: Interventions:  - Encourage patient to monitor pain and request assistance  - Assess pain using appropriate pain scale  - Administer analgesics based on type and severity of pain and evaluate response  - Implement non-pharmacological measures as appropriate and evaluate response  - Consider cultural and social influences on pain and pain management  - Notify physician/advanced practitioner if interventions unsuccessful or patient reports new pain  Outcome: Progressing     Problem: INFECTION - ADULT  Goal: Absence or prevention of progression during hospitalization  Description: INTERVENTIONS:  - Assess and monitor for signs and symptoms of infection  - Monitor lab/diagnostic results  - Monitor all insertion sites, i.e. indwelling lines, tubes, and drains  - Monitor endotracheal if appropriate and nasal secretions for changes in amount and color  - [place] appropriate cooling/warming therapies per order  - Administer medications as ordered  - Instruct and encourage patient and family to use good hand hygiene technique  - Identify and instruct in appropriate isolation precautions for identified infection/condition  Outcome: Progressing  Goal:  Absence of fever/infection during neutropenic period  Description: INTERVENTIONS:  - Monitor WBC    Outcome: Progressing     Problem: SAFETY ADULT  Goal: Patient will remain free of falls  Description: INTERVENTIONS:  - Educate patient/family on patient safety including physical limitations  - Instruct patient to call for assistance with activity   - Consult OT/PT to assist with strengthening/mobility   - Keep Call bell within reach  - Keep bed low and locked with side rails adjusted as appropriate  - Keep care items and personal belongings within reach  - Initiate and maintain comfort rounds  - Make Fall Risk Sign visible to staff  - Offer Toileting every 2 Hours, in advance of need  - Initiate/Maintain bed/chair alarm  - Obtain necessary fall risk management equipment: bracelet/socks   - Apply yellow socks and bracelet for high fall risk patients  - Consider moving patient to room near nurses station  Outcome: Progressing  Goal: Maintain or return to baseline ADL function  Description: INTERVENTIONS:  -  Assess patient's ability to carry out ADLs; assess patient's baseline for ADL function and identify physical deficits which impact ability to perform ADLs (bathing, care of mouth/teeth, toileting, grooming, dressing, etc.)  - Assess/evaluate cause of self-care deficits   - Assess range of motion  - Assess patient's mobility; develop plan if impaired  - Assess patient's need for assistive devices and provide as appropriate  - Encourage maximum independence but intervene and supervise when necessary  - Involve family in performance of ADLs  - Assess for home care needs following discharge   - Consider OT consult to assist with ADL evaluation and planning for discharge  - Provide patient education as appropriate  Outcome: Progressing  Goal: Maintains/Returns to pre admission functional level  Description: INTERVENTIONS:  - Perform AM-PAC 6 Click Basic Mobility/ Daily Activity assessment daily.  - Set and communicate  daily mobility goal to care team and patient/family/caregiver.   - Collaborate with rehabilitation services on mobility goals if consulted  - Perform Range of Motion 12 times a day.  - Reposition patient every 2 hours.  - Dangle patient 3 times a day  - Stand patient 3 times a day  - Ambulate patient 3 times a day  - Out of bed to chair 3 times a day   - Out of bed for meals 3 times a day  - Out of bed for toileting  - Record patient progress and toleration of activity level   Outcome: Progressing     Problem: DISCHARGE PLANNING  Goal: Discharge to home or other facility with appropriate resources  Description: INTERVENTIONS:  - Identify barriers to discharge w/patient and caregiver  - Arrange for needed discharge resources and transportation as appropriate  - Identify discharge learning needs (meds, wound care, etc.)  - Arrange for interpretive services to assist at discharge as needed  - Refer to Case Management Department for coordinating discharge planning if the patient needs post-hospital services based on physician/advanced practitioner order or complex needs related to functional status, cognitive ability, or social support system  Outcome: Progressing     Problem: Knowledge Deficit  Goal: Patient/family/caregiver demonstrates understanding of disease process, treatment plan, medications, and discharge instructions  Description: Complete learning assessment and assess knowledge base.  Interventions:  - Provide teaching at level of understanding  - Provide teaching via preferred learning methods  Outcome: Progressing     Problem: Nutrition/Hydration-ADULT  Goal: Nutrient/Hydration intake appropriate for improving, restoring or maintaining nutritional needs  Description: Monitor and assess patient's nutrition/hydration status for malnutrition. Collaborate with interdisciplinary team and initiate plan and interventions as ordered.  Monitor patient's weight and dietary intake as ordered or per policy. Utilize  nutrition screening tool and intervene as necessary. Determine patient's food preferences and provide high-protein, high-caloric foods as appropriate.     INTERVENTIONS:  - Monitor oral intake, urinary output, labs, and treatment plans  - Assess nutrition and hydration status and recommend course of action  - Evaluate amount of meals eaten  - Assist patient with eating if necessary   - Allow adequate time for meals  - Recommend/ encourage appropriate diets, oral nutritional supplements, and vitamin/mineral supplements  - Order, calculate, and assess calorie counts as needed  - Recommend, monitor, and adjust tube feedings and TPN/PPN based on assessed needs  - Assess need for intravenous fluids  - Provide specific nutrition/hydration education as appropriate  - Include patient/family/caregiver in decisions related to nutrition  Outcome: Progressing     Problem: Prexisting or High Potential for Compromised Skin Integrity  Goal: Skin integrity is maintained or improved  Description: INTERVENTIONS:  - Identify patients at risk for skin breakdown  - Assess and monitor skin integrity  - Assess and monitor nutrition and hydration status  - Monitor labs   - Assess for incontinence   - Turn and reposition patient  - Assist with mobility/ambulation  - Relieve pressure over bony prominences  - Avoid friction and shearing  - Provide appropriate hygiene as needed including keeping skin clean and dry  - Evaluate need for skin moisturizer/barrier cream  - Collaborate with interdisciplinary team   - Patient/family teaching  - Consider wound care consult   Outcome: Progressing

## 2024-10-20 NOTE — ASSESSMENT & PLAN NOTE
Patient with history of diastolic CHF, severe COPD, history of NSCLC s/p wedge resection/chemo, chronic respiratory failure on 4-6 L nc presented to ED with progressive shortness of breath for past week with productive cough.  CXR: Findings most suggestive of CHF with basilar edema and pleural effusions. Could not entirely exclude pneumonia.  ABG 7.3/56/78/93 on 9 L  , higher than previous elevated levels  Procalcitonin negative x 2  Afebrile, no leukocytosis  Suspect in setting of acute CHF  Continue IV lasix 40 mg daily  Continue scheduled duonebs  Received 3 doses of ceftriaxone, will discontinue and monitor patient off antibiotic as patient has been afebrile and WBCs within normal limits  Denies cough   Currently on 9 L mid flow, O2 sats at rest 92%, however with conversation noted patient to drop O2 sats to 88%  Repeat chest x-ray ordered, results pending  If bilateral pleural effusions are persistent would consider consulting interventional radiology for possible thoracentesis

## 2024-10-20 NOTE — PROGRESS NOTES
Progress Note - Hospitalist   Name: Eryn Morocho 84 y.o. adult I MRN: [id number]  Unit/Bed#: [address] [place], [place] [id number] I Date of Admission: 10/16/2024   Date of Service: 10/20/2024 I Hospital Day: 3    Assessment & Plan  Acute on chronic respiratory failure (HCC)  Patient with history of diastolic CHF, severe COPD, history of NSCLC s/p wedge resection/chemo, chronic respiratory failure on 4-6 L nc presented to ED with progressive shortness of breath for past week with productive cough.  CXR: Findings most suggestive of CHF with basilar edema and pleural effusions. Could not entirely exclude pneumonia.  ABG 7.3/56/78/93 on 9 L  , higher than previous elevated levels  Procalcitonin negative x 2  Afebrile, no leukocytosis  Suspect in setting of acute CHF  Continue IV lasix 40 mg daily  Continue scheduled duonebs  Received 3 doses of ceftriaxone, will discontinue and monitor patient off antibiotic as patient has been afebrile and WBCs within normal limits  Denies cough   Currently on 9 L mid flow, O2 sats at rest 92%, however with conversation noted patient to drop O2 sats to 88%  Repeat chest x-ray ordered, results pending  If bilateral pleural effusions are persistent would consider consulting interventional radiology for possible thoracentesis  Acute on chronic diastolic congestive heart failure (HCC)  Wt Readings from Last 3 Encounters:   10/20/24 78 kg (171 lb 14.4 oz)   09/16/24 74.8 kg (165 lb)   07/16/24 77.1 kg (170 lb)     ECHO 5/2024: EF 70%, systolic function is vigorous, diastolic function is moderately abnormal, consistent with grade II relaxation  CXR: Findings most suggestive of CHF with basilar edema and pleural effusions.  Weight noted to be 175 lbs was 165 lbs one month ago    Home regimen includes torsemide 10 mg daily  Received 60 mg IV lasix in ED  Continue IV lasix 40 mg daily  Monitor Is/Os and daily weights  Low sodium diet and fluid restriction  As noted above patient is continuing to have  persistent hypoxia with conversation, repeat chest x-ray ordered, follow-up on results and plan as noted above  COPD (chronic obstructive pulmonary disease) (HCC)  History of severe COPD chronically on 4-6 L nc  Do not suspect acute exacerbation  Continue scheduled duonebs, performist, Singulair  Procalcitonin negative x 2  Urinary antigens negative  RP2 panel negative  Sputum culture negative  Patient received 3 doses of ceftriaxone, discontinue and monitor off antibiotics  Remains afebrile   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)  History of non-small cell lung cancer s/p left upper lobe wedge resection and chemotherapy  CT chest in June 2024 showed enlargement of right lower lobe nodule highly suspicious for second primary lung neoplasm  Has outpatient PET scan ordered, but per PCP documentation is not recommended due to age and slow growth rate  JULIO (obstructive sleep apnea)  continue BiPAP hs  Patient reports compliance at home  Hyperlipidemia  Continue statin  Heart healthy diet  Recurrent major depressive disorder, in partial remission (HCC)  Continue Celexa  Supportive care   PAD (peripheral artery disease) (HCC)  Continue plavix and statin  Paroxysmal atrial fibrillation (HCC)  Continue Cardizem 30 mg tid  Continue Xarelto  EKG shows NSR  Stage 3a chronic kidney disease (HCC)  Lab Results   Component Value Date    EGFR 80 06/19/2024    EGFR 81 06/18/2024    EGFR 81 06/17/2024    CREATININE 0.59 (L) 10/20/2024    CREATININE 0.63 10/19/2024    CREATININE 0.66 10/18/2024   Cr currently stable at baseline  Daily bmp    VTE Pharmacologic Prophylaxis: VTE Score: 7 High Risk (Score >/= 5) - Pharmacological DVT Prophylaxis Ordered: rivaroxaban (Xarelto). Sequential Compression Devices Ordered.    Mobility:   Basic Mobility Inpatient Raw Score: 17  JH-HLM Goal: 5: Stand one or more mins  JH-HLM Achieved: 5: Stand (1 or more minutes)  JH-HLM Goal achieved. Continue to encourage appropriate mobility.    Patient  "Centered Rounds: I performed bedside rounds with nursing staff today.   Discussions with Specialists or Other Care Team Provider: nursing     Education and Discussions with Family / Patient: Updated  (daughter) via phone.    Current Length of Stay: 3 day(s)  Current Patient Status: Inpatient   Certification Statement: The patient will continue to require additional inpatient hospital stay due to repeat chest xray, midflow O2, IV lasix   Discharge Plan: Anticipate discharge in 24-48 hrs to home with home services.    Code Status: Level 1 - Full Code    Subjective   \"I feel like my breathing is getting better\".  Reports that she slept okay last night, good appetite for breakfast no nausea or vomiting.  No fevers or chills.  Not coughing.    Objective :  Temp:  [97.3 °F (36.3 °C)-98.4 °F (36.9 °C)] 98.1 °F (36.7 °C)  HR:  [60-67] 64  BP: (140-165)/(53-68) 155/57  Resp:  [16-20] 16  SpO2:  [87 %-94 %] 92 %  O2 Device: Mid flow nasal cannula  Nasal Cannula O2 Flow Rate (L/min):  [9 L/min] 9 L/min  FiO2 (%):  [50] 50    Body mass index is 31.44 kg/m².     Input and Output Summary (last 24 hours):     Intake/Output Summary (Last 24 hours) at 10/20/2024 1158  Last data filed at 10/19/2024 2244  Gross per 24 hour   Intake --   Output 1050 ml   Net -1050 ml       Physical Exam  Vitals and nursing note reviewed.   Constitutional:       Appearance: Normal appearance.   HENT:      Head: Normocephalic.      Nose: Nose normal.      Mouth/Throat:      Mouth: Mucous membranes are moist.   Eyes:      Extraocular Movements: Extraocular movements intact.      Conjunctiva/sclera: Conjunctivae normal.      Pupils: Pupils are equal, round, and reactive to light.   Cardiovascular:      Rate and Rhythm: Normal rate and regular rhythm.      Pulses: Normal pulses.   Pulmonary:      Comments: Diminished at the bases bilaterally, no wheezing  Abdominal:      General: Bowel sounds are normal. There is no distension.      " Palpations: Abdomen is soft.      Tenderness: There is no abdominal tenderness.   Genitourinary:     Comments: Voiding spontaneously  Musculoskeletal:      Cervical back: Normal range of motion.      Right lower leg: No edema.      Left lower leg: No edema.      Comments: Ambulates using walker, feels her legs are stronger   Skin:     General: Skin is warm and dry.      Capillary Refill: Capillary refill takes less than 2 seconds.   Neurological:      General: No focal deficit present.      Mental Status: She is alert and oriented to person, place, and time.   Psychiatric:         Mood and Affect: Mood normal.         Behavior: Behavior normal.         Thought Content: Thought content normal.         Judgment: Judgment normal.           Lines/Drains:  Lines/Drains/Airways       Active Status       Name Placement date Placement time Site Days    External Urinary Catheter 10/20/24  0512  -- less than 1                            Lab Results: I have reviewed the following results:   Results from last 7 days   Lab Units 10/20/24  0522 10/17/24  0612 10/16/24  1101   WBC Thousand/uL 3.10*   < > 4.39   HEMOGLOBIN g/dL 11.9*   < > 13.9   HEMATOCRIT % 38.3   < > 44.7   PLATELETS Thousands/uL 165   < > 182   SEGS PCT %  --   --  75   LYMPHO PCT %  --   --  14   MONO PCT %  --   --  6   EOS PCT %  --   --  3    < > = values in this interval not displayed.     Results from last 7 days   Lab Units 10/20/24  0522 10/17/24  0612 10/16/24  1101   SODIUM mmol/L 140   < > 139   POTASSIUM mmol/L 4.0   < > 3.8   CHLORIDE mmol/L 96   < > 98   CO2 mmol/L 40*   < > 35*   BUN mg/dL 16   < > 16   CREATININE mg/dL 0.59*   < > 0.69   ANION GAP mmol/L 4   < > 6   CALCIUM mg/dL 8.5   < > 8.8   ALBUMIN g/dL  --   --  3.8   TOTAL BILIRUBIN mg/dL  --   --  0.53   ALK PHOS U/L  --   --  47   ALT U/L  --   --  6*   AST U/L  --   --  8   GLUCOSE RANDOM mg/dL 125   < > 153*    < > = values in this interval not displayed.                 Results from  last 7 days   Lab Units 10/17/24  0612 10/16/24  1101   PROCALCITONIN ng/ml <0.05 <0.05       Recent Cultures (last 7 days):   Results from last 7 days   Lab Units 10/16/24  1746 10/16/24  1705   SPUTUM CULTURE  2+ Growth of  --    GRAM STAIN RESULT  1+ Epithelial cells per low power field*  Rare Gram variable rods*  No polys seen*  --    LEGIONELLA URINARY ANTIGEN   --  Negative       Imaging Results Review: No pertinent imaging studies reviewed.  Other Study Results Review: No additional pertinent studies reviewed.    Last 24 Hours Medication List:     Current Facility-Administered Medications:     acetaminophen (TYLENOL) tablet 650 mg, Q4H PRN    atorvastatin (LIPITOR) tablet 40 mg, Daily With Dinner    budesonide (PULMICORT) inhalation solution 0.5 mg, BID    citalopram (CeleXA) tablet 20 mg, Daily    clopidogrel (PLAVIX) tablet 75 mg, Daily    diltiazem (CARDIZEM) tablet 30 mg, Q8H ANNE    fish oil capsule 1,000 mg, Daily    formoterol (PERFOROMIST) nebulizer solution 20 mcg, BID    furosemide (LASIX) injection 40 mg, Daily    guaiFENesin (MUCINEX) 12 hr tablet 600 mg, Q12H ANNE    ipratropium-albuterol (DUO-NEB) 0.5-2.5 mg/3 mL inhalation solution 3 mL, Q6H    montelukast (SINGULAIR) tablet 10 mg, HS    rivaroxaban (XARELTO) tablet 20 mg, Daily With Breakfast    timolol (TIMOPTIC) 0.5 % ophthalmic solution 1 drop, BID    traZODone (DESYREL) tablet 100 mg, HS PRN    Administrative Statements   Today, Patient Was Seen By: PRANAY Martino  I have spent a total time of greater than 35 minutes in caring for this patient on the day of the visit/encounter including Diagnostic results, Counseling / Coordination of care, Documenting in the medical record, Reviewing / ordering tests, medicine, procedures  , and Communicating with other healthcare professionals .    **Please Note: This note may have been constructed using a voice recognition system.**

## 2024-10-20 NOTE — PLAN OF CARE
Problem: RESPIRATORY - ADULT  Goal: Achieves optimal ventilation and oxygenation  Description: INTERVENTIONS:  - Assess for changes in respiratory status  - Assess for changes in mentation and behavior  - Position to facilitate oxygenation and minimize respiratory effort  - Oxygen administered by appropriate delivery if ordered  - Initiate smoking cessation education as indicated  - Encourage broncho-pulmonary hygiene including cough, deep breathe, Incentive Spirometry  - Assess the need for suctioning and aspirate as needed  - Assess and instruct to report SOB or any respiratory difficulty  - Respiratory Therapy support as indicated  Outcome: Progressing     Problem: PAIN - ADULT  Goal: Verbalizes/displays adequate comfort level or baseline comfort level  Description: Interventions:  - Encourage patient to monitor pain and request assistance  - Assess pain using appropriate pain scale  - Administer analgesics based on type and severity of pain and evaluate response  - Implement non-pharmacological measures as appropriate and evaluate response  - Consider cultural and social influences on pain and pain management  - Notify physician/advanced practitioner if interventions unsuccessful or patient reports new pain  Outcome: Progressing     Problem: INFECTION - ADULT  Goal: Absence or prevention of progression during hospitalization  Description: INTERVENTIONS:  - Assess and monitor for signs and symptoms of infection  - Monitor lab/diagnostic results  - Monitor all insertion sites, i.e. indwelling lines, tubes, and drains  - Monitor endotracheal if appropriate and nasal secretions for changes in amount and color  - Hooks appropriate cooling/warming therapies per order  - Administer medications as ordered  - Instruct and encourage patient and family to use good hand hygiene technique  - Identify and instruct in appropriate isolation precautions for identified infection/condition  Outcome: Progressing  Goal:  Absence of fever/infection during neutropenic period  Description: INTERVENTIONS:  - Monitor WBC    Outcome: Progressing     Problem: SAFETY ADULT  Goal: Patient will remain free of falls  Description: INTERVENTIONS:  - Educate patient/family on patient safety including physical limitations  - Instruct patient to call for assistance with activity   - Consult OT/PT to assist with strengthening/mobility   - Keep Call bell within reach  - Keep bed low and locked with side rails adjusted as appropriate  - Keep care items and personal belongings within reach  - Initiate and maintain comfort rounds  - Make Fall Risk Sign visible to staff  - Offer Toileting every 2 Hours, in advance of need  - Initiate/Maintain bed alarm  - Obtain necessary fall risk management equipment: walker   - Apply yellow socks and bracelet for high fall risk patients  - Consider moving patient to room near nurses station  Outcome: Progressing  Goal: Maintain or return to baseline ADL function  Description: INTERVENTIONS:  -  Assess patient's ability to carry out ADLs; assess patient's baseline for ADL function and identify physical deficits which impact ability to perform ADLs (bathing, care of mouth/teeth, toileting, grooming, dressing, etc.)  - Assess/evaluate cause of self-care deficits   - Assess range of motion  - Assess patient's mobility; develop plan if impaired  - Assess patient's need for assistive devices and provide as appropriate  - Encourage maximum independence but intervene and supervise when necessary  - Involve family in performance of ADLs  - Assess for home care needs following discharge   - Consider OT consult to assist with ADL evaluation and planning for discharge  - Provide patient education as appropriate  Outcome: Progressing  Goal: Maintains/Returns to pre admission functional level  Description: INTERVENTIONS:  - Perform AM-PAC 6 Click Basic Mobility/ Daily Activity assessment daily.  - Set and communicate daily mobility  goal to care team and patient/family/caregiver.   - Collaborate with rehabilitation services on mobility goals if consulted  - Perform Range of Motion 3 times a day.  - Reposition patient every 2 hours.  - Dangle patient 3 times a day  - Stand patient 3 times a day  - Ambulate patient 3 times a day  - Out of bed to chair 3 times a day   - Out of bed for meals 3 times a day  - Out of bed for toileting  - Record patient progress and toleration of activity level   Outcome: Progressing     Problem: DISCHARGE PLANNING  Goal: Discharge to home or other facility with appropriate resources  Description: INTERVENTIONS:  - Identify barriers to discharge w/patient and caregiver  - Arrange for needed discharge resources and transportation as appropriate  - Identify discharge learning needs (meds, wound care, etc.)  - Arrange for interpretive services to assist at discharge as needed  - Refer to Case Management Department for coordinating discharge planning if the patient needs post-hospital services based on physician/advanced practitioner order or complex needs related to functional status, cognitive ability, or social support system  Outcome: Progressing     Problem: Nutrition/Hydration-ADULT  Goal: Nutrient/Hydration intake appropriate for improving, restoring or maintaining nutritional needs  Description: Monitor and assess patient's nutrition/hydration status for malnutrition. Collaborate with interdisciplinary team and initiate plan and interventions as ordered.  Monitor patient's weight and dietary intake as ordered or per policy. Utilize nutrition screening tool and intervene as necessary. Determine patient's food preferences and provide high-protein, high-caloric foods as appropriate.     INTERVENTIONS:  - Monitor oral intake, urinary output, labs, and treatment plans  - Assess nutrition and hydration status and recommend course of action  - Evaluate amount of meals eaten  - Assist patient with eating if necessary   -  Allow adequate time for meals  - Recommend/ encourage appropriate diets, oral nutritional supplements, and vitamin/mineral supplements  - Order, calculate, and assess calorie counts as needed  - Recommend, monitor, and adjust tube feedings and TPN/PPN based on assessed needs  - Assess need for intravenous fluids  - Provide specific nutrition/hydration education as appropriate  - Include patient/family/caregiver in decisions related to nutrition  Outcome: Progressing     Problem: Prexisting or High Potential for Compromised Skin Integrity  Goal: Skin integrity is maintained or improved  Description: INTERVENTIONS:  - Identify patients at risk for skin breakdown  - Assess and monitor skin integrity  - Assess and monitor nutrition and hydration status  - Monitor labs   - Assess for incontinence   - Turn and reposition patient  - Assist with mobility/ambulation  - Relieve pressure over bony prominences  - Avoid friction and shearing  - Provide appropriate hygiene as needed including keeping skin clean and dry  - Evaluate need for skin moisturizer/barrier cream  - Collaborate with interdisciplinary team   - Patient/family teaching  - Consider wound care consult   Outcome: Progressing

## 2024-10-21 LAB
ANION GAP SERPL CALCULATED.3IONS-SCNC: 3 MMOL/L (ref 4–13)
BUN SERPL-MCNC: 21 MG/DL (ref 5–25)
CALCIUM SERPL-MCNC: 8.7 MG/DL (ref 8.4–10.2)
CHLORIDE SERPL-SCNC: 96 MMOL/L (ref 96–108)
CO2 SERPL-SCNC: 39 MMOL/L (ref 21–32)
CREAT SERPL-MCNC: 0.64 MG/DL (ref 0.6–1.3)
ERYTHROCYTE [DISTWIDTH] IN BLOOD BY AUTOMATED COUNT: 14.6 % (ref 11.6–15.1)
GLUCOSE SERPL-MCNC: 130 MG/DL (ref 65–140)
HCT VFR BLD AUTO: 39.5 % (ref 36.5–46.1)
HGB BLD-MCNC: 12.5 G/DL (ref 12–15.4)
MCH RBC QN AUTO: 33.5 PG (ref 26.8–34.3)
MCHC RBC AUTO-ENTMCNC: 31.6 G/DL (ref 31.4–37.4)
MCV RBC AUTO: 106 FL (ref 82–98)
PLATELET # BLD AUTO: 180 THOUSANDS/UL (ref 149–390)
PMV BLD AUTO: 9.6 FL (ref 8.9–12.7)
POTASSIUM SERPL-SCNC: 4.2 MMOL/L (ref 3.5–5.3)
RBC # BLD AUTO: 3.73 MILLION/UL (ref 3.88–5.12)
SODIUM SERPL-SCNC: 138 MMOL/L (ref 135–147)
WBC # BLD AUTO: 3.14 THOUSAND/UL (ref 4.31–10.16)

## 2024-10-21 PROCEDURE — 94640 AIRWAY INHALATION TREATMENT: CPT

## 2024-10-21 PROCEDURE — 94760 N-INVAS EAR/PLS OXIMETRY 1: CPT

## 2024-10-21 PROCEDURE — 94660 CPAP INITIATION&MGMT: CPT

## 2024-10-21 PROCEDURE — 80048 BASIC METABOLIC PNL TOTAL CA: CPT | Performed by: NURSE PRACTITIONER

## 2024-10-21 PROCEDURE — 99223 1ST HOSP IP/OBS HIGH 75: CPT | Performed by: INTERNAL MEDICINE

## 2024-10-21 PROCEDURE — 85027 COMPLETE CBC AUTOMATED: CPT | Performed by: NURSE PRACTITIONER

## 2024-10-21 PROCEDURE — 99232 SBSQ HOSP IP/OBS MODERATE 35: CPT

## 2024-10-21 RX ORDER — METHYLPREDNISOLONE SODIUM SUCCINATE 40 MG/ML
40 INJECTION, POWDER, LYOPHILIZED, FOR SOLUTION INTRAMUSCULAR; INTRAVENOUS EVERY 12 HOURS SCHEDULED
Status: DISCONTINUED | OUTPATIENT
Start: 2024-10-21 | End: 2024-10-21

## 2024-10-21 RX ORDER — METHYLPREDNISOLONE SODIUM SUCCINATE 40 MG/ML
40 INJECTION, POWDER, LYOPHILIZED, FOR SOLUTION INTRAMUSCULAR; INTRAVENOUS DAILY
Status: DISCONTINUED | OUTPATIENT
Start: 2024-10-22 | End: 2024-10-23 | Stop reason: HOSPADM

## 2024-10-21 RX ADMIN — BUDESONIDE 0.5 MG: 0.5 INHALANT RESPIRATORY (INHALATION) at 08:21

## 2024-10-21 RX ADMIN — TRAZODONE HYDROCHLORIDE 100 MG: 100 TABLET ORAL at 21:22

## 2024-10-21 RX ADMIN — DILTIAZEM HYDROCHLORIDE 30 MG: 30 TABLET ORAL at 13:39

## 2024-10-21 RX ADMIN — DILTIAZEM HYDROCHLORIDE 30 MG: 30 TABLET ORAL at 05:57

## 2024-10-21 RX ADMIN — FUROSEMIDE 40 MG: 10 INJECTION, SOLUTION INTRAMUSCULAR; INTRAVENOUS at 09:06

## 2024-10-21 RX ADMIN — BUDESONIDE 0.5 MG: 0.5 INHALANT RESPIRATORY (INHALATION) at 19:40

## 2024-10-21 RX ADMIN — METHYLPREDNISOLONE SODIUM SUCCINATE 40 MG: 40 INJECTION, POWDER, FOR SOLUTION INTRAMUSCULAR; INTRAVENOUS at 11:46

## 2024-10-21 RX ADMIN — TIMOLOL MALEATE 1 DROP: 5 SOLUTION/ DROPS OPHTHALMIC at 17:04

## 2024-10-21 RX ADMIN — ATORVASTATIN CALCIUM 40 MG: 40 TABLET, FILM COATED ORAL at 17:04

## 2024-10-21 RX ADMIN — IPRATROPIUM BROMIDE AND ALBUTEROL SULFATE 3 ML: 2.5; .5 SOLUTION RESPIRATORY (INHALATION) at 13:43

## 2024-10-21 RX ADMIN — RIVAROXABAN 20 MG: 20 TABLET, FILM COATED ORAL at 09:06

## 2024-10-21 RX ADMIN — CITALOPRAM HYDROBROMIDE 20 MG: 10 TABLET ORAL at 09:06

## 2024-10-21 RX ADMIN — FORMOTEROL FUMARATE DIHYDRATE 20 MCG: 20 SOLUTION RESPIRATORY (INHALATION) at 08:21

## 2024-10-21 RX ADMIN — GUAIFENESIN 600 MG: 600 TABLET, EXTENDED RELEASE ORAL at 21:22

## 2024-10-21 RX ADMIN — GUAIFENESIN 600 MG: 600 TABLET, EXTENDED RELEASE ORAL at 09:06

## 2024-10-21 RX ADMIN — MONTELUKAST 10 MG: 10 TABLET, FILM COATED ORAL at 21:22

## 2024-10-21 RX ADMIN — FORMOTEROL FUMARATE DIHYDRATE 20 MCG: 20 SOLUTION RESPIRATORY (INHALATION) at 19:29

## 2024-10-21 RX ADMIN — IPRATROPIUM BROMIDE AND ALBUTEROL SULFATE 3 ML: 2.5; .5 SOLUTION RESPIRATORY (INHALATION) at 08:21

## 2024-10-21 RX ADMIN — TIMOLOL MALEATE 1 DROP: 5 SOLUTION/ DROPS OPHTHALMIC at 09:06

## 2024-10-21 RX ADMIN — DILTIAZEM HYDROCHLORIDE 30 MG: 30 TABLET ORAL at 21:24

## 2024-10-21 RX ADMIN — IPRATROPIUM BROMIDE AND ALBUTEROL SULFATE 3 ML: 2.5; .5 SOLUTION RESPIRATORY (INHALATION) at 19:40

## 2024-10-21 RX ADMIN — IPRATROPIUM BROMIDE AND ALBUTEROL SULFATE 3 ML: 2.5; .5 SOLUTION RESPIRATORY (INHALATION) at 02:32

## 2024-10-21 RX ADMIN — OMEGA-3 FATTY ACIDS CAP 1000 MG 1000 MG: 1000 CAP at 09:06

## 2024-10-21 RX ADMIN — CLOPIDOGREL 75 MG: 75 TABLET ORAL at 09:06

## 2024-10-21 NOTE — CONSULTS
Consult Note - Pulmonary   Eryn Morocho 84 y.o. adult MRN: [id number]  Unit/Bed#: [address] [place] [address] Encounter: [id number]      Reason for consultation: Acute on chronic hypoxic respiratory failure, COPD, JULIO     Requesting provider: PRANAY Ram     Assessment & Recommendations:   Acute on chronic hypoxic respiratory failure with suspected chronic hypercarbic respiratory failure  Titrate O2 to keep SpO2 >90%, IS, OOB-chair  She appears to be optimized but remains hypoxic  Will check TTE with bubble for possible shunt    COPD - reported severe with acute exacerbation  No wheeze on my exam, will reduce solumedrol to 40mg once daily  Continue pulmicort/perforomist BID, continue duonebs q6hrs for now  Monitor off abx    Acute/chronic diastolic CHF - further diuresis per primary service, trend daily weight    JULIO on BiPAP 13/9cmH2O in hospital  Asked CM to obtain home compliance report from [facility] [place]  Will check AM ABG - based upon results may consider adjusting BiPAP settings vs transition to AVAPS'    NSCLCa post resection and chemotherapy remotely with new enlarging RLL nodule  She does not desire any further evaluation of her enlarging lung nodule. I did discuss that if FDG avid and localized, could consider SBRT therapy but she reports she does not desire further cancer related care as she is currently asymptomatic   Consider HPM consultation as outpatient given multiple comorbid conditions and desires to limit further medical care      History of Present Illness   HPI:  Eryn Morocho is a 84 y.o. adult who has atrial fibrillation on xarelto, PAD, chronic diastolic CHF, CKD, JULIO on BiPAP, COPD reported severe per Dr. Cortes's note, chronic hypoxic respiratory failure on 6L/min NC, NSCLCa post resection/chemo, new lung nodule undergoing evaluation. She presented 10/16 for 1 week of increasing dyspnea and weakness. She was treated for possible pneumonia and CHF exacerbation. She was started on systemic steroids today for  noted wheezing and evaluation is requested.     She reported some increased dyspnea. She has clear sputum production, denied hemoptysis or pleurisy. She reports her daughter keeps close monitoring of her weight each morning and diuretic use. She uses BiPAP nightly via nasal pillows. She denied SSCP, no increased LE edema    From a pulmonary standpoint she follows with Dr. Cortes and is on duoneb and peforomist/pulmicort nebs. At his last visit in July 2024 a CT/PET was ordered but not yet obtained. She reports she has not obtained CT/PET as she does not desire to know the results or to undergo any further cancer related therapies including radiation therapy.      Review of systems:  12 point review of systems was completed and was otherwise negative except as listed in HPI.      Historical Information   Past Medical History:   Diagnosis Date    Asthma     Back problem     Chronic pain     Colon cancer screening     recheck tattoo    COPD (chronic obstructive pulmonary disease) (HCC)     Oxygen 3.5L via NC    CPAP (continuous positive airway pressure) dependence     Diverticulosis     Emphysema of lung (HCC)     High blood pressure     Hyperlipidemia     Hypertension     Lung cancer (HCC)     Left Lung cancer; wedge resection     JULIO on CPAP     PAD (peripheral artery disease) (HCC)     Leg    Wears glasses      Past Surgical History:   Procedure Laterality Date    APPENDECTOMY      age 11 yr    COLONOSCOPY  2018    repeat 2023    COLONOSCOPY      HYSTERECTOMY      complete    LUNG CANCER SURGERY Left     wedge reseection, Dr Anna     Family History   Problem Relation Age of Onset    Colon cancer Father     Heart disease Sister     Lung cancer Daughter        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 and worsened respiratory symptoms    Social History: former smoker, quit age 34    Meds/Allergies     Current Facility-Administered Medications:      acetaminophen (TYLENOL) tablet 650 mg, Q4H PRN    atorvastatin (LIPITOR) tablet 40 mg, Daily With Dinner    budesonide (PULMICORT) inhalation solution 0.5 mg, BID    citalopram (CeleXA) tablet 20 mg, Daily    clopidogrel (PLAVIX) tablet 75 mg, Daily    diltiazem (CARDIZEM) tablet 30 mg, Q8H ANNE    fish oil capsule 1,000 mg, Daily    formoterol (PERFOROMIST) nebulizer solution 20 mcg, BID    furosemide (LASIX) injection 40 mg, Daily    guaiFENesin (MUCINEX) 12 hr tablet 600 mg, Q12H ANNE    ipratropium-albuterol (DUO-NEB) 0.5-2.5 mg/3 mL inhalation solution 3 mL, Q6H    methylPREDNISolone sodium succinate (Solu-MEDROL) injection 40 mg, Q12H ANNE    montelukast (SINGULAIR) tablet 10 mg, HS    rivaroxaban (XARELTO) tablet 20 mg, Daily With Breakfast    timolol (TIMOPTIC) 0.5 % ophthalmic solution 1 drop, BID    traZODone (DESYREL) tablet 100 mg, HS PRN    Medications Prior to Admission:     albuterol (2.5 mg/3 mL) 0.083 % nebulizer solution    albuterol (PROVENTIL HFA,VENTOLIN HFA) 90 mcg/act inhaler    bisacodyl (DULCOLAX) 5 mg EC tablet    budesonide (PULMICORT) 0.5 mg/2 mL nebulizer solution    Calcium Carbonate (CALTRATE 600 PO)    carboxymethylcellulose 0.5 % SOLN    citalopram (CeleXA) 20 mg tablet    clopidogrel (PLAVIX) 75 mg tablet    Coenzyme Q10 (Co Q 10) 100 MG CAPS    Cyanocobalamin (Energy B12) 500 MCG CHEW    denosumab (Prolia) 60 mg/mL    diltiazem (CARDIZEM) 30 mg tablet    formoterol (Perforomist) 20 MCG/2ML nebulizer solution    guaiFENesin (MUCINEX) 600 mg 12 hr tablet    ipratropium-albuterol (DUO-NEB) 0.5-2.5 mg/3 mL nebulizer solution    levocetirizine (XYZAL) 5 MG tablet    montelukast (SINGULAIR) 10 mg tablet    Multiple Vitamins-Minerals (AIRBORNE GUMMIES PO)    Nebulizers (Vios LC Sprint) MISC    Omega-3 Fatty Acids (fish oil) 1,000 mg    omega-3-acid ethyl esters (LOVAZA) 1 g capsule    oxygen gas    rivaroxaban (Xarelto) 20 mg tablet    rosuvastatin (CRESTOR) 20 MG tablet    timolol  "(TIMOPTIC) 0.5 % ophthalmic solution    torsemide (DEMADEX) 10 mg tablet    traZODone (DESYREL) 100 mg tablet    VITAMIN D PO  Allergies   Allergen Reactions    Baclofen Nausea Only and Dizziness    Lisinopril Nausea Only and Dizziness    Losartan Nausea Only and Dizziness    Naproxen Nausea Only and Dizziness    Zinc Acetate Nausea Only and Dizziness       Vitals: Blood pressure 169/63, pulse 73, temperature 97.7 °F (36.5 °C), resp. rate 17, height 5' 2\" (1.575 m), weight 77.1 kg (170 lb), SpO2 90%., 9L/min, Body mass index is 31.09 kg/m².      Intake/Output Summary (Last 24 hours) at 10/21/2024 1428  Last data filed at 10/21/2024 1401  Gross per 24 hour   Intake --   Output 750 ml   Net -750 ml       Physical Exam  General: older woman, awake alert and oriented x 3, conversant without conversational dyspnea, NAD, normal affect  HEENT:  PERRL, Sclera noninjected, nonicteric OU, Nares patent, no nasal flaring, no nasal drainage, Mucous membranes moist  NECK: Trachea midline, no accessory muscle use, no stridor, no cervical or supraclavicular adenopathy, JVP not elevated  CARDIAC: Reg, single s1/S2, no m/r/g  PULM: diminished BS diffusely, no wheeze or rales, no ronchi  CHEST: No gross deformities, equal chest expansion on inspiration bilaterally  ABD: Normoactive bowel sounds, soft nontender, nondistended, no rebound, no rigidity, no guarding  EXT: No cyanosis, no clubbing, no edema, normal capillary refill  SKIN:  No rashes, no lesions  NEURO: no focal neurologic deficits, AAOx3, moving all extremities appropriately    Labs: I have personally reviewed pertinent lab results.  Laboratory and Diagnostics  Results from last 7 days   Lab Units 10/21/24  0556 10/20/24  0522 10/19/24  0539 10/18/24  0529 10/17/24  0612 10/16/24  1101   WBC Thousand/uL 3.14* 3.10* 3.57* 4.23* 3.77* 4.39   HEMOGLOBIN g/dL 12.5 11.9* 12.7 12.2 12.1 13.9   HEMATOCRIT % 39.5 38.3 39.9 38.6 39.0 44.7   PLATELETS Thousands/uL 180 165 198 161 154 " 182   SEGS PCT %  --   --   --   --   --  75   MONO PCT %  --   --   --   --   --  6   EOS PCT %  --   --   --   --   --  3     Results from last 7 days   Lab Units 10/21/24  0556 10/20/24  0522 10/19/24  0743 10/18/24  0529 10/17/24  0612 10/16/24  1101   SODIUM mmol/L 138 140 138 138 139 139   POTASSIUM mmol/L 4.2 4.0 3.7 4.4 3.9 3.8   CHLORIDE mmol/L 96 96 96 97 98 98   CO2 mmol/L 39* 40* 37* 36* 33* 35*   ANION GAP mmol/L 3* 4 5 5 8 6   BUN mg/dL 21 16 16 15 16 16   CREATININE mg/dL 0.64 0.59* 0.63 0.66 0.62 0.69   CALCIUM mg/dL 8.7 8.5 8.6 8.6 8.6 8.8   GLUCOSE RANDOM mg/dL 130 125 141* 134 137 153*   ALT U/L  --   --   --   --   --  6*   AST U/L  --   --   --   --   --  8   ALK PHOS U/L  --   --   --   --   --  47   ALBUMIN g/dL  --   --   --   --   --  3.8   TOTAL BILIRUBIN mg/dL  --   --   --   --   --  0.53     Results from last 7 days   Lab Units 10/17/24  0612   MAGNESIUM mg/dL 1.9                                   Results from last 7 days   Lab Units 10/17/24  0612 10/16/24  1101   PROCALCITONIN ng/ml <0.05 <0.05       ABG:   Results from last 7 days   Lab Units 10/16/24  1630   PH ART  7.380   PCO2 ART mm Hg 55.8*   PO2 ART mm Hg 78.4   HCO3 ART mmol/L 32.3*   BASE EXC ART mmol/L 5.5   ABG SOURCE  Radial, Right       MICRO  Sputum 2+ MRF  RP2 neg  Strep/legionella ag neg      Imaging and other studies: Results Review Statement: I personally reviewed the following image studies in PACS and associated radiology reports: chest xray and CT chest. My interpretation of the radiology images/reports is:  .  CXR 10/20/24 - vascular congestion, cardiomegaly, questionable 2.8cm retrocardiac right sided density, no PTX, blunted CP angles    CT chest  6/17/2024 - mod-severe emphysematous changes, interstitial edema and some ground glass densities, small effusions L>R, enlarging RLL nodule in posterior region    Pulmonary function testing: none available     EKG, Pathology, and Other Studies: Results Review  Statement: I reviewed radiology reports from this admission including: Echocardiogram.  TTE 5/2024 EF 70%, grade II diastolic dysfunction, normal RV size/function    Code Status: Level 1 - Full Code      Asaf Serrano DO, FACP  [facility]

## 2024-10-21 NOTE — ASSESSMENT & PLAN NOTE
Patient with history of diastolic CHF, severe COPD, history of NSCLC s/p wedge resection/chemo, chronic respiratory failure on 4-6 L nc presented to ED with progressive shortness of breath for past week with productive cough.  CXR 10/16: Findings most suggestive of CHF with basilar edema and pleural effusions. Could not entirely exclude pneumonia.  ABG 7.3/56/78/93 on 9 L  , higher than previous elevated levels  Procalcitonin negative x 2  Afebrile, no leukocytosis  Suspect in setting of acute CHF  Continue IV lasix 40 mg daily  Continue scheduled duonebs  Received 3 doses of ceftriaxone, will discontinue and monitor patient off antibiotic as patient has been afebrile and WBCs within normal limits  Denies cough  Currently on 9 L mid flow, O2 sats at rest 92%, however with conversation noted patient to drop O2 sats to 88%; chronically on 4-6 L nc at home  Repeat chest x-ray 10/20 showing 2.8 cm nodular opacity projecting over the right heart. This could be due to enlargement of the right lower lobe nodule on the chest CT from June 2024, suspicious for malignancy. A progress note from the patient's PCP from 9/16/2024 indicates that she has been advised against undergoing a PET/CT scan for her right lower lobe nodule due to her age and its slow rate of growth. But if further evaluation is desired, a chest CT with no contrast can be obtained.  Pulmonology consult appreciated

## 2024-10-21 NOTE — PLAN OF CARE
Problem: RESPIRATORY - ADULT  Goal: Achieves optimal ventilation and oxygenation  Description: INTERVENTIONS:  - Assess for changes in respiratory status  - Assess for changes in mentation and behavior  - Position to facilitate oxygenation and minimize respiratory effort  - Oxygen administered by appropriate delivery if ordered  - Initiate smoking cessation education as indicated  - Encourage broncho-pulmonary hygiene including cough, deep breathe, Incentive Spirometry  - Assess the need for suctioning and aspirate as needed  - Assess and instruct to report SOB or any respiratory difficulty  - Respiratory Therapy support as indicated  Outcome: Progressing     Problem: PAIN - ADULT  Goal: Verbalizes/displays adequate comfort level or baseline comfort level  Description: Interventions:  - Encourage patient to monitor pain and request assistance  - Assess pain using appropriate pain scale  - Administer analgesics based on type and severity of pain and evaluate response  - Implement non-pharmacological measures as appropriate and evaluate response  - Consider cultural and social influences on pain and pain management  - Notify physician/advanced practitioner if interventions unsuccessful or patient reports new pain  Outcome: Progressing     Problem: INFECTION - ADULT  Goal: Absence or prevention of progression during hospitalization  Description: INTERVENTIONS:  - Assess and monitor for signs and symptoms of infection  - Monitor lab/diagnostic results  - Monitor all insertion sites, i.e. indwelling lines, tubes, and drains  - Monitor endotracheal if appropriate and nasal secretions for changes in amount and color  - [place] appropriate cooling/warming therapies per order  - Administer medications as ordered  - Instruct and encourage patient and family to use good hand hygiene technique  - Identify and instruct in appropriate isolation precautions for identified infection/condition  Outcome: Progressing  Goal:  Absence of fever/infection during neutropenic period  Description: INTERVENTIONS:  - Monitor WBC    Outcome: Progressing     Problem: SAFETY ADULT  Goal: Patient will remain free of falls  Description: INTERVENTIONS:  - Educate patient/family on patient safety including physical limitations  - Instruct patient to call for assistance with activity   - Consult OT/PT to assist with strengthening/mobility   - Keep Call bell within reach  - Keep bed low and locked with side rails adjusted as appropriate  - Keep care items and personal belongings within reach  - Initiate and maintain comfort rounds  - Make Fall Risk Sign visible to staff  - Offer Toileting every 2 Hours, in advance of need  - Initiate/Maintain bed alarm  - Obtain necessary fall risk management equipment: walker  - Apply yellow socks and bracelet for high fall risk patients  - Consider moving patient to room near nurses station  Outcome: Progressing  Goal: Maintain or return to baseline ADL function  Description: INTERVENTIONS:  -  Assess patient's ability to carry out ADLs; assess patient's baseline for ADL function and identify physical deficits which impact ability to perform ADLs (bathing, care of mouth/teeth, toileting, grooming, dressing, etc.)  - Assess/evaluate cause of self-care deficits   - Assess range of motion  - Assess patient's mobility; develop plan if impaired  - Assess patient's need for assistive devices and provide as appropriate  - Encourage maximum independence but intervene and supervise when necessary  - Involve family in performance of ADLs  - Assess for home care needs following discharge   - Consider OT consult to assist with ADL evaluation and planning for discharge  - Provide patient education as appropriate  Outcome: Progressing  Goal: Maintains/Returns to pre admission functional level  Description: INTERVENTIONS:  - Perform AM-PAC 6 Click Basic Mobility/ Daily Activity assessment daily.  - Set and communicate daily mobility  goal to care team and patient/family/caregiver.   - Collaborate with rehabilitation services on mobility goals if consulted  - Reposition patient every 2 hours.  - Out of bed for toileting  - Record patient progress and toleration of activity level   Outcome: Progressing     Problem: DISCHARGE PLANNING  Goal: Discharge to home or other facility with appropriate resources  Description: INTERVENTIONS:  - Identify barriers to discharge w/patient and caregiver  - Arrange for needed discharge resources and transportation as appropriate  - Identify discharge learning needs (meds, wound care, etc.)  - Arrange for interpretive services to assist at discharge as needed  - Refer to Case Management Department for coordinating discharge planning if the patient needs post-hospital services based on physician/advanced practitioner order or complex needs related to functional status, cognitive ability, or social support system  Outcome: Progressing     Problem: Knowledge Deficit  Goal: Patient/family/caregiver demonstrates understanding of disease process, treatment plan, medications, and discharge instructions  Description: Complete learning assessment and assess knowledge base.  Interventions:  - Provide teaching at level of understanding  - Provide teaching via preferred learning methods  Outcome: Progressing     Problem: Nutrition/Hydration-ADULT  Goal: Nutrient/Hydration intake appropriate for improving, restoring or maintaining nutritional needs  Description: Monitor and assess patient's nutrition/hydration status for malnutrition. Collaborate with interdisciplinary team and initiate plan and interventions as ordered.  Monitor patient's weight and dietary intake as ordered or per policy. Utilize nutrition screening tool and intervene as necessary. Determine patient's food preferences and provide high-protein, high-caloric foods as appropriate.     INTERVENTIONS:  - Monitor oral intake, urinary output, labs, and treatment  plans  - Assess nutrition and hydration status and recommend course of action  - Evaluate amount of meals eaten  - Assist patient with eating if necessary   - Allow adequate time for meals  - Recommend/ encourage appropriate diets, oral nutritional supplements, and vitamin/mineral supplements  - Order, calculate, and assess calorie counts as needed  - Recommend, monitor, and adjust tube feedings and TPN/PPN based on assessed needs  - Assess need for intravenous fluids  - Provide specific nutrition/hydration education as appropriate  - Include patient/family/caregiver in decisions related to nutrition  Outcome: Progressing     Problem: Prexisting or High Potential for Compromised Skin Integrity  Goal: Skin integrity is maintained or improved  Description: INTERVENTIONS:  - Identify patients at risk for skin breakdown  - Assess and monitor skin integrity  - Assess and monitor nutrition and hydration status  - Monitor labs   - Assess for incontinence   - Turn and reposition patient  - Assist with mobility/ambulation  - Relieve pressure over bony prominences  - Avoid friction and shearing  - Provide appropriate hygiene as needed including keeping skin clean and dry  - Evaluate need for skin moisturizer/barrier cream  - Collaborate with interdisciplinary team   - Patient/family teaching  - Consider wound care consult   Outcome: Progressing

## 2024-10-21 NOTE — ASSESSMENT & PLAN NOTE
Wt Readings from Last 3 Encounters:   10/21/24 77.1 kg (170 lb)   09/16/24 74.8 kg (165 lb)   07/16/24 77.1 kg (170 lb)     ECHO 5/2024: EF 70%, systolic function is vigorous, diastolic function is moderately abnormal, consistent with grade II relaxation  CXR 10/16: Findings most suggestive of CHF with basilar edema and pleural effusions.  Weight noted to be 175 lbs was 165 lbs one month ago, she is near target weight    Home regimen includes torsemide 10 mg daily, will likely resume tomorrow   Received 60 mg IV lasix in ED  IV lasix 40 mg daily with adequate output response  Monitor Is/Os and daily weights  Low sodium diet and fluid restriction  As noted above patient is continuing to have persistent hypoxia with conversation, repeat chest x-ray ordered, follow-up on results and plan as noted above

## 2024-10-21 NOTE — ASSESSMENT & PLAN NOTE
History of severe COPD chronically on 4-6 L nc  Do not suspect acute exacerbation  Continue scheduled duonebs, performist, Singulair  Procalcitonin negative x 2  Urinary antigens negative  RP2 panel negative  Sputum culture negative  Patient received 3 doses of ceftriaxone, discontinue and monitor off antibiotics  Remains afebrile   Wheezing today, started IV solumedrol Q12

## 2024-10-21 NOTE — ASSESSMENT & PLAN NOTE
Lab Results   Component Value Date    EGFR 80 06/19/2024    EGFR 81 06/18/2024    EGFR 81 06/17/2024    CREATININE 0.64 10/21/2024    CREATININE 0.59 (L) 10/20/2024    CREATININE 0.63 10/19/2024   Cr currently stable at baseline  Daily bmp

## 2024-10-21 NOTE — PLAN OF CARE
Problem: RESPIRATORY - ADULT  Goal: Achieves optimal ventilation and oxygenation  Description: INTERVENTIONS:  - Assess for changes in respiratory status  - Assess for changes in mentation and behavior  - Position to facilitate oxygenation and minimize respiratory effort  - Oxygen administered by appropriate delivery if ordered  - Initiate smoking cessation education as indicated  - Encourage broncho-pulmonary hygiene including cough, deep breathe, Incentive Spirometry  - Assess the need for suctioning and aspirate as needed  - Assess and instruct to report SOB or any respiratory difficulty  - Respiratory Therapy support as indicated  Outcome: Progressing     Problem: PAIN - ADULT  Goal: Verbalizes/displays adequate comfort level or baseline comfort level  Description: Interventions:  - Encourage patient to monitor pain and request assistance  - Assess pain using appropriate pain scale  - Administer analgesics based on type and severity of pain and evaluate response  - Implement non-pharmacological measures as appropriate and evaluate response  - Consider cultural and social influences on pain and pain management  - Notify physician/advanced practitioner if interventions unsuccessful or patient reports new pain  Outcome: Progressing     Problem: INFECTION - ADULT  Goal: Absence or prevention of progression during hospitalization  Description: INTERVENTIONS:  - Assess and monitor for signs and symptoms of infection  - Monitor lab/diagnostic results  - Monitor all insertion sites, i.e. indwelling lines, tubes, and drains  - Monitor endotracheal if appropriate and nasal secretions for changes in amount and color  - [place] appropriate cooling/warming therapies per order  - Administer medications as ordered  - Instruct and encourage patient and family to use good hand hygiene technique  - Identify and instruct in appropriate isolation precautions for identified infection/condition  Outcome: Progressing  Goal:  Absence of fever/infection during neutropenic period  Description: INTERVENTIONS:  - Monitor WBC    Outcome: Progressing     Problem: SAFETY ADULT  Goal: Patient will remain free of falls  Description: INTERVENTIONS:  - Educate patient/family on patient safety including physical limitations  - Instruct patient to call for assistance with activity   - Consult OT/PT to assist with strengthening/mobility   - Keep Call bell within reach  - Keep bed low and locked with side rails adjusted as appropriate  - Keep care items and personal belongings within reach  - Initiate and maintain comfort rounds  - Make Fall Risk Sign visible to staff  - Offer Toileting every 2 Hours, in advance of need  - Initiate/Maintain bed/chair alarm  - Obtain necessary fall risk management equipment: bracelet/socks   - Apply yellow socks and bracelet for high fall risk patients  - Consider moving patient to room near nurses station  Outcome: Progressing  Goal: Maintain or return to baseline ADL function  Description: INTERVENTIONS:  -  Assess patient's ability to carry out ADLs; assess patient's baseline for ADL function and identify physical deficits which impact ability to perform ADLs (bathing, care of mouth/teeth, toileting, grooming, dressing, etc.)  - Assess/evaluate cause of self-care deficits   - Assess range of motion  - Assess patient's mobility; develop plan if impaired  - Assess patient's need for assistive devices and provide as appropriate  - Encourage maximum independence but intervene and supervise when necessary  - Involve family in performance of ADLs  - Assess for home care needs following discharge   - Consider OT consult to assist with ADL evaluation and planning for discharge  - Provide patient education as appropriate  Outcome: Progressing  Goal: Maintains/Returns to pre admission functional level  Description: INTERVENTIONS:  - Perform AM-PAC 6 Click Basic Mobility/ Daily Activity assessment daily.  - Set and communicate  daily mobility goal to care team and patient/family/caregiver.   - Collaborate with rehabilitation services on mobility goals if consulted  - Perform Range of Motion 12 times a day.  - Reposition patient every 2 hours.  - Dangle patient 3 times a day  - Stand patient 3 times a day  - Ambulate patient 3 times a day  - Out of bed to chair 3 times a day   - Out of bed for meals 3 times a day  - Out of bed for toileting  - Record patient progress and toleration of activity level   Outcome: Progressing     Problem: DISCHARGE PLANNING  Goal: Discharge to home or other facility with appropriate resources  Description: INTERVENTIONS:  - Identify barriers to discharge w/patient and caregiver  - Arrange for needed discharge resources and transportation as appropriate  - Identify discharge learning needs (meds, wound care, etc.)  - Arrange for interpretive services to assist at discharge as needed  - Refer to Case Management Department for coordinating discharge planning if the patient needs post-hospital services based on physician/advanced practitioner order or complex needs related to functional status, cognitive ability, or social support system  Outcome: Progressing     Problem: Knowledge Deficit  Goal: Patient/family/caregiver demonstrates understanding of disease process, treatment plan, medications, and discharge instructions  Description: Complete learning assessment and assess knowledge base.  Interventions:  - Provide teaching at level of understanding  - Provide teaching via preferred learning methods  Outcome: Progressing     Problem: Nutrition/Hydration-ADULT  Goal: Nutrient/Hydration intake appropriate for improving, restoring or maintaining nutritional needs  Description: Monitor and assess patient's nutrition/hydration status for malnutrition. Collaborate with interdisciplinary team and initiate plan and interventions as ordered.  Monitor patient's weight and dietary intake as ordered or per policy. Utilize  nutrition screening tool and intervene as necessary. Determine patient's food preferences and provide high-protein, high-caloric foods as appropriate.     INTERVENTIONS:  - Monitor oral intake, urinary output, labs, and treatment plans  - Assess nutrition and hydration status and recommend course of action  - Evaluate amount of meals eaten  - Assist patient with eating if necessary   - Allow adequate time for meals  - Recommend/ encourage appropriate diets, oral nutritional supplements, and vitamin/mineral supplements  - Order, calculate, and assess calorie counts as needed  - Recommend, monitor, and adjust tube feedings and TPN/PPN based on assessed needs  - Assess need for intravenous fluids  - Provide specific nutrition/hydration education as appropriate  - Include patient/family/caregiver in decisions related to nutrition  Outcome: Progressing     Problem: Prexisting or High Potential for Compromised Skin Integrity  Goal: Skin integrity is maintained or improved  Description: INTERVENTIONS:  - Identify patients at risk for skin breakdown  - Assess and monitor skin integrity  - Assess and monitor nutrition and hydration status  - Monitor labs   - Assess for incontinence   - Turn and reposition patient  - Assist with mobility/ambulation  - Relieve pressure over bony prominences  - Avoid friction and shearing  - Provide appropriate hygiene as needed including keeping skin clean and dry  - Evaluate need for skin moisturizer/barrier cream  - Collaborate with interdisciplinary team   - Patient/family teaching  - Consider wound care consult   Outcome: Progressing

## 2024-10-21 NOTE — PROGRESS NOTES
Progress Note - Hospitalist   Name: Eryn Morocho 84 y.o. adult I MRN: [id number]  Unit/Bed#: [address] [place], [place] [address] I Date of Admission: 10/16/2024   Date of Service: 10/21/2024 I Hospital Day: 4    Assessment & Plan  Acute on chronic respiratory failure (HCC)  Patient with history of diastolic CHF, severe COPD, history of NSCLC s/p wedge resection/chemo, chronic respiratory failure on 4-6 L nc presented to ED with progressive shortness of breath for past week with productive cough.  CXR 10/16: Findings most suggestive of CHF with basilar edema and pleural effusions. Could not entirely exclude pneumonia.  ABG 7.3/56/78/93 on 9 L  , higher than previous elevated levels  Procalcitonin negative x 2  Afebrile, no leukocytosis  Suspect in setting of acute CHF  Continue IV lasix 40 mg daily  Continue scheduled duonebs  Received 3 doses of ceftriaxone, will discontinue and monitor patient off antibiotic as patient has been afebrile and WBCs within normal limits  Denies cough  Currently on 9 L mid flow, O2 sats at rest 92%, however with conversation noted patient to drop O2 sats to 88%; chronically on 4-6 L nc at home  Repeat chest x-ray 10/20 showing 2.8 cm nodular opacity projecting over the right heart. This could be due to enlargement of the right lower lobe nodule on the chest CT from June 2024, suspicious for malignancy. A progress note from the patient's PCP from 9/16/2024 indicates that she has been advised against undergoing a PET/CT scan for her right lower lobe nodule due to her age and its slow rate of growth. But if further evaluation is desired, a chest CT with no contrast can be obtained.  Pulmonology consult appreciated  Acute on chronic diastolic congestive heart failure (HCC)  Wt Readings from Last 3 Encounters:   10/21/24 77.1 kg (170 lb)   09/16/24 74.8 kg (165 lb)   07/16/24 77.1 kg (170 lb)     ECHO 5/2024: EF 70%, systolic function is vigorous, diastolic function is moderately abnormal, consistent with  grade II relaxation  CXR 10/16: Findings most suggestive of CHF with basilar edema and pleural effusions.  Weight noted to be 175 lbs was 165 lbs one month ago, she is near target weight    Home regimen includes torsemide 10 mg daily, will likely resume tomorrow   Received 60 mg IV lasix in ED  IV lasix 40 mg daily with adequate output response  Monitor Is/Os and daily weights  Low sodium diet and fluid restriction  As noted above patient is continuing to have persistent hypoxia with conversation, repeat chest x-ray ordered, follow-up on results and plan as noted above   COPD (chronic obstructive pulmonary disease) (HCC)  History of severe COPD chronically on 4-6 L nc  Do not suspect acute exacerbation  Continue scheduled duonebs, performist, Singulair  Procalcitonin negative x 2  Urinary antigens negative  RP2 panel negative  Sputum culture negative  Patient received 3 doses of ceftriaxone, discontinue and monitor off antibiotics  Remains afebrile   Wheezing today, started IV solumedrol Q12   Non-small cell cancer of left lung ([facility])  History of non-small cell lung cancer s/p left upper lobe wedge resection and chemotherapy  CT chest in June 2024 showed enlargement of right lower lobe nodule highly suspicious for second primary lung neoplasm  Has outpatient PET scan ordered, but per PCP documentation is not recommended due to age and slow growth rate  JULIO (obstructive sleep apnea)  continue BiPAP hs  Patient reports compliance at home  Hyperlipidemia  Continue statin  Heart healthy diet  Recurrent major depressive disorder, in partial remission ([facility])  Continue Celexa  Supportive care   PAD (peripheral artery disease) ([facility])  Continue plavix and statin  Paroxysmal atrial fibrillation (HCC)  Continue Cardizem 30 mg tid  Continue Xarelto  EKG shows NSR  Stage 3a chronic kidney disease ([facility])  Lab Results   Component Value Date    EGFR 80 06/19/2024    EGFR 81 06/18/2024    EGFR 81 06/17/2024    CREATININE 0.64  10/21/2024    CREATININE 0.59 (L) 10/20/2024    CREATININE 0.63 10/19/2024   Cr currently stable at baseline  Daily bmp    VTE Pharmacologic Prophylaxis: VTE Score: 7 High Risk (Score >/= 5) - Pharmacological DVT Prophylaxis Ordered: rivaroxaban (Xarelto). Sequential Compression Devices Ordered.    Mobility:   Basic Mobility Inpatient Raw Score: 17  JH-HLM Goal: 5: Stand one or more mins  JH-HLM Achieved: 5: Stand (1 or more minutes)  JH-HLM Goal achieved. Continue to encourage appropriate mobility.    Patient Centered Rounds: I performed bedside rounds with nursing staff today.     Education and Discussions with Family / Patient: Attempted to update  (daughter, Karen) via phone. Unable to contact, phone kept ringing without option to leave voicemail.    Current Length of Stay: 4 day(s)  Current Patient Status: Inpatient   Certification Statement: The patient will continue to require additional inpatient hospital stay due to possible IR procedures. Continues with diuresis and above baseline oxygen requirements.  Discharge Plan: Anticipate discharge in 24-48 hrs to home.    Code Status: Level 1 - Full Code    Subjective   Patient seen and examined at bedside, offers no new complaints. States she does want to go home today; however, understands plan for today given the continued oxygen requirements and diuretics. Patient states she did sleep well last night and overall having a good appetite.    Objective :  Temp:  [97.4 °F (36.3 °C)-98.2 °F (36.8 °C)] 97.7 °F (36.5 °C)  HR:  [64-71] 65  BP: (146-178)/(59-69) 151/62  Resp:  [17-20] 17  SpO2:  [89 %-95 %] 90 %  O2 Device: Mid flow nasal cannula  Nasal Cannula O2 Flow Rate (L/min):  [9 L/min] 9 L/min  FiO2 (%):  [9-50] 50    Body mass index is 31.09 kg/m².     Input and Output Summary (last 24 hours):     Intake/Output Summary (Last 24 hours) at 10/21/2024 0930  Last data filed at 10/20/2024 1400  Gross per 24 hour   Intake --   Output 200 ml   Net -200  ml       Physical Exam  Vitals and nursing note reviewed.   Constitutional:       General: She is not in acute distress.     Appearance: She is well-developed. She is obese. She is not ill-appearing or toxic-appearing.   HENT:      Head: Normocephalic and atraumatic.      Nose: Nose normal.      Mouth/Throat:      Mouth: Mucous membranes are moist.      Pharynx: Oropharynx is clear.   Eyes:      Conjunctiva/sclera: Conjunctivae normal.   Cardiovascular:      Rate and Rhythm: Normal rate and regular rhythm.      Pulses: Normal pulses.      Heart sounds: Normal heart sounds. No murmur heard.  Pulmonary:      Effort: Pulmonary effort is normal. No respiratory distress.      Breath sounds: Wheezing and rhonchi present. No rales.   Abdominal:      General: Bowel sounds are normal. There is no distension.      Palpations: Abdomen is soft.      Tenderness: There is no abdominal tenderness. There is no guarding.   Musculoskeletal:         General: No swelling.      Cervical back: Neck supple.      Right lower leg: No edema.      Left lower leg: No edema.   Skin:     General: Skin is warm and dry.      Capillary Refill: Capillary refill takes less than 2 seconds.      Coloration: Skin is pale.   Neurological:      General: No focal deficit present.      Mental Status: She is alert and oriented to person, place, and time.   Psychiatric:         Mood and Affect: Mood normal.       Lines/Drains:  Lines/Drains/Airways       Active Status       Name Placement date Placement time Site Days    External Urinary Catheter 10/20/24  2200  -- less than 1                    Lab Results: I have reviewed the following results:   Results from last 7 days   Lab Units 10/21/24  0556 10/17/24  0612 10/16/24  1101   WBC Thousand/uL 3.14*   < > 4.39   HEMOGLOBIN g/dL 12.5   < > 13.9   HEMATOCRIT % 39.5   < > 44.7   PLATELETS Thousands/uL 180   < > 182   SEGS PCT %  --   --  75   LYMPHO PCT %  --   --  14   MONO PCT %  --   --  6   EOS PCT %  --    --  3    < > = values in this interval not displayed.     Results from last 7 days   Lab Units 10/21/24  0556 10/17/24  0612 10/16/24  1101   SODIUM mmol/L 138   < > 139   POTASSIUM mmol/L 4.2   < > 3.8   CHLORIDE mmol/L 96   < > 98   CO2 mmol/L 39*   < > 35*   BUN mg/dL 21   < > 16   CREATININE mg/dL 0.64   < > 0.69   ANION GAP mmol/L 3*   < > 6   CALCIUM mg/dL 8.7   < > 8.8   ALBUMIN g/dL  --   --  3.8   TOTAL BILIRUBIN mg/dL  --   --  0.53   ALK PHOS U/L  --   --  47   ALT U/L  --   --  6*   AST U/L  --   --  8   GLUCOSE RANDOM mg/dL 130   < > 153*    < > = values in this interval not displayed.                 Results from last 7 days   Lab Units 10/17/24  0612 10/16/24  1101   PROCALCITONIN ng/ml <0.05 <0.05       Recent Cultures (last 7 days):   Results from last 7 days   Lab Units 10/16/24  1746 10/16/24  1705   SPUTUM CULTURE  2+ Growth of  --    GRAM STAIN RESULT  1+ Epithelial cells per low power field*  Rare Gram variable rods*  No polys seen*  --    LEGIONELLA URINARY ANTIGEN   --  Negative       Imaging Results Review: I reviewed radiology reports from this admission including: chest xray.  Other Study Results Review: EKG was reviewed.     Last 24 Hours Medication List:     Current Facility-Administered Medications:     acetaminophen (TYLENOL) tablet 650 mg, Q4H PRN    atorvastatin (LIPITOR) tablet 40 mg, Daily With Dinner    budesonide (PULMICORT) inhalation solution 0.5 mg, BID    citalopram (CeleXA) tablet 20 mg, Daily    clopidogrel (PLAVIX) tablet 75 mg, Daily    diltiazem (CARDIZEM) tablet 30 mg, Q8H ANNE    fish oil capsule 1,000 mg, Daily    formoterol (PERFOROMIST) nebulizer solution 20 mcg, BID    furosemide (LASIX) injection 40 mg, Daily    guaiFENesin (MUCINEX) 12 hr tablet 600 mg, Q12H ANNE    ipratropium-albuterol (DUO-NEB) 0.5-2.5 mg/3 mL inhalation solution 3 mL, Q6H    montelukast (SINGULAIR) tablet 10 mg, HS    rivaroxaban (XARELTO) tablet 20 mg, Daily With Breakfast    timolol  (TIMOPTIC) 0.5 % ophthalmic solution 1 drop, BID    traZODone (DESYREL) tablet 100 mg, HS PRN    Administrative Statements   Today, Patient Was Seen By: PRANAY [place]  I have spent a total time of 35 minutes in caring for this patient on the day of the visit/encounter including Diagnostic results, Risks and benefits of tx options, Instructions for management, Patient and family education, Importance of tx compliance, Risk factor reductions, Impressions, Counseling / Coordination of care, Documenting in the medical record, Reviewing / ordering tests, medicine, procedures  , Obtaining or reviewing history  , and Communicating with other healthcare professionals .    **Please Note: This note may have been constructed using a voice recognition system.**

## 2024-10-22 ENCOUNTER — APPOINTMENT (INPATIENT)
Dept: NON INVASIVE DIAGNOSTICS | Facility: HOSPITAL | Age: 84
DRG: 193 | End: 2024-10-22
Payer: MEDICARE

## 2024-10-22 LAB
ANION GAP SERPL CALCULATED.3IONS-SCNC: 4 MMOL/L (ref 4–13)
BASE EXCESS BLDA CALC-SCNC: 8.6 MMOL/L
BODY TEMPERATURE: 97.7 DEGREES FEHRENHEIT
BSA FOR ECHO PROCEDURE: 1.8 M2
BUN SERPL-MCNC: 21 MG/DL (ref 5–25)
CALCIUM SERPL-MCNC: 9.2 MG/DL (ref 8.4–10.2)
CHLORIDE SERPL-SCNC: 96 MMOL/L (ref 96–108)
CO2 SERPL-SCNC: 38 MMOL/L (ref 21–32)
CREAT SERPL-MCNC: 0.55 MG/DL (ref 0.6–1.3)
ERYTHROCYTE [DISTWIDTH] IN BLOOD BY AUTOMATED COUNT: 13.9 % (ref 11.6–15.1)
GLUCOSE SERPL-MCNC: 171 MG/DL (ref 65–140)
HCO3 BLDA-SCNC: 36 MMOL/L (ref 22–28)
HCT VFR BLD AUTO: 37.7 % (ref 36.5–46.1)
HGB BLD-MCNC: 12 G/DL (ref 12–15.4)
MCH RBC QN AUTO: 33.2 PG (ref 26.8–34.3)
MCHC RBC AUTO-ENTMCNC: 31.8 G/DL (ref 31.4–37.4)
MCV RBC AUTO: 104 FL (ref 82–98)
NASAL CANNULA: 9
O2 CT BLDA-SCNC: 18.1 ML/DL (ref 16–23)
OXYHGB MFR BLDA: 92.6 % (ref 94–97)
PCO2 BLDA: 62.3 MM HG (ref 36–44)
PCO2 TEMP ADJ BLDA: 61 MM HG (ref 36–44)
PH BLD: 7.39 [PH] (ref 7.35–7.45)
PH BLDA: 7.38 [PH] (ref 7.35–7.45)
PLATELET # BLD AUTO: 185 THOUSANDS/UL (ref 149–390)
PMV BLD AUTO: 9.5 FL (ref 8.9–12.7)
PO2 BLD: 69.2 MM HG (ref 75–129)
PO2 BLDA: 71.6 MM HG (ref 75–129)
POTASSIUM SERPL-SCNC: 4.4 MMOL/L (ref 3.5–5.3)
RA PRESSURE ESTIMATED: 8 MMHG
RBC # BLD AUTO: 3.61 MILLION/UL (ref 3.88–5.12)
RV PSP: 28 MMHG
SL CV LV EF: 60
SODIUM SERPL-SCNC: 138 MMOL/L (ref 135–147)
SPECIMEN SOURCE: ABNORMAL
TR MAX PG: 20 MMHG
TR PEAK VELOCITY: 2.2 M/S
TRICUSPID VALVE PEAK REGURGITATION VELOCITY: 2.24 M/S
WBC # BLD AUTO: 4.2 THOUSAND/UL (ref 4.31–10.16)

## 2024-10-22 PROCEDURE — 93321 DOPPLER ECHO F-UP/LMTD STD: CPT | Performed by: INTERNAL MEDICINE

## 2024-10-22 PROCEDURE — 93325 DOPPLER ECHO COLOR FLOW MAPG: CPT

## 2024-10-22 PROCEDURE — 93321 DOPPLER ECHO F-UP/LMTD STD: CPT

## 2024-10-22 PROCEDURE — 94761 N-INVAS EAR/PLS OXIMETRY MLT: CPT

## 2024-10-22 PROCEDURE — 94760 N-INVAS EAR/PLS OXIMETRY 1: CPT

## 2024-10-22 PROCEDURE — 93308 TTE F-UP OR LMTD: CPT | Performed by: INTERNAL MEDICINE

## 2024-10-22 PROCEDURE — 97530 THERAPEUTIC ACTIVITIES: CPT

## 2024-10-22 PROCEDURE — 85027 COMPLETE CBC AUTOMATED: CPT

## 2024-10-22 PROCEDURE — 97535 SELF CARE MNGMENT TRAINING: CPT

## 2024-10-22 PROCEDURE — 99232 SBSQ HOSP IP/OBS MODERATE 35: CPT | Performed by: NURSE PRACTITIONER

## 2024-10-22 PROCEDURE — 36600 WITHDRAWAL OF ARTERIAL BLOOD: CPT

## 2024-10-22 PROCEDURE — 99232 SBSQ HOSP IP/OBS MODERATE 35: CPT

## 2024-10-22 PROCEDURE — 82805 BLOOD GASES W/O2 SATURATION: CPT | Performed by: INTERNAL MEDICINE

## 2024-10-22 PROCEDURE — 93308 TTE F-UP OR LMTD: CPT

## 2024-10-22 PROCEDURE — 94640 AIRWAY INHALATION TREATMENT: CPT

## 2024-10-22 PROCEDURE — 80048 BASIC METABOLIC PNL TOTAL CA: CPT

## 2024-10-22 PROCEDURE — 94660 CPAP INITIATION&MGMT: CPT

## 2024-10-22 PROCEDURE — 93325 DOPPLER ECHO COLOR FLOW MAPG: CPT | Performed by: INTERNAL MEDICINE

## 2024-10-22 RX ORDER — TORSEMIDE 10 MG/1
10 TABLET ORAL DAILY
Status: DISCONTINUED | OUTPATIENT
Start: 2024-10-23 | End: 2024-10-23 | Stop reason: HOSPADM

## 2024-10-22 RX ADMIN — IPRATROPIUM BROMIDE AND ALBUTEROL SULFATE 3 ML: 2.5; .5 SOLUTION RESPIRATORY (INHALATION) at 07:47

## 2024-10-22 RX ADMIN — IPRATROPIUM BROMIDE AND ALBUTEROL SULFATE 3 ML: 2.5; .5 SOLUTION RESPIRATORY (INHALATION) at 20:39

## 2024-10-22 RX ADMIN — DILTIAZEM HYDROCHLORIDE 30 MG: 30 TABLET ORAL at 16:55

## 2024-10-22 RX ADMIN — TRAZODONE HYDROCHLORIDE 100 MG: 100 TABLET ORAL at 21:41

## 2024-10-22 RX ADMIN — RIVAROXABAN 20 MG: 20 TABLET, FILM COATED ORAL at 08:53

## 2024-10-22 RX ADMIN — CLOPIDOGREL 75 MG: 75 TABLET ORAL at 08:52

## 2024-10-22 RX ADMIN — GUAIFENESIN 600 MG: 600 TABLET, EXTENDED RELEASE ORAL at 21:42

## 2024-10-22 RX ADMIN — BUDESONIDE 0.5 MG: 0.5 INHALANT RESPIRATORY (INHALATION) at 20:40

## 2024-10-22 RX ADMIN — METHYLPREDNISOLONE SODIUM SUCCINATE 40 MG: 40 INJECTION, POWDER, FOR SOLUTION INTRAMUSCULAR; INTRAVENOUS at 08:53

## 2024-10-22 RX ADMIN — FUROSEMIDE 40 MG: 10 INJECTION, SOLUTION INTRAMUSCULAR; INTRAVENOUS at 08:53

## 2024-10-22 RX ADMIN — IPRATROPIUM BROMIDE AND ALBUTEROL SULFATE 3 ML: 2.5; .5 SOLUTION RESPIRATORY (INHALATION) at 01:34

## 2024-10-22 RX ADMIN — FORMOTEROL FUMARATE DIHYDRATE 20 MCG: 20 SOLUTION RESPIRATORY (INHALATION) at 20:25

## 2024-10-22 RX ADMIN — GUAIFENESIN 600 MG: 600 TABLET, EXTENDED RELEASE ORAL at 08:53

## 2024-10-22 RX ADMIN — OMEGA-3 FATTY ACIDS CAP 1000 MG 1000 MG: 1000 CAP at 08:53

## 2024-10-22 RX ADMIN — TIMOLOL MALEATE 1 DROP: 5 SOLUTION/ DROPS OPHTHALMIC at 18:17

## 2024-10-22 RX ADMIN — IPRATROPIUM BROMIDE AND ALBUTEROL SULFATE 3 ML: 2.5; .5 SOLUTION RESPIRATORY (INHALATION) at 13:51

## 2024-10-22 RX ADMIN — MONTELUKAST 10 MG: 10 TABLET, FILM COATED ORAL at 21:41

## 2024-10-22 RX ADMIN — ATORVASTATIN CALCIUM 40 MG: 40 TABLET, FILM COATED ORAL at 16:55

## 2024-10-22 RX ADMIN — DILTIAZEM HYDROCHLORIDE 30 MG: 30 TABLET ORAL at 21:42

## 2024-10-22 RX ADMIN — DILTIAZEM HYDROCHLORIDE 30 MG: 30 TABLET ORAL at 05:23

## 2024-10-22 RX ADMIN — BUDESONIDE 0.5 MG: 0.5 INHALANT RESPIRATORY (INHALATION) at 07:47

## 2024-10-22 RX ADMIN — TIMOLOL MALEATE 1 DROP: 5 SOLUTION/ DROPS OPHTHALMIC at 08:54

## 2024-10-22 RX ADMIN — ACETAMINOPHEN 650 MG: 325 TABLET ORAL at 05:21

## 2024-10-22 RX ADMIN — CITALOPRAM HYDROBROMIDE 20 MG: 10 TABLET ORAL at 08:55

## 2024-10-22 RX ADMIN — FORMOTEROL FUMARATE DIHYDRATE 20 MCG: 20 SOLUTION RESPIRATORY (INHALATION) at 07:47

## 2024-10-22 NOTE — ASSESSMENT & PLAN NOTE
Wt Readings from Last 3 Encounters:   10/22/24 78.5 kg (173 lb)   09/16/24 74.8 kg (165 lb)   07/16/24 77.1 kg (170 lb)   Diuresis and management per primary team.

## 2024-10-22 NOTE — PHYSICAL THERAPY NOTE
"   PT TREATMENT     10/22/24 1504   PT Last Visit   PT Visit Date 10/22/24   Note Type   Note Type Treatment   Pain Assessment   Pain Assessment Tool 0-10   Pain Score No Pain   Restrictions/Precautions   Other Precautions O2  (keep Sp02 >90%)   General   Chart Reviewed Yes   Cognition   Overall Cognitive Status WFL   Subjective   Subjective \"I'm going home today, can I get dressed?\"   Transfers   Sit to Stand 4  Minimal assistance   Stand to Sit 4  Minimal assistance   Stand pivot 4  Minimal assistance   Additional items   (with walker to commode)   Ambulation/Elevation   Gait Assistance 4  Minimal assist   Assistive Device Rolling walker  (12L02 with oxymyzer)   Distance 25 feet in room;  Sp02 >88 % with activity but dropped to 83% after sitting.   Balance   Static Sitting Fair +   Static Standing Fair   Ambulatory Fair   Endurance Deficit   Endurance Deficit   (Sp02 drops even wtih 12L02 oxymizer)   Activity Tolerance   Activity Tolerance Patient tolerated treatment well;Patient limited by fatigue   Assessment   Prognosis Good   Problem List Decreased strength;Decreased endurance;Impaired balance;Decreased mobility   Assessment Pt demonstrates improving activity tolerance however Sp02 drops with little activity despite 12L02 via oxymizer.  Pt needs moderate cues to remember to use pursed lip breathing and breathe in through her nose not her mouth.    The patient's AM-PAC Basic Mobility Inpatient Short Form Raw Score is 14. A Raw score of greater than 16 suggests the patient may benefit from discharge to home. Please also refer to the recommendation of the Physical Therapist for safe discharge planning.         Plan   Treatment/Interventions Functional transfer training;LE strengthening/ROM;Therapeutic exercise;Endurance training;Gait training;Equipment eval/education   Progress Progressing toward goals   PT Frequency 3-5x/wk   Discharge Recommendation   Rehab Resource Intensity Level, PT II (Moderate Resource " Intensity)   AM-PAC Basic Mobility Inpatient   Turning in Flat Bed Without Bedrails 3   Lying on Back to Sitting on Edge of Flat Bed Without Bedrails 3   Moving Bed to Chair 3   Standing Up From Chair Using Arms 3   Walk in Room 3   Climb 3-5 Stairs With Railing 2   Basic Mobility Inpatient Raw Score 17   Basic Mobility Standardized Score 39.67   [facility] Highest Level Of Mobility   -[facility] Goal 5: Stand one or more mins   -HL Achieved 7: Walk 25 feet or more   End of Consult   Patient Position at End of Consult All needs within reach;Bed/Chair alarm activated;Seated edge of bed   Licensure   [place] License Number  Ofelia Nagel PT [id number]

## 2024-10-22 NOTE — ASSESSMENT & PLAN NOTE
With hypoxia and hypercapnia  Baseline oxygen requirement is 6 to 10 L nasal cannula with Oxymizer as needed per last hospital note.  Last office note states that she was able to decrease to 5 L/min.   Titrate supplemental oxygen to maintain saturations greater than or equal to 89%.

## 2024-10-22 NOTE — ASSESSMENT & PLAN NOTE
History of non-small cell lung cancer s/p left upper lobe wedge resection and chemotherapy  CT chest in June 2024 showed enlargement of right lower lobe nodule highly suspicious for second primary lung neoplasm  Has outpatient PET scan ordered, but per PCP documentation is not recommended due to age and slow growth rate  She does not desire any further evaluation of her enlarging lung nodule

## 2024-10-22 NOTE — PROGRESS NOTES
Progress Note - Hospitalist   Name: Eryn Morocho 84 y.o. adult I MRN: [id number]  Unit/Bed#: [address] [place], [place] [id number] I Date of Admission: 10/16/2024   Date of Service: 10/22/2024 I Hospital Day: 5    Assessment & Plan  Acute on chronic respiratory failure (HCC)  Patient with history of diastolic CHF, severe COPD, history of NSCLC s/p wedge resection/chemo, chronic respiratory failure on 4-6 L nc presented to ED with progressive shortness of breath for past week with productive cough.  CXR 10/16: Findings most suggestive of CHF with basilar edema and pleural effusions. Could not entirely exclude pneumonia.  ABG 7.3/56/78/93 on 9 L  , higher than previous elevated levels  Procalcitonin negative x 2  Afebrile, no leukocytosis  Suspect in setting of acute CHF  Continue IV lasix 40 mg daily  Continue scheduled duonebs  Received 3 doses of ceftriaxone, will discontinue and monitor patient off antibiotic as patient has been afebrile and WBCs within normal limits  Denies cough  Currently on 9 L mid flow, O2 sats at rest 92%, however with conversation noted patient to drop O2 sats to 88%; chronically on 4-6 L nc at home  Repeat chest x-ray 10/20 showing 2.8 cm nodular opacity projecting over the right heart. This could be due to enlargement of the right lower lobe nodule on the chest CT from June 2024, suspicious for malignancy. A progress note from the patient's PCP from 9/16/2024 indicates that she has been advised against undergoing a PET/CT scan for her right lower lobe nodule due to her age and its slow rate of growth. But if further evaluation is desired, a chest CT with no contrast can be obtained.  Pulmonology consult appreciated  ECHO 10/22: technically difficult study. Showing Left ventricular wall thickness is mildly increased. There is mild asymmetric hypertrophy of the basal septal wall. EF 60%. Systolic function is vigorous. Wall motion is normal. No patent foramen oval detected. No significant change from  previous exam.  10/22 ABG without any drastic changes from baseline in the setting of underlying lung condition.  Home O2 eval today and patient requiring 12L with exertion, at this point not able to send home due to significant supplemental oxygen needs.  Plan for patient to return to 6-10L with Oxymizer and follow up with Dr. Moreno outpatient  Acute on chronic diastolic congestive heart failure (HCC)  Wt Readings from Last 3 Encounters:   10/22/24 78.5 kg (173 lb)   09/16/24 74.8 kg (165 lb)   07/16/24 77.1 kg (170 lb)     ECHO 5/2024: EF 70%, systolic function is vigorous, diastolic function is moderately abnormal, consistent with grade II relaxation  CXR 10/16: Findings most suggestive of CHF with basilar edema and pleural effusions.  Weight noted to be 175 lbs was 165 lbs one month ago, she is near target weight    Home regimen includes torsemide 10 mg daily, resume tomorrow  Received 60 mg IV lasix in ED  IV lasix 40 mg daily with adequate output response  Monitor Is/Os and daily weights  Low sodium diet and fluid restriction  As noted above patient is continuing to have persistent hypoxia with conversation, repeat chest x-ray ordered, follow-up on results and plan as noted above   COPD (chronic obstructive pulmonary disease) ([facility])  History of severe COPD chronically on 4-6 L nc  Do not suspect acute exacerbation  Continue scheduled duonebs, performist, Singulair  Procalcitonin negative x 2  Urinary antigens negative  RP2 panel negative  Sputum culture negative  Patient received 3 doses of ceftriaxone, discontinue and monitor off antibiotics  Remains afebrile   IV solumedrol decreased to once daily, wheezing resolved  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)  History of non-small cell lung cancer s/p left upper lobe wedge resection and chemotherapy  CT chest in June 2024 showed enlargement of right lower lobe nodule highly suspicious for second primary lung neoplasm  Has outpatient PET scan ordered,  but per PCP documentation is not recommended due to age and slow growth rate  She does not desire any further evaluation of her enlarging lung nodule.  JULIO (obstructive sleep apnea)  continue BiPAP hs  Patient reports compliance at home  Hyperlipidemia  Continue statin  Heart healthy diet  Recurrent major depressive disorder, in partial remission (HCC)  Continue Celexa  Supportive care   PAD (peripheral artery disease) (HCC)  Continue plavix and statin  Paroxysmal atrial fibrillation (HCC)  Continue Cardizem 30 mg tid  Continue Xarelto  EKG shows NSR  Stage 3a chronic kidney disease (HCC)  Lab Results   Component Value Date    EGFR 80 06/19/2024    EGFR 81 06/18/2024    EGFR 81 06/17/2024    CREATININE 0.55 (L) 10/22/2024    CREATININE 0.64 10/21/2024    CREATININE 0.59 (L) 10/20/2024   Cr currently stable at baseline  Daily bmp    VTE Pharmacologic Prophylaxis: VTE Score: 7 High Risk (Score >/= 5) - Pharmacological DVT Prophylaxis Ordered: rivaroxaban (Xarelto). Sequential Compression Devices Ordered.    Mobility:   Basic Mobility Inpatient Raw Score: 17  JH-HLM Goal: 5: Stand one or more mins  JH-HLM Achieved: 7: Walk 25 feet or more  JH-HLM Goal achieved. Continue to encourage appropriate mobility.    Patient Centered Rounds: I performed bedside rounds with nursing staff today.   Discussions with Specialists or Other Care Team Provider:  Pulmonology, CM    Education and Discussions with Family / Patient: Updated  (, Garret and daughter, Karen) via phone.    Current Length of Stay: 5 day(s)  Current Patient Status: Inpatient   Certification Statement: The patient will continue to require additional inpatient hospital stay due to high supplemental oxygen needs requiring titration to patients baseline  Discharge Plan: Anticipate discharge tomorrow to home with home services. [facility].    Code Status: Level 1 - Full Code    Subjective   Patient seen and examined at bedside and offers no new  complaints. Slept well overnight without any acute issues overnight. Good appetite. Her wheezing has improved. Patient states she wants to go home to see her dogs. Patient upset after home O2 eval which is delaying her discharge. Patient aware of oxygen goals as her home concentrator only goes up to 10L and she requires 12L. Patient understands plan for titration until she can be safely discharged to home.     Objective :  Temp:  [97.6 °F (36.4 °C)-97.7 °F (36.5 °C)] 97.7 °F (36.5 °C)  HR:  [63-67] 63  BP: (135-158)/(59-70) 158/59  Resp:  [18] 18  SpO2:  [90 %-92 %] 91 %  O2 Device: Mid flow nasal cannula  Nasal Cannula O2 Flow Rate (L/min):  [9 L/min] 9 L/min    Body mass index is 31.64 kg/m².     Input and Output Summary (last 24 hours):     Intake/Output Summary (Last 24 hours) at 10/22/2024 1517  Last data filed at 10/22/2024 0709  Gross per 24 hour   Intake --   Output 300 ml   Net -300 ml       Physical Exam  Vitals and nursing note reviewed.   Constitutional:       General: She is not in acute distress.     Appearance: She is well-developed. She is obese. She is not ill-appearing or toxic-appearing.   HENT:      Head: Normocephalic and atraumatic.      Nose: Nose normal.      Mouth/Throat:      Mouth: Mucous membranes are moist.      Pharynx: Oropharynx is clear.   Eyes:      Conjunctiva/sclera: Conjunctivae normal.   Cardiovascular:      Rate and Rhythm: Normal rate and regular rhythm.      Pulses: Normal pulses.      Heart sounds: Normal heart sounds. No murmur heard.  Pulmonary:      Effort: Pulmonary effort is normal. No respiratory distress.      Breath sounds: Normal breath sounds. No wheezing or rhonchi.   Abdominal:      General: There is no distension.      Palpations: Abdomen is soft.      Tenderness: There is no abdominal tenderness.   Musculoskeletal:         General: No swelling.      Cervical back: Neck supple.   Skin:     General: Skin is warm and dry.      Capillary Refill: Capillary refill  takes less than 2 seconds.      Coloration: Skin is pale.   Neurological:      General: No focal deficit present.      Mental Status: She is alert and oriented to person, place, and time.   Psychiatric:         Mood and Affect: Mood normal.       Lines/Drains:  Lines/Drains/Airways       Active Status       Name Placement date Placement time Site Days    External Urinary Catheter 10/20/24  2200  -- 1                    Lab Results: I have reviewed the following results:   Results from last 7 days   Lab Units 10/22/24  0509 10/17/24  0612 10/16/24  1101   WBC Thousand/uL 4.20*   < > 4.39   HEMOGLOBIN g/dL 12.0   < > 13.9   HEMATOCRIT % 37.7   < > 44.7   PLATELETS Thousands/uL 185   < > 182   SEGS PCT %  --   --  75   LYMPHO PCT %  --   --  14   MONO PCT %  --   --  6   EOS PCT %  --   --  3    < > = values in this interval not displayed.     Results from last 7 days   Lab Units 10/22/24  0509 10/17/24  0612 10/16/24  1101   SODIUM mmol/L 138   < > 139   POTASSIUM mmol/L 4.4   < > 3.8   CHLORIDE mmol/L 96   < > 98   CO2 mmol/L 38*   < > 35*   BUN mg/dL 21   < > 16   CREATININE mg/dL 0.55*   < > 0.69   ANION GAP mmol/L 4   < > 6   CALCIUM mg/dL 9.2   < > 8.8   ALBUMIN g/dL  --   --  3.8   TOTAL BILIRUBIN mg/dL  --   --  0.53   ALK PHOS U/L  --   --  47   ALT U/L  --   --  6*   AST U/L  --   --  8   GLUCOSE RANDOM mg/dL 171*   < > 153*    < > = values in this interval not displayed.                 Results from last 7 days   Lab Units 10/17/24  0612 10/16/24  1101   PROCALCITONIN ng/ml <0.05 <0.05       Recent Cultures (last 7 days):   Results from last 7 days   Lab Units 10/16/24  1746 10/16/24  1705   SPUTUM CULTURE  2+ Growth of  --    GRAM STAIN RESULT  1+ Epithelial cells per low power field*  Rare Gram variable rods*  No polys seen*  --    LEGIONELLA URINARY ANTIGEN   --  Negative       Imaging Results Review: I reviewed radiology reports from this admission including: Echocardiogram.  Other Study Results  Review: No additional pertinent studies reviewed.    Last 24 Hours Medication List:     Current Facility-Administered Medications:     acetaminophen (TYLENOL) tablet 650 mg, Q4H PRN    atorvastatin (LIPITOR) tablet 40 mg, Daily With Dinner    budesonide (PULMICORT) inhalation solution 0.5 mg, BID    citalopram (CeleXA) tablet 20 mg, Daily    clopidogrel (PLAVIX) tablet 75 mg, Daily    diltiazem (CARDIZEM) tablet 30 mg, Q8H ANNE    fish oil capsule 1,000 mg, Daily    formoterol (PERFOROMIST) nebulizer solution 20 mcg, BID    guaiFENesin (MUCINEX) 12 hr tablet 600 mg, Q12H ANNE    ipratropium-albuterol (DUO-NEB) 0.5-2.5 mg/3 mL inhalation solution 3 mL, Q6H    methylPREDNISolone sodium succinate (Solu-MEDROL) injection 40 mg, Daily    montelukast (SINGULAIR) tablet 10 mg, HS    rivaroxaban (XARELTO) tablet 20 mg, Daily With Breakfast    timolol (TIMOPTIC) 0.5 % ophthalmic solution 1 drop, BID    [START ON 10/23/2024] torsemide (DEMADEX) tablet 10 mg, Daily    traZODone (DESYREL) tablet 100 mg, HS PRN    Administrative Statements   Today, Patient Was Seen By: PRANAY [place]  I have spent a total time of 45 minutes in caring for this patient on the day of the visit/encounter including Diagnostic results, Prognosis, Risks and benefits of tx options, Instructions for management, Patient and family education, Importance of tx compliance, Risk factor reductions, Impressions, Counseling / Coordination of care, Documenting in the medical record, Reviewing / ordering tests, medicine, procedures  , Obtaining or reviewing history  , and Communicating with other healthcare professionals .    **Please Note: This note may have been constructed using a voice recognition system.**

## 2024-10-22 NOTE — ASSESSMENT & PLAN NOTE
Diagnosed 7 years ago and status post left upper lobe wedge resection and chemotherapy.  Pulmonary nodule in the right lower lobe has been enlarging and a PET scan was ordered in July, but not yet done.  Further follow-up per Dr. Moreno.

## 2024-10-22 NOTE — ASSESSMENT & PLAN NOTE
Patient with history of diastolic CHF, severe COPD, history of NSCLC s/p wedge resection/chemo, chronic respiratory failure on 4-6 L nc presented to ED with progressive shortness of breath for past week with productive cough.  CXR 10/16: Findings most suggestive of CHF with basilar edema and pleural effusions. Could not entirely exclude pneumonia.  ABG 7.3/56/78/93 on 9 L  , higher than previous elevated levels  Procalcitonin negative x 2  Afebrile, no leukocytosis  Suspect in setting of acute CHF  Continue IV lasix 40 mg daily  Continue scheduled duonebs  Received 3 doses of ceftriaxone, will discontinue and monitor patient off antibiotic as patient has been afebrile and WBCs within normal limits  Denies cough  Currently on 9 L mid flow, O2 sats at rest 92%, however with conversation noted patient to drop O2 sats to 88%; chronically on 4-6 L nc at home  Repeat chest x-ray 10/20 showing 2.8 cm nodular opacity projecting over the right heart. This could be due to enlargement of the right lower lobe nodule on the chest CT from June 2024, suspicious for malignancy. A progress note from the patient's PCP from 9/16/2024 indicates that she has been advised against undergoing a PET/CT scan for her right lower lobe nodule due to her age and its slow rate of growth. But if further evaluation is desired, a chest CT with no contrast can be obtained.  Pulmonology consult appreciated  ECHO 10/22: technically difficult study. Showing Left ventricular wall thickness is mildly increased. There is mild asymmetric hypertrophy of the basal septal wall. EF 60%. Systolic function is vigorous. Wall motion is normal. No patent foramen oval detected. No significant change from previous exam.  10/22 ABG without any drastic changes from baseline in the setting of underlying lung condition.  Home O2 eval today and patient requiring 12L with exertion, at this point not able to send home due to significant supplemental oxygen  needs.  Plan for patient to return to 6-10L with Oxymizer and follow up with Dr. Moreno outpatient

## 2024-10-22 NOTE — ASSESSMENT & PLAN NOTE
Wt Readings from Last 3 Encounters:   10/22/24 78.5 kg (173 lb)   09/16/24 74.8 kg (165 lb)   07/16/24 77.1 kg (170 lb)     ECHO 5/2024: EF 70%, systolic function is vigorous, diastolic function is moderately abnormal, consistent with grade II relaxation  CXR 10/16: Findings most suggestive of CHF with basilar edema and pleural effusions.  Weight noted to be 175 lbs was 165 lbs one month ago, she is near target weight    Home regimen includes torsemide 10 mg daily, resume tomorrow  Received 60 mg IV lasix in ED  IV lasix 40 mg daily with adequate output response  Monitor Is/Os and daily weights  Low sodium diet and fluid restriction  As noted above patient is continuing to have persistent hypoxia with conversation, repeat chest x-ray ordered, follow-up on results and plan as noted above

## 2024-10-22 NOTE — PLAN OF CARE
Problem: RESPIRATORY - ADULT  Goal: Achieves optimal ventilation and oxygenation  Description: INTERVENTIONS:  - Assess for changes in respiratory status  - Assess for changes in mentation and behavior  - Position to facilitate oxygenation and minimize respiratory effort  - Oxygen administered by appropriate delivery if ordered  - Initiate smoking cessation education as indicated  - Encourage broncho-pulmonary hygiene including cough, deep breathe, Incentive Spirometry  - Assess the need for suctioning and aspirate as needed  - Assess and instruct to report SOB or any respiratory difficulty  - Respiratory Therapy support as indicated  10/22/2024 1508 by Shelley Moraes RN  Outcome: Progressing  10/22/2024 1507 by Shelley Moraes RN  Outcome: Progressing     Problem: PAIN - ADULT  Goal: Verbalizes/displays adequate comfort level or baseline comfort level  Description: Interventions:  - Encourage patient to monitor pain and request assistance  - Assess pain using appropriate pain scale  - Administer analgesics based on type and severity of pain and evaluate response  - Implement non-pharmacological measures as appropriate and evaluate response  - Consider cultural and social influences on pain and pain management  - Notify physician/advanced practitioner if interventions unsuccessful or patient reports new pain  10/22/2024 1508 by Shelley Moraes RN  Outcome: Progressing  10/22/2024 1507 by Shelley Moraes RN  Outcome: Progressing     Problem: INFECTION - ADULT  Goal: Absence or prevention of progression during hospitalization  Description: INTERVENTIONS:  - Assess and monitor for signs and symptoms of infection  - Monitor lab/diagnostic results  - Monitor all insertion sites, i.e. indwelling lines, tubes, and drains  - Monitor endotracheal if appropriate and nasal secretions for changes in amount and color  - [place] appropriate cooling/warming therapies per order  - Administer medications as ordered  - Instruct  and encourage patient and family to use good hand hygiene technique  - Identify and instruct in appropriate isolation precautions for identified infection/condition  10/22/2024 1508 by Shelley Moraes RN  Outcome: Progressing  10/22/2024 1507 by Shelley Moraes RN  Outcome: Progressing  Goal: Absence of fever/infection during neutropenic period  Description: INTERVENTIONS:  - Monitor WBC    10/22/2024 1508 by Shelley Moraes RN  Outcome: Progressing  10/22/2024 1507 by Shelley Moraes RN  Outcome: Progressing     Problem: SAFETY ADULT  Goal: Patient will remain free of falls  Description: INTERVENTIONS:  - Educate patient/family on patient safety including physical limitations  - Instruct patient to call for assistance with activity   - Consult OT/PT to assist with strengthening/mobility   - Keep Call bell within reach  - Keep bed low and locked with side rails adjusted as appropriate  - Keep care items and personal belongings within reach  - Initiate and maintain comfort rounds  - Make Fall Risk Sign visible to staff  - Offer Toileting every 2 Hours, in advance of need  - Initiate/Maintain bed alarm  - Obtain necessary fall risk management equipment: call bell  - Apply yellow socks and bracelet for high fall risk patients  - Consider moving patient to room near nurses station  10/22/2024 1508 by Shelley Moraes RN  Outcome: Progressing  10/22/2024 1507 by Shelley Moraes RN  Outcome: Progressing  Goal: Maintain or return to baseline ADL function  Description: INTERVENTIONS:  -  Assess patient's ability to carry out ADLs; assess patient's baseline for ADL function and identify physical deficits which impact ability to perform ADLs (bathing, care of mouth/teeth, toileting, grooming, dressing, etc.)  - Assess/evaluate cause of self-care deficits   - Assess range of motion  - Assess patient's mobility; develop plan if impaired  - Assess patient's need for assistive devices and provide as appropriate  - Encourage  maximum independence but intervene and supervise when necessary  - Involve family in performance of ADLs  - Assess for home care needs following discharge   - Consider OT consult to assist with ADL evaluation and planning for discharge  - Provide patient education as appropriate  10/22/2024 1508 by Shelley Moraes RN  Outcome: Progressing  10/22/2024 1507 by Shelley Moraes RN  Outcome: Progressing  Goal: Maintains/Returns to pre admission functional level  Description: INTERVENTIONS:  - Perform AM-PAC 6 Click Basic Mobility/ Daily Activity assessment daily.  - Set and communicate daily mobility goal to care team and patient/family/caregiver.   - Collaborate with rehabilitation services on mobility goals if consulted  - Stand patient 3 times a day  - Ambulate patient 3 times a day  - Out of bed to chair 3 times a day   - Out of bed for meals 3 times a day  - Out of bed for toileting  - Record patient progress and toleration of activity level   10/22/2024 1508 by Shelley Moraes RN  Outcome: Progressing  10/22/2024 1507 by Shelley Moraes RN  Outcome: Progressing     Problem: DISCHARGE PLANNING  Goal: Discharge to home or other facility with appropriate resources  Description: INTERVENTIONS:  - Identify barriers to discharge w/patient and caregiver  - Arrange for needed discharge resources and transportation as appropriate  - Identify discharge learning needs (meds, wound care, etc.)  - Arrange for interpretive services to assist at discharge as needed  - Refer to Case Management Department for coordinating discharge planning if the patient needs post-hospital services based on physician/advanced practitioner order or complex needs related to functional status, cognitive ability, or social support system  10/22/2024 1508 by Shelley Moraes RN  Outcome: Progressing  10/22/2024 1507 by Shelley Moraes RN  Outcome: Progressing     Problem: Knowledge Deficit  Goal: Patient/family/caregiver demonstrates understanding of  disease process, treatment plan, medications, and discharge instructions  Description: Complete learning assessment and assess knowledge base.  Interventions:  - Provide teaching at level of understanding  - Provide teaching via preferred learning methods  10/22/2024 1508 by Shelley Moraes RN  Outcome: Progressing  10/22/2024 1507 by Shelley Moraes RN  Outcome: Progressing     Problem: Nutrition/Hydration-ADULT  Goal: Nutrient/Hydration intake appropriate for improving, restoring or maintaining nutritional needs  Description: Monitor and assess patient's nutrition/hydration status for malnutrition. Collaborate with interdisciplinary team and initiate plan and interventions as ordered.  Monitor patient's weight and dietary intake as ordered or per policy. Utilize nutrition screening tool and intervene as necessary. Determine patient's food preferences and provide high-protein, high-caloric foods as appropriate.     INTERVENTIONS:  - Monitor oral intake, urinary output, labs, and treatment plans  - Assess nutrition and hydration status and recommend course of action  - Evaluate amount of meals eaten  - Assist patient with eating if necessary   - Allow adequate time for meals  - Recommend/ encourage appropriate diets, oral nutritional supplements, and vitamin/mineral supplements  - Order, calculate, and assess calorie counts as needed  - Recommend, monitor, and adjust tube feedings and TPN/PPN based on assessed needs  - Assess need for intravenous fluids  - Provide specific nutrition/hydration education as appropriate  - Include patient/family/caregiver in decisions related to nutrition  10/22/2024 1508 by Shelley Moraes RN  Outcome: Progressing  10/22/2024 1507 by Shelley Moraes RN  Outcome: Progressing     Problem: Prexisting or High Potential for Compromised Skin Integrity  Goal: Skin integrity is maintained or improved  Description: INTERVENTIONS:  - Identify patients at risk for skin breakdown  - Assess and  monitor skin integrity  - Assess and monitor nutrition and hydration status  - Monitor labs   - Assess for incontinence   - Turn and reposition patient  - Assist with mobility/ambulation  - Relieve pressure over bony prominences  - Avoid friction and shearing  - Provide appropriate hygiene as needed including keeping skin clean and dry  - Evaluate need for skin moisturizer/barrier cream  - Collaborate with interdisciplinary team   - Patient/family teaching  - Consider wound care consult   10/22/2024 1508 by Shelley Moraes RN  Outcome: Progressing  10/22/2024 1507 by Shelley Moraes RN  Outcome: Progressing

## 2024-10-22 NOTE — ASSESSMENT & PLAN NOTE
Wt Readings from Last 3 Encounters:   10/22/24 78.5 kg (173 lb)   09/16/24 74.8 kg (165 lb)   07/16/24 77.1 kg (170 lb)     ECHO 5/2024: EF 70%, systolic function is vigorous, diastolic function is moderately abnormal, consistent with grade II relaxation  CXR 10/16: Findings most suggestive of CHF with basilar edema and pleural effusions.  Weight noted to be 175 lbs was 165 lbs one month ago, she is near target weight    Home regimen includes torsemide 10 mg daily, will likely resume tomorrow   Received 60 mg IV lasix in ED  IV lasix 40 mg daily with adequate output response  Monitor Is/Os and daily weights  Low sodium diet and fluid restriction  As noted above patient is continuing to have persistent hypoxia with conversation, repeat chest x-ray ordered, follow-up on results and plan as noted above

## 2024-10-22 NOTE — ASSESSMENT & PLAN NOTE
Patient with history of diastolic CHF, severe COPD, history of NSCLC s/p wedge resection/chemo, chronic respiratory failure on 4-6 L nc presented to ED with progressive shortness of breath for past week with productive cough.  CXR 10/16: Findings most suggestive of CHF with basilar edema and pleural effusions. Could not entirely exclude pneumonia.  ABG 7.3/56/78/93 on 9 L  , higher than previous elevated levels  Procalcitonin negative x 2  Afebrile, no leukocytosis  Suspect in setting of acute CHF  Pulmonology consult appreciated  AM ABG without any drastic changes from baseline in the setting of underlying lung condition  Repeat CXR showed possible enlargement of right lower lobe nodule suspicious for malignancy; patient indicated she does not want further workup for this and would not want treatment if it were recurrent cancer  Home O2 eval shows patient requires 9 L with Oxymizer at rest and 12 L with Oxymizer on exertion  Pulmonology had goals of care discussion with the patient indicated she would just like to be comfortable and go home. She has made herself DNR/DNI. Referral given for palliative care on discharge.  Pulmonology could not perform thoracentesis today due to elevated INR and xarelto usage, indicated patient can follow-up outpatient with IR  Cleared for discharge by pulmonology

## 2024-10-22 NOTE — PLAN OF CARE
Problem: RESPIRATORY - ADULT  Goal: Achieves optimal ventilation and oxygenation  Description: INTERVENTIONS:  - Assess for changes in respiratory status  - Assess for changes in mentation and behavior  - Position to facilitate oxygenation and minimize respiratory effort  - Oxygen administered by appropriate delivery if ordered  - Initiate smoking cessation education as indicated  - Encourage broncho-pulmonary hygiene including cough, deep breathe, Incentive Spirometry  - Assess the need for suctioning and aspirate as needed  - Assess and instruct to report SOB or any respiratory difficulty  - Respiratory Therapy support as indicated  Outcome: Progressing     Problem: PAIN - ADULT  Goal: Verbalizes/displays adequate comfort level or baseline comfort level  Description: Interventions:  - Encourage patient to monitor pain and request assistance  - Assess pain using appropriate pain scale  - Administer analgesics based on type and severity of pain and evaluate response  - Implement non-pharmacological measures as appropriate and evaluate response  - Consider cultural and social influences on pain and pain management  - Notify physician/advanced practitioner if interventions unsuccessful or patient reports new pain  Outcome: Progressing     Problem: INFECTION - ADULT  Goal: Absence or prevention of progression during hospitalization  Description: INTERVENTIONS:  - Assess and monitor for signs and symptoms of infection  - Monitor lab/diagnostic results  - Monitor all insertion sites, i.e. indwelling lines, tubes, and drains  - Monitor endotracheal if appropriate and nasal secretions for changes in amount and color  - [place] appropriate cooling/warming therapies per order  - Administer medications as ordered  - Instruct and encourage patient and family to use good hand hygiene technique  - Identify and instruct in appropriate isolation precautions for identified infection/condition  Outcome: Progressing  Goal:  Absence of fever/infection during neutropenic period  Description: INTERVENTIONS:  - Monitor WBC    Outcome: Progressing     Problem: SAFETY ADULT  Goal: Patient will remain free of falls  Description: INTERVENTIONS:  - Educate patient/family on patient safety including physical limitations  - Instruct patient to call for assistance with activity   - Consult OT/PT to assist with strengthening/mobility   - Keep Call bell within reach  - Keep bed low and locked with side rails adjusted as appropriate  - Keep care items and personal belongings within reach  - Initiate and maintain comfort rounds  - Make Fall Risk Sign visible to staff  - Offer Toileting every 2 Hours, in advance of need  - Initiate/Maintain bed/chair alarm  - Obtain necessary fall risk management equipment: bracelet/socks   - Apply yellow socks and bracelet for high fall risk patients  - Consider moving patient to room near nurses station  Outcome: Progressing  Goal: Maintain or return to baseline ADL function  Description: INTERVENTIONS:  -  Assess patient's ability to carry out ADLs; assess patient's baseline for ADL function and identify physical deficits which impact ability to perform ADLs (bathing, care of mouth/teeth, toileting, grooming, dressing, etc.)  - Assess/evaluate cause of self-care deficits   - Assess range of motion  - Assess patient's mobility; develop plan if impaired  - Assess patient's need for assistive devices and provide as appropriate  - Encourage maximum independence but intervene and supervise when necessary  - Involve family in performance of ADLs  - Assess for home care needs following discharge   - Consider OT consult to assist with ADL evaluation and planning for discharge  - Provide patient education as appropriate  Outcome: Progressing  Goal: Maintains/Returns to pre admission functional level  Description: INTERVENTIONS:  - Perform AM-PAC 6 Click Basic Mobility/ Daily Activity assessment daily.  - Set and communicate  daily mobility goal to care team and patient/family/caregiver.   - Collaborate with rehabilitation services on mobility goals if consulted  - Perform Range of Motion 12 times a day.  - Reposition patient every 2 hours.  - Dangle patient 3 times a day  - Stand patient 3 times a day  - Ambulate patient 3 times a day  - Out of bed to chair 3 times a day   - Out of bed for meals 3 times a day  - Out of bed for toileting  - Record patient progress and toleration of activity level   Outcome: Progressing     Problem: DISCHARGE PLANNING  Goal: Discharge to home or other facility with appropriate resources  Description: INTERVENTIONS:  - Identify barriers to discharge w/patient and caregiver  - Arrange for needed discharge resources and transportation as appropriate  - Identify discharge learning needs (meds, wound care, etc.)  - Arrange for interpretive services to assist at discharge as needed  - Refer to Case Management Department for coordinating discharge planning if the patient needs post-hospital services based on physician/advanced practitioner order or complex needs related to functional status, cognitive ability, or social support system  Outcome: Progressing     Problem: Knowledge Deficit  Goal: Patient/family/caregiver demonstrates understanding of disease process, treatment plan, medications, and discharge instructions  Description: Complete learning assessment and assess knowledge base.  Interventions:  - Provide teaching at level of understanding  - Provide teaching via preferred learning methods  Outcome: Progressing     Problem: Nutrition/Hydration-ADULT  Goal: Nutrient/Hydration intake appropriate for improving, restoring or maintaining nutritional needs  Description: Monitor and assess patient's nutrition/hydration status for malnutrition. Collaborate with interdisciplinary team and initiate plan and interventions as ordered.  Monitor patient's weight and dietary intake as ordered or per policy. Utilize  nutrition screening tool and intervene as necessary. Determine patient's food preferences and provide high-protein, high-caloric foods as appropriate.     INTERVENTIONS:  - Monitor oral intake, urinary output, labs, and treatment plans  - Assess nutrition and hydration status and recommend course of action  - Evaluate amount of meals eaten  - Assist patient with eating if necessary   - Allow adequate time for meals  - Recommend/ encourage appropriate diets, oral nutritional supplements, and vitamin/mineral supplements  - Order, calculate, and assess calorie counts as needed  - Recommend, monitor, and adjust tube feedings and TPN/PPN based on assessed needs  - Assess need for intravenous fluids  - Provide specific nutrition/hydration education as appropriate  - Include patient/family/caregiver in decisions related to nutrition  Outcome: Progressing     Problem: Prexisting or High Potential for Compromised Skin Integrity  Goal: Skin integrity is maintained or improved  Description: INTERVENTIONS:  - Identify patients at risk for skin breakdown  - Assess and monitor skin integrity  - Assess and monitor nutrition and hydration status  - Monitor labs   - Assess for incontinence   - Turn and reposition patient  - Assist with mobility/ambulation  - Relieve pressure over bony prominences  - Avoid friction and shearing  - Provide appropriate hygiene as needed including keeping skin clean and dry  - Evaluate need for skin moisturizer/barrier cream  - Collaborate with interdisciplinary team   - Patient/family teaching  - Consider wound care consult   Outcome: Progressing

## 2024-10-22 NOTE — OCCUPATIONAL THERAPY NOTE
Occupational Therapy Treatment Note       10/22/24 1431   OT Last Visit   OT Visit Date 10/22/24   Note Type   Note Type Treatment   Pain Assessment   Pain Assessment Tool 0-10   Pain Score No Pain   Restrictions/Precautions   Other Precautions O2   ADL   Grooming Assistance 5  Supervision/Setup   Grooming Deficit Wash/dry hands;Wash/dry face  (Seated EOB)   Toileting Assistance  4  Minimal Assistance   Toileting Deficit Perineal hygiene;Increased time to complete   Toileting Comments Toilet hygiene after urination and bowel movement at [facility] with min assist; increased time due to low SpO2 with activity   Bed Mobility   Supine to Sit 5  Supervision   Additional items Increased time required   Transfers   Sit to Stand 4  Minimal assistance   Additional items Assist x 1;Verbal cues   Stand to Sit 4  Minimal assistance   Additional items Assist x 1;Verbal cues   Additional Comments STS from EOB x several trials throughout session; min verbal cues for safe transfer technique; bilateral knees buckling with static standing requiring seated rest break   Functional Mobility   Functional Mobility 4  Minimal assistance   Additional Comments Patient ambulated few feet to/from commode x 2 trials with RW and min assist   Toilet Transfers   Toilet Transfer Type To and from   Toilet Transfer to Standard bedside commode   Toilet Transfer Technique Ambulating   Toilet Transfers Minimal assistance   Toilet Transfers Comments With RW; completed x 2 trials, min verbal cues for safe transfer technique   Cognition   Overall Cognitive Status WFL   Arousal/Participation Alert;Cooperative   Orientation Level Oriented X4   Following Commands Follows all commands and directions without difficulty   Activity Tolerance   Activity Tolerance Patient limited by fatigue;Other (Comment)  (Limited by SOB, low SpO2)   Assessment   Assessment Patient seen for OT treatment this PM. Patient on 10L O2 via nasal cannula; limited by increased SOB, increased  WOB with functional activity. Patient with bilateral knee buckling when in static stand, requiring seated rest break. Patient able to transfer bed <-> BSC x 2 trials while taking few steps with RW and min assist, however limited by low SpO2. SpO2 dropping to low 80s while on 10 L nasal cannula. Increased time to recover. Required min assist for toilet hygiene today. Patient is eager to increased independence and return home however limited insight into current functional and medical limitations, pt also with questionable safety awareness.   The patient's raw score on the AM-PAC Daily Activity Inpatient Short Form is 18. A raw score of less than 19 suggests the patient may benefit from discharge to post-acute rehabilitation services. Please refer to the recommendation of the Occupational Therapist for safe discharge planning. At end of session patient seated EOB with all needs met, alarm set, RN aware of patient status. Continue OT per POC.   Plan   OT Frequency 3-5x/wk   Discharge Recommendation   Rehab Resource Intensity Level, OT II (Moderate Resource Intensity)   AM-PAC Daily Activity Inpatient   Lower Body Dressing 2   Bathing 3   Toileting 3   Upper Body Dressing 3   Grooming 3   Eating 4   Daily Activity Raw Score 18   Daily Activity Standardized Score (Calc for Raw Score >=11) 38.66   AM-PAC Applied Cognition Inpatient   Following a Speech/Presentation 4   Understanding Ordinary Conversation 4   Taking Medications 4   Remembering Where Things Are Placed or Put Away 4   Remembering List of 4-5 Errands 3   Taking Care of Complicated Tasks 3   Applied Cognition Raw Score 22   Applied Cognition Standardized Score 47.83   Licensure   [place] License Number  Ashley Viviane, OTR/L [id number]

## 2024-10-22 NOTE — ASSESSMENT & PLAN NOTE
History of non-small cell lung cancer s/p left upper lobe wedge resection and chemotherapy  CT chest in June 2024 showed enlargement of right lower lobe nodule highly suspicious for second primary lung neoplasm  Has outpatient PET scan ordered, but per PCP documentation is not recommended due to age and slow growth rate  She does not desire any further evaluation of her enlarging lung nodule.

## 2024-10-22 NOTE — ASSESSMENT & PLAN NOTE
Wt Readings from Last 3 Encounters:   10/22/24 78.5 kg (173 lb)   09/16/24 74.8 kg (165 lb)   07/16/24 77.1 kg (170 lb)     ECHO 5/2024: EF 70%, systolic function is vigorous, diastolic function is moderately abnormal, consistent with grade II relaxation  CXR 10/16: Findings most suggestive of CHF with basilar edema and pleural effusions.  Weight noted to be 175 lbs on admission, was 165 lbs one month ago  , higher than previous values  Received 60 mg IV lasix in ED  S/p IV lasix 40 mg daily with adequate output response  Home regimen includes torsemide 10 mg daily, resume upon discharge  Monitor Is/Os and daily weights  Low sodium diet and fluid restriction

## 2024-10-22 NOTE — DISCHARGE SUMMARY
Discharge Summary - Hospitalist   Name: Eryn Morocho 84 y.o. adult I MRN: [id number]  Unit/Bed#: [address] [place] [address] I Date of Admission: 10/16/2024   Date of Service: 10/22/2024 I Hospital Day: 5     Assessment & Plan  Acute on chronic respiratory failure (HCC)  Patient with history of diastolic CHF, severe COPD, history of NSCLC s/p wedge resection/chemo, chronic respiratory failure on 4-6 L nc presented to ED with progressive shortness of breath for past week with productive cough.  CXR 10/16: Findings most suggestive of CHF with basilar edema and pleural effusions. Could not entirely exclude pneumonia.  ABG 7.3/56/78/93 on 9 L  , higher than previous elevated levels  Procalcitonin negative x 2  Afebrile, no leukocytosis  Suspect in setting of acute CHF  Pulmonology consult appreciated  AM ABG without any drastic changes from baseline in the setting of underlying lung condition  Repeat CXR showed possible enlargement of right lower lobe nodule suspicious for malignancy; patient indicated she does not want further workup for this and would not want treatment if it were recurrent cancer  Home O2 eval shows patient requires 9 L with Oxymizer at rest and 12 L with Oxymizer on exertion  Pulmonology had goals of care discussion with the patient indicated she would just like to be comfortable and go home. She has made herself DNR/DNI. Referral given for palliative care on discharge.  Pulmonology could not perform thoracentesis today due to elevated INR and xarelto usage, indicated patient can follow-up outpatient with IR  Cleared for discharge by pulmonology  Acute on chronic diastolic congestive heart failure (HCC)  Wt Readings from Last 3 Encounters:   10/22/24 78.5 kg (173 lb)   09/16/24 74.8 kg (165 lb)   07/16/24 77.1 kg (170 lb)     ECHO 5/2024: EF 70%, systolic function is vigorous, diastolic function is moderately abnormal, consistent with grade II relaxation  CXR 10/16: Findings most suggestive of CHF with  basilar edema and pleural effusions.  Weight noted to be 175 lbs on admission, was 165 lbs one month ago  , higher than previous values  Received 60 mg IV lasix in ED  S/p IV lasix 40 mg daily with adequate output response  Home regimen includes torsemide 10 mg daily, resume upon discharge  Monitor Is/Os and daily weights  Low sodium diet and fluid restriction  COPD (chronic obstructive pulmonary disease) (HCC)  History of severe COPD chronically on 4-6 L nc  Do not suspect acute exacerbation  Continue scheduled duonetracie, performist, Singulair  Procalcitonin negative x 2  Urinary antigens negative  RP2 panel negative  Sputum culture negative  Patient received 3 doses of ceftriaxone, discontinue and monitor off antibiotics  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)  History of non-small cell lung cancer s/p left upper lobe wedge resection and chemotherapy  CT chest in June 2024 showed enlargement of right lower lobe nodule highly suspicious for second primary lung neoplasm  Has outpatient PET scan ordered, but per PCP documentation is not recommended due to age and slow growth rate  She does not desire any further evaluation of her enlarging lung nodule  JULIO (obstructive sleep apnea)  continue BiPAP hs  Patient reports compliance at home  Hyperlipidemia  Continue statin  Heart healthy diet  Recurrent major depressive disorder, in partial remission (HCC)  Continue Celexa  Supportive care   PAD (peripheral artery disease) (HCC)  Continue plavix and statin  Paroxysmal atrial fibrillation (HCC)  Continue Cardizem 30 mg tid  Continue Xarelto  EKG shows NSR  Stage 3a chronic kidney disease (HCC)  Lab Results   Component Value Date    EGFR 80 06/19/2024    EGFR 81 06/18/2024    EGFR 81 06/17/2024    CREATININE 0.55 (L) 10/22/2024    CREATININE 0.64 10/21/2024    CREATININE 0.59 (L) 10/20/2024   Cr currently stable at baseline  Daily bmp     Medical Problems       Resolved Problems  Date Reviewed: 10/20/2024   None        Discharging Physician / Practitioner: PRANAY Washington  PCP: Ari Mendiola MD  Admission Date:   Admission Orders (From admission, onward)       Ordered        10/17/24 1349  INPATIENT ADMISSION  Once            10/16/24 1221  Place in Observation  Once                          Discharge Date: 10/23/24    Consultations During Hospital Stay:  Pulmonology    Procedures Performed:   None    Significant Findings / Test Results:   CXR: Findings most suggestive of CHF with basilar edema and pleural effusions. Could not entirely exclude pneumonia. Correlate for any fever or cough. Findings concordant with the ER interpretation   CXR: 2.8 cm nodular opacity projecting over the right heart. This could be due to enlargement of the right lower lobe nodule on the chest CT from June 2024, suspicious for malignancy. A progress note from the patient's PCP from 9/16/2024 indicates that she has been advised against undergoing a PET/CT scan for her right lower lobe nodule due to her age and its slow rate of growth. But if further evaluation is desired, a chest CT with no contrast can be obtained.    Incidental Findings:   Patient does not wish for further workup for lung nodule    Test Results Pending at Discharge (will require follow up):   None     Outpatient Tests Requested:  None    Complications:  None  Reason for Admission: shortness of breath    Hospital Course:   Eryn Morocho is a pleasant 84 y.o. adult with a history of severe COPD, diastolic CHF, history of NSCLC status post wedge resection/chemo, JULIO on BiPAP, paroxysmal A-fib on anticoagulation, CKD, dyslipidemia, PAD who originally presented to the hospital on 10/16/2024 due to worsening of shortness of breath associated with cough productive of sputum and generalized weakness. Reported positive sick contact at home. Patient reported compliance with the medication and BiPAP and denied any significant weight gain or edema. Upon admission, CXR: Findings  "most suggestive of CHF with basilar edema and pleural effusions. Could not entirely exclude pneumonia. Patient was diuresed with IV Lasix with adequate output response. Received 3 doses of ceftriaxone, and was monitored off antibiotic as patient has been afebrile and WBCs within normal limits. Patient was then continuing to have persistent hypoxia with conversation despite diuresis. Pulmonology was consulted for acute on chronic hypoxic respiratory failure with suspected chronic hypercarbic respiratory failure.  Pulmonology had goals of care discussion with patient and patient indicated she does wish to go home and be comfortable.  Home O2 evaluation shows patient requires 12 L of oxygen on exertion with Oxymizer.  Patient is not interested in further workup for enlarging nodule concerning for malignancy and would not want treatment if it were cancer.  She will go home with to oxygen concentrator's and follow-up with palliative outpatient.  Patient cleared by pulmonology for discharge.    Please see above list of diagnoses and related plan for additional information.     Condition at Discharge: fair    Discharge Day Visit / Exam:   Subjective:  Patient states she continues to feel well and just wants to go home. She indicated \" who would want to spend her last days in the hospital?\" And said she misses her dogs and being with her family at home. Denies current chest pain or shortness of breath and very eager to leave today. Appreciative of palliative referral on discharge. She states she really does not want to come back to the hospital in the future and wants to focus on being comfortable.    Vitals: Blood Pressure: 158/59 (10/22/24 0735)  Pulse: 63 (10/22/24 0735)  Temperature: 97.7 °F (36.5 °C) (10/21/24 2219)  Temp Source: Oral (10/19/24 2221)  Respirations: 18 (10/21/24 2219)  Height: 5' 2\" (157.5 cm) (10/22/24 0735)  Weight - Scale: 78.5 kg (173 lb) (10/22/24 0735)  SpO2: 91 % (10/22/24 1353)  Physical " Exam  Vitals and nursing note reviewed.   Constitutional:       General: She is not in acute distress.     Appearance: She is well-developed.   Cardiovascular:      Rate and Rhythm: Normal rate and regular rhythm.   Pulmonary:      Effort: Pulmonary effort is normal. No respiratory distress.      Breath sounds: Decreased breath sounds (bases bilaterally) and rales present.      Comments: Saturating 90% on 9 L mid flow  Abdominal:      Palpations: Abdomen is soft.      Tenderness: There is no abdominal tenderness.   Musculoskeletal:         General: No swelling.   Skin:     General: Skin is warm and dry.   Neurological:      Mental Status: She is alert.   Psychiatric:         Mood and Affect: Mood normal.       Discussion with Family: Updated  (daughter) at bedside.    Discharge instructions/Information to patient and family:   See after visit summary for information provided to patient and family.      Provisions for Follow-Up Care:  See after visit summary for information related to follow-up care and any pertinent home health orders.      Mobility at time of Discharge:   Basic Mobility Inpatient Raw Score: 17  JH-HLM Goal: 5: Stand one or more mins  JH-HLM Achieved: 4: Move to chair/commode  HLM Goal achieved. Continue to encourage appropriate mobility.     Disposition:   Home with VNA Services (Reminder: Complete face to face encounter)    Planned Readmission: No    Discharge Medications:  See after visit summary for reconciled discharge medications provided to patient and/or family.      Administrative Statements   Discharge Statement:  I have spent a total time of 60 minutes in caring for this patient on the day of the visit/encounter. >30 minutes of time was spent on: Diagnostic results, Prognosis, Risks and benefits of tx options, Instructions for management, Patient and family education, Importance of tx compliance, Risk factor reductions, Impressions, Counseling / Coordination of care,  Documenting in the medical record, Reviewing / ordering tests, medicine, procedures  , and Communicating with other healthcare professionals .    **Please Note: This note may have been constructed using a voice recognition system**

## 2024-10-22 NOTE — PLAN OF CARE
Problem: OCCUPATIONAL THERAPY ADULT  Goal: Performs self-care activities at highest level of function for planned discharge setting.  See evaluation for individualized goals.  Description: Treatment Interventions: ADL retraining, Functional transfer training, UE strengthening/ROM, Endurance training, Patient/family training, Equipment evaluation/education, Activityengagement, Compensatory technique education, Continued evaluation          See flowsheet documentation for full assessment, interventions and recommendations.   Outcome: Progressing  Note: Limitation: Decreased ADL status, Decreased Safe judgement during ADL, Decreased endurance, Decreased self-care trans, Decreased high-level ADLs (decreased balance and mobility)  Prognosis: Fair  Assessment: Patient seen for OT treatment this PM. Patient on 10L O2 via nasal cannula; limited by increased SOB, increased WOB with functional activity. Patient with bilateral knee buckling when in static stand, requiring seated rest break. Patient able to transfer bed <-> BSC x 2 trials while taking few steps with RW and min assist, however limited by low SpO2. SpO2 dropping to low 80s while on 10 L nasal cannula. Increased time to recover. Required min assist for toilet hygiene today. Patient is eager to increased independence and return home however limited insight into current functional and medical limitations, pt also with questionable safety awareness.   The patient's raw score on the AM-PAC Daily Activity Inpatient Short Form is 18. A raw score of less than 19 suggests the patient may benefit from discharge to post-acute rehabilitation services. Please refer to the recommendation of the Occupational Therapist for safe discharge planning. At end of session patient seated EOB with all needs met, alarm set, RN aware of patient status. Continue OT per POC.     Rehab Resource Intensity Level, OT: II (Moderate Resource Intensity)

## 2024-10-22 NOTE — ASSESSMENT & PLAN NOTE
Lab Results   Component Value Date    EGFR 80 06/19/2024    EGFR 81 06/18/2024    EGFR 81 06/17/2024    CREATININE 0.55 (L) 10/22/2024    CREATININE 0.64 10/21/2024    CREATININE 0.59 (L) 10/20/2024   Cr currently stable at baseline  Daily bmp

## 2024-10-22 NOTE — ASSESSMENT & PLAN NOTE
Continues on BiPAP 15/9 with supplemental oxygen at bedtime.  Compliance follow-up with Dr. Moreno.

## 2024-10-22 NOTE — PROGRESS NOTES
"Progress Note - Pulmonology   Name: Eryn Morocho 84 y.o. adult I MRN: [id number]  Unit/Bed#: 4 [address] 420-01 I Date of Admission: 10/16/2024   Date of Service: 10/22/2024 I Hospital Day: 5    Assessment & Plan  Acute on chronic respiratory failure (HCC)  With hypoxia and hypercapnia  Baseline oxygen requirement is 6 to 10 L nasal cannula with Oxymizer as needed per last hospital note.  Last office note states that she was able to decrease to 5 L/min.   Titrate supplemental oxygen to maintain saturations greater than or equal to 89%.  JULIO (obstructive sleep apnea)  Continues on BiPAP 15/9 with supplemental oxygen at bedtime.  Compliance follow-up with Dr. Moreno.  COPD (chronic obstructive pulmonary disease) (HCC)  Continue all nebulized regimen with DuoNebs, formoterol, and budesonide as outpatient.  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)  Diagnosed 7 years ago and status post left upper lobe wedge resection and chemotherapy.  Pulmonary nodule in the right lower lobe has been enlarging and a PET scan was ordered in July, but not yet done.  Further follow-up per Dr. Moreno.  Acute on chronic diastolic congestive heart failure (HCC)  Wt Readings from Last 3 Encounters:   10/22/24 78.5 kg (173 lb)   09/16/24 74.8 kg (165 lb)   07/16/24 77.1 kg (170 lb)   Diuresis and management per primary team.    Outpatient follow-up with Dr. Moreno after discharge.  Discussed with primary team.  Will sign off.  Please call with further questions or concerns.    24 Hour Events : \"Can I go home?\"  Subjective : Eryn is sitting up in bed.  She reports that she feels well and back to baseline.  She is eager to go home.  She is on 9 L of oxygen saturating 94% or greater.    Objective :  Temp:  [97.4 °F (36.3 °C)-97.7 °F (36.5 °C)] 97.7 °F (36.5 °C)  HR:  [63-71] 63  BP: (135-169)/(59-70) 158/59  Resp:  [18] 18  SpO2:  [90 %-92 %] 91 %  O2 Device: Mid flow nasal cannula  Nasal Cannula O2 Flow Rate (L/min):  [9 L/min] 9 " L/min    Physical Exam  Vitals reviewed.   Constitutional:       General: She is not in acute distress.     Appearance: She is well-developed. She is obese. She is not toxic-appearing or diaphoretic.      Interventions: Nasal cannula in place.   HENT:      Head: Normocephalic and atraumatic.   Eyes:      General: No scleral icterus.     Extraocular Movements: Extraocular movements intact.   Neck:      Trachea: No tracheal deviation.   Cardiovascular:      Rate and Rhythm: Normal rate and regular rhythm.      Heart sounds: S1 normal and S2 normal. No murmur heard.     No friction rub. No gallop.   Pulmonary:      Effort: Pulmonary effort is normal. No tachypnea, accessory muscle usage or respiratory distress.      Breath sounds: No stridor. Decreased breath sounds present. No wheezing, rhonchi or rales.   Chest:      Chest wall: No tenderness.   Abdominal:      General: Bowel sounds are normal. There is no distension.      Palpations: Abdomen is soft.      Tenderness: There is no abdominal tenderness.   Musculoskeletal:      Cervical back: Neck supple.      Right lower leg: No edema.      Left lower leg: No edema.   Skin:     General: Skin is warm and dry.      Findings: No rash.   Neurological:      Mental Status: She is alert and oriented to person, place, and time.      GCS: GCS eye subscore is 4. GCS verbal subscore is 5. GCS motor subscore is 6.   Psychiatric:         Speech: Speech normal.         Behavior: Behavior normal. Behavior is cooperative.           Lab Results: I have reviewed the following results:   .     10/22/24  0509   WBC 4.20*   HGB 12.0   HCT 37.7      SODIUM 138   K 4.4   CL 96   CO2 38*   BUN 21   CREATININE 0.55*   GLUC 171*     ABG:   .     10/22/24  0642   PHART 7.380   VYY3IMK 62.3*   PO2ART 71.6*   LEU4NNS 36.0*   BEART 8.6       Kateryna PIRES

## 2024-10-22 NOTE — ASSESSMENT & PLAN NOTE
History of severe COPD chronically on 4-6 L nc  Do not suspect acute exacerbation  Continue scheduled duonebs, performist, Singulair  Procalcitonin negative x 2  Urinary antigens negative  RP2 panel negative  Sputum culture negative  Patient received 3 doses of ceftriaxone, discontinue and monitor off antibiotics  Remains afebrile   IV solumedrol decreased to once daily, wheezing resolved

## 2024-10-22 NOTE — RESPIRATORY THERAPY NOTE
Home Oxygen Qualifying Test     Patient name: Eryn Morocho        : 1940   Date of Test:  2024  Diagnosis:    Home Oxygen Test:    Medicare Guidelines require item(s) 1-5 on all ambulatory patients or 1 and 2 on non-ambulatory patients.    1. Baseline SPO2 on Room Air at rest 83%   If <= 88% on Room Air add O2 via NC to obtain SpO2 >=88%. If LPM needed, document 9 LPM  OXYMIZER needed to reach =>88%    SPO2 during exertion on Room Air NA %  During exertion monitor SPO2. If SPO2 increases >=89%, do not add supplemental oxygen    SPO2 on Oxygen at Rest 90% at 9 LPM OXYMIZER    SPO2 during exertion on Oxygen 89% at 12 LPM OXYMIZER    Test performed during exertion activity.      [x]  Supplemental Home Oxygen is indicated.    []  Client does not qualify for home oxygen.    Respiratory Additional Notes- Minimal exertion.     Lou Aguirre, RT

## 2024-10-23 VITALS
RESPIRATION RATE: 18 BRPM | HEIGHT: 62 IN | BODY MASS INDEX: 32.07 KG/M2 | OXYGEN SATURATION: 86 % | TEMPERATURE: 97.2 F | HEART RATE: 64 BPM | SYSTOLIC BLOOD PRESSURE: 161 MMHG | WEIGHT: 174.3 LBS | DIASTOLIC BLOOD PRESSURE: 68 MMHG

## 2024-10-23 DIAGNOSIS — I73.9 PAD (PERIPHERAL ARTERY DISEASE) (HCC): ICD-10-CM

## 2024-10-23 DIAGNOSIS — R45.1 AGITATION: ICD-10-CM

## 2024-10-23 DIAGNOSIS — I10 ESSENTIAL HYPERTENSION: ICD-10-CM

## 2024-10-23 PROBLEM — J90 PLEURAL EFFUSION: Status: ACTIVE | Noted: 2024-10-23

## 2024-10-23 LAB
ANION GAP SERPL CALCULATED.3IONS-SCNC: 5 MMOL/L (ref 4–13)
APTT PPP: 47 SECONDS (ref 23–34)
BUN SERPL-MCNC: 24 MG/DL (ref 5–25)
CALCIUM SERPL-MCNC: 9.4 MG/DL (ref 8.4–10.2)
CHLORIDE SERPL-SCNC: 95 MMOL/L (ref 96–108)
CO2 SERPL-SCNC: 39 MMOL/L (ref 21–32)
CREAT SERPL-MCNC: 0.6 MG/DL (ref 0.6–1.3)
DME PARACHUTE DELIVERY DATE ACTUAL: NORMAL
DME PARACHUTE DELIVERY DATE REQUESTED: NORMAL
DME PARACHUTE DELIVERY NOTE: NORMAL
DME PARACHUTE ITEM DESCRIPTION: NORMAL
DME PARACHUTE ORDER STATUS: NORMAL
DME PARACHUTE SUPPLIER NAME: NORMAL
DME PARACHUTE SUPPLIER PHONE: NORMAL
ERYTHROCYTE [DISTWIDTH] IN BLOOD BY AUTOMATED COUNT: 14.1 % (ref 11.6–15.1)
GLUCOSE SERPL-MCNC: 148 MG/DL (ref 65–140)
HCT VFR BLD AUTO: 38.8 % (ref 36.5–46.1)
HGB BLD-MCNC: 12.3 G/DL (ref 12–15.4)
INR PPP: 2.15 (ref 0.85–1.19)
MCH RBC QN AUTO: 33.3 PG (ref 26.8–34.3)
MCHC RBC AUTO-ENTMCNC: 31.7 G/DL (ref 31.4–37.4)
MCV RBC AUTO: 105 FL (ref 82–98)
PLATELET # BLD AUTO: 207 THOUSANDS/UL (ref 149–390)
PMV BLD AUTO: 9.6 FL (ref 8.9–12.7)
POTASSIUM SERPL-SCNC: 4.3 MMOL/L (ref 3.5–5.3)
PROTHROMBIN TIME: 24.4 SECONDS (ref 12.3–15)
RBC # BLD AUTO: 3.69 MILLION/UL (ref 3.88–5.12)
SODIUM SERPL-SCNC: 139 MMOL/L (ref 135–147)
WBC # BLD AUTO: 4.58 THOUSAND/UL (ref 4.31–10.16)

## 2024-10-23 PROCEDURE — 99232 SBSQ HOSP IP/OBS MODERATE 35: CPT | Performed by: NURSE PRACTITIONER

## 2024-10-23 PROCEDURE — 94760 N-INVAS EAR/PLS OXIMETRY 1: CPT

## 2024-10-23 PROCEDURE — 94640 AIRWAY INHALATION TREATMENT: CPT

## 2024-10-23 PROCEDURE — 99239 HOSP IP/OBS DSCHRG MGMT >30: CPT

## 2024-10-23 PROCEDURE — 80048 BASIC METABOLIC PNL TOTAL CA: CPT

## 2024-10-23 PROCEDURE — 85610 PROTHROMBIN TIME: CPT | Performed by: INTERNAL MEDICINE

## 2024-10-23 PROCEDURE — 99497 ADVNCD CARE PLAN 30 MIN: CPT | Performed by: NURSE PRACTITIONER

## 2024-10-23 PROCEDURE — 85027 COMPLETE CBC AUTOMATED: CPT

## 2024-10-23 PROCEDURE — 85730 THROMBOPLASTIN TIME PARTIAL: CPT | Performed by: INTERNAL MEDICINE

## 2024-10-23 RX ORDER — CITALOPRAM HYDROBROMIDE 20 MG/1
20 TABLET ORAL DAILY
Qty: 90 TABLET | Refills: 1 | Status: SHIPPED | OUTPATIENT
Start: 2024-10-23

## 2024-10-23 RX ORDER — LIDOCAINE HYDROCHLORIDE 10 MG/ML
10 INJECTION, SOLUTION EPIDURAL; INFILTRATION; INTRACAUDAL; PERINEURAL ONCE
Status: DISCONTINUED | OUTPATIENT
Start: 2024-10-23 | End: 2024-10-23 | Stop reason: HOSPADM

## 2024-10-23 RX ORDER — CLOPIDOGREL BISULFATE 75 MG/1
75 TABLET ORAL DAILY
Qty: 90 TABLET | Refills: 1 | Status: SHIPPED | OUTPATIENT
Start: 2024-10-23

## 2024-10-23 RX ADMIN — OMEGA-3 FATTY ACIDS CAP 1000 MG 1000 MG: 1000 CAP at 08:29

## 2024-10-23 RX ADMIN — TORSEMIDE 10 MG: 10 TABLET ORAL at 08:29

## 2024-10-23 RX ADMIN — METHYLPREDNISOLONE SODIUM SUCCINATE 40 MG: 40 INJECTION, POWDER, FOR SOLUTION INTRAMUSCULAR; INTRAVENOUS at 08:30

## 2024-10-23 RX ADMIN — IPRATROPIUM BROMIDE AND ALBUTEROL SULFATE 3 ML: 2.5; .5 SOLUTION RESPIRATORY (INHALATION) at 13:43

## 2024-10-23 RX ADMIN — FORMOTEROL FUMARATE DIHYDRATE 20 MCG: 20 SOLUTION RESPIRATORY (INHALATION) at 07:46

## 2024-10-23 RX ADMIN — BUDESONIDE 0.5 MG: 0.5 INHALANT RESPIRATORY (INHALATION) at 07:46

## 2024-10-23 RX ADMIN — TIMOLOL MALEATE 1 DROP: 5 SOLUTION/ DROPS OPHTHALMIC at 08:35

## 2024-10-23 RX ADMIN — ATORVASTATIN CALCIUM 40 MG: 40 TABLET, FILM COATED ORAL at 16:54

## 2024-10-23 RX ADMIN — GUAIFENESIN 600 MG: 600 TABLET, EXTENDED RELEASE ORAL at 08:29

## 2024-10-23 RX ADMIN — CITALOPRAM HYDROBROMIDE 20 MG: 10 TABLET ORAL at 08:33

## 2024-10-23 RX ADMIN — DILTIAZEM HYDROCHLORIDE 30 MG: 30 TABLET ORAL at 05:59

## 2024-10-23 RX ADMIN — IPRATROPIUM BROMIDE AND ALBUTEROL SULFATE 3 ML: 2.5; .5 SOLUTION RESPIRATORY (INHALATION) at 01:42

## 2024-10-23 RX ADMIN — TIMOLOL MALEATE 1 DROP: 5 SOLUTION/ DROPS OPHTHALMIC at 17:13

## 2024-10-23 RX ADMIN — IPRATROPIUM BROMIDE AND ALBUTEROL SULFATE 3 ML: 2.5; .5 SOLUTION RESPIRATORY (INHALATION) at 07:46

## 2024-10-23 RX ADMIN — DILTIAZEM HYDROCHLORIDE 30 MG: 30 TABLET ORAL at 15:00

## 2024-10-23 RX ADMIN — CLOPIDOGREL 75 MG: 75 TABLET ORAL at 08:28

## 2024-10-23 RX ADMIN — RIVAROXABAN 20 MG: 20 TABLET, FILM COATED ORAL at 08:29

## 2024-10-23 NOTE — ASSESSMENT & PLAN NOTE
Diagnosed 7 years ago and status post left upper lobe wedge resection and chemotherapy.  Pulmonary nodule in the right lower lobe has been enlarging and a PET scan was ordered in July, but not yet done.  She declines further workup of her pulmonary nodule.

## 2024-10-23 NOTE — NURSING NOTE
AVS reviewed with patient and daughter. Discharge home via w/c with daughter, assisted to her car by this nurse.Portable Oxygen tank provided by daughter n/c  transferred to their own oxygen system.

## 2024-10-23 NOTE — PROGRESS NOTES
Progress Note - Pulmonology   Name: Eryn Morocho 84 y.o. adult I MRN: [id number]  Unit/Bed#: 4 [address] I Date of Admission: 10/16/2024   Date of Service: 10/23/2024 I Hospital Day: 6    Assessment & Plan  Acute on chronic respiratory failure (HCC)  With hypoxia and hypercapnia  Baseline oxygen requirement is 6 to 10 L nasal cannula with Oxymizer as needed per last hospital note.  Last office note states that she was able to decrease to 5 L/min.   Titrate supplemental oxygen to maintain saturations greater than or equal to 89%.  If she continues to have an oxygen requirement of above 10 L, we briefly discussed having to concentrators at home, though typically cares more palliative in nature at that time.  Ongoing discussions occurring.  JULIO (obstructive sleep apnea)  Continues on BiPAP 15/9 with supplemental oxygen at bedtime.  Compliance follow-up with Dr. Moreno.  COPD (chronic obstructive pulmonary disease) (HCC)  Continue all nebulized regimen with DuoNebs, formoterol, and budesonide as outpatient.  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)  Diagnosed 7 years ago and status post left upper lobe wedge resection and chemotherapy.  Pulmonary nodule in the right lower lobe has been enlarging and a PET scan was ordered in July, but not yet done.  She declines further workup of her pulmonary nodule.  Acute on chronic diastolic congestive heart failure (HCC)  Wt Readings from Last 3 Encounters:   10/23/24 79.1 kg (174 lb 4.8 oz)   09/16/24 74.8 kg (165 lb)   07/16/24 77.1 kg (170 lb)   Diuresis and management per primary team.  Pleural effusion  Will complete bedside ultrasound today to evaluate for any pleural effusion amenable to thoracentesis.    See ACP note for further discussions with primary team, nursing, case management, and patient's daughter.    24 Hour Events : Did not wear Bipap  Subjective : Erny is sitting up in bed.  She was frustrated that she did not leave the hospital yesterday due to hypoxia  with ambulation requiring 13 L of oxygen.  Because of this, she refused BiPAP last night.  Overall, she would just like to go home and be with her dogs and her family.  She reports that she symptomatically feels fine and is at baseline aside from her oxygen requirement.    Objective :  Temp:  [97.2 °F (36.2 °C)-97.6 °F (36.4 °C)] 97.2 °F (36.2 °C)  HR:  [55-71] 55  BP: (110-167)/(53-69) 153/59  Resp:  [18] 18  SpO2:  [87 %-95 %] 95 %  O2 Device: Mid flow nasal cannula  Nasal Cannula O2 Flow Rate (L/min):  [9 L/min] 9 L/min  FiO2 (%):  [50] 50    Physical Exam  Vitals reviewed.   Constitutional:       General: She is not in acute distress.     Appearance: She is well-developed. She is obese. She is not toxic-appearing or diaphoretic.      Interventions: Nasal cannula in place.   HENT:      Head: Normocephalic and atraumatic.   Eyes:      General: No scleral icterus.     Extraocular Movements: Extraocular movements intact.   Neck:      Trachea: No tracheal deviation.   Cardiovascular:      Rate and Rhythm: Normal rate and regular rhythm.      Heart sounds: S1 normal and S2 normal. No murmur heard.     No friction rub. No gallop.   Pulmonary:      Effort: Pulmonary effort is normal. No tachypnea, accessory muscle usage or respiratory distress.      Breath sounds: Normal breath sounds. No stridor. No decreased breath sounds, wheezing, rhonchi or rales.   Chest:      Chest wall: No tenderness.   Abdominal:      General: Bowel sounds are normal. There is no distension.      Palpations: Abdomen is soft.      Tenderness: There is no abdominal tenderness.   Musculoskeletal:      Cervical back: Neck supple.      Right lower leg: No edema.      Left lower leg: No edema.   Skin:     General: Skin is warm and dry.      Findings: No rash.   Neurological:      Mental Status: She is alert and oriented to person, place, and time.      GCS: GCS eye subscore is 4. GCS verbal subscore is 5. GCS motor subscore is 6.   Psychiatric:          Speech: Speech normal.         Behavior: Behavior normal. Behavior is cooperative.         Lab Results: I have reviewed the following results:   .     10/23/24  0557   WBC 4.58   HGB 12.3   HCT 38.8      SODIUM 139   K 4.3   CL 95*   CO2 39*   BUN 24   CREATININE 0.60   GLUC 148*       Kateryna Crane CRNP

## 2024-10-23 NOTE — ASSESSMENT & PLAN NOTE
With hypoxia and hypercapnia  Baseline oxygen requirement is 6 to 10 L nasal cannula with Oxymizer as needed per last hospital note.  Last office note states that she was able to decrease to 5 L/min.   Titrate supplemental oxygen to maintain saturations greater than or equal to 89%.  If she continues to have an oxygen requirement of above 10 L, we briefly discussed having to concentrators at home, though typically cares more palliative in nature at that time.  Ongoing discussions occurring.

## 2024-10-23 NOTE — CASE MANAGEMENT
Case Management Discharge Planning Note    Patient name Eryn Morocho  Location [address] [address]/[address] [address]-* MRN [id number]  : 1940 Date 10/23/2024       Current Admission Date: 10/16/2024  Current Admission Diagnosis:Acute on chronic respiratory failure (HCC)   Patient Active Problem List    Diagnosis Date Noted Date Diagnosed    Pleural effusion 10/23/2024     Acute on chronic respiratory failure (HCC) 10/16/2024     Paroxysmal atrial fibrillation (HCC) 10/16/2024     Chronic respiratory failure with hypoxia and hypercapnia (HCC) 2024     JULIO and COPD overlap syndrome (HCC) 2024     Pulmonary emphysema, unspecified emphysema type (HCC) 2024     Multiple lung nodules 2022     Class 1 obesity due to excess calories with body mass index (BMI) of 34.0 to 34.9 in adult 2022     Recurrent falls 2022     Stage 3a chronic kidney disease (HCC) 2021     OAB (overactive bladder) 2021     Osteoporosis 2021     Acute on chronic diastolic congestive heart failure (HCC) 2021     PAD (peripheral artery disease) ([facility])      Non-small cell cancer of left lung ([facility]) 2020     Recurrent major depressive disorder, in partial remission ([facility]) 2020     JULIO (obstructive sleep apnea)      COPD (chronic obstructive pulmonary disease) ([facility])      HTN (hypertension)      Hyperlipidemia        LOS (days): 6  Geometric Mean LOS (GMLOS) (days): 3.9  Days to GMLOS:-2.1     OBJECTIVE:  Risk of Unplanned Readmission Score: 17.96         Current admission status: Inpatient   Preferred Pharmacy:   [id number] - [place] - [address]   [place] [address]  Phone: [phone] Fax: [phone]    [facility]  [address] [address]  Phone: [phone]     Primary Care Provider: Ari Mendiola MD    Primary Insurance: MEDICARE  Secondary Insurance: AARP    DISCHARGE DETAILS:    DME Referral  Provided  Referral made for DME?: Yes  DME referral completed for the following items:: Home Oxygen concentrator, Portable Oxygen tanks  DME Supplier Name:: [facility]    Other Referral/Resources/Interventions Provided:  Interventions: DME  Referral Comments: O2 revision order sent to [facility] via [employer]. Liaison Radha notified patient will now need 2 concentrators as her O2 requirements are up to 12L w/ oximizer. Radha requests for office to be contacted when patient is ready for discharge so equipment can be delivered.     Treatment Team Recommendation: Short Term Rehab  Discharge Destination Plan:: Home with Home Health Care

## 2024-10-23 NOTE — ACP (ADVANCE CARE PLANNING)
Advanced Care Planning Note  Eryn Morocho 84 y.o. adult MRN: [id number]  Unit/Bed#: [address] [place] [address] Encounter: [id number]    Eryn Morocho is a 84 y.o. adult requiring advanced care planning. The patient has chronic comorbidities, including but not limited to CHF, COPD, history of lung cancer, CKD, and respiratory failure in which chronic hypoxia has worsened this admission.  Due to the severity of the patient's acute condition and/or the extent of chronic conditions, additional conversations pertaining to advanced care planning were required. Today's discussion was held in a face-to-face meeting with the patient and then subsequently over the phone with her daughter Karen and it was established that all stake holders understood the rationale for the advanced care planning. The patient was able to participate in the discussion.    Summary of Discussion:  I reviewed Eryn's chronic lung disease and heart disease diagnoses with her today.  We also discussed her prior diagnosis of lung cancer and new enlarging lung nodule.  She has a pending outpatient PET scan to evaluate this lung nodule; however, she does not want to complete this.  She does not want a diagnosis of the lung nodule and definitively would not want any treatment even if a cancer diagnosis was made.  She reports to me that she wants to spend the remainder of her life at home with her family and her dogs.  She lives with her daughter and her .  She does not want to die in a hospital nor have prolonged hospitalization or any stay in a nursing facility.  We discussed CODE STATUS extensively today and she wishes to be a level 3 DNR/DNI.  She was alert and oriented during our conversation and verbalized understanding.  All questions were answered.  We also discussed briefly transition to palliative measures in the event her hypoxia does not further improve.  No decisions were made on this front at present.  If her hypoxia continues, may be beneficial  to include palliative care.  I did update nursing and her primary team.  I also extensively had the same discussion with her daughter via telephone and her daughter agreed with the plan.  She reports she does have her medical paperwork at home documenting the same decision making.  Case management aware as well.    Total time spent, (29) minutes.      New CODE STATUS: DNR/DNI - Level 3  POA:    POLST:      SIGNATURE: PRANAY Price  DATE: October 23, 2024  TIME: 10:50 AM

## 2024-10-23 NOTE — PLAN OF CARE
Problem: RESPIRATORY - ADULT  Goal: Achieves optimal ventilation and oxygenation  Description: INTERVENTIONS:  - Assess for changes in respiratory status  - Assess for changes in mentation and behavior  - Position to facilitate oxygenation and minimize respiratory effort  - Oxygen administered by appropriate delivery if ordered  - Initiate smoking cessation education as indicated  - Encourage broncho-pulmonary hygiene including cough, deep breathe, Incentive Spirometry  - Assess the need for suctioning and aspirate as needed  - Assess and instruct to report SOB or any respiratory difficulty  - Respiratory Therapy support as indicated  Outcome: Progressing     Problem: PAIN - ADULT  Goal: Verbalizes/displays adequate comfort level or baseline comfort level  Description: Interventions:  - Encourage patient to monitor pain and request assistance  - Assess pain using appropriate pain scale  - Administer analgesics based on type and severity of pain and evaluate response  - Implement non-pharmacological measures as appropriate and evaluate response  - Consider cultural and social influences on pain and pain management  - Notify physician/advanced practitioner if interventions unsuccessful or patient reports new pain  Outcome: Progressing     Problem: INFECTION - ADULT  Goal: Absence or prevention of progression during hospitalization  Description: INTERVENTIONS:  - Assess and monitor for signs and symptoms of infection  - Monitor lab/diagnostic results  - Monitor all insertion sites, i.e. indwelling lines, tubes, and drains  - Monitor endotracheal if appropriate and nasal secretions for changes in amount and color  - [place] appropriate cooling/warming therapies per order  - Administer medications as ordered  - Instruct and encourage patient and family to use good hand hygiene technique  - Identify and instruct in appropriate isolation precautions for identified infection/condition  Outcome: Progressing  Goal:  Absence of fever/infection during neutropenic period  Description: INTERVENTIONS:  - Monitor WBC    Outcome: Progressing     Problem: SAFETY ADULT  Goal: Patient will remain free of falls  Description: INTERVENTIONS:  - Educate patient/family on patient safety including physical limitations  - Instruct patient to call for assistance with activity   - Consult OT/PT to assist with strengthening/mobility   - Keep Call bell within reach  - Keep bed low and locked with side rails adjusted as appropriate  - Keep care items and personal belongings within reach  - Initiate and maintain comfort rounds  - Make Fall Risk Sign visible to staff  - Offer Toileting every 2 Hours, in advance of need  - Initiate/Maintain bed/chair alarm  - Obtain necessary fall risk management equipment: bracelet/socks   - Apply yellow socks and bracelet for high fall risk patients  - Consider moving patient to room near nurses station  Outcome: Progressing  Goal: Maintain or return to baseline ADL function  Description: INTERVENTIONS:  -  Assess patient's ability to carry out ADLs; assess patient's baseline for ADL function and identify physical deficits which impact ability to perform ADLs (bathing, care of mouth/teeth, toileting, grooming, dressing, etc.)  - Assess/evaluate cause of self-care deficits   - Assess range of motion  - Assess patient's mobility; develop plan if impaired  - Assess patient's need for assistive devices and provide as appropriate  - Encourage maximum independence but intervene and supervise when necessary  - Involve family in performance of ADLs  - Assess for home care needs following discharge   - Consider OT consult to assist with ADL evaluation and planning for discharge  - Provide patient education as appropriate  Outcome: Progressing  Goal: Maintains/Returns to pre admission functional level  Description: INTERVENTIONS:  - Perform AM-PAC 6 Click Basic Mobility/ Daily Activity assessment daily.  - Set and communicate  daily mobility goal to care team and patient/family/caregiver.   - Collaborate with rehabilitation services on mobility goals if consulted  - Perform Range of Motion 12 times a day.  - Reposition patient every 2 hours.  - Dangle patient 3 times a day  - Stand patient 3 times a day  - Ambulate patient 3 times a day  - Out of bed to chair 3 times a day   - Out of bed for meals 3 times a day  - Out of bed for toileting  - Record patient progress and toleration of activity level   Outcome: Progressing     Problem: DISCHARGE PLANNING  Goal: Discharge to home or other facility with appropriate resources  Description: INTERVENTIONS:  - Identify barriers to discharge w/patient and caregiver  - Arrange for needed discharge resources and transportation as appropriate  - Identify discharge learning needs (meds, wound care, etc.)  - Arrange for interpretive services to assist at discharge as needed  - Refer to Case Management Department for coordinating discharge planning if the patient needs post-hospital services based on physician/advanced practitioner order or complex needs related to functional status, cognitive ability, or social support system  Outcome: Progressing     Problem: Knowledge Deficit  Goal: Patient/family/caregiver demonstrates understanding of disease process, treatment plan, medications, and discharge instructions  Description: Complete learning assessment and assess knowledge base.  Interventions:  - Provide teaching at level of understanding  - Provide teaching via preferred learning methods  Outcome: Progressing     Problem: Nutrition/Hydration-ADULT  Goal: Nutrient/Hydration intake appropriate for improving, restoring or maintaining nutritional needs  Description: Monitor and assess patient's nutrition/hydration status for malnutrition. Collaborate with interdisciplinary team and initiate plan and interventions as ordered.  Monitor patient's weight and dietary intake as ordered or per policy. Utilize  nutrition screening tool and intervene as necessary. Determine patient's food preferences and provide high-protein, high-caloric foods as appropriate.     INTERVENTIONS:  - Monitor oral intake, urinary output, labs, and treatment plans  - Assess nutrition and hydration status and recommend course of action  - Evaluate amount of meals eaten  - Assist patient with eating if necessary   - Allow adequate time for meals  - Recommend/ encourage appropriate diets, oral nutritional supplements, and vitamin/mineral supplements  - Order, calculate, and assess calorie counts as needed  - Recommend, monitor, and adjust tube feedings and TPN/PPN based on assessed needs  - Assess need for intravenous fluids  - Provide specific nutrition/hydration education as appropriate  - Include patient/family/caregiver in decisions related to nutrition  Outcome: Progressing     Problem: Prexisting or High Potential for Compromised Skin Integrity  Goal: Skin integrity is maintained or improved  Description: INTERVENTIONS:  - Identify patients at risk for skin breakdown  - Assess and monitor skin integrity  - Assess and monitor nutrition and hydration status  - Monitor labs   - Assess for incontinence   - Turn and reposition patient  - Assist with mobility/ambulation  - Relieve pressure over bony prominences  - Avoid friction and shearing  - Provide appropriate hygiene as needed including keeping skin clean and dry  - Evaluate need for skin moisturizer/barrier cream  - Collaborate with interdisciplinary team   - Patient/family teaching  - Consider wound care consult   Outcome: Progressing

## 2024-10-23 NOTE — PLAN OF CARE
Problem: RESPIRATORY - ADULT  Goal: Achieves optimal ventilation and oxygenation  Description: INTERVENTIONS:  - Assess for changes in respiratory status  - Assess for changes in mentation and behavior  - Position to facilitate oxygenation and minimize respiratory effort  - Oxygen administered by appropriate delivery if ordered  - Initiate smoking cessation education as indicated  - Encourage broncho-pulmonary hygiene including cough, deep breathe, Incentive Spirometry  - Assess the need for suctioning and aspirate as needed  - Assess and instruct to report SOB or any respiratory difficulty  - Respiratory Therapy support as indicated  Outcome: Progressing     Problem: PAIN - ADULT  Goal: Verbalizes/displays adequate comfort level or baseline comfort level  Description: Interventions:  - Encourage patient to monitor pain and request assistance  - Assess pain using appropriate pain scale  - Administer analgesics based on type and severity of pain and evaluate response  - Implement non-pharmacological measures as appropriate and evaluate response  - Consider cultural and social influences on pain and pain management  - Notify physician/advanced practitioner if interventions unsuccessful or patient reports new pain  Outcome: Progressing     Problem: INFECTION - ADULT  Goal: Absence or prevention of progression during hospitalization  Description: INTERVENTIONS:  - Assess and monitor for signs and symptoms of infection  - Monitor lab/diagnostic results  - Monitor all insertion sites, i.e. indwelling lines, tubes, and drains  - Monitor endotracheal if appropriate and nasal secretions for changes in amount and color  - [place] appropriate cooling/warming therapies per order  - Administer medications as ordered  - Instruct and encourage patient and family to use good hand hygiene technique  - Identify and instruct in appropriate isolation precautions for identified infection/condition  Outcome: Progressing  Goal:  Absence of fever/infection during neutropenic period  Description: INTERVENTIONS:  - Monitor WBC    Outcome: Progressing     Problem: SAFETY ADULT  Goal: Patient will remain free of falls  Description: INTERVENTIONS:  - Educate patient/family on patient safety including physical limitations  - Instruct patient to call for assistance with activity   - Consult OT/PT to assist with strengthening/mobility   - Keep Call bell within reach  - Keep bed low and locked with side rails adjusted as appropriate  - Keep care items and personal belongings within reach  - Initiate and maintain comfort rounds  - Make Fall Risk Sign visible to staff  - Apply yellow socks and bracelet for high fall risk patients  - Consider moving patient to room near nurses station  Outcome: Progressing  Goal: Maintain or return to baseline ADL function  Description: INTERVENTIONS:  -  Assess patient's ability to carry out ADLs; assess patient's baseline for ADL function and identify physical deficits which impact ability to perform ADLs (bathing, care of mouth/teeth, toileting, grooming, dressing, etc.)  - Assess/evaluate cause of self-care deficits   - Assess range of motion  - Assess patient's mobility; develop plan if impaired  - Assess patient's need for assistive devices and provide as appropriate  - Encourage maximum independence but intervene and supervise when necessary  - Involve family in performance of ADLs  - Assess for home care needs following discharge   - Consider OT consult to assist with ADL evaluation and planning for discharge  - Provide patient education as appropriate  Outcome: Progressing  Goal: Maintains/Returns to pre admission functional level  Description: INTERVENTIONS:  - Perform AM-PAC 6 Click Basic Mobility/ Daily Activity assessment daily.  - Set and communicate daily mobility goal to care team and patient/family/caregiver.   - Collaborate with rehabilitation services on mobility goals if consulted  - Out of bed for  toileting  - Record patient progress and toleration of activity level   Outcome: Progressing     Problem: DISCHARGE PLANNING  Goal: Discharge to home or other facility with appropriate resources  Description: INTERVENTIONS:  - Identify barriers to discharge w/patient and caregiver  - Arrange for needed discharge resources and transportation as appropriate  - Identify discharge learning needs (meds, wound care, etc.)  - Arrange for interpretive services to assist at discharge as needed  - Refer to Case Management Department for coordinating discharge planning if the patient needs post-hospital services based on physician/advanced practitioner order or complex needs related to functional status, cognitive ability, or social support system  Outcome: Progressing     Problem: Knowledge Deficit  Goal: Patient/family/caregiver demonstrates understanding of disease process, treatment plan, medications, and discharge instructions  Description: Complete learning assessment and assess knowledge base.  Interventions:  - Provide teaching at level of understanding  - Provide teaching via preferred learning methods  Outcome: Progressing     Problem: Nutrition/Hydration-ADULT  Goal: Nutrient/Hydration intake appropriate for improving, restoring or maintaining nutritional needs  Description: Monitor and assess patient's nutrition/hydration status for malnutrition. Collaborate with interdisciplinary team and initiate plan and interventions as ordered.  Monitor patient's weight and dietary intake as ordered or per policy. Utilize nutrition screening tool and intervene as necessary. Determine patient's food preferences and provide high-protein, high-caloric foods as appropriate.     INTERVENTIONS:  - Monitor oral intake, urinary output, labs, and treatment plans  - Assess nutrition and hydration status and recommend course of action  - Evaluate amount of meals eaten  - Assist patient with eating if necessary   - Allow adequate time for  meals  - Recommend/ encourage appropriate diets, oral nutritional supplements, and vitamin/mineral supplements  - Order, calculate, and assess calorie counts as needed  - Recommend, monitor, and adjust tube feedings and TPN/PPN based on assessed needs  - Assess need for intravenous fluids  - Provide specific nutrition/hydration education as appropriate  - Include patient/family/caregiver in decisions related to nutrition  Outcome: Progressing     Problem: Prexisting or High Potential for Compromised Skin Integrity  Goal: Skin integrity is maintained or improved  Description: INTERVENTIONS:  - Identify patients at risk for skin breakdown  - Assess and monitor skin integrity  - Assess and monitor nutrition and hydration status  - Monitor labs   - Assess for incontinence   - Turn and reposition patient  - Assist with mobility/ambulation  - Relieve pressure over bony prominences  - Avoid friction and shearing  - Provide appropriate hygiene as needed including keeping skin clean and dry  - Evaluate need for skin moisturizer/barrier cream  - Collaborate with interdisciplinary team   - Patient/family teaching  - Consider wound care consult   Outcome: Progressing

## 2024-10-23 NOTE — ASSESSMENT & PLAN NOTE
Will complete bedside ultrasound today to evaluate for any pleural effusion amenable to thoracentesis.

## 2024-10-23 NOTE — ASSESSMENT & PLAN NOTE
Wt Readings from Last 3 Encounters:   10/23/24 79.1 kg (174 lb 4.8 oz)   09/16/24 74.8 kg (165 lb)   07/16/24 77.1 kg (170 lb)   Diuresis and management per primary team.

## 2024-10-24 ENCOUNTER — TRANSITIONAL CARE MANAGEMENT (OUTPATIENT)
Dept: FAMILY MEDICINE CLINIC | Facility: CLINIC | Age: 84
End: 2024-10-24

## 2024-10-25 ENCOUNTER — HOME CARE VISIT (OUTPATIENT)
Dept: HOME HEALTH SERVICES | Facility: HOME HEALTHCARE | Age: 84
End: 2024-10-25

## 2024-10-26 ENCOUNTER — HOME CARE VISIT (OUTPATIENT)
Dept: HOME HEALTH SERVICES | Facility: HOME HEALTHCARE | Age: 84
End: 2024-10-26
Payer: MEDICARE

## 2024-10-26 VITALS
SYSTOLIC BLOOD PRESSURE: 135 MMHG | HEART RATE: 51 BPM | OXYGEN SATURATION: 95 % | DIASTOLIC BLOOD PRESSURE: 75 MMHG | RESPIRATION RATE: 18 BRPM | TEMPERATURE: 97.3 F

## 2024-10-26 PROCEDURE — 10330081 VN NO-PAY CLAIM PROCEDURE

## 2024-10-26 PROCEDURE — G0299 HHS/HOSPICE OF RN EA 15 MIN: HCPCS

## 2024-10-26 PROCEDURE — 400013 VN SOC

## 2024-10-28 ENCOUNTER — HOME CARE VISIT (OUTPATIENT)
Dept: HOME HEALTH SERVICES | Facility: HOME HEALTHCARE | Age: 84
End: 2024-10-28
Payer: MEDICARE

## 2024-10-28 VITALS — HEART RATE: 57 BPM | SYSTOLIC BLOOD PRESSURE: 115 MMHG | DIASTOLIC BLOOD PRESSURE: 80 MMHG | OXYGEN SATURATION: 93 %

## 2024-10-28 PROCEDURE — G0152 HHCP-SERV OF OT,EA 15 MIN: HCPCS

## 2024-10-28 NOTE — CASE COMMUNICATION
"Per EPIC communication from PT manager Susie ORTEGA \"pts daughter is refusing further home health services\".    Thank you,  Madelaine Medina, TADEOT, COS-C"

## 2024-10-31 ENCOUNTER — TELEMEDICINE (OUTPATIENT)
Dept: FAMILY MEDICINE CLINIC | Facility: CLINIC | Age: 84
End: 2024-10-31
Payer: MEDICARE

## 2024-10-31 ENCOUNTER — HOSPITAL ENCOUNTER (OUTPATIENT)
Dept: NON INVASIVE DIAGNOSTICS | Facility: HOSPITAL | Age: 84
Discharge: HOME | End: 2024-10-31
Payer: MEDICARE

## 2024-10-31 VITALS
RESPIRATION RATE: 22 BRPM | DIASTOLIC BLOOD PRESSURE: 76 MMHG | SYSTOLIC BLOOD PRESSURE: 123 MMHG | HEART RATE: 54 BPM | OXYGEN SATURATION: 100 %

## 2024-10-31 DIAGNOSIS — I50.33 ACUTE ON CHRONIC DIASTOLIC CONGESTIVE HEART FAILURE (HCC): ICD-10-CM

## 2024-10-31 DIAGNOSIS — F33.41 RECURRENT MAJOR DEPRESSIVE DISORDER, IN PARTIAL REMISSION (HCC): ICD-10-CM

## 2024-10-31 DIAGNOSIS — J96.11 CHRONIC RESPIRATORY FAILURE WITH HYPOXIA AND HYPERCAPNIA (HCC): ICD-10-CM

## 2024-10-31 DIAGNOSIS — C34.92 NON-SMALL CELL CANCER OF LEFT LUNG (HCC): ICD-10-CM

## 2024-10-31 DIAGNOSIS — J96.12 CHRONIC RESPIRATORY FAILURE WITH HYPOXIA AND HYPERCAPNIA (HCC): ICD-10-CM

## 2024-10-31 DIAGNOSIS — J90 PLEURAL EFFUSION: ICD-10-CM

## 2024-10-31 DIAGNOSIS — J90 PLEURAL EFFUSION: Primary | ICD-10-CM

## 2024-10-31 LAB
APPEARANCE FLD: NORMAL
COLOR FLD: YELLOW
GLUCOSE FLD-MCNC: 148 MG/DL
HISTIOCYTES NFR FLD: 10 %
LDH FLD L TO P-CCNC: 88 U/L
LYMPHOCYTES NFR BLD AUTO: 71 %
MONO+MESO NFR FLD MANUAL: 1 %
MONOCYTES NFR BLD AUTO: 9 %
NEUTS SEG NFR BLD AUTO: 9 %
PH BODY FLUID: 7.6
PROT FLD-MCNC: 3.3 G/DL
SITE: NORMAL
TOTAL CELLS COUNTED SPEC: 100
WBC # FLD MANUAL: 1937 /UL

## 2024-10-31 PROCEDURE — 83986 ASSAY PH BODY FLUID NOS: CPT | Performed by: NURSE PRACTITIONER

## 2024-10-31 PROCEDURE — 88112 CYTOPATH CELL ENHANCE TECH: CPT | Performed by: STUDENT IN AN ORGANIZED HEALTH CARE EDUCATION/TRAINING PROGRAM

## 2024-10-31 PROCEDURE — 89051 BODY FLUID CELL COUNT: CPT | Performed by: NURSE PRACTITIONER

## 2024-10-31 PROCEDURE — 87070 CULTURE OTHR SPECIMN AEROBIC: CPT | Performed by: NURSE PRACTITIONER

## 2024-10-31 PROCEDURE — 87205 SMEAR GRAM STAIN: CPT | Performed by: NURSE PRACTITIONER

## 2024-10-31 PROCEDURE — 88341 IMHCHEM/IMCYTCHM EA ADD ANTB: CPT | Performed by: STUDENT IN AN ORGANIZED HEALTH CARE EDUCATION/TRAINING PROGRAM

## 2024-10-31 PROCEDURE — 32555 ASPIRATE PLEURA W/ IMAGING: CPT

## 2024-10-31 PROCEDURE — 88342 IMHCHEM/IMCYTCHM 1ST ANTB: CPT | Performed by: STUDENT IN AN ORGANIZED HEALTH CARE EDUCATION/TRAINING PROGRAM

## 2024-10-31 PROCEDURE — 83615 LACTATE (LD) (LDH) ENZYME: CPT | Performed by: NURSE PRACTITIONER

## 2024-10-31 PROCEDURE — 88305 TISSUE EXAM BY PATHOLOGIST: CPT | Performed by: STUDENT IN AN ORGANIZED HEALTH CARE EDUCATION/TRAINING PROGRAM

## 2024-10-31 PROCEDURE — 82945 GLUCOSE OTHER FLUID: CPT | Performed by: NURSE PRACTITIONER

## 2024-10-31 PROCEDURE — 84157 ASSAY OF PROTEIN OTHER: CPT | Performed by: NURSE PRACTITIONER

## 2024-10-31 PROCEDURE — 99495 TRANSJ CARE MGMT MOD F2F 14D: CPT | Performed by: FAMILY MEDICINE

## 2024-10-31 RX ORDER — LIDOCAINE WITH 8.4% SOD BICARB 0.9%(10ML)
SYRINGE (ML) INJECTION AS NEEDED
Status: COMPLETED | OUTPATIENT
Start: 2024-10-31 | End: 2024-10-31

## 2024-10-31 RX ORDER — TRAZODONE HYDROCHLORIDE 50 MG/1
100 TABLET, FILM COATED ORAL
Qty: 180 TABLET | Refills: 1 | Status: SHIPPED | OUTPATIENT
Start: 2024-10-31

## 2024-10-31 RX ADMIN — Medication 10 ML: at 09:51

## 2024-10-31 NOTE — DISCHARGE INSTRUCTIONS
Thoracentesis   WHAT YOU NEED TO KNOW:   A thoracentesis is a procedure to remove extra fluid or air from between your lungs and your inner chest wall. Air or fluid buildup may make it hard for you to breathe. A thoracentesis allows your lungs to expand fully so you can breathe more easily.  DISCHARGE INSTRUCTIONS:   Medicines:   Pain medicine:  You may be given a prescription medicine to decrease pain. Do not wait until the pain is severe before you take your medicine.    Antibiotics:  This medicine helps fight or prevent an infection.    Take your medicine as directed.  Call your healthcare provider if you think your medicine is not helping or if you have side effects. Tell him if you are allergic to any medicine. Keep a list of the medicines, vitamins, and herbs you take. Include the amounts, and when and why you take them. Bring the list or the pill bottles to follow-up visits. Carry your medicine list with you in case of an emergency.  Follow up with your healthcare provider as directed:  Write down your questions so you remember to ask them during your visits.   Rest:  Rest when you feel it is needed. Slowly start to do more each day. Return to your daily activities as directed.   Do not smoke:  If you smoke, it is never too late to quit. Ask for information about how to stop smoking if you need help.  Contact your healthcare provider if:   You have a fever.    Your puncture site is red, warm, swollen, or draining pus.    You have questions or concerns about your procedure, medicine, or care.    If you have any questions regarding, call the IR department @ [phone].     Seek care immediately or call 911 if:   Blood soaks through your bandage.    There is blood in your spit.

## 2024-10-31 NOTE — SEDATION DOCUMENTATION
Procedure completed by Dr Victor. Pt tolerated without issues, VSS. Education provided to pt prior to and throughout procedure, questions answered as offered. 350cc clear elisa fluid removed from L pleural space, band-aid to site.

## 2024-11-03 LAB
BACTERIA SPEC BFLD CULT: NO GROWTH
GRAM STN SPEC: NORMAL

## 2024-11-04 PROBLEM — G47.33 OSA (OBSTRUCTIVE SLEEP APNEA): Status: ACTIVE | Noted: 2024-06-18

## 2024-11-04 NOTE — PROGRESS NOTES
Virtual TCM Visit:    Verification of patient location:    Patient is located at Home in the following state in which I hold an active license [place]    Assessment & Plan  Recurrent major depressive disorder, in partial remission (HCC)      Orders:    traZODone (DESYREL) 50 mg tablet; Take 2 tablets (100 mg total) by mouth daily at bedtime as needed for sleep    Pleural effusion         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)         Chronic respiratory failure with hypoxia and hypercapnia (HCC)         Acute on chronic diastolic congestive heart failure (HCC)  Wt Readings from Last 3 Encounters:   10/23/24 79.1 kg (174 lb 4.8 oz)   09/16/24 74.8 kg (165 lb)   07/16/24 77.1 kg (170 lb)                       Assessment & Plan  1. Lung cancer.  Fluid accumulation outside the lung is compressing the lung, likely due to lung cancer. The fluid is not substantial but is expected to continue accumulating. The patient underwent an ultrasound-guided left thoracocentesis, removing 350 mL of fluid. She is advised to monitor her breathing and oxygen levels. If her oxygen requirement increases, it may indicate more fluid accumulation, necessitating a chest x-ray and potentially another thoracocentesis.     2. Heart failure.  There is a possibility that the fluid accumulation around the lung is due to heart failure. The heart is not pumping as well as expected, described as moderately abnormal. She is advised to continue taking torsemide and monitor her oxygen levels, using 4 to 6 L as needed. If the fluid is determined to be due to heart failure, adjustments to her treatment plan may be necessary.    3. Oxygen therapy.  Her home oxygen evaluation indicates a requirement of 9 L at rest and 12 L on exertion. However, she is currently using about 5.5 L of oxygen, maintaining an oxygen saturation of 90-91%. She is advised to continue monitoring her oxygen levels and adjust the oxygen flow as needed. If her oxygen requirement increases,  it may indicate more fluid accumulation around the lung.    4. Medication management.  A prescription for trazodone will be sent to her pharmacy. She prefers taking two 50 mg tablets instead of one 100 mg tablet.          Encounter provider Ari Mendiola MD     Provider located at [address] [place] [phone]    After connecting through [employer], the patient was identified by name and date of birth. Eryn Morocho was informed that this is a telemedicine visit and that the visit is being conducted through the datatracker platform. She agrees to proceed..  My office door was closed. No one else was in the room.  She acknowledged consent and understanding of privacy and security of the video platform. The patient has agreed to participate and understands they can discontinue the visit at any time.    Patient is aware this is a billable service.    History of Present Illness     Transitional Care Management Review:   Eryn Morocho is a 84 y.o. adult here for TCM follow up.    During the TCM phone call patient stated:  TCM Call       Date and time call was made  10/25/2024  8:40 AM    Hospital care reviewed  Records reviewed    Patient was hospitial[facility] at  [facility]    Date of Admission  10/16/24    Date of discharge  10/23/24    Diagnosis  Acute on chronic respiratory failure (HCC)    Disposition  Home    Were the patients medications reviewed and updated  Yes    Current Symptoms  None          TCM Call       Post hospital issues  None    Should patient be enrolled in anticoag monitoring?  No    Scheduled for follow up?  Yes    Patients specialists  [facility]    Did you obtain your prescribed medications  Yes    Do you need help managing your prescriptions or medications  No    Why type of assitance do you need  daughter helps as needed    Is transportation to your appointment needed  Yes    Specify why  patient's daughter brings her to  appointments    I have advised the patient to call PCP with any new or worsening symptoms  Clare Gilliam MA    Living Arrangements  Family members; Spouse or Significiant other    Support System  Partner; Family    The type of support provided  Emotional; Financial; Physical    Do you have social support  Yes, as much as I need    Are you recieving any outpatient services  No    Are you recieving home care services  Yes    Types of home care services  Nurse visit; Home PT    Are you using any community resources  No    Current waiver services  No    Have you fallen in the last 12 months  No    Interperter language line needed  No    Counseling  Family          HPI    History of Present Illness  The patient presents for evaluation of lung cancer. She is accompanied by an adult female.    She underwent a procedure this morning to remove fluid from her lungs, with a total of 350 mL extracted. She was advised to take pain medication and antibiotics post-procedure, but she does not have these medications. Instead, she took half a tablet of Tylenol 4. Her oxygen level is currently at 90 to 91.    She has run out of trazodone and has found that taking two 50 mg tablets is more effective than a single 100 mg tablet.    Her heart rate occasionally drops to 55 or 56 beats per minute. She monitors her blood pressure daily, sometimes up to three times a day.      Review of Systems   Constitutional:  Positive for activity change. Negative for fever and unexpected weight change.        Weight increase    HENT:  Negative for ear pain, nosebleeds and trouble swallowing.    Eyes:  Negative for visual disturbance.   Respiratory:  Positive for shortness of breath. Negative for chest tightness.    Cardiovascular:  Negative for chest pain, palpitations and leg swelling.   Gastrointestinal:  Negative for abdominal pain, constipation, diarrhea and nausea.   Endocrine: Negative for cold intolerance.   Genitourinary:  Negative for dysuria  and urgency.   Musculoskeletal:  Positive for arthralgias, back pain and gait problem. Negative for joint swelling and myalgias.   Skin:  Negative for rash.   Neurological:  Positive for weakness. Negative for tremors, seizures and syncope.   Hematological:  Does not bruise/bleed easily.   Psychiatric/Behavioral:  Negative for agitation, behavioral problems, hallucinations and suicidal ideas. The patient is nervous/anxious.      Objective     There were no vitals taken for this visit.    Physical Exam  Constitutional:       Appearance: She is well-developed. She is ill-appearing.   HENT:      Head: Normocephalic and atraumatic.   Pulmonary:      Effort: Pulmonary effort is normal.      Comments: 6L  NC  Neurological:      Mental Status: She is alert and oriented to person, place, and time.   Psychiatric:         Behavior: Behavior normal.         Thought Content: Thought content normal.         Judgment: Judgment normal.       Medications have been reviewed by provider in current encounter  Administrative Statements   I have spent a total time of 25 minutes in caring for this patient on the day of the visit/encounter including Diagnostic results, Prognosis, Risks and benefits of tx options, Patient and family education, Importance of tx compliance, Counseling / Coordination of care, Documenting in the medical record, Reviewing / ordering tests, medicine, procedures  , and Obtaining or reviewing history  .    Ari Mendiola MD      VIRTUAL VISIT DISCLAIMER    Eryn Morocho verbally agrees to participate in Virtual Care Services. Pt is aware that Virtual Care Services could be limited without vital signs or the ability to perform a full hands-on physical exam. Eryn Morocho understands she or the provider may request at any time to terminate the video visit and request the patient to seek care or treatment in person.

## 2024-11-04 NOTE — ASSESSMENT & PLAN NOTE
Orders:    traZODone (DESYREL) 50 mg tablet; Take 2 tablets (100 mg total) by mouth daily at bedtime as needed for sleep

## 2024-11-04 NOTE — ASSESSMENT & PLAN NOTE
Wt Readings from Last 3 Encounters:   10/23/24 79.1 kg (174 lb 4.8 oz)   09/16/24 74.8 kg (165 lb)   07/16/24 77.1 kg (170 lb)

## 2024-11-05 PROCEDURE — 88341 IMHCHEM/IMCYTCHM EA ADD ANTB: CPT | Performed by: STUDENT IN AN ORGANIZED HEALTH CARE EDUCATION/TRAINING PROGRAM

## 2024-11-05 PROCEDURE — 88305 TISSUE EXAM BY PATHOLOGIST: CPT | Performed by: STUDENT IN AN ORGANIZED HEALTH CARE EDUCATION/TRAINING PROGRAM

## 2024-11-05 PROCEDURE — 88112 CYTOPATH CELL ENHANCE TECH: CPT | Performed by: STUDENT IN AN ORGANIZED HEALTH CARE EDUCATION/TRAINING PROGRAM

## 2024-11-05 PROCEDURE — 88342 IMHCHEM/IMCYTCHM 1ST ANTB: CPT | Performed by: STUDENT IN AN ORGANIZED HEALTH CARE EDUCATION/TRAINING PROGRAM

## 2024-11-06 ENCOUNTER — TELEPHONE (OUTPATIENT)
Age: 84
End: 2024-11-06

## 2024-11-06 NOTE — TELEPHONE ENCOUNTER
"Patient's daughter Karen called post telemedicine visit 10/31, provider requested follow up for significant weight changes. Reports patient was 167.0 lbs 11/5 and this morning 163.6  lbs 11/6 with difference of 3.4 lbs. Denies respiratory problems; O2 at 5L and O2 sat 90-92% with HR in high 50-low 60\"s. Denies excessive urination in past 24 hours. Patient had thoracentesis with drainage on 10/31. Denies edema in extremities. Please follow up Karen for provider   "

## 2024-11-06 NOTE — TELEPHONE ENCOUNTER
Entered patients chart to determine is triage was appropriate.  Called triage nurse Meka and warm transfer patients daughter to Meka to discuss the significant weight change of patient

## 2024-11-07 NOTE — TELEPHONE ENCOUNTER
Weight loss is ok (rand)   But yes we can cont to monitor it   Doesn't sound like HF or worsening Pl Ef.

## 2024-11-10 ENCOUNTER — APPOINTMENT (EMERGENCY)
Dept: RADIOLOGY | Facility: HOSPITAL | Age: 84
DRG: 193 | End: 2024-11-10
Payer: MEDICARE

## 2024-11-10 ENCOUNTER — HOSPITAL ENCOUNTER (INPATIENT)
Facility: HOSPITAL | Age: 84
LOS: 4 days | Discharge: HOSPICE | DRG: 193 | End: 2024-11-14
Attending: EMERGENCY MEDICINE | Admitting: INTERNAL MEDICINE
Payer: MEDICARE

## 2024-11-10 DIAGNOSIS — J44.9 CHRONIC OBSTRUCTIVE PULMONARY DISEASE, UNSPECIFIED COPD TYPE (HCC): ICD-10-CM

## 2024-11-10 DIAGNOSIS — J96.01 ACUTE HYPOXIC ON CHRONIC HYPERCAPNIC RESPIRATORY FAILURE (HCC): ICD-10-CM

## 2024-11-10 DIAGNOSIS — J18.9 PNEUMONIA: ICD-10-CM

## 2024-11-10 DIAGNOSIS — J44.9 COPD, VERY SEVERE (HCC): ICD-10-CM

## 2024-11-10 DIAGNOSIS — Z71.89 GOALS OF CARE, COUNSELING/DISCUSSION: ICD-10-CM

## 2024-11-10 DIAGNOSIS — J44.1 COPD WITH ACUTE EXACERBATION (HCC): ICD-10-CM

## 2024-11-10 DIAGNOSIS — J10.1 INFLUENZA B: ICD-10-CM

## 2024-11-10 DIAGNOSIS — R06.03 RESPIRATORY DISTRESS: Primary | ICD-10-CM

## 2024-11-10 DIAGNOSIS — J96.12 CHRONIC RESPIRATORY FAILURE WITH HYPOXIA AND HYPERCAPNIA (HCC): ICD-10-CM

## 2024-11-10 DIAGNOSIS — J96.12 ACUTE HYPOXIC ON CHRONIC HYPERCAPNIC RESPIRATORY FAILURE (HCC): ICD-10-CM

## 2024-11-10 DIAGNOSIS — J96.11 CHRONIC RESPIRATORY FAILURE WITH HYPOXIA AND HYPERCAPNIA (HCC): ICD-10-CM

## 2024-11-10 PROBLEM — I50.32 CHRONIC HEART FAILURE WITH PRESERVED EJECTION FRACTION (HFPEF) (HCC): Status: ACTIVE | Noted: 2024-11-10

## 2024-11-10 PROBLEM — F41.9 ANXIETY AND DEPRESSION: Status: ACTIVE | Noted: 2020-08-27

## 2024-11-10 PROBLEM — F32.A ANXIETY AND DEPRESSION: Status: ACTIVE | Noted: 2020-08-27

## 2024-11-10 LAB
2HR DELTA HS TROPONIN: 6 NG/L
4HR DELTA HS TROPONIN: 13 NG/L
ALBUMIN SERPL BCG-MCNC: 4.1 G/DL (ref 3.5–5)
ALP SERPL-CCNC: 47 U/L (ref 34–104)
ALT SERPL W P-5'-P-CCNC: 6 U/L (ref 7–52)
ANION GAP SERPL CALCULATED.3IONS-SCNC: 4 MMOL/L (ref 4–13)
AST SERPL W P-5'-P-CCNC: 16 U/L (ref 5–45)
ATRIAL RATE: 88 BPM
BASE EX.OXY STD BLDV CALC-SCNC: 62.6 % (ref 60–80)
BASE EX.OXY STD BLDV CALC-SCNC: 66.1 % (ref 60–80)
BASE EXCESS BLDV CALC-SCNC: 0.2 MMOL/L
BASE EXCESS BLDV CALC-SCNC: 4.3 MMOL/L
BASOPHILS # BLD AUTO: 0.02 THOUSANDS/ÂΜL (ref 0–0.1)
BASOPHILS NFR BLD AUTO: 0 % (ref 0–1)
BILIRUB SERPL-MCNC: 0.72 MG/DL (ref 0.2–1)
BUN SERPL-MCNC: 17 MG/DL (ref 5–25)
CALCIUM SERPL-MCNC: 9.2 MG/DL (ref 8.4–10.2)
CARDIAC TROPONIN I PNL SERPL HS: 11 NG/L
CARDIAC TROPONIN I PNL SERPL HS: 17 NG/L
CARDIAC TROPONIN I PNL SERPL HS: 24 NG/L
CHLORIDE SERPL-SCNC: 98 MMOL/L (ref 96–108)
CO2 SERPL-SCNC: 36 MMOL/L (ref 21–32)
CREAT SERPL-MCNC: 0.73 MG/DL (ref 0.6–1.3)
EOSINOPHIL # BLD AUTO: 0.08 THOUSAND/ÂΜL (ref 0–0.61)
EOSINOPHIL NFR BLD AUTO: 1 % (ref 0–6)
ERYTHROCYTE [DISTWIDTH] IN BLOOD BY AUTOMATED COUNT: 15.2 % (ref 11.6–15.1)
FLUAV AG UPPER RESP QL IA.RAPID: NEGATIVE
FLUBV AG UPPER RESP QL IA.RAPID: POSITIVE
GLUCOSE SERPL-MCNC: 209 MG/DL (ref 65–140)
HCO3 BLDV-SCNC: 31.5 MMOL/L (ref 24–30)
HCO3 BLDV-SCNC: 33.1 MMOL/L (ref 24–30)
HCT VFR BLD AUTO: 41.3 % (ref 36.5–46.1)
HGB BLD-MCNC: 12.6 G/DL (ref 12–15.4)
IMM GRANULOCYTES # BLD AUTO: 0.04 THOUSAND/UL (ref 0–0.2)
IMM GRANULOCYTES NFR BLD AUTO: 1 % (ref 0–2)
LACTATE SERPL-SCNC: 0.6 MMOL/L (ref 0.5–2)
LYMPHOCYTES # BLD AUTO: 0.86 THOUSANDS/ÂΜL (ref 0.6–4.47)
LYMPHOCYTES NFR BLD AUTO: 10 % (ref 14–44)
MCH RBC QN AUTO: 32.8 PG (ref 26.8–34.3)
MCHC RBC AUTO-ENTMCNC: 30.5 G/DL (ref 31.4–37.4)
MCV RBC AUTO: 108 FL (ref 82–98)
MONOCYTES # BLD AUTO: 0.47 THOUSAND/ÂΜL (ref 0.17–1.22)
MONOCYTES NFR BLD AUTO: 6 % (ref 4–12)
NEUTROPHILS # BLD AUTO: 6.91 THOUSANDS/ÂΜL (ref 1.85–7.62)
NEUTS SEG NFR BLD AUTO: 82 % (ref 43–75)
NRBC BLD AUTO-RTO: 0 /100 WBCS
O2 CT BLDV-SCNC: 11.1 ML/DL
O2 CT BLDV-SCNC: 12.7 ML/DL
P AXIS: 64 DEGREES
PCO2 BLDV: 72 MM HG (ref 42–50)
PCO2 BLDV: 89.4 MM HG (ref 42–50)
PH BLDV: 7.17 [PH] (ref 7.3–7.4)
PH BLDV: 7.28 [PH] (ref 7.3–7.4)
PLATELET # BLD AUTO: 206 THOUSANDS/UL (ref 149–390)
PMV BLD AUTO: 9.3 FL (ref 8.9–12.7)
PO2 BLDV: 35.7 MM HG (ref 35–45)
PO2 BLDV: 44 MM HG (ref 35–45)
POTASSIUM SERPL-SCNC: 5 MMOL/L (ref 3.5–5.3)
PR INTERVAL: 164 MS
PROCALCITONIN SERPL-MCNC: <0.05 NG/ML
PROT SERPL-MCNC: 7.5 G/DL (ref 6.4–8.4)
QRS AXIS: 35 DEGREES
QRSD INTERVAL: 90 MS
QT INTERVAL: 346 MS
QTC INTERVAL: 418 MS
RBC # BLD AUTO: 3.84 MILLION/UL (ref 3.88–5.12)
SARS-COV+SARS-COV-2 AG RESP QL IA.RAPID: NEGATIVE
SODIUM SERPL-SCNC: 138 MMOL/L (ref 135–147)
T WAVE AXIS: 102 DEGREES
VENTRICULAR RATE: 88 BPM
WBC # BLD AUTO: 8.38 THOUSAND/UL (ref 4.31–10.16)

## 2024-11-10 PROCEDURE — 94760 N-INVAS EAR/PLS OXIMETRY 1: CPT

## 2024-11-10 PROCEDURE — 87804 INFLUENZA ASSAY W/OPTIC: CPT | Performed by: EMERGENCY MEDICINE

## 2024-11-10 PROCEDURE — 36415 COLL VENOUS BLD VENIPUNCTURE: CPT | Performed by: EMERGENCY MEDICINE

## 2024-11-10 PROCEDURE — 85025 COMPLETE CBC W/AUTO DIFF WBC: CPT | Performed by: EMERGENCY MEDICINE

## 2024-11-10 PROCEDURE — 82805 BLOOD GASES W/O2 SATURATION: CPT | Performed by: EMERGENCY MEDICINE

## 2024-11-10 PROCEDURE — 99223 1ST HOSP IP/OBS HIGH 75: CPT | Performed by: INTERNAL MEDICINE

## 2024-11-10 PROCEDURE — 94002 VENT MGMT INPAT INIT DAY: CPT

## 2024-11-10 PROCEDURE — 83605 ASSAY OF LACTIC ACID: CPT | Performed by: EMERGENCY MEDICINE

## 2024-11-10 PROCEDURE — 84484 ASSAY OF TROPONIN QUANT: CPT | Performed by: EMERGENCY MEDICINE

## 2024-11-10 PROCEDURE — 94640 AIRWAY INHALATION TREATMENT: CPT

## 2024-11-10 PROCEDURE — 94644 CONT INHLJ TX 1ST HOUR: CPT

## 2024-11-10 PROCEDURE — 87040 BLOOD CULTURE FOR BACTERIA: CPT | Performed by: EMERGENCY MEDICINE

## 2024-11-10 PROCEDURE — 84145 PROCALCITONIN (PCT): CPT | Performed by: EMERGENCY MEDICINE

## 2024-11-10 PROCEDURE — 99285 EMERGENCY DEPT VISIT HI MDM: CPT

## 2024-11-10 PROCEDURE — 80053 COMPREHEN METABOLIC PANEL: CPT | Performed by: EMERGENCY MEDICINE

## 2024-11-10 PROCEDURE — 71045 X-RAY EXAM CHEST 1 VIEW: CPT

## 2024-11-10 PROCEDURE — 87811 SARS-COV-2 COVID19 W/OPTIC: CPT | Performed by: EMERGENCY MEDICINE

## 2024-11-10 PROCEDURE — 5A09357 ASSISTANCE WITH RESPIRATORY VENTILATION, LESS THAN 24 CONSECUTIVE HOURS, CONTINUOUS POSITIVE AIRWAY PRESSURE: ICD-10-PCS | Performed by: INTERNAL MEDICINE

## 2024-11-10 PROCEDURE — 94660 CPAP INITIATION&MGMT: CPT

## 2024-11-10 PROCEDURE — 96365 THER/PROPH/DIAG IV INF INIT: CPT

## 2024-11-10 PROCEDURE — 93005 ELECTROCARDIOGRAM TRACING: CPT

## 2024-11-10 PROCEDURE — 93010 ELECTROCARDIOGRAM REPORT: CPT | Performed by: INTERNAL MEDICINE

## 2024-11-10 RX ORDER — LIDOCAINE 50 MG/G
1 PATCH TOPICAL ONCE
Status: COMPLETED | OUTPATIENT
Start: 2024-11-10 | End: 2024-11-10

## 2024-11-10 RX ORDER — CITALOPRAM HYDROBROMIDE 20 MG/1
20 TABLET ORAL DAILY
Status: DISCONTINUED | OUTPATIENT
Start: 2024-11-10 | End: 2024-11-14 | Stop reason: HOSPADM

## 2024-11-10 RX ORDER — GUAIFENESIN 600 MG/1
1200 TABLET, EXTENDED RELEASE ORAL EVERY 12 HOURS SCHEDULED
Status: DISCONTINUED | OUTPATIENT
Start: 2024-11-10 | End: 2024-11-14 | Stop reason: HOSPADM

## 2024-11-10 RX ORDER — ALBUTEROL SULFATE 2.5 MG/3ML
1 SOLUTION RESPIRATORY (INHALATION) ONCE
Status: COMPLETED | OUTPATIENT
Start: 2024-11-10 | End: 2024-11-10

## 2024-11-10 RX ORDER — ALBUTEROL SULFATE 5 MG/ML
10 SOLUTION RESPIRATORY (INHALATION) ONCE
Status: COMPLETED | OUTPATIENT
Start: 2024-11-10 | End: 2024-11-10

## 2024-11-10 RX ORDER — LORATADINE 10 MG/1
10 TABLET ORAL DAILY
Status: DISCONTINUED | OUTPATIENT
Start: 2024-11-10 | End: 2024-11-14 | Stop reason: HOSPADM

## 2024-11-10 RX ORDER — TORSEMIDE 10 MG/1
10 TABLET ORAL DAILY
Status: DISCONTINUED | OUTPATIENT
Start: 2024-11-10 | End: 2024-11-14 | Stop reason: HOSPADM

## 2024-11-10 RX ORDER — SODIUM CHLORIDE FOR INHALATION 0.9 %
12 VIAL, NEBULIZER (ML) INHALATION ONCE
Status: COMPLETED | OUTPATIENT
Start: 2024-11-10 | End: 2024-11-10

## 2024-11-10 RX ORDER — ONDANSETRON 2 MG/ML
4 INJECTION INTRAMUSCULAR; INTRAVENOUS EVERY 4 HOURS PRN
Status: DISCONTINUED | OUTPATIENT
Start: 2024-11-10 | End: 2024-11-14 | Stop reason: HOSPADM

## 2024-11-10 RX ORDER — IPRATROPIUM BROMIDE AND ALBUTEROL SULFATE .5; 3 MG/3ML; MG/3ML
1 SOLUTION RESPIRATORY (INHALATION) ONCE
Status: COMPLETED | OUTPATIENT
Start: 2024-11-10 | End: 2024-11-10

## 2024-11-10 RX ORDER — BUDESONIDE 0.5 MG/2ML
0.5 INHALANT ORAL
Status: DISCONTINUED | OUTPATIENT
Start: 2024-11-10 | End: 2024-11-14 | Stop reason: HOSPADM

## 2024-11-10 RX ORDER — FORMOTEROL FUMARATE DIHYDRATE 20 UG/2ML
20 SOLUTION RESPIRATORY (INHALATION)
Status: DISCONTINUED | OUTPATIENT
Start: 2024-11-10 | End: 2024-11-10

## 2024-11-10 RX ORDER — OSELTAMIVIR PHOSPHATE 75 MG/1
75 CAPSULE ORAL ONCE
Status: COMPLETED | OUTPATIENT
Start: 2024-11-10 | End: 2024-11-10

## 2024-11-10 RX ORDER — LIDOCAINE 50 MG/G
1 PATCH TOPICAL DAILY
Status: DISCONTINUED | OUTPATIENT
Start: 2024-11-10 | End: 2024-11-14 | Stop reason: HOSPADM

## 2024-11-10 RX ORDER — CEFEPIME HYDROCHLORIDE 2 G/50ML
2000 INJECTION, SOLUTION INTRAVENOUS ONCE
Status: COMPLETED | OUTPATIENT
Start: 2024-11-10 | End: 2024-11-10

## 2024-11-10 RX ORDER — ACETAMINOPHEN 325 MG/1
975 TABLET ORAL ONCE
Status: COMPLETED | OUTPATIENT
Start: 2024-11-10 | End: 2024-11-10

## 2024-11-10 RX ORDER — DILTIAZEM HYDROCHLORIDE 30 MG/1
30 TABLET, FILM COATED ORAL EVERY 8 HOURS SCHEDULED
Status: DISCONTINUED | OUTPATIENT
Start: 2024-11-10 | End: 2024-11-14 | Stop reason: HOSPADM

## 2024-11-10 RX ORDER — METHYLPREDNISOLONE SOD SUCC 125 MG
1 VIAL (EA) INJECTION ONCE
Status: COMPLETED | OUTPATIENT
Start: 2024-11-10 | End: 2024-11-10

## 2024-11-10 RX ORDER — CHLORAL HYDRATE 500 MG
1000 CAPSULE ORAL 2 TIMES DAILY
Status: DISCONTINUED | OUTPATIENT
Start: 2024-11-10 | End: 2024-11-14 | Stop reason: HOSPADM

## 2024-11-10 RX ORDER — ATORVASTATIN CALCIUM 10 MG/1
10 TABLET, FILM COATED ORAL
Status: DISCONTINUED | OUTPATIENT
Start: 2024-11-10 | End: 2024-11-14 | Stop reason: HOSPADM

## 2024-11-10 RX ORDER — TIMOLOL MALEATE 5 MG/ML
1 SOLUTION/ DROPS OPHTHALMIC 2 TIMES DAILY
Status: DISCONTINUED | OUTPATIENT
Start: 2024-11-10 | End: 2024-11-14 | Stop reason: HOSPADM

## 2024-11-10 RX ORDER — OSELTAMIVIR PHOSPHATE 75 MG/1
75 CAPSULE ORAL EVERY 12 HOURS SCHEDULED
Status: DISCONTINUED | OUTPATIENT
Start: 2024-11-10 | End: 2024-11-13

## 2024-11-10 RX ORDER — ACETAMINOPHEN 325 MG/1
650 TABLET ORAL EVERY 4 HOURS PRN
Status: DISCONTINUED | OUTPATIENT
Start: 2024-11-10 | End: 2024-11-14 | Stop reason: HOSPADM

## 2024-11-10 RX ORDER — DOCUSATE SODIUM 100 MG/1
100 CAPSULE, LIQUID FILLED ORAL 2 TIMES DAILY PRN
Status: DISCONTINUED | OUTPATIENT
Start: 2024-11-10 | End: 2024-11-14 | Stop reason: HOSPADM

## 2024-11-10 RX ORDER — METHOCARBAMOL 500 MG/1
500 TABLET, FILM COATED ORAL ONCE
Status: COMPLETED | OUTPATIENT
Start: 2024-11-10 | End: 2024-11-10

## 2024-11-10 RX ORDER — TRAZODONE HYDROCHLORIDE 100 MG/1
100 TABLET ORAL
Status: DISCONTINUED | OUTPATIENT
Start: 2024-11-10 | End: 2024-11-13

## 2024-11-10 RX ORDER — UBIDECARENONE 30 MG
200 CAPSULE ORAL EVERY MORNING
Status: DISCONTINUED | OUTPATIENT
Start: 2024-11-10 | End: 2024-11-14 | Stop reason: HOSPADM

## 2024-11-10 RX ORDER — CLOPIDOGREL BISULFATE 75 MG/1
75 TABLET ORAL DAILY
Status: DISCONTINUED | OUTPATIENT
Start: 2024-11-10 | End: 2024-11-14 | Stop reason: HOSPADM

## 2024-11-10 RX ORDER — LANOLIN ALCOHOL/MO/W.PET/CERES
2 CREAM (GRAM) TOPICAL
Status: DISCONTINUED | OUTPATIENT
Start: 2024-11-11 | End: 2024-11-14 | Stop reason: HOSPADM

## 2024-11-10 RX ORDER — SODIUM CHLORIDE FOR INHALATION 0.9 %
3 VIAL, NEBULIZER (ML) INHALATION
Status: DISCONTINUED | OUTPATIENT
Start: 2024-11-10 | End: 2024-11-11

## 2024-11-10 RX ORDER — LEVALBUTEROL INHALATION SOLUTION 1.25 MG/3ML
1.25 SOLUTION RESPIRATORY (INHALATION)
Status: DISCONTINUED | OUTPATIENT
Start: 2024-11-10 | End: 2024-11-14 | Stop reason: HOSPADM

## 2024-11-10 RX ORDER — MONTELUKAST SODIUM 10 MG/1
10 TABLET ORAL
Status: DISCONTINUED | OUTPATIENT
Start: 2024-11-10 | End: 2024-11-14 | Stop reason: HOSPADM

## 2024-11-10 RX ADMIN — ACETAMINOPHEN 975 MG: 325 TABLET ORAL at 07:13

## 2024-11-10 RX ADMIN — CITALOPRAM HYDROBROMIDE 20 MG: 20 TABLET ORAL at 10:10

## 2024-11-10 RX ADMIN — IPRATROPIUM BROMIDE 0.5 MG: 0.5 SOLUTION RESPIRATORY (INHALATION) at 19:57

## 2024-11-10 RX ADMIN — OMEGA-3 FATTY ACIDS CAP 1000 MG 1000 MG: 1000 CAP at 17:34

## 2024-11-10 RX ADMIN — IPRATROPIUM BROMIDE 0.5 MG: 0.5 SOLUTION RESPIRATORY (INHALATION) at 13:17

## 2024-11-10 RX ADMIN — ISODIUM CHLORIDE 3 ML: 0.03 SOLUTION RESPIRATORY (INHALATION) at 19:56

## 2024-11-10 RX ADMIN — GUAIFENESIN 1200 MG: 600 TABLET, EXTENDED RELEASE ORAL at 10:10

## 2024-11-10 RX ADMIN — IPRATROPIUM BROMIDE 1 MG: 0.5 SOLUTION RESPIRATORY (INHALATION) at 04:52

## 2024-11-10 RX ADMIN — BUDESONIDE 0.5 MG: 0.5 INHALANT RESPIRATORY (INHALATION) at 19:57

## 2024-11-10 RX ADMIN — DILTIAZEM HYDROCHLORIDE 30 MG: 30 TABLET, FILM COATED ORAL at 14:02

## 2024-11-10 RX ADMIN — GUAIFENESIN 1200 MG: 600 TABLET, EXTENDED RELEASE ORAL at 21:14

## 2024-11-10 RX ADMIN — LIDOCAINE 1 PATCH: 50 PATCH CUTANEOUS at 07:15

## 2024-11-10 RX ADMIN — LEVALBUTEROL HYDROCHLORIDE 1.25 MG: 1.25 SOLUTION RESPIRATORY (INHALATION) at 13:17

## 2024-11-10 RX ADMIN — ATORVASTATIN CALCIUM 10 MG: 10 TABLET, FILM COATED ORAL at 17:34

## 2024-11-10 RX ADMIN — ISODIUM CHLORIDE 12 ML: 0.03 SOLUTION RESPIRATORY (INHALATION) at 04:52

## 2024-11-10 RX ADMIN — OSELTAMAVIR PHOSPHATE 75 MG: 75 CAPSULE ORAL at 07:15

## 2024-11-10 RX ADMIN — CLOPIDOGREL 75 MG: 75 TABLET ORAL at 10:10

## 2024-11-10 RX ADMIN — DILTIAZEM HYDROCHLORIDE 30 MG: 30 TABLET, FILM COATED ORAL at 21:14

## 2024-11-10 RX ADMIN — TORSEMIDE 10 MG: 10 TABLET ORAL at 10:10

## 2024-11-10 RX ADMIN — Medication 200 MG: at 10:10

## 2024-11-10 RX ADMIN — LORATADINE 10 MG: 10 TABLET ORAL at 10:10

## 2024-11-10 RX ADMIN — ISODIUM CHLORIDE 3 ML: 0.03 SOLUTION RESPIRATORY (INHALATION) at 13:17

## 2024-11-10 RX ADMIN — OMEGA-3 FATTY ACIDS CAP 1000 MG 1000 MG: 1000 CAP at 10:10

## 2024-11-10 RX ADMIN — TRAZODONE HYDROCHLORIDE 100 MG: 100 TABLET ORAL at 21:14

## 2024-11-10 RX ADMIN — OSELTAMIVIR PHOSPHATE 75 MG: 75 CAPSULE ORAL at 21:14

## 2024-11-10 RX ADMIN — TIMOLOL MALEATE 1 DROP: 5 SOLUTION OPHTHALMIC at 21:14

## 2024-11-10 RX ADMIN — LEVALBUTEROL HYDROCHLORIDE 1.25 MG: 1.25 SOLUTION RESPIRATORY (INHALATION) at 19:56

## 2024-11-10 RX ADMIN — MONTELUKAST 10 MG: 10 TABLET, FILM COATED ORAL at 21:14

## 2024-11-10 RX ADMIN — CYANOCOBALAMIN TAB 500 MCG 500 MCG: 500 TAB at 10:10

## 2024-11-10 RX ADMIN — METHOCARBAMOL TABLETS 500 MG: 500 TABLET, COATED ORAL at 07:15

## 2024-11-10 RX ADMIN — CEFEPIME HYDROCHLORIDE 2000 MG: 2 INJECTION, SOLUTION INTRAVENOUS at 05:11

## 2024-11-10 RX ADMIN — ALBUTEROL SULFATE 10 MG: 2.5 SOLUTION RESPIRATORY (INHALATION) at 04:52

## 2024-11-10 RX ADMIN — TIMOLOL MALEATE 1 DROP: 5 SOLUTION OPHTHALMIC at 11:51

## 2024-11-10 NOTE — H&P
H&P - Hospitalist   Name: Eryn Morocho 84 y.o. adult I MRN: [id number]  Unit/Bed#: [address] [place] [address] I Date of Admission: 11/10/2024   Date of Service: 11/10/2024 I Hospital Day: 0     Assessment & Plan  Acute on chronic respiratory failure with hypoxia and hypercapnia (HCC)  History of HFpEF severe COPD (recently discharged on 9 L) NS CLC status post wedge resection anxiety depression PAD paroxysmal atrial fibrillation and CKD who presents back to the hospital for worsening shortness of breath  Patient reports 2 days of worsening dyspnea with cough/mucus production  In the ED required BiPAP  Has been transitioned to mid flow 15 L saturating well comfortable  Acute decompensation secondary to influenza B.  She was vaccinated this year for influenza.  Continue oseltamivir  Hypercapnia/JULIO.  Continue BiPAP at bedtime.  Given high oxygen requirements and severe COPD will consult pulmonology  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)  NSCLC status post wedge resection and chemo.  Recent CT scan with suspicion for new neoplasm  COPD, very severe (HCC)  Follows with Dr. Moreno.  No clear exacerbation.  Pulmonology to evaluate.  Continue Perforomist and Pulmicort  HTN (hypertension)  Monitor on diltiazem  Anxiety and depression  Mood stable continue citalopram  Stage 3a chronic kidney disease (HCC)  Lab Results   Component Value Date    EGFR 80 06/19/2024    EGFR 81 06/18/2024    EGFR 81 06/17/2024    CREATININE 0.73 11/10/2024    CREATININE 0.60 10/23/2024    CREATININE 0.55 (L) 10/22/2024     Renal function at baseline  Paroxysmal atrial fibrillation (HCC)  Continue diltiazem  Anticoagulation: Continue rivaroxaban  Chronic heart failure with preserved ejection fraction (HFpEF) (HCC)  Wt Readings from Last 3 Encounters:   11/10/24 72.9 kg (160 lb 11.5 oz)   10/23/24 79.1 kg (174 lb 4.8 oz)   09/16/24 74.8 kg (165 lb)     Compensated continue torsemide 10 mg.  Follows with Dr. Agarwal  Influenza B  Oseltamivir as above    VTE  Pharmacologic Prophylaxis: VTE Score: 5 High Risk (Score >/= 5) - Pharmacological DVT Prophylaxis Ordered: rivaroxaban (Xarelto). Sequential Compression Devices Ordered.  Code Status: Level 3 - DNAR and DNI   Discussion with family: Updated  (daughter) via phone.    Anticipated Length of Stay: Patient will be admitted on an inpatient basis with an anticipated length of stay of greater than 2 midnights secondary to respiratory failure secondary to influenza.    Chief Complaint:     Respiratory Distress (Patient arrives ALS on cpap, 1 duo, 1 albuterol and 125 of solumedrol pta, mild respiratory issues for a couple of days worsening tonight)    History of Present Illness  Eryn Morocho is a 84 y.o. adult with a PMH of HFpEF atrial fibrillation NSCLC severe COPD who presents with worsening shortness of breath.  The patient was recently hospitalized here and discharged on 10/23 for decompensated CHF.  She was doing relatively well until 2 days ago when she had increased work of breathing.  During recent discharge she was advised to use 9 L.  She denies any fevers chills nausea vomiting or diarrhea.  She came to the ED via EMS for respiratory distress and was placed on BiPAP.  She has been transitioned over to mid flow and appears comfortable.  She did test positive for the flu.  She did receive her flu shot this year but had a daughter visit from out of state    Review of Systems   Constitutional:  Negative for fever.   HENT:  Negative for facial swelling.    Eyes:  Negative for visual disturbance.   Respiratory:  Positive for cough and shortness of breath.    Cardiovascular:  Negative for chest pain and palpitations.   Gastrointestinal:  Negative for abdominal distention, abdominal pain, diarrhea, nausea and vomiting.   Genitourinary:  Negative for hematuria.   Musculoskeletal:  Negative for back pain and myalgias.   Skin:  Negative for rash.   Neurological:  Negative for seizures and speech difficulty.    Psychiatric/Behavioral:  The patient is not nervous/anxious.    All other systems reviewed and are negative.      Past Medical and Surgical History:   Past Medical History:   Diagnosis Date    Asthma     Back problem     Chronic pain     Colon cancer screening     recheck tattoo    COPD (chronic obstructive pulmonary disease) (HCC)     Oxygen 3.5L via NC    CPAP (continuous positive airway pressure) dependence     Diverticulosis     Emphysema of lung (HCC)     High blood pressure     Hyperlipidemia     Hypertension     Lung cancer (HCC)     Left Lung cancer; wedge resection     JULIO on CPAP     PAD (peripheral artery disease) (HCC)     Leg    Wears glasses      Past Surgical History:   Procedure Laterality Date    APPENDECTOMY      age 11 yr    COLONOSCOPY  2018    repeat 2023    COLONOSCOPY      HYSTERECTOMY      complete    IR THORACENTESIS  10/31/2024    LUNG CANCER SURGERY Left     wedge reseection, Dr Anna     Meds/Allergies:  Allergies:   Allergies   Allergen Reactions    Baclofen Nausea Only and Dizziness    Lisinopril Nausea Only and Dizziness    Losartan Nausea Only and Dizziness    Naproxen Nausea Only and Dizziness    Zinc Acetate Nausea Only and Dizziness     Prior to Admission Medications   Prescriptions Last Dose Informant Patient Reported? Taking?   Calcium Carbonate (CALTRATE 600 PO)  Self Yes No   Sig: Take 3 tablets by mouth in the morning daily in AM   Coenzyme Q10 (Co Q 10) 100 MG CAPS  Self No No   Sig: Take 2 capsules (200 mg total) by mouth every morning   Cyanocobalamin (Energy B12) 500 MCG CHEW  Self Yes No   Sig: Chew 1 tablet in the morning. daily  Indications: Inadequate Vitamin B12   Multiple Vitamins-Minerals (AIRBORNE GUMMIES PO)  Self Yes No   Sig: Take 1 tablet by mouth every morning   Nebulizers (Vios LC Sprint) MISC  Self No No   Sig: Use as directed   VITAMIN D PO  Self Yes No   Sig: Take 25 mcg by mouth in the morning daily    pt takes 50mcg by mouth daily   albuterol (2.5  mg/3 mL) 0.083 % nebulizer solution  Self No No   Sig: Take 3 mL (2.5 mg total) by nebulization daily as needed for wheezing or shortness of breath   albuterol (PROVENTIL HFA,VENTOLIN HFA) 90 mcg/act inhaler  Self No No   Sig: INHALE 2 PUFFS BY MOUTH EVERY 6 HOURS AS NEEDED FOR SHORTNESS OF BREATH   budesonide (PULMICORT) 0.5 mg/2 mL nebulizer solution   No Yes   Sig: Take 2 mL (0.5 mg total) by nebulization 2 (two) times a day Rinse mouth after use.   carboxymethylcellulose 0.5 % SOLN  Self Yes No   Sig: Administer 1 drop to both eyes 3 (three) times a day as needed for dry eyes   citalopram (CeleXA) 20 mg tablet   No No   Sig: TAKE 1 TABLET(20 MG) BY MOUTH DAILY   clopidogrel (PLAVIX) 75 mg tablet   No No   Sig: TAKE 1 TABLET BY MOUTH DAILY   denosumab (Prolia) 60 mg/mL   No Yes   Sig: Inject 1 mL (60 mg total) under the skin once for 1 dose   Patient taking differently: Inject 60 mg under the skin once Next dose 6/2023   diltiazem (CARDIZEM) 30 mg tablet   No No   Sig: TAKE 1 TABLET(30 MG) BY MOUTH THREE TIMES DAILY   formoterol (Perforomist) 20 MCG/2ML nebulizer solution  Self No No   Sig: Take 2 mL (20 mcg total) by nebulization 2 (two) times a day icd 10 code J44.9   guaiFENesin (MUCINEX) 600 mg 12 hr tablet  Self No No   Sig: TAKE 2 TABLET BY MOUTH EVERY 12 HOURS   ipratropium-albuterol (DUO-NEB) 0.5-2.5 mg/3 mL nebulizer solution  Self No No   Sig: Take 3 mL by nebulization every 6 (six) hours as needed for wheezing or shortness of breath ICD 10 J44.9   levocetirizine (XYZAL) 5 MG tablet  Self No No   Sig: TAKE 1 TABLET(5 MG) BY MOUTH EVERY EVENING   montelukast (SINGULAIR) 10 mg tablet  Self No No   Sig: TAKE 1 TABLET(10 MG) BY MOUTH DAILY AT BEDTIME   omega-3-acid ethyl esters (LOVAZA) 1 g capsule   No No   Sig: Take 2 capsules (2 g total) by mouth 2 (two) times a day   oxygen gas  Self Yes No   Sig: Inhale 9 L/min continuous VIA Nc  pt uses 8-9L via NC at rest and 12L during activity   rivaroxaban  (Xarelto) 20 mg tablet   No No   Sig: Take 1 tablet (20 mg total) by mouth daily with breakfast   rosuvastatin (CRESTOR) 20 MG tablet  Self No No   Sig: TAKE 1 TABLET(20 MG) BY MOUTH DAILY   timolol (TIMOPTIC) 0.5 % ophthalmic solution  Self Yes No   Sig: Administer 1 drop to both eyes 2 (two) times a day   torsemide (DEMADEX) 10 mg tablet  Self No No   Sig: Take 1 tablet (10 mg total) by mouth daily   traZODone (DESYREL) 50 mg tablet   No No   Sig: Take 2 tablets (100 mg total) by mouth daily at bedtime as needed for sleep      Facility-Administered Medications: None     Social History:     Social History     Socioeconomic History    Marital status: /Civil Union     Spouse name: Not on file    Number of children: 4    Years of education: 12    Highest education level: Not on file   Occupational History    Occupation: Retired -    Tobacco Use    Smoking status: Former     Current packs/day: 1.50     Average packs/day: 1.5 packs/day for 35.0 years (52.5 ttl pk-yrs)     Types: Cigarettes    Smokeless tobacco: Never    Tobacco comments:     Has smoked since age:   11 - As per [place]    Vaping Use    Vaping status: Never Used   Substance and Sexual Activity    Alcohol use: Not Currently    Drug use: Never    Sexual activity: Not on file   Other Topics Concern    Not on file   Social History Narrative    Most recent tobacco use screenin2020    Do you currently or have you served in the [employer]: No    Were you activated, into active duty, as a member of the [employer] or as a Reservist: No    Occupation: Retired;     Exercise level: None    Has smoked since age: 11    Alcohol intake: None    Caffeine intake: Occasional    Chewing tobacco: none    Marital status:     Illicit drugs: Denied    Diet: Cardiac    Seat belts used routinely: Yes    Sexual orientation: Heterosexual    Smoke alarm in home: Yes    Advance directive: No    General stress level: Medium    Salt  Intake: HTN Diet    Sunscreen used routinely: Yes    Has the Patient had a mammogram to screen for breast cancer within 24 months: No    Guns present in home: No    Single or multi-level home/work: single level home    Live alone or with others: with others    Number of children: 4    Presence of domestic violence: No    Are you currently employed: No    Asbestos exposure: No    TB exposure: No    Environmental exposure: No    Animal exposure: Yes    Hard of hearing or deaf in one or both ears: No    Legally blind in one or both eyes: No    has cpap, oxygen and nebulizer-[employer]     - As per [place]      Social Determinants of Health     Financial Resource Strain: Low Risk  (2023)    Overall Financial Resource Strain (CARDIA)     Difficulty of Paying Living Expenses: Not very hard   Food Insecurity: No Food Insecurity (10/23/2024)    Nursing - Inadequate Food Risk Classification     Worried About Running Out of Food in the Last Year: Never true     Ran Out of Food in the Last Year: Never true     Ran Out of Food in the Last Year: 1   Transportation Needs: No Transportation Needs (10/28/2024)    OASIS : Transportation     Lack of Transportation (Medical): No     Lack of Transportation (Non-Medical): No     Patient Unable or Declines to Respond: No   Physical Activity: Not on file   Stress: Not on file   Social Connections: Not on file   Intimate Partner Violence: Unknown (10/23/2024)    Nursing IPS     Feels Physically and Emotionally Safe: Not on file     Physically Hurt by Someone: Not on file     Humiliated or Emotionally Abused by Someone: Not on file     Physically Hurt by Someone: 2     Hurt or Threatened by Someone: 2   Housing Stability: Unknown (10/23/2024)    Nursing: Inadequate Housing Risk Classification     Has Housing: Not on file     Worried About Losing Housing: Not on file     Unable to Get Utilities: Not on file     Unable to Pay for Housing in the Last Year: 2     Has Housin      Patient Pre-hospital Living Situation:   Patient Pre-hospital Level of Mobility: walks  Patient Pre-hospital Diet Restrictions:     Objective   Vitals:   Blood Pressure: 132/54 (11/10/24 0914)  Pulse: 73 (11/10/24 0914)  Temperature: 98.3 °F (36.8 °C) (11/10/24 0912)  Temp Source: Oral (11/10/24 0647)  Respirations: 22 (11/10/24 0912)  Weight - Scale: 72.9 kg (160 lb 11.5 oz) (11/10/24 0914)  SpO2: 90 % (11/10/24 0914)    Physical Exam  Vitals reviewed.   Constitutional:       General: She is not in acute distress.  HENT:      Head: Atraumatic.      Mouth/Throat:      Mouth: Mucous membranes are dry.   Eyes:      Extraocular Movements: Extraocular movements intact.   Cardiovascular:      Rate and Rhythm: Regular rhythm.      Heart sounds: Normal heart sounds.   Pulmonary:      Effort: Pulmonary effort is normal. No respiratory distress.      Breath sounds: No stridor. Decreased breath sounds present.   Abdominal:      General: Bowel sounds are normal.      Palpations: Abdomen is soft.      Tenderness: There is no abdominal tenderness.   Musculoskeletal:      Cervical back: Normal range of motion.      Right lower leg: No edema.      Left lower leg: No edema.   Skin:     General: Skin is warm and dry.   Neurological:      General: No focal deficit present.      Mental Status: She is alert and oriented to person, place, and time.      Motor: No weakness.   Psychiatric:         Mood and Affect: Mood normal.         Additional Data:   Lab Results: I have reviewed the following results:  Results from last 7 days   Lab Units 11/10/24  0455   WBC Thousand/uL 8.38   HEMOGLOBIN g/dL 12.6   HEMATOCRIT % 41.3   PLATELETS Thousands/uL 206   SEGS PCT % 82*   LYMPHO PCT % 10*   MONO PCT % 6   EOS PCT % 1     Results from last 7 days   Lab Units 11/10/24  0455   SODIUM mmol/L 138   POTASSIUM mmol/L 5.0   CHLORIDE mmol/L 98   CO2 mmol/L 36*   ANION GAP mmol/L 4   BUN mg/dL 17   CREATININE mg/dL 0.73   CALCIUM mg/dL 9.2    ALBUMIN g/dL 4.1   TOTAL BILIRUBIN mg/dL 0.72   ALK PHOS U/L 47   ALT U/L 6*   AST U/L 16   GLUCOSE RANDOM mg/dL 209*             Results from last 7 days   Lab Units 11/10/24  0722 11/10/24  0455   HS TNI 0HR ng/L  --  11   HS TNI 2HR ng/L 17  --      Results from last 7 days   Lab Units 11/10/24  0455   LACTIC ACID mmol/L 0.6                              Lines/Drains  Invasive Devices       Peripheral Intravenous Line  Duration             Peripheral IV Left;Dorsal (posterior) Hand -- days    Peripheral IV 11/10/24 Right Antecubital <1 day    Peripheral IV 11/10/24 Right;Dorsal (posterior) Wrist <1 day                    Imaging:   Personally reviewed the following image studies in PACS and associated radiology reports:  XR chest portable: No obvious infiltrates    EKG, Pathology, and Other Studies Reviewed on Admission:   EKG  Result Date: 11/10/24  Personally reviewed strips with impression of: Normal sinus rhythm 88 bpm    Administrative Statements       ** Please Note: This note has been constructed using a voice recognition system. **

## 2024-11-10 NOTE — ASSESSMENT & PLAN NOTE
-Moderately severe obstruction with severe decreased diffusion on previous pulmonary function testing from August 2018.  - Not in exacerbation.  - Continue all nebulized regimen, which is what she uses at home.  Hold formoterol since she is receiving scheduled levalbuterol 3 times daily.  Resume at discharge.  -Ambulate as able.  - Appropriate vaccines as outpatient.

## 2024-11-10 NOTE — CONSULTS
Consultation - Pulmonology   Name: Eryn Morocho 84 y.o. adult I MRN: [id number]  Unit/Bed#: 4 [place] 420-01 I Date of Admission: 11/10/2024   Date of Service: 11/10/2024 I Hospital Day: 0   Inpatient consult to Pulmonology  Consult performed by: Courtney Hayes DO  Consult ordered by: Ulices Lebron DO        Physician Requesting Evaluation: Ulices Lebron DO   Reason for Evaluation / Principal Problem: hypoxic respiratory failure    Assessment & Plan  Acute on chronic respiratory failure with hypoxia and hypercapnia (HCC)  -Baseline oxygen requirement 6 to 10 L with Oxymizer as needed.  She was recently discharged on 9 L with Oxymizer at rest and 12 L with Oxymizer on exertion.  -Continue oxygen here and wean as able.  - Continue BiPAP 15/9 with all sleep.  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)  -Diagnosed with non-small cell lung cancer underwent wedge resection.  Treated with chemotherapy.  Follows with Dr. Dumont from oncology.  Stage 3a chronic kidney disease (HCC)  Lab Results   Component Value Date    EGFR 80 06/19/2024    EGFR 81 06/18/2024    EGFR 81 06/17/2024    CREATININE 0.73 11/10/2024    CREATININE 0.60 10/23/2024    CREATININE 0.55 (L) 10/22/2024     COPD, very severe (HCC)  -Moderately severe obstruction with severe decreased diffusion on previous pulmonary function testing from August 2018.  - Not in exacerbation.  - Continue all nebulized regimen, which is what she uses at home.  Hold formoterol since she is receiving scheduled levalbuterol 3 times daily.  Resume at discharge.  -Ambulate as able.  - Appropriate vaccines as outpatient.  Paroxysmal atrial fibrillation (HCC)  Anticoagulated with rivaroxaban.  Maintained on diltiazem.  Chronic heart failure with preserved ejection fraction (HFpEF) ([facility])  Wt Readings from Last 3 Encounters:   11/10/24 72.9 kg (160 lb 11.5 oz)   10/23/24 79.1 kg (174 lb 4.8 oz)   09/16/24 74.8 kg (165 lb)   -Continue outpatient torsemide daily.  - Limit fluid and  salt.  Trend weight.    Influenza B  -Oseltamivir per primary team  - Supportive care  -She is at risk for superinfection.  Given negative procalcitonin, afebrile, no leukocytosis.  Agree with holding additional antibiotics at this time.    Pulmonology service will follow.    Plan of care discussed with patient.  Concerns and questions addressed.  Plan of care discussed with primary team.    History of Present Illness   Eryn Morocho is a 84 y.o. adult PMH chronic hypoxic respiratory failure, very severe COPD, NSC lung cancer left lung; HTN, anxiety and depression, CKD stage IIIA, pafib, HFpEF, who presents with acute on chronic hypoxia due to Influenza B. Placed on CPAP pre-hospital. Received IV steroids and nebs. We are consulted to help with management.    Found lying in bed, asleep.  Daughter at bedside who is main caretaker.  Daughter is frustrated.  States the patient's daughter visited despite not having a flu shot and did not wear a mask and has the flu.  Daughter does not want patient to suffer if her breathing worsens.    Records reviewed.  Patient admitted 10/16 to 10/23 for acute on chronic respiratory failure and found to have exacerbation of heart failure.  She had a thoracentesis on 10/31/2024.  Fluid transudative, likely secondary to heart failure.  Cytology negative.    Review of Systems  Positive as above and negative otherwise    Historical Information   I have reviewed the patient's PMH, PSH, Social History, Family History, Meds, and Allergies  Tobacco History: 1.5 ppd x 35 years; quit in 2010  Occupational History: retired   Family History:non-contributory    Objective :  Temp:  [98.3 °F (36.8 °C)-100 °F (37.8 °C)] 98.3 °F (36.8 °C)  HR:  [73-93] 73  BP: (132-203)/() 132/54  Resp:  [20-32] 22  SpO2:  [82 %-100 %] 90 %  O2 Device: Mid flow nasal cannula  Nasal Cannula O2 Flow Rate (L/min):  [5 L/min-15 L/min] 15 L/min  FiO2 (%):  [40] 40    Physical Exam  GEN:  WDWN, nad,  comfortable  HEENT: NCAT, EOMI  ABD: soft  EXT: No c/c/e      Lab Results: I have reviewed the following results:  .     11/10/24  0455 11/10/24  0722   WBC 8.38  --    HGB 12.6  --    HCT 41.3  --      --    SODIUM 138  --    K 5.0  --    CL 98  --    CO2 36*  --    BUN 17  --    CREATININE 0.73  --    GLUC 209*  --    AST 16  --    ALT 6*  --    ALB 4.1  --    TBILI 0.72  --    ALKPHOS 47  --    HSTNI0 11  --    HSTNI2  --  17   LACTICACID 0.6  --      Labs per my review show normal CBC, normal renal function, hyperglycemia    Imaging Results Review: I personally reviewed the following image studies in PACS and associated radiology reports: chest xray. My interpretation of the radiology images/reports is: Chest x-ray per my review shows pulmonary vascular congestion with a small left pleural effusion.    Other Study Results Review: Other studies reviewed include: Echo 10/22/2024: EF normal; RVSP 28 mmHg    PFT August 2018 per my review shows FEV1 55% predicted; DLCO 27%; my impression moderately severe obstruction with severely decreased diffusion    VTE Prophylaxis: VTE covered by:  [START ON 11/11/2024] rivaroxaban, Oral

## 2024-11-10 NOTE — ASSESSMENT & PLAN NOTE
Lab Results   Component Value Date    EGFR 80 06/19/2024    EGFR 81 06/18/2024    EGFR 81 06/17/2024    CREATININE 0.73 11/10/2024    CREATININE 0.60 10/23/2024    CREATININE 0.55 (L) 10/22/2024

## 2024-11-10 NOTE — ASSESSMENT & PLAN NOTE
-Oseltamivir per primary team  - Supportive care  -She is at risk for superinfection.  Given negative procalcitonin, afebrile, no leukocytosis.  Agree with holding additional antibiotics at this time.

## 2024-11-10 NOTE — Clinical Note
Case was discussed with  and the patient's admission status was agreed to be Admission Status: inpatient status to the service of Dr. Jones

## 2024-11-10 NOTE — ED NOTES
Respiratory placed patient on midflow O2  at 15 lpm, O2 Sat at 90% per critical care.     Manda Winter RN  11/10/24 0808       Manda Winter RN  11/10/24 0810       Manda Winter RN  11/10/24 0816

## 2024-11-10 NOTE — PLAN OF CARE
Problem: PAIN - ADULT  Goal: Verbalizes/displays adequate comfort level or baseline comfort level  Description: Interventions:  - Encourage patient to monitor pain and request assistance  - Assess pain using appropriate pain scale  - Administer analgesics based on type and severity of pain and evaluate response  - Implement non-pharmacological measures as appropriate and evaluate response  - Consider cultural and social influences on pain and pain management  - Notify physician/advanced practitioner if interventions unsuccessful or patient reports new pain  Outcome: Progressing     Problem: INFECTION - ADULT  Goal: Absence or prevention of progression during hospitalization  Description: INTERVENTIONS:  - Assess and monitor for signs and symptoms of infection  - Monitor lab/diagnostic results  - Monitor all insertion sites, i.e. indwelling lines, tubes, and drains  - Monitor endotracheal if appropriate and nasal secretions for changes in amount and color  - [place] appropriate cooling/warming therapies per order  - Administer medications as ordered  - Instruct and encourage patient and family to use good hand hygiene technique  - Identify and instruct in appropriate isolation precautions for identified infection/condition  Outcome: Progressing  Goal: Absence of fever/infection during neutropenic period  Description: INTERVENTIONS:  - Monitor WBC    Outcome: Progressing     Problem: SAFETY ADULT  Goal: Patient will remain free of falls  Description: INTERVENTIONS:  - Educate patient/family on patient safety including physical limitations  - Instruct patient to call for assistance with activity   - Consult OT/PT to assist with strengthening/mobility   - Keep Call bell within reach  - Keep bed low and locked with side rails adjusted as appropriate  - Keep care items and personal belongings within reach  - Initiate and maintain comfort rounds  - Make Fall Risk Sign visible to staff  - Offer Toileting every 2 Hours,  in advance of need  - Initiate/Maintain bed alarm  - Obtain necessary fall risk management equipment: walker  - Apply yellow socks and bracelet for high fall risk patients  - Consider moving patient to room near nurses station  Outcome: Progressing  Goal: Maintain or return to baseline ADL function  Description: INTERVENTIONS:  -  Assess patient's ability to carry out ADLs; assess patient's baseline for ADL function and identify physical deficits which impact ability to perform ADLs (bathing, care of mouth/teeth, toileting, grooming, dressing, etc.)  - Assess/evaluate cause of self-care deficits   - Assess range of motion  - Assess patient's mobility; develop plan if impaired  - Assess patient's need for assistive devices and provide as appropriate  - Encourage maximum independence but intervene and supervise when necessary  - Involve family in performance of ADLs  - Assess for home care needs following discharge   - Consider OT consult to assist with ADL evaluation and planning for discharge  - Provide patient education as appropriate  Outcome: Progressing  Goal: Maintains/Returns to pre admission functional level  Description: INTERVENTIONS:  - Perform AM-PAC 6 Click Basic Mobility/ Daily Activity assessment daily.  - Set and communicate daily mobility goal to care team and patient/family/caregiver.   - Collaborate with rehabilitation services on mobility goals if consulted  - Out of bed for toileting  - Record patient progress and toleration of activity level   Outcome: Progressing     Problem: DISCHARGE PLANNING  Goal: Discharge to home or other facility with appropriate resources  Description: INTERVENTIONS:  - Identify barriers to discharge w/patient and caregiver  - Arrange for needed discharge resources and transportation as appropriate  - Identify discharge learning needs (meds, wound care, etc.)  - Arrange for interpretive services to assist at discharge as needed  - Refer to Case Management Department for  coordinating discharge planning if the patient needs post-hospital services based on physician/advanced practitioner order or complex needs related to functional status, cognitive ability, or social support system  Outcome: Progressing     Problem: Knowledge Deficit  Goal: Patient/family/caregiver demonstrates understanding of disease process, treatment plan, medications, and discharge instructions  Description: Complete learning assessment and assess knowledge base.  Interventions:  - Provide teaching at level of understanding  - Provide teaching via preferred learning methods  Outcome: Progressing     Problem: RESPIRATORY - ADULT  Goal: Achieves optimal ventilation and oxygenation  Description: INTERVENTIONS:  - Assess for changes in respiratory status  - Assess for changes in mentation and behavior  - Position to facilitate oxygenation and minimize respiratory effort  - Oxygen administered by appropriate delivery if ordered  - Initiate smoking cessation education as indicated  - Encourage broncho-pulmonary hygiene including cough, deep breathe, Incentive Spirometry  - Assess the need for suctioning and aspirate as needed  - Assess and instruct to report SOB or any respiratory difficulty  - Respiratory Therapy support as indicated  Outcome: Progressing

## 2024-11-10 NOTE — ED NOTES
Patient placed back on bipap, unable to maintain spo2 above 86 on midflow     Nessa Colunga RN  11/10/24 [id number]

## 2024-11-10 NOTE — QUICK NOTE
Progress Note - Triage Assessment   Eryn Morocho 84 y.o. adult MRN: [id number]    Date Called: 11/10/24  Room#: CT1  Person requesting evaluation: Dr. Herr    Called to ED to evaluate patient for possible admission to Critical Care Service, chart reviewed and patient evaluated.     Patient with severe COPD, chronic respiratory failure with reported 9-12L baseline, diastolic CHF, NSCLCa with reportedly new enlarging RLL nodule that she is not pursuing further treatment for. She was discharged 10/23 for which she was hospitalized for acute on chronic hypercapnic and hypoxic respiratory failure. During that hospitalization, she had extensive GOC and was made DNR/I.     She presents today, with SOB x2 days, found to be positive for influenza B. She was placed on Bipap and transitioned off to 5L NC with sp02 82%. She reportedly was tried up to 12L, but was placed back on Bipap.     On my exam, patient wakes and is oriented x4. No wheezing appreciated on my exam. Her and daughter report productive cough. GOC and code status again were addressed and she wishes to remain DNR/I and accepts admission at this time.     Recommend, transitioning to MFNC (baseline 12L) or HFNC. Discourage Bipap use at this time given productive cough. Continue to treat for influenza. Continue on-going goals of care conversations.     Case discussed with Critical Care attending Dr. Hayes and after review it was felt the patient is appropriate for admission to a general medical floor.    Discussed case with Dr. Lebron and they have agreed to accept patient to their service.       Triage Assessment:     Patient appropriate to be admitted to Stepdown Level 2 level of care.    If any questions or concerns please call the critical care team via Secure Chat.

## 2024-11-10 NOTE — ED PROVIDER NOTES
"Final Diagnosis:  1. Respiratory distress    2. COPD with acute exacerbation (HCC)    3. Pneumonia    4. Influenza B    5. Acute hypoxic on chronic hypercapnic respiratory failure (HCC)    6. COPD, very severe (HCC)        Chief Complaint   Patient presents with    Respiratory Distress     Patient arrives ALS on cpap, 1 duo, 1 albuterol and 125 of solumedrol pta, mild respiratory issues for a couple of days worsening tonight       HPI  Patietn comes by ems.    Was admitted  month ago for copd  Ultimately went home on several liters midflow  At the time mentioned \"just want to go home and be comfortable\"  Here is DNR DNI, but want full care otherwise    Has THICC sputum on CPAP removal  En route was getting cpap, albuterol duoneb and solumedrol    Here suctioned, wiped  Denies pain just sob  Bipap placed and more comf    A little leth, suspect co2 narcosis    No leg swelling      EMS additionally reports:     - Previous charting underwent limited review with attention to last ED visits, labs, ekgs, and prior imaging.  Chart review reveals :     Hospital Outpatient Visit on 10/31/2024   Component Date Value Ref Range Status    Site 10/31/2024 L. Pleural   Final    Color, Fluid 10/31/2024 Yellow  Clear, Colorless,Yellow Final    Clarity, Fluid 10/31/2024 Slightly Cloudy  Clear Final    WBC, Fluid 10/31/2024 1,937  /ul Final    The reference range and other method performance specifications have not been established for this body fluid. The test result must be integrated into the clinical context for interpretation.    Body Fluid Culture, Sterile 10/31/2024 No growth   Final    Gram Stain Result 10/31/2024 No organisms seen   Final    Glucose, Fluid 10/31/2024 148  mg/dL Final    The reference range is not defined for this body fluid test. The test result must be integrated into the clinical context for interpretation.    LD, Fluid 10/31/2024 88  U/L Final    The reference range is not defined for this body fluid test. " The test result must be integrated into the clinical context for interpretation.    Case Report 10/31/2024    Final                    Value:Non-gynecologic Cytology                          Case: [id number]                                  Authorizing Provider:  PRANAY Price    Collected:           10/31/2024 0952              Ordering Location:     [facility] Received:            10/31/2024 1045                                     Cardiac Cath Lab                                                             Pathologist:           Evan Tarango MD                                                          Specimens:   A) - Thoracentesis, Left                                                                            B) - Thoracentesis, cell block                                                             Final Diagnosis 10/31/2024    Final                    Value:A.-B.  Thoracentesis, Left (Thin-prep and cell block preparations):    Negative for malignancy.  Benign mesothelial cells and histiocytes.  Scattered lymphocytes.  Proteinaceous material.    Satisfactory for evaluation.        Note 10/31/2024    Final                    Value:Immunohistochemical stains show:  MOC-31 and DIANNE-EP4 are negative.  WT-1 and D2-40 highlights scattered mesothelial cells.      Gross Description 10/31/2024    Final                    Value:A. 100mL, burton yellow, hazy, received in [employer]to[employer]     B. Minimal cell button, red and white, Due to the (size and/or consistency) of the specimen, it is questionable whether cell block will survive histological processing.         Additional Information 10/31/2024    Final                    Value:[email or web address]'s FDA approved ,  and ThinPrep Imaging Duo System are utilized with strict adherence to the 's instruction manual to prepare gynecologic and non-gynecologic cytology specimens for the production of ThinPrep slides as well as for gynecologic  ThinPrep imaging. These processes have been validated by our laboratory and/or by the .    These tests were developed and their performance characteristics determined by [facility] or [facility]. They may not be cleared or approved by the U.S. Food and Drug Administration. The FDA has determined that such clearance or approval is not necessary. These tests are used for clinical purposes. They should not be regarded as investigational or for research. This laboratory has been approved by CLIA 88, designated as a high-complexity laboratory and is qualified to perform these tests.  Interpretation performed at [facility] [address] [place] [id number]          PH BODY FLUID 10/31/2024 7.6   Final    The reference range and other method performance specifications have not been established for this body fluid. The test result must be integrated into the clinical context for interpretation.    Protein, Fluid 10/31/2024 3.3  g/dL Final    The reference range is not defined for this body fluid test. The test result must be integrated into the clinical context for interpretation.    Total Counted 10/31/2024 100   Final    Neutrophils % (Fluid) 10/31/2024 9  % Final    Lymphs % (Fluid) 10/31/2024 71  % Final    Mesothelial % (Fluid) 10/31/2024 1  % Final    Histiocyte % (Fluid) 10/31/2024 10  % Final    Hemosiderin granules are present.     Monocytes % (Fluid) 10/31/2024 9  % Final       - No language barrier.   - History obtained from patient    - Discuss patient's care, with patient permission or by chart review, with      PMH:   has a past medical history of Asthma, Back problem, Chronic pain, Colon cancer screening, COPD (chronic obstructive pulmonary disease) (HCC), CPAP (continuous positive airway pressure) dependence, Diverticulosis, Emphysema of lung (HCC), High blood pressure, Hyperlipidemia, Hypertension, Lung cancer (HCC), JULIO on CPAP, PAD (peripheral  artery disease) (HCC), and Wears glasses.    PSH:   has a past surgical history that includes Hysterectomy; Appendectomy; Colonoscopy (2018); Lung cancer surgery (Left); Colonoscopy; and IR thoracentesis (10/31/2024).     Social History:  Tobacco Use: Medium Risk (11/10/2024)    Patient History     Smoking Tobacco Use: Former     Smokeless Tobacco Use: Never     Passive Exposure: Not on file     Alcohol Use: Not on file     No illicit use       ROS:  Pertinent positives/negatives: .     Some ROS may be present in the HPI and would take precedent over these standard questions asked below.   Review of Systems   Unable to perform ROS: Severe respiratory distress          PE:     Physical exam highlights:   Physical Exam       Vitals:    11/10/24 1809 11/10/24 1928 11/10/24 2000 11/10/24 2231   BP:  167/67  146/53   BP Location:       Pulse: 75 83 72 65   Resp:  20 20 16   Temp:       TempSrc:       SpO2: 94% 91%  92%   Weight:       Height:         Vitals reviewed by me.   Nursing note reviewed  Chaperone present for all sensitive exam.  Const: No acute distress. Alert. Nontoxic. Not diaphoretic.    HEENT: External ears normal. No protrusion drainage swelling. Nose normal. No drainage/traumatic deformity. MM. Mouth with baseline/symmetric movement. No trismus.   Eyes: No squinting. No icterus. No tearing/swelling/drainage. Tracks through the room with normal EOM.   Neck: ROM normal. No rigidity. No meningismus.  Cards: Rate as per vitals Compared to monitor sinus unless documented. Regular Well perfused.  Pulm: Effort and excursion normal. No distress. No audible wheezing/no stridor. Normal resp rate without retraction or change in work of breathing.  Abd: No distension beyond baseline. No fluctuant wave. Patient without peritoneal pain with shifting/bumping the bed.   MSK: ROM normal baseline. No deformity. No contractures from baseline.   Skin: No new rashes visible. Well perfused. No wounds visualized on exposed  skin  Neuro: Nonfocal. Baseline. CN grossly intact. Moving all four with coordination.   Psych: Normal behavior and affect.        A:  - Nursing note reviewed.    Ddx and MDM  Considered diagnoses    Copd  Kamara neb  Bipap  Got steroid  Vbg  Elev co2  Continue bipap trend  Still elev needs more time    Pnuemonia?  Meets sirs  Blood cultures  Cefepime  Culture sputum  Chest xr  Prelim left lower    Resp failure  Hypoxia     Trial midflow once, o2 to low 80s     D/w ICU     Will eval  Hypercap      Trend gas      Continue pos pressure  WOB improving    R/o atypical acs  EKG w/o stemi  Trop  trend          Dispo decision       My conversation with consultant reveals:        Decision rules:                    ED Course as of 11/10/24 2315   Sun Nov 10, 2024   0739 Procedure Note: EKG  Date/Time: 11/10/24 7:40 AM   Interpreted by: Anjel Herr  Indications / Diagnosis: CP  ECG reviewed by me, the ED Provider: yes   The EKG demonstrates:  Rhythm: sinus    Intervals: normal intervals  Axis: normal axis  QRS/Blocks: normal QRS  ST Changes: No acute ST Changes, no STD/WOODY.  T wave changes: nonspec             My read of the XR/CT scan reveals:     XR chest 1 view portable   Final Result      Mild pulmonary venous congestion with chronic small left pleural effusion.            Workstation performed: [id number]             Orders Placed This Encounter   Procedures    Critical Care    Blood culture    Blood culture    FLU/COVID Rapid Antigen (30 min. TAT) - Preferred screening test in ED    XR chest 1 view portable    CBC and differential    Comprehensive metabolic panel    Procalcitonin    Blood gas, venous    Lactic acid, plasma (w/reflex if result > 2.0)    HS Troponin 0hr (reflex protocol)    HS Troponin I 2hr    HS Troponin I 4hr    Blood gas, venous    CBC (With Platelets)    Comprehensive metabolic panel    Procalcitonin    Diet Cardiovascular; Sodium 2 GM    Vital Signs per unit routine    Up and OOB as tolerated     I/O    Check Pulse Oximetry while ambulating    Apply SCD or Foot pumps    Level 3 - DNAR (Do Not Attempt Resuscitation) and DNI (Do Not Intubate)    Inpatient consult to Pulmonology    Contact and droplet isolation status    Room Service    OT eval and treat    PT eval and treat    Mid Flow Nasal Cannula    Respiratory Protocol    Bipap    ECG 12 lead    ECG 12 lead    INPATIENT ADMISSION     Labs Reviewed   COVID-19/INFLUENZA A/B RAPID ANTIGEN (30 MIN.TAT) - Abnormal       Result Value Ref Range Status    SARS COV Rapid Antigen Negative  Negative Final    Influenza A Rapid Antigen Negative  Negative Final    Influenza B Rapid Antigen Positive (*) Negative Final    Narrative:     This test has been performed using the [employer] Irais 2 FLU+SARS Antigen test under the Emergency Use Authorization (EUA). This test has been validated by the  and verified by the performing laboratory. The Irais uses lateral flow immunofluorescent sandwich assay to detect SARS-COV, Influenza A and Influenza B Antigen.     The [employer] Irais 2 SARS Antigen test does not differentiate between SARS-CoV and SARS-CoV-2.     Negative results are presumptive and may be confirmed with a molecular assay, if necessary, for patient management. Negative results do not rule out SARS-CoV-2 or influenza infection and should not be used as the sole basis for treatment or patient management decisions. A negative test result may occur if the level of antigen in a sample is below the limit of detection of this test.     Positive results are indicative of the presence of viral antigens, but do not rule out bacterial infection or co-infection with other viruses.     All test results should be used as an adjunct to clinical observations and other information available to the provider.    FOR PEDIATRIC PATIENTS - copy/paste COVID Guidelines URL to browser: [email or web address]   CBC AND DIFFERENTIAL  - Abnormal    WBC 8.38  4.31 - 10.16 Thousand/uL Final    RBC 3.84 (*) 3.88 - 5.12 Million/uL Final    Hemoglobin 12.6  12.0 - 15.4 g/dL Final    Hematocrit 41.3  36.5 - 46.1 % Final     (*) 82 - 98 fL Final    MCH 32.8  26.8 - 34.3 pg Final    MCHC 30.5 (*) 31.4 - 37.4 g/dL Final    RDW 15.2 (*) 11.6 - 15.1 % Final    MPV 9.3  8.9 - 12.7 fL Final    Platelets 206  149 - 390 Thousands/uL Final    nRBC 0  /100 WBCs Final    Segmented % 82 (*) 43 - 75 % Final    Immature Grans % 1  0 - 2 % Final    Lymphocytes % 10 (*) 14 - 44 % Final    Monocytes % 6  4 - 12 % Final    Eosinophils Relative 1  0 - 6 % Final    Basophils Relative 0  0 - 1 % Final    Absolute Neutrophils 6.91  1.85 - 7.62 Thousands/µL Final    Absolute Immature Grans 0.04  0.00 - 0.20 Thousand/uL Final    Absolute Lymphocytes 0.86  0.60 - 4.47 Thousands/µL Final    Absolute Monocytes 0.47  0.17 - 1.22 Thousand/µL Final    Eosinophils Absolute 0.08  0.00 - 0.61 Thousand/µL Final    Basophils Absolute 0.02  0.00 - 0.10 Thousands/µL Final   COMPREHENSIVE METABOLIC PANEL - Abnormal    Sodium 138  135 - 147 mmol/L Final    Potassium 5.0  3.5 - 5.3 mmol/L Final    Comment: Slightly Hemolyzed:Results may be affected.    Chloride 98  96 - 108 mmol/L Final    CO2 36 (*) 21 - 32 mmol/L Final    ANION GAP 4  4 - 13 mmol/L Final    BUN 17  5 - 25 mg/dL Final    Creatinine 0.73  0.60 - 1.30 mg/dL Final    Comment: Standardized to IDMS reference method    Glucose 209 (*) 65 - 140 mg/dL Final    Comment: If the patient is fasting, the ADA then defines impaired fasting glucose as > 100 mg/dL and diabetes as > or equal to 123 mg/dL.    Calcium 9.2  8.4 - 10.2 mg/dL Final    AST 16  5 - 45 U/L Final    Comment: Slightly Hemolyzed:Results may be affected.    ALT 6 (*) 7 - 52 U/L Final    Comment: Specimen collection should occur prior to Sulfasalazine administration due to the potential for falsely depressed results.     Alkaline Phosphatase 47  34 - 104 U/L Final     Total Protein 7.5  6.4 - 8.4 g/dL Final    Albumin 4.1  3.5 - 5.0 g/dL Final    Total Bilirubin 0.72  0.20 - 1.00 mg/dL Final    Comment: Use of this assay is not recommended for patients undergoing treatment with eltrombopag due to the potential for falsely elevated results.  N-acetyl-p-benzoquinone imine (metabolite of Acetaminophen) will generate erroneously low results in samples for patients that have taken an overdose of Acetaminophen.    eGFR     Final    Narrative:     Notes:     1. eGFR calculation is only valid for adults 18 years and older.  2. EGFR calculation cannot be performed for patients who are transgender, non-binary, or whose legal sex, sex at birth, and gender identity differ.   BLOOD GAS, VENOUS - Abnormal    pH, Ricardo 7.165 (*) 7.300 - 7.400 Final    pCO2, Ricardo 89.4 (*) 42.0 - 50.0 mm Hg Final    pO2, Ricardo 44.0  35.0 - 45.0 mm Hg Final    HCO3, Ricardo 31.5 (*) 24 - 30 mmol/L Final    Base Excess, Ricardo 0.2  mmol/L Final    O2 Content, Ricardo 12.7  ml/dL Final    O2 HGB, VENOUS 66.1  60.0 - 80.0 % Final   BLOOD GAS, VENOUS - Abnormal    pH, Ricardo 7.280 (*) 7.300 - 7.400 Final    pCO2, Ricardo 72.0 (*) 42.0 - 50.0 mm Hg Final    pO2, Ricardo 35.7  35.0 - 45.0 mm Hg Final    HCO3, Ricardo 33.1 (*) 24 - 30 mmol/L Final    Base Excess, Ricardo 4.3  mmol/L Final    O2 Content, Ricardo 11.1  ml/dL Final    O2 HGB, VENOUS 62.6  60.0 - 80.0 % Final   PROCALCITONIN TEST - Normal    Procalcitonin <0.05  <=0.25 ng/ml Final    Comment: Suspected Lower Respiratory Tract Infection (LRTI):  - LESS than or EQUAL to 0.25 ng/mL:   low likelihood for bacterial LRTI; antibiotics DISCOURAGED.  - GREATER than 0.25 ng/mL:   increased likelihood for bacterial LRTI; antibiotics ENCOURAGED.    Suspected Sepsis:  - Strongly consider initiating antibiotics in ALL UNSTABLE patients.  - LESS than or EQUAL to 0.5 ng/mL:   low likelihood for bacterial sepsis; antibiotics DISCOURAGED.  - GREATER than 0.5 ng/mL:   increased likelihood for bacterial sepsis;  antibiotics ENCOURAGED.  - GREATER than 2 ng/mL:   high risk for severe sepsis / septic shock; antibiotics strongly ENCOURAGED.    Decisions on antibiotic use should not be based solely on Procalcitonin (PCT) levels. If PCT is low but uncertainty exists with stopping antibiotics, repeat PCT in 6-24 hours to confirm the low level. If antibiotics are administered (regardless if initial PCT was high or low), repeat PCT every 1-2 days to consider early antibiotic cessation (when GREATER than 80% decrease from the peak OR when PCT drops below designated cutoffs, whichever comes first), so long as the infection is NOT one that typically requires prolonged treatment durations (e.g., bone/joint infections, endocarditis, Staph. aureus bacteremia).    Situations of FALSE-POSITIVE Procalcitonin values:  1) Newborns < 72 hours old  2) Massive stress from severe trauma / burns, major surgery, acute pancreatitis, cardiogenic / hemorrhagic shock, sickle cell crisis, or other organ perfusion abnormalities  3) Malaria and some Candidal infections  4) Treatment with agents that stimulate cytokines (e.g., OKT3, anti-lymphocyte globulins, alemtuzumab, IL-2, granulocyte transfusion [NOT GCSFs])  5) Chronic renal disease causes elevated baseline levels (consider GREATER than 0.75 ng/mL as an abnormal cut-off); initiating HD/CRRT may cause transient decreases  6) Paraneoplastic syndromes from medullary thyroid or SCLC, some forms of vasculitis, and acute [id number]-kv-[id number] disease    Situations of FALSE-NEGATIVE Procalcitonin values:  1) Too early in clinical course for PCT to have reached its peak (may repeat in 6-24 hours to confirm low level)  2) Localized infection WITHOUT systemic (SIRS / sepsis) response (e.g., an abscess, osteomyelitis, cystitis)  3) Mycobacteria (e.g., Tuberculosis, MAC)  4) Cystic fibrosis exacerbations     LACTIC ACID, PLASMA (W/REFLEX IF RESULT > 2.0) - Normal    LACTIC ACID 0.6  0.5 - 2.0 mmol/L Final    Narrative:  "    Result may be elevated if tourniquet was used during collection.   HS TROPONIN I 0HR - Normal    hs TnI 0hr 11  \"Refer to ACS Flowchart\"- see link ng/L Final    Comment:                                              Initial (time 0) result  If >=50 ng/L, Myocardial injury suggested ;  Type of myocardial injury and treatment strategy  to be determined.  If 5-49 ng/L, a delta result at 2 hours and or 4 hours will be needed to further evaluate.  If <4 ng/L, and chest pain has been >3 hours since onset, patient may qualify for discharge based on the HEART score in the ED.  If <5 ng/L and <3hours since onset of chest pain, a delta result at 2 hours will be needed to further evaluate.    HS Troponin 99th Percentile URL of a Health Population=12 ng/L with a 95% Confidence Interval of 8-18 ng/L.    Second Troponin (time 2 hours)  If calculated delta >= 20 ng/L,  Myocardial injury suggested ; Type of myocardial injury and treatment strategy to be determined.  If 5-49 ng/L and the calculated delta is 5-19 ng/L, consult medical service for evaluation.  Continue evaluation for ischemia on ecg and other possible etiology and repeat hs troponin at 4 hours.  If delta is <5 ng/L at 2 hours, consider discharge based on risk stratification via the HEART score (if in ED), or PARTH risk score in IP/Observation.    HS Troponin 99th Percentile URL of a Health Population=12 ng/L with a 95% Confidence Interval of 8-18 ng/L.   HS TROPONIN I 2HR - Normal    hs TnI 2hr 17  \"Refer to ACS Flowchart\"- see link ng/L Final    Comment:                                              Initial (time 0) result  If >=50 ng/L, Myocardial injury suggested ;  Type of myocardial injury and treatment strategy  to be determined.  If 5-49 ng/L, a delta result at 2 hours and or 4 hours will be needed to further evaluate.  If <4 ng/L, and chest pain has been >3 hours since onset, patient may qualify for discharge based on the HEART score in the ED.  If <5 ng/L " and <3hours since onset of chest pain, a delta result at 2 hours will be needed to further evaluate.    HS Troponin 99th Percentile URL of a Health Population=12 ng/L with a 95% Confidence Interval of 8-18 ng/L.    Second Troponin (time 2 hours)  If calculated delta >= 20 ng/L,  Myocardial injury suggested ; Type of myocardial injury and treatment strategy to be determined.  If 5-49 ng/L and the calculated delta is 5-19 ng/L, consult medical service for evaluation.  Continue evaluation for ischemia on ecg and other possible etiology and repeat hs troponin at 4 hours.  If delta is <5 ng/L at 2 hours, consider discharge based on risk stratification via the HEART score (if in ED), or PARTH risk score in IP/Observation.    HS Troponin 99th Percentile URL of a Health Population=12 ng/L with a 95% Confidence Interval of 8-18 ng/L.    Delta 2hr hsTnI 6  <20 ng/L Final       *Each of these labs was reviewed. Particular standout labs will be noted in the ED Course above     Final Diagnosis:  1. Respiratory distress    2. COPD with acute exacerbation (HCC)    3. Pneumonia    4. Influenza B    5. Acute hypoxic on chronic hypercapnic respiratory failure (HCC)    6. COPD, very severe (HCC)          P:  - hospital tx includes   Medications   budesonide (PULMICORT) inhalation solution 0.5 mg (0.5 mg Nebulization Given 11/10/24 1957)   calcium carbonate-vitamin D 500 mg-5 mcg tablet 2 tablet (has no administration in time range)   Artificial Tears Op Soln 1 drop (has no administration in time range)   clopidogrel (PLAVIX) tablet 75 mg (75 mg Oral Given 11/10/24 1010)   citalopram (CeleXA) tablet 20 mg (20 mg Oral Given 11/10/24 1010)   co-enzyme Q-10 capsule 200 mg (200 mg Oral Given 11/10/24 1010)   cyanocobalamin (VITAMIN B-12) tablet 500 mcg (500 mcg Oral Given 11/10/24 1010)   diltiazem (CARDIZEM) tablet 30 mg (30 mg Oral Given 11/10/24 2114)   guaiFENesin (MUCINEX) 12 hr tablet 1,200 mg (1,200 mg Oral Given 11/10/24 2114)    loratadine (CLARITIN) tablet 10 mg (10 mg Oral Given 11/10/24 1010)   montelukast (SINGULAIR) tablet 10 mg (10 mg Oral Given 11/10/24 2114)   fish oil capsule 1,000 mg (1,000 mg Oral Given 11/10/24 1734)   rivaroxaban (XARELTO) tablet 20 mg (has no administration in time range)   torsemide (DEMADEX) tablet 10 mg (10 mg Oral Given 11/10/24 1010)   timolol (TIMOPTIC) 0.5 % ophthalmic solution 1 drop (1 drop Both Eyes Given 11/10/24 2114)   atorvastatin (LIPITOR) tablet 10 mg (10 mg Oral Given 11/10/24 1734)   traZODone (DESYREL) tablet 100 mg (100 mg Oral Given 11/10/24 2114)   docusate sodium (COLACE) capsule 100 mg (has no administration in time range)   levalbuterol (XOPENEX) inhalation solution 1.25 mg (1.25 mg Nebulization Given 11/10/24 1956)     And   sodium chloride 0.9 % inhalation solution 3 mL (3 mL Nebulization Given 11/10/24 1956)   ipratropium (ATROVENT) 0.02 % inhalation solution 0.5 mg (0.5 mg Nebulization Given 11/10/24 1957)   acetaminophen (TYLENOL) tablet 650 mg (has no administration in time range)   ondansetron (ZOFRAN) injection 4 mg (has no administration in time range)   oseltamivir (TAMIFLU) capsule 75 mg (75 mg Oral Given 11/10/24 2114)   lidocaine (LIDODERM) 5 % patch 1 patch (1 patch Topical Not Given 11/10/24 1004)   albuterol inhalation solution 10 mg (10 mg Nebulization Given 11/10/24 0452)   ipratropium (ATROVENT) 0.02 % inhalation solution 1 mg (1 mg Nebulization Given 11/10/24 0452)   sodium chloride 0.9 % inhalation solution 12 mL (12 mL Nebulization Given 11/10/24 0452)   cefepime (MAXIPIME) IVPB (premix in dextrose) 2,000 mg 50 mL (0 mg Intravenous Stopped 11/10/24 0629)   albuterol (FOR EMS ONLY) (2.5 mg/3 mL) 0.083 % inhalation solution 2.5 mg (0 mg Does not apply Given to EMS 11/10/24 0456)   ipratropium-albuterol (FOR EMS ONLY) (DUO-NEB) 0.5-2.5 mg/3 mL inhalation solution 3 mL (0 mL Does not apply Given to EMS 11/10/24 0456)   methylPREDNISolone sodium succinate (FOR EMS  ONLY) (Solu-MEDROL) 125 MG injection 125 mg (0 mg Does not apply Given to EMS 11/10/24 0456)   acetaminophen (TYLENOL) tablet 975 mg (975 mg Oral Given 11/10/24 0713)   methocarbamol (ROBAXIN) tablet 500 mg (500 mg Oral Given 11/10/24 0715)   lidocaine (LIDODERM) 5 % patch 1 patch (1 patch Topical Patch Removed 11/10/24 1920)   oseltamivir (TAMIFLU) capsule 75 mg (75 mg Oral Given 11/10/24 0715)         - disposition  Time reflects when diagnosis was documented in both MDM as applicable and the Disposition within this note       Time User Action Codes Description Comment    11/10/2024  7:58 AM Dru Edouard [R06.03] Respiratory distress     11/10/2024  8:07 AM Anjel Herr [J44.1] COPD with acute exacerbation (HCC)     11/10/2024  8:07 AM Anjel Herr [J18.9] Pneumonia     11/10/2024  8:07 AM Anjel Herr [J10.1] Influenza B     11/10/2024  8:07 AM Anjel Herr [J96.01,  J96.12] Acute hypoxic on chronic hypercapnic respiratory failure (HCC)     11/10/2024  9:36 AM Ulices Lebron [J44.9] COPD, very severe (HCC)           ED Disposition       ED Disposition   Admit    Condition   Stable    Date/Time   Sun Nov 10, 2024  8:07 AM    Comment   Case was discussed with  and the patient's admission status was agreed to be Admission Status: inpatient status to the service of Dr. lebron               Follow-up Information    None         - patient will call their PCP to let them know they were in the emergency department. We discuss return precautions and patient is agreeable with plan and aformentioned disposition.       - additional treatment intended, if consistent with primary provider:  - patient to follow with :      Current Discharge Medication List        CONTINUE these medications which have NOT CHANGED    Details   budesonide (PULMICORT) 0.5 mg/2 mL nebulizer solution Take 2 mL (0.5 mg total) by nebulization 2 (two) times a day Rinse mouth after use.  Qty: 120 mL, Refills: 3     Associated Diagnoses: Chronic obstructive pulmonary disease, unspecified COPD type (Formerly [facility])      Calcium Carbonate (CALTRATE 600 PO) Take 3 tablets by mouth in the morning daily in AM      citalopram (CeleXA) 20 mg tablet TAKE 1 TABLET(20 MG) BY MOUTH DAILY  Qty: 90 tablet, Refills: 1    Associated Diagnoses: Agitation      clopidogrel (PLAVIX) 75 mg tablet TAKE 1 TABLET BY MOUTH DAILY  Qty: 90 tablet, Refills: 1    Associated Diagnoses: Essential hypertension; PAD (peripheral artery disease) (Formerly [facility])      Coenzyme Q10 (Co Q 10) 100 MG CAPS Take 2 capsules (200 mg total) by mouth every morning  Qty: 180 capsule, Refills: 1    Associated Diagnoses: PAD (peripheral artery disease) (Formerly [facility]); Mixed hyperlipidemia      denosumab (Prolia) 60 mg/mL Inject 1 mL (60 mg total) under the skin once for 1 dose  Qty: 1 mL, Refills: 0    Associated Diagnoses: Osteoporosis, unspecified osteoporosis type, unspecified pathological fracture presence      diltiazem (CARDIZEM) 30 mg tablet TAKE 1 TABLET(30 MG) BY MOUTH THREE TIMES DAILY  Qty: 90 tablet, Refills: 1    Associated Diagnoses: Resistant hypertension; Paroxysmal atrial fibrillation (Formerly [facility])      montelukast (SINGULAIR) 10 mg tablet TAKE 1 TABLET(10 MG) BY MOUTH DAILY AT BEDTIME  Qty: 90 tablet, Refills: 1    Associated Diagnoses: Allergy, initial encounter      rivaroxaban (Xarelto) 20 mg tablet Take 1 tablet (20 mg total) by mouth daily with breakfast  Qty: 30 tablet, Refills: 6    Associated Diagnoses: Paroxysmal atrial fibrillation (Formerly [facility])      rosuvastatin (CRESTOR) 20 MG tablet TAKE 1 TABLET(20 MG) BY MOUTH DAILY  Qty: 90 tablet, Refills: 1    Associated Diagnoses: Congestive heart failure, unspecified HF chronicity, unspecified heart failure type (Formerly [facility])      timolol (TIMOPTIC) 0.5 % ophthalmic solution Administer 1 drop to both eyes 2 (two) times a day      torsemide (DEMADEX) 10 mg tablet Take 1 tablet (10 mg total) by mouth daily  Qty: 90 tablet, Refills: 1    Comments: **Patient  requests 90 days supply**  Associated Diagnoses: Right ankle swelling      traZODone (DESYREL) 50 mg tablet Take 2 tablets (100 mg total) by mouth daily at bedtime as needed for sleep  Qty: 180 tablet, Refills: 1    Associated Diagnoses: Recurrent major depressive disorder, in partial remission (Formerly [facility])      VITAMIN D PO Take 25 mcg by mouth in the morning daily    pt takes 50mcg by mouth daily      albuterol (2.5 mg/3 mL) 0.083 % nebulizer solution Take 3 mL (2.5 mg total) by nebulization daily as needed for wheezing or shortness of breath  Qty: 180 mL, Refills: 0    Associated Diagnoses: Chronic obstructive pulmonary disease, unspecified COPD type (Formerly [facility])      albuterol (PROVENTIL HFA,VENTOLIN HFA) 90 mcg/act inhaler INHALE 2 PUFFS BY MOUTH EVERY 6 HOURS AS NEEDED FOR SHORTNESS OF BREATH  Qty: 8.5 g, Refills: 1    Comments: Substitution to a formulary equivalent within the same pharmaceutical class is authorized.  Associated Diagnoses: Chronic hypoxemic respiratory failure (Formerly [facility])      carboxymethylcellulose 0.5 % SOLN Administer 1 drop to both eyes 3 (three) times a day as needed for dry eyes      Cyanocobalamin (Energy B12) 500 MCG CHEW Chew 1 tablet in the morning. daily  Indications: Inadequate Vitamin B12      formoterol (Perforomist) 20 MCG/2ML nebulizer solution Take 2 mL (20 mcg total) by nebulization 2 (two) times a day icd 10 code J44.9  Qty: 180 mL, Refills: 1    Associated Diagnoses: Chronic obstructive pulmonary disease, unspecified COPD type (Formerly [facility])      guaiFENesin (MUCINEX) 600 mg 12 hr tablet TAKE 2 TABLET BY MOUTH EVERY 12 HOURS  Qty: 120 tablet, Refills: 5    Associated Diagnoses: Chronic obstructive pulmonary disease, unspecified COPD type (Formerly [facility])      ipratropium-albuterol (DUO-NEB) 0.5-2.5 mg/3 mL nebulizer solution Take 3 mL by nebulization every 6 (six) hours as needed for wheezing or shortness of breath ICD 10 J44.9  Qty: 360 mL, Refills: 2    Associated Diagnoses: Chronic obstructive pulmonary  disease, unspecified COPD type (HCC)      levocetirizine (XYZAL) 5 MG tablet TAKE 1 TABLET(5 MG) BY MOUTH EVERY EVENING  Qty: 90 tablet, Refills: 1    Associated Diagnoses: Allergy, initial encounter      Multiple Vitamins-Minerals (AIRBORNE GUMMIES PO) Take 1 tablet by mouth every morning      Nebulizers (Vios LC Sprint) MISC Use as directed  Qty: 1 each, Refills: 0    Associated Diagnoses: Chronic obstructive pulmonary disease, unspecified COPD type (HCC)      omega-3-acid ethyl esters (LOVAZA) 1 g capsule Take 2 capsules (2 g total) by mouth 2 (two) times a day  Qty: 360 capsule, Refills: 1    Associated Diagnoses: Acute on chronic diastolic congestive heart failure (HCC); Hypertensive urgency; Elevated triglycerides with high cholesterol      oxygen gas Inhale 9 L/min continuous VIA Nc  pt uses 8-9L via NC at rest and 12L during activity           No discharge procedures on file.  Prior to Admission Medications   Prescriptions Last Dose Informant Patient Reported? Taking?   Calcium Carbonate (CALTRATE 600 PO) 11/9/2024 Self Yes Yes   Sig: Take 3 tablets by mouth in the morning daily in AM   Coenzyme Q10 (Co Q 10) 100 MG CAPS 11/9/2024 Self No Yes   Sig: Take 2 capsules (200 mg total) by mouth every morning   Cyanocobalamin (Energy B12) 500 MCG CHEW  Self Yes No   Sig: Chew 1 tablet in the morning. daily  Indications: Inadequate Vitamin B12   Multiple Vitamins-Minerals (AIRBORNE GUMMIES PO)  Self Yes No   Sig: Take 1 tablet by mouth every morning   Nebulizers (Vios LC Sprint) MISC  Self No No   Sig: Use as directed   VITAMIN D PO 11/9/2024 Self Yes Yes   Sig: Take 25 mcg by mouth in the morning daily    pt takes 50mcg by mouth daily   albuterol (2.5 mg/3 mL) 0.083 % nebulizer solution  Self No No   Sig: Take 3 mL (2.5 mg total) by nebulization daily as needed for wheezing or shortness of breath   albuterol (PROVENTIL HFA,VENTOLIN HFA) 90 mcg/act inhaler  Self No No   Sig: INHALE 2 PUFFS BY MOUTH EVERY 6 HOURS AS  NEEDED FOR SHORTNESS OF BREATH   budesonide (PULMICORT) 0.5 mg/2 mL nebulizer solution   No Yes   Sig: Take 2 mL (0.5 mg total) by nebulization 2 (two) times a day Rinse mouth after use.   carboxymethylcellulose 0.5 % SOLN  Self Yes No   Sig: Administer 1 drop to both eyes 3 (three) times a day as needed for dry eyes   citalopram (CeleXA) 20 mg tablet 11/9/2024  No Yes   Sig: TAKE 1 TABLET(20 MG) BY MOUTH DAILY   clopidogrel (PLAVIX) 75 mg tablet 11/9/2024  No Yes   Sig: TAKE 1 TABLET BY MOUTH DAILY   denosumab (Prolia) 60 mg/mL   No Yes   Sig: Inject 1 mL (60 mg total) under the skin once for 1 dose   Patient taking differently: Inject 60 mg under the skin once Next dose 6/2023   diltiazem (CARDIZEM) 30 mg tablet 11/9/2024  No Yes   Sig: TAKE 1 TABLET(30 MG) BY MOUTH THREE TIMES DAILY   formoterol (Perforomist) 20 MCG/2ML nebulizer solution  Self No No   Sig: Take 2 mL (20 mcg total) by nebulization 2 (two) times a day icd 10 code J44.9   guaiFENesin (MUCINEX) 600 mg 12 hr tablet  Self No No   Sig: TAKE 2 TABLET BY MOUTH EVERY 12 HOURS   ipratropium-albuterol (DUO-NEB) 0.5-2.5 mg/3 mL nebulizer solution  Self No No   Sig: Take 3 mL by nebulization every 6 (six) hours as needed for wheezing or shortness of breath ICD 10 J44.9   levocetirizine (XYZAL) 5 MG tablet  Self No No   Sig: TAKE 1 TABLET(5 MG) BY MOUTH EVERY EVENING   montelukast (SINGULAIR) 10 mg tablet 11/9/2024 Self No Yes   Sig: TAKE 1 TABLET(10 MG) BY MOUTH DAILY AT BEDTIME   omega-3-acid ethyl esters (LOVAZA) 1 g capsule   No No   Sig: Take 2 capsules (2 g total) by mouth 2 (two) times a day   oxygen gas  Self Yes No   Sig: Inhale 9 L/min continuous VIA Nc  pt uses 8-9L via NC at rest and 12L during activity   rivaroxaban (Xarelto) 20 mg tablet 11/9/2024  No Yes   Sig: Take 1 tablet (20 mg total) by mouth daily with breakfast   rosuvastatin (CRESTOR) 20 MG tablet 11/9/2024 Self No Yes   Sig: TAKE 1 TABLET(20 MG) BY MOUTH DAILY   timolol (TIMOPTIC) 0.5 %  "ophthalmic solution 11/9/2024 Self Yes Yes   Sig: Administer 1 drop to both eyes 2 (two) times a day   torsemide (DEMADEX) 10 mg tablet 11/9/2024 Self No Yes   Sig: Take 1 tablet (10 mg total) by mouth daily   traZODone (DESYREL) 50 mg tablet 11/9/2024  No Yes   Sig: Take 2 tablets (100 mg total) by mouth daily at bedtime as needed for sleep      Facility-Administered Medications: None       Portions of the record may have been created with voice recognition software. Occasional wrong word or \"sound a like\" substitutions may have occurred due to the inherent limitations of voice recognition software. Read the chart carefully and recognize, using context, where substitutions have occurred.    Electronically signed by:  MD Anjel Razo MD  11/10/24 [id number]    "

## 2024-11-10 NOTE — ASSESSMENT & PLAN NOTE
Wt Readings from Last 3 Encounters:   11/10/24 72.9 kg (160 lb 11.5 oz)   10/23/24 79.1 kg (174 lb 4.8 oz)   09/16/24 74.8 kg (165 lb)     Compensated continue torsemide 10 mg.  Follows with Dr. Agarwal

## 2024-11-10 NOTE — ASSESSMENT & PLAN NOTE
-Diagnosed with non-small cell lung cancer underwent wedge resection.  Treated with chemotherapy.  Follows with Dr. Dumont from oncology.

## 2024-11-10 NOTE — ASSESSMENT & PLAN NOTE
-Baseline oxygen requirement 6 to 10 L with Oxymizer as needed.  She was recently discharged on 9 L with Oxymizer at rest and 12 L with Oxymizer on exertion.  -Continue oxygen here and wean as able.  - Continue BiPAP 15/9 with all sleep.

## 2024-11-10 NOTE — ASSESSMENT & PLAN NOTE
Wt Readings from Last 3 Encounters:   11/10/24 72.9 kg (160 lb 11.5 oz)   10/23/24 79.1 kg (174 lb 4.8 oz)   09/16/24 74.8 kg (165 lb)   -Continue outpatient torsemide daily.  - Limit fluid and salt.  Trend weight.

## 2024-11-10 NOTE — ASSESSMENT & PLAN NOTE
Follows with Dr. Moreno.  No clear exacerbation.  Pulmonology to evaluate.  Continue Perforomist and Pulmicort

## 2024-11-10 NOTE — ASSESSMENT & PLAN NOTE
Lab Results   Component Value Date    EGFR 80 06/19/2024    EGFR 81 06/18/2024    EGFR 81 06/17/2024    CREATININE 0.73 11/10/2024    CREATININE 0.60 10/23/2024    CREATININE 0.55 (L) 10/22/2024     Renal function at baseline

## 2024-11-10 NOTE — ED PROCEDURE NOTE
PROCEDURE  CriticalCare Time    Date/Time: 11/10/2024 8:08 AM    Performed by: Anjel Herr MD  Authorized by: Anjel Herr MD    Critical care provider statement:     Critical care time (minutes):  45    Critical care end time:  11/10/2024 11:05 PM    Critical care time was exclusive of:  Separately billable procedures and treating other patients and teaching time    Critical care was necessary to treat or prevent imminent or life-threatening deterioration of the following conditions:  Respiratory failure and sepsis    Critical care was time spent personally by me on the following activities:  Blood draw for specimens, obtaining history from patient or surrogate, discussions with consultants, evaluation of patient's response to treatment, examination of patient, review of old charts, re-evaluation of patient's condition, ordering and review of radiographic studies, ordering and review of laboratory studies and ordering and performing treatments and interventions       Anjel Herr MD  11/10/24 [id number]

## 2024-11-10 NOTE — ASSESSMENT & PLAN NOTE
History of HFpEF severe COPD (recently discharged on 9 L) NS CLC status post wedge resection anxiety depression PAD paroxysmal atrial fibrillation and CKD who presents back to the hospital for worsening shortness of breath  Patient reports 2 days of worsening dyspnea with cough/mucus production  In the ED required BiPAP  Has been transitioned to mid flow 15 L saturating well comfortable  Acute decompensation secondary to influenza B.  She was vaccinated this year for influenza.  Continue oseltamivir  Hypercapnia/JULIO.  Continue BiPAP at bedtime.  Given high oxygen requirements and severe COPD will consult pulmonology

## 2024-11-11 ENCOUNTER — TELEPHONE (OUTPATIENT)
Dept: PULMONOLOGY | Facility: MEDICAL CENTER | Age: 84
End: 2024-11-11

## 2024-11-11 LAB
ALBUMIN SERPL BCG-MCNC: 3.5 G/DL (ref 3.5–5)
ALP SERPL-CCNC: 35 U/L (ref 34–104)
ALT SERPL W P-5'-P-CCNC: 5 U/L (ref 7–52)
ANION GAP SERPL CALCULATED.3IONS-SCNC: 5 MMOL/L (ref 4–13)
AST SERPL W P-5'-P-CCNC: 6 U/L (ref 5–45)
BASE EX.OXY STD BLDV CALC-SCNC: 91.1 % (ref 60–80)
BASE EXCESS BLDV CALC-SCNC: 5.8 MMOL/L
BILIRUB SERPL-MCNC: 0.36 MG/DL (ref 0.2–1)
BUN SERPL-MCNC: 23 MG/DL (ref 5–25)
CALCIUM SERPL-MCNC: 9 MG/DL (ref 8.4–10.2)
CHLORIDE SERPL-SCNC: 100 MMOL/L (ref 96–108)
CO2 SERPL-SCNC: 33 MMOL/L (ref 21–32)
CREAT SERPL-MCNC: 0.68 MG/DL (ref 0.6–1.3)
ERYTHROCYTE [DISTWIDTH] IN BLOOD BY AUTOMATED COUNT: 14.5 % (ref 11.6–15.1)
GLUCOSE SERPL-MCNC: 160 MG/DL (ref 65–140)
HCO3 BLDV-SCNC: 34 MMOL/L (ref 24–30)
HCT VFR BLD AUTO: 35.2 % (ref 36.5–46.1)
HGB BLD-MCNC: 11.1 G/DL (ref 12–15.4)
MCH RBC QN AUTO: 33.1 PG (ref 26.8–34.3)
MCHC RBC AUTO-ENTMCNC: 31.5 G/DL (ref 31.4–37.4)
MCV RBC AUTO: 105 FL (ref 82–98)
O2 CT BLDV-SCNC: 17.1 ML/DL
PCO2 BLDV: 66.8 MM HG (ref 42–50)
PH BLDV: 7.32 [PH] (ref 7.3–7.4)
PLATELET # BLD AUTO: 194 THOUSANDS/UL (ref 149–390)
PMV BLD AUTO: 9.1 FL (ref 8.9–12.7)
PO2 BLDV: 70 MM HG (ref 35–45)
POTASSIUM SERPL-SCNC: 4.6 MMOL/L (ref 3.5–5.3)
PROCALCITONIN SERPL-MCNC: 0.07 NG/ML
PROT SERPL-MCNC: 6.4 G/DL (ref 6.4–8.4)
RBC # BLD AUTO: 3.35 MILLION/UL (ref 3.88–5.12)
SODIUM SERPL-SCNC: 138 MMOL/L (ref 135–147)
WBC # BLD AUTO: 6.68 THOUSAND/UL (ref 4.31–10.16)

## 2024-11-11 PROCEDURE — 97167 OT EVAL HIGH COMPLEX 60 MIN: CPT

## 2024-11-11 PROCEDURE — 82805 BLOOD GASES W/O2 SATURATION: CPT | Performed by: INTERNAL MEDICINE

## 2024-11-11 PROCEDURE — 99232 SBSQ HOSP IP/OBS MODERATE 35: CPT | Performed by: STUDENT IN AN ORGANIZED HEALTH CARE EDUCATION/TRAINING PROGRAM

## 2024-11-11 PROCEDURE — 99232 SBSQ HOSP IP/OBS MODERATE 35: CPT | Performed by: INTERNAL MEDICINE

## 2024-11-11 PROCEDURE — 94760 N-INVAS EAR/PLS OXIMETRY 1: CPT

## 2024-11-11 PROCEDURE — 85027 COMPLETE CBC AUTOMATED: CPT | Performed by: INTERNAL MEDICINE

## 2024-11-11 PROCEDURE — 84145 PROCALCITONIN (PCT): CPT | Performed by: INTERNAL MEDICINE

## 2024-11-11 PROCEDURE — 94640 AIRWAY INHALATION TREATMENT: CPT

## 2024-11-11 PROCEDURE — 80053 COMPREHEN METABOLIC PANEL: CPT | Performed by: INTERNAL MEDICINE

## 2024-11-11 RX ADMIN — ATORVASTATIN CALCIUM 10 MG: 10 TABLET, FILM COATED ORAL at 17:09

## 2024-11-11 RX ADMIN — ISODIUM CHLORIDE 3 ML: 0.03 SOLUTION RESPIRATORY (INHALATION) at 07:22

## 2024-11-11 RX ADMIN — BUDESONIDE 0.5 MG: 0.5 INHALANT RESPIRATORY (INHALATION) at 07:22

## 2024-11-11 RX ADMIN — OMEGA-3 FATTY ACIDS CAP 1000 MG 1000 MG: 1000 CAP at 17:09

## 2024-11-11 RX ADMIN — MONTELUKAST 10 MG: 10 TABLET, FILM COATED ORAL at 22:06

## 2024-11-11 RX ADMIN — BUDESONIDE 0.5 MG: 0.5 INHALANT RESPIRATORY (INHALATION) at 18:03

## 2024-11-11 RX ADMIN — OSELTAMIVIR PHOSPHATE 75 MG: 75 CAPSULE ORAL at 08:03

## 2024-11-11 RX ADMIN — GUAIFENESIN 1200 MG: 600 TABLET, EXTENDED RELEASE ORAL at 22:06

## 2024-11-11 RX ADMIN — TORSEMIDE 10 MG: 10 TABLET ORAL at 08:03

## 2024-11-11 RX ADMIN — IPRATROPIUM BROMIDE 0.5 MG: 0.5 SOLUTION RESPIRATORY (INHALATION) at 18:03

## 2024-11-11 RX ADMIN — LEVALBUTEROL HYDROCHLORIDE 1.25 MG: 1.25 SOLUTION RESPIRATORY (INHALATION) at 07:22

## 2024-11-11 RX ADMIN — LORATADINE 10 MG: 10 TABLET ORAL at 08:03

## 2024-11-11 RX ADMIN — LEVALBUTEROL HYDROCHLORIDE 1.25 MG: 1.25 SOLUTION RESPIRATORY (INHALATION) at 18:04

## 2024-11-11 RX ADMIN — RIVAROXABAN 20 MG: 20 TABLET, FILM COATED ORAL at 08:02

## 2024-11-11 RX ADMIN — Medication 2 TABLET: at 08:02

## 2024-11-11 RX ADMIN — DILTIAZEM HYDROCHLORIDE 30 MG: 30 TABLET, FILM COATED ORAL at 13:07

## 2024-11-11 RX ADMIN — LIDOCAINE 1 PATCH: 50 PATCH CUTANEOUS at 08:02

## 2024-11-11 RX ADMIN — IPRATROPIUM BROMIDE 0.5 MG: 0.5 SOLUTION RESPIRATORY (INHALATION) at 07:22

## 2024-11-11 RX ADMIN — LEVALBUTEROL HYDROCHLORIDE 1.25 MG: 1.25 SOLUTION RESPIRATORY (INHALATION) at 13:15

## 2024-11-11 RX ADMIN — GUAIFENESIN 1200 MG: 600 TABLET, EXTENDED RELEASE ORAL at 08:03

## 2024-11-11 RX ADMIN — TRAZODONE HYDROCHLORIDE 100 MG: 100 TABLET ORAL at 22:06

## 2024-11-11 RX ADMIN — OMEGA-3 FATTY ACIDS CAP 1000 MG 1000 MG: 1000 CAP at 08:02

## 2024-11-11 RX ADMIN — TIMOLOL MALEATE 1 DROP: 5 SOLUTION OPHTHALMIC at 22:07

## 2024-11-11 RX ADMIN — CYANOCOBALAMIN TAB 500 MCG 500 MCG: 500 TAB at 08:03

## 2024-11-11 RX ADMIN — DILTIAZEM HYDROCHLORIDE 30 MG: 30 TABLET, FILM COATED ORAL at 05:17

## 2024-11-11 RX ADMIN — OSELTAMIVIR PHOSPHATE 75 MG: 75 CAPSULE ORAL at 22:06

## 2024-11-11 RX ADMIN — DILTIAZEM HYDROCHLORIDE 30 MG: 30 TABLET, FILM COATED ORAL at 22:06

## 2024-11-11 RX ADMIN — CLOPIDOGREL 75 MG: 75 TABLET ORAL at 08:03

## 2024-11-11 RX ADMIN — Medication 200 MG: at 08:02

## 2024-11-11 RX ADMIN — TIMOLOL MALEATE 1 DROP: 5 SOLUTION OPHTHALMIC at 08:02

## 2024-11-11 RX ADMIN — IPRATROPIUM BROMIDE 0.5 MG: 0.5 SOLUTION RESPIRATORY (INHALATION) at 13:15

## 2024-11-11 RX ADMIN — CITALOPRAM HYDROBROMIDE 20 MG: 20 TABLET ORAL at 08:02

## 2024-11-11 NOTE — ASSESSMENT & PLAN NOTE
Wt Readings from Last 3 Encounters:   11/10/24 72.9 kg (160 lb 11.5 oz)   10/23/24 79.1 kg (174 lb 4.8 oz)   09/16/24 74.8 kg (165 lb)   Management per primary team.  Monitor I's and O's Daily weights.

## 2024-11-11 NOTE — ASSESSMENT & PLAN NOTE
Lab Results   Component Value Date    EGFR 80 06/19/2024    EGFR 81 06/18/2024    EGFR 81 06/17/2024    CREATININE 0.68 11/11/2024    CREATININE 0.73 11/10/2024    CREATININE 0.60 10/23/2024     Renal function at baseline

## 2024-11-11 NOTE — OCCUPATIONAL THERAPY NOTE
OT EVALUATION       11/11/24 1010   OT Last Visit   OT Visit Date 11/11/24   Note Type   Note type Evaluation   Pain Assessment   Pain Assessment Tool 0-10   Pain Score No Pain   Restrictions/Precautions   Other Precautions Chair Alarm;Bed Alarm;Fall Risk;Droplet precautions;Contact/isolation  (15L O2)   Home Living   Type of Home House   Home Layout One level  (0 WOODY)   Bathroom Shower/Tub Tub/shower unit   Bathroom Toilet Standard   Bathroom Equipment Grab bars in shower;Toilet raiser   Home Equipment Walker;Cane  (typically uses RW, home O2)   Prior Function   Level of N[place] Independent with functional mobility;Needs assistance with IADLS;Needs assistance with ADLs  (daughter assists pt with washing hair in the shower, pt able to toilet and dress herself)   Lives With Spouse;Daughter  (pts spouse is bedbound)   Receives Help From Family   IADLs Family/Friend/Other provides transportation;Family/Friend/Other provides meals;Family/Friend/Other provides medication management   Lifestyle   Intrinsic Gratification TV, playing games on computer   ADL   Eating Assistance 7  Independent   Grooming Assistance 7  Independent   UB Bathing Assistance 5  Supervision/Setup   LB Bathing Assistance 4  Minimal Assistance   UB Dressing Assistance 5  Supervision/Setup   LB Dressing Assistance 4  Minimal Assistance   Toileting Assistance  5  Supervision/Setup   Toileting Deficit Bedside commode;Perineal hygiene  (urination on commode)   Bed Mobility   Supine to Sit 5  Supervision   Sit to Supine 5  Supervision   Transfers   Sit to Stand 5  Supervision   Stand to Sit 5  Supervision   Functional Mobility   Functional Mobility 4  Minimal assistance   Additional Comments to and from commode with RW, progressing to supervision   Balance   Static Sitting Fair +   Dynamic Sitting Fair   Static Standing Fair   Dynamic Standing Fair -   Activity Tolerance   Activity Tolerance Patient limited by fatigue  (SOB with activity)   Nurse  Made Aware yes, Racheal   RUE Assessment   RUE Assessment WFL   LUE Assessment   LUE Assessment WFL   Cognition   Overall Cognitive Status WFL   Arousal/Participation Cooperative   Attention Attends with cues to redirect   Orientation Level Oriented X4   Following Commands Follows one step commands with increased time or repetition   Assessment   Limitation Decreased ADL status;Decreased UE strength;Decreased Safe judgement during ADL;Decreased endurance;Decreased high-level ADLs;Decreased self-care trans  (decreased balance and mobility)   Prognosis Good   Assessment Patient evaluated by Occupational Therapy.  Patient admitted with Acute on chronic respiratory failure with hypoxia and hypercapnia (HCC).  The patients occupational profile, medical and therapy history includes a extensive additional review of physical, cognitive, or psychosocial history related to current functional performance.  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  Prior to admission, patient was independent with functional mobility with cane, requiring assist for ADLS, and requiring assist for IADLS.  The evaluation identifies the following performance deficits: weakness, impaired balance, decreased endurance, increased fall risk, new onset of impairment of functional mobility, decreased ADLS, decreased IADLS, decreased activity tolerance, decreased safety awareness, and SOB upon exertion, that result in activity limitations and/or participation restrictions. This evaluation requires clinical decision making of high complexity, because the patient presents with comorbidites that affect occupational performance and required significant modification of tasks or assistance with consideration of multiple treatment options.  The Barthel Index was used as a functional outcome tool presenting with a score of Barthel Index Score: 60, indicating  marked limitations of functional mobility and ADLS.  The patient's raw score on the -PAC Daily Activity Inpatient Short Form is 21. A raw score of greater than or equal to 19 suggests the patient may benefit from discharge to home. Please refer to the recommendation of the Occupational Therapist for safe discharge planning.  Patient will benefit from skilled Occupational Therapy services to address above deficits and facilitate a safe return to prior level of function.   Goals   Patient Goals to go home   STG Time Frame   (1-7 days)   Short Term Goal  Goals established to promote Patient Goals: to go home:   Grooming: independent standing at sink; Bathing: supervision; Upper Body Dressing independent; Lower Body Dressing: supervision; Toileting: independent; Patient will increase ambulatory standard toilet transfer to supervision with rolling walker to increase performance and safety with ADLS and functional mobility; Patient will increase standing tolerance to 5 minutes during ADL task to decrease assistance level and decrease fall risk; Patient will increase bed mobility to independent in preparation for ADLS and transfers; Patient will increase functional mobility to and from bathroom with rolling walker with supervision to increase performance with ADLS and to use a toilet; Patient will tolerate 5 minutes of UE ROM/strengthening to increase general activity tolerance and performance in ADLS/IADLS; Patient will improve functional activity tolerance to 10 minutes of sustained functional tasks to increase participation in basic self-care and decrease assistance level;  Patient will be able to to verbalize understanding and perform energy conservation/proper body mechanics during ADLS and functional mobility at least 75% of the time with minimal cueing to decrease signs of fatigue and increase stamina to return to prior level of function; Patient will increase dynamic sitting balance to fair+ to improve the  ability to sit at edge of bed or on a chair for ADLS;  Patient will increase dynamic standing balance to fair to improve postural stability and decrease fall risk during standing ADLS and transfers.   LTG Time Frame   (8-14 days)   Long Term Goal Lower Body Dressing: independent; Patient will increase ambulatory standard toilet transfer to independent with rolling walker to increase performance and safety with ADLS and functional mobility; Patient will increase standing tolerance to 10 minutes during ADL task to decrease assistance level and decrease fall risk; Patient will increase functional mobility to and from bathroom with rolling walker independently to increase performance with ADLS and to use a toilet; Patient will tolerate 10 minutes of UE ROM/strengthening to increase general activity tolerance and performance in ADLS/IADLS; Patient will improve functional activity tolerance to 20 minutes of sustained functional tasks to increase participation in basic self-care and decrease assistance level;  Patient will be able to to verbalize understanding and perform energy conservation/proper body mechanics during ADLS and functional mobility at least 90% of the time to decrease signs of fatigue and increase stamina to return to prior level of function; Patient will increase dynamic sitting balance to good to improve the ability to sit at edge of bed or on a chair for ADLS;  Patient will increase dynamic standing balance to fair+ to improve postural stability and decrease fall risk during standing ADLS and transfers. Pt will score >/= 24/24 on AM-PAC Daily Activity Inpatient scale to promote safe independence with ADLs and functional mobility; Pt will score >/= 90/100 on Barthel Index in order to decrease caregiver assistance needed and increase ability to perform ADLs and functional mobility.   Plan   Treatment Interventions ADL retraining;Functional transfer training;UE strengthening/ROM;Endurance  training;Patient/family training;Equipment evaluation/education;Activityengagement;Compensatory technique education   Goal Expiration Date 11/25/24   OT Frequency 2-3x/wk   Discharge Recommendation   Rehab Resource Intensity Level, OT III (Minimum Resource Intensity)   AM-PAC Daily Activity Inpatient   Lower Body Dressing 3   Bathing 3   Toileting 3   Upper Body Dressing 4   Grooming 4   Eating 4   Daily Activity Raw Score 21   Daily Activity Standardized Score (Calc for Raw Score >=11) 44.27   AM-PAC Applied Cognition Inpatient   Following a Speech/Presentation 3   Understanding Ordinary Conversation 4   Taking Medications 3   Remembering Where Things Are Placed or Put Away 3   Remembering List of 4-5 Errands 3   Taking Care of Complicated Tasks 3   Applied Cognition Raw Score 19   Applied Cognition Standardized Score 39.77   Barthel Index   Feeding 10   Bathing 0   Grooming Score 5   Dressing Score 5   Bladder Score 10   Bowels Score 10   Toilet Use Score 5   Transfers (Bed/Chair) Score 10   Mobility (Level Surface) Score 0   Stairs Score 5   Barthel Index Score 60   Licensure   [place] License Number  Kateryna Fontaine MS OTR/L [id number]

## 2024-11-11 NOTE — CASE MANAGEMENT
Case Management Assessment & Discharge Planning Note    Patient name Eryn Morocho  Location [address] [facility]/4 [facility] 420-* MRN [id number]  : 1940 Date 2024       Current Admission Date: 11/10/2024  Current Admission Diagnosis:Acute on chronic respiratory failure with hypoxia and hypercapnia (HCC)   Patient Active Problem List    Diagnosis Date Noted Date Diagnosed    Chronic heart failure with preserved ejection fraction (HFpEF) (HCC) 11/10/2024     Influenza B 11/10/2024     Pleural effusion 10/23/2024     Paroxysmal atrial fibrillation (HCC) 10/16/2024     JULIO (obstructive sleep apnea) 2024     Chronic respiratory failure with hypoxia and hypercapnia (HCC) 2024     COPD, very severe (HCC) 2024     Multiple lung nodules 2022     Class 1 obesity due to excess calories with body mass index (BMI) of 34.0 to 34.9 in adult 2022     Recurrent falls 2022     Stage 3a chronic kidney disease (HCC) 2021     OAB (overactive bladder) 2021     Osteoporosis 2021     Acute on chronic diastolic congestive heart failure (HCC) 2021     PAD (peripheral artery disease) (HCC)      Acute on chronic respiratory failure with hypoxia and hypercapnia (HCC) 2020     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) 2020     Anxiety and depression 2020     HTN (hypertension)      Hyperlipidemia        LOS (days): 1  Geometric Mean LOS (GMLOS) (days): 4  Days to GMLOS:2.8     OBJECTIVE:  PATIENT READMITTED TO HOSPITAL  Risk of Unplanned Readmission Score: 21.65      Current admission status: Inpatient     Preferred Pharmacy:   [id number] - [place] - [address]   [place] [address]  Phone: [phone] Fax: [phone]    [facility]  [address] [address]  Phone: [phone]     Primary Care Provider: Ari Mendiola MD    Primary Insurance: MEDICARE  Secondary Insurance:  AARP    ASSESSMENT:  Active Health Care Proxies    There are no active Health Care Proxies on file.  Readmission Root Cause  30 Day Readmission: Yes  During your hospital stay, did someone (provider, nurse, ) explain your care to you in a way you could understand?: Yes  Did you feel medically stable to leave the hospital?: Yes  Were you able to pay for your medication at the pharmacy?: Yes  Did you have reliable transportation to take you to your appointments?: Yes  During previous admission, was a post-acute recommendation made?: Yes  What post-acute resources were offered?: STR, HHC, Offered, but declined (STR offered but declined, set up with HHC through [facility], canceled services after 2 visits)  Patient was readmitted due to: Acute on chronic resp failure 2' Influenza  Action Plan: Medical management    Patient Information  Admitted from:: Home  Mental Status: Alert  During Assessment patient was accompanied by: Daughter  Assessment information provided by:: Patient, Daughter  Primary Caregiver: Family  Caregiver's Name:: Karen Alexander (dtr)  Caregiver's Relationship to Patient:: Family Member  Caregiver's Telephone Number:: [phone]  Support Systems: Spouse/significant other, Daughter  County of Residence: [place]  What city do you live in[place]  Home entry access options. Select all that apply.: Stairs  Number of steps to enter home.: 1  Type of Current Residence: [facility]  Living Arrangements: Lives w/ Spouse/significant other, Lives w/ Daughter    Activities of Daily Living Prior to Admission  Functional Status: Independent  Completes ADLs independently?: No  Level of ADL dependence: Assistance  Ambulates independently?: Yes  Does patient use assisted devices?: Yes  Assisted Devices (DME) used: Home Oxygen concentrator, Portable Oxygen tanks, BiPAP, Nebulizer, Hospital Bed, Wheelchair, Walker, Bedside Commode  DME Company Name (respiratory supplies): [employer]  O2 Rate(s): 9L at rest,  12L on exertion w/ oximizer  Does patient have a history of HHC?: Yes (Hx with [facility])  Does patient currently have HHC?: No     Patient Information Continued  Income Source: Pension/halfway  Does patient have prescription coverage?: Yes  Does patient receive dialysis treatments?: No     Means of Transportation  Means of Transport to Appts:: Family transport    DISCHARGE DETAILS:    Discharge planning discussed with:: Patient, Dtr  Freedom of Choice: Yes  Comments - Freedom of Choice: Discharge choice is to return home.  CM contacted family/caregiver?: Yes  Were Treatment Team discharge recommendations reviewed with patient/caregiver?: Yes  Did patient/caregiver verbalize understanding of patient care needs?: Yes  Were patient/caregiver advised of the risks associated with not following Treatment Team discharge recommendations?: Yes    Contacts  Patient Contacts: Karen Jenkins (daughter)  Relationship to Patient:: Family  Contact Method: In Person  Reason/Outcome: Emergency Contact, Discharge Planning, Continuity of Care    Requested Home Health Care         Is the patient interested in HHC at discharge?: No    DME Referral Provided  Referral made for DME?: No    Treatment Team Recommendation: Home with Home Health Care  Discharge Destination Plan:: Home

## 2024-11-11 NOTE — PLAN OF CARE
Problem: PAIN - ADULT  Goal: Verbalizes/displays adequate comfort level or baseline comfort level  Description: Interventions:  - Encourage patient to monitor pain and request assistance  - Assess pain using appropriate pain scale  - Administer analgesics based on type and severity of pain and evaluate response  - Implement non-pharmacological measures as appropriate and evaluate response  - Consider cultural and social influences on pain and pain management  - Notify physician/advanced practitioner if interventions unsuccessful or patient reports new pain  Outcome: Progressing     Problem: INFECTION - ADULT  Goal: Absence or prevention of progression during hospitalization  Description: INTERVENTIONS:  - Assess and monitor for signs and symptoms of infection  - Monitor lab/diagnostic results  - Monitor all insertion sites, i.e. indwelling lines, tubes, and drains  - Monitor endotracheal if appropriate and nasal secretions for changes in amount and color  - [place] appropriate cooling/warming therapies per order  - Administer medications as ordered  - Instruct and encourage patient and family to use good hand hygiene technique  - Identify and instruct in appropriate isolation precautions for identified infection/condition  Outcome: Progressing  Goal: Absence of fever/infection during neutropenic period  Description: INTERVENTIONS:  - Monitor WBC    Outcome: Progressing     Problem: SAFETY ADULT  Goal: Patient will remain free of falls  Description: INTERVENTIONS:  - Educate patient/family on patient safety including physical limitations  - Instruct patient to call for assistance with activity   - Consult OT/PT to assist with strengthening/mobility   - Keep Call bell within reach  - Keep bed low and locked with side rails adjusted as appropriate  - Keep care items and personal belongings within reach  - Initiate and maintain comfort rounds  - Make Fall Risk Sign visible to staff  - Offer Toileting every 2 Hours,  in advance of need  - Initiate/Maintain bed alarm  - Obtain necessary fall risk management equipment: walker  - Apply yellow socks and bracelet for high fall risk patients  - Consider moving patient to room near nurses station  Outcome: Progressing  Goal: Maintain or return to baseline ADL function  Description: INTERVENTIONS:  -  Assess patient's ability to carry out ADLs; assess patient's baseline for ADL function and identify physical deficits which impact ability to perform ADLs (bathing, care of mouth/teeth, toileting, grooming, dressing, etc.)  - Assess/evaluate cause of self-care deficits   - Assess range of motion  - Assess patient's mobility; develop plan if impaired  - Assess patient's need for assistive devices and provide as appropriate  - Encourage maximum independence but intervene and supervise when necessary  - Involve family in performance of ADLs  - Assess for home care needs following discharge   - Consider OT consult to assist with ADL evaluation and planning for discharge  - Provide patient education as appropriate  Outcome: Progressing  Goal: Maintains/Returns to pre admission functional level  Description: INTERVENTIONS:  - Perform AM-PAC 6 Click Basic Mobility/ Daily Activity assessment daily.  - Set and communicate daily mobility goal to care team and patient/family/caregiver.   - Collaborate with rehabilitation services on mobility goals if consulted  - Out of bed for toileting  - Record patient progress and toleration of activity level   Outcome: Progressing     Problem: DISCHARGE PLANNING  Goal: Discharge to home or other facility with appropriate resources  Description: INTERVENTIONS:  - Identify barriers to discharge w/patient and caregiver  - Arrange for needed discharge resources and transportation as appropriate  - Identify discharge learning needs (meds, wound care, etc.)  - Arrange for interpretive services to assist at discharge as needed  - Refer to Case Management Department for  coordinating discharge planning if the patient needs post-hospital services based on physician/advanced practitioner order or complex needs related to functional status, cognitive ability, or social support system  Outcome: Progressing     Problem: Knowledge Deficit  Goal: Patient/family/caregiver demonstrates understanding of disease process, treatment plan, medications, and discharge instructions  Description: Complete learning assessment and assess knowledge base.  Interventions:  - Provide teaching at level of understanding  - Provide teaching via preferred learning methods  Outcome: Progressing     Problem: RESPIRATORY - ADULT  Goal: Achieves optimal ventilation and oxygenation  Description: INTERVENTIONS:  - Assess for changes in respiratory status  - Assess for changes in mentation and behavior  - Position to facilitate oxygenation and minimize respiratory effort  - Oxygen administered by appropriate delivery if ordered  - Initiate smoking cessation education as indicated  - Encourage broncho-pulmonary hygiene including cough, deep breathe, Incentive Spirometry  - Assess the need for suctioning and aspirate as needed  - Assess and instruct to report SOB or any respiratory difficulty  - Respiratory Therapy support as indicated  Outcome: Progressing

## 2024-11-11 NOTE — ASSESSMENT & PLAN NOTE
Diagnosed with non-small cell lung cancer underwent wedge resection.  Treated with chemotherapy.  Follows with Dr. Esquivel from oncology.  She has an enlarging lung nodule and has deferred further workup of this.

## 2024-11-11 NOTE — ASSESSMENT & PLAN NOTE
No evidence of acute exacerbation.  Home regimen is DuoNebs, formoterol, and budesonide.  Continue budesonide twice daily with Xopenex/ipratropium 3 times daily while inpatient.

## 2024-11-11 NOTE — PROGRESS NOTES
Progress Note - Pulmonology   Name: Eryn Morocho 84 y.o. adult I MRN: [id number]  Unit/Bed#: 4 [address] I Date of Admission: 11/10/2024   Date of Service: 11/11/2024 I Hospital Day: 1    Assessment & Plan  Acute on chronic respiratory failure with hypoxia and hypercapnia (HCC)  At last discharge, she was to wear 9 L of oxygen at rest and 12 L with exertion.  She has been doing well with 8 L at rest at home per her daughter.  Titrate supplemental oxygen to maintain saturations greater than or equal to 89%.  Continue BiPAP at bedtime.  She prefers her home nasal pillows and her daughter will bring in her machine to use tonight.  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)  Diagnosed with non-small cell lung cancer underwent wedge resection.  Treated with chemotherapy.  Follows with Dr. Esquivel from oncology.  She has an enlarging lung nodule and has deferred further workup of this.  COPD, very severe (HCC)  No evidence of acute exacerbation.  Home regimen is DuoNebs, formoterol, and budesonide.  Continue budesonide twice daily with Xopenex/ipratropium 3 times daily while inpatient.  Chronic heart failure with preserved ejection fraction (HFpEF) (formerly [facility])  Wt Readings from Last 3 Encounters:   11/10/24 72.9 kg (160 lb 11.5 oz)   10/23/24 79.1 kg (174 lb 4.8 oz)   09/16/24 74.8 kg (165 lb)   Management per primary team.  Monitor I's and O's Daily weights.  Influenza B  Tamiflu per primary team.  Pleural effusion  Pleural effusion was drained as an outpatient.  Culture and cytology negative.    Discussed with primary team, nursing, and patient's daughter.  All questions answered.  Outpatient follow-up as per discharge instructions.    24 Hour Events : Better  Subjective : Eryn is sitting up in bed with her daughter at bedside.  She reports she feels better.  She does feel hot in her room and this sometimes makes her breathing feel worse.  No other complaints.    Objective :  Temp:  [97.7 °F (36.5 °C)-98 °F (36.7 °C)] 97.7 °F  (36.5 °C)  HR:  [59-83] 66  BP: (134-167)/(51-67) 149/58  Resp:  [16-20] 19  SpO2:  [91 %-96 %] 92 %  O2 Device: Mid flow nasal cannula  Nasal Cannula O2 Flow Rate (L/min):  [10 L/min-15 L/min] 15 L/min  FiO2 (%):  [50] 50    Physical Exam  Vitals reviewed.   Constitutional:       General: She is not in acute distress.     Appearance: She is well-developed. She is not toxic-appearing or diaphoretic.      Interventions: Nasal cannula in place.   HENT:      Head: Normocephalic and atraumatic.   Eyes:      General: No scleral icterus.     Extraocular Movements: Extraocular movements intact.   Neck:      Trachea: No tracheal deviation.   Cardiovascular:      Rate and Rhythm: Normal rate and regular rhythm.      Heart sounds: S1 normal and S2 normal. No murmur heard.     No friction rub. No gallop.   Pulmonary:      Effort: Pulmonary effort is normal. No tachypnea, accessory muscle usage or respiratory distress.      Breath sounds: Normal breath sounds. No stridor. No decreased breath sounds, wheezing, rhonchi or rales.   Chest:      Chest wall: No tenderness.   Abdominal:      General: Bowel sounds are normal. There is no distension.      Palpations: Abdomen is soft.      Tenderness: There is no abdominal tenderness.   Musculoskeletal:      Cervical back: Neck supple.      Right lower leg: No edema.      Left lower leg: No edema.   Skin:     General: Skin is warm and dry.      Findings: No rash.   Neurological:      Mental Status: She is alert and oriented to person, place, and time.      GCS: GCS eye subscore is 4. GCS verbal subscore is 5. GCS motor subscore is 6.   Psychiatric:         Speech: Speech normal.         Behavior: Behavior normal. Behavior is cooperative.           Lab Results: I have reviewed the following results:   .     11/11/24  0513   WBC 6.68   HGB 11.1*   HCT 35.2*      SODIUM 138   K 4.6      CO2 33*   BUN 23   CREATININE 0.68   GLUC 160*   AST 6   ALT 5*   ALB 3.5   TBILI 0.36    ALKPHOS 35     Procalcitonin 0.07

## 2024-11-11 NOTE — TELEPHONE ENCOUNTER
Patient has been scheduled for a Virtual HFU appointment with Kateryan for 11/19. Patient is scheduled for discharge late in the week. Per Kateryna 's request.

## 2024-11-11 NOTE — PLAN OF CARE
Problem: PAIN - ADULT  Goal: Verbalizes/displays adequate comfort level or baseline comfort level  Description: Interventions:  - Encourage patient to monitor pain and request assistance  - Assess pain using appropriate pain scale  - Administer analgesics based on type and severity of pain and evaluate response  - Implement non-pharmacological measures as appropriate and evaluate response  - Consider cultural and social influences on pain and pain management  - Notify physician/advanced practitioner if interventions unsuccessful or patient reports new pain  Outcome: Progressing     Problem: INFECTION - ADULT  Goal: Absence or prevention of progression during hospitalization  Description: INTERVENTIONS:  - Assess and monitor for signs and symptoms of infection  - Monitor lab/diagnostic results  - Monitor all insertion sites, i.e. indwelling lines, tubes, and drains  - Monitor endotracheal if appropriate and nasal secretions for changes in amount and color  - [place] appropriate cooling/warming therapies per order  - Administer medications as ordered  - Instruct and encourage patient and family to use good hand hygiene technique  - Identify and instruct in appropriate isolation precautions for identified infection/condition  Outcome: Progressing  Goal: Absence of fever/infection during neutropenic period  Description: INTERVENTIONS:  - Monitor WBC    Outcome: Progressing     Problem: SAFETY ADULT  Goal: Patient will remain free of falls  Description: INTERVENTIONS:  - Educate patient/family on patient safety including physical limitations  - Instruct patient to call for assistance with activity   - Consult OT/PT to assist with strengthening/mobility   - Keep Call bell within reach  - Keep bed low and locked with side rails adjusted as appropriate  - Keep care items and personal belongings within reach  - Initiate and maintain comfort rounds  - Make Fall Risk Sign visible to staff  - Offer Toileting every 2 Hours,  in advance of need  - Initiate/Maintain bed alarm  - Obtain necessary fall risk management equipment: call  Problem: RESPIRATORY - ADULT  Goal: Achieves optimal ventilation and oxygenation  Description: INTERVENTIONS:  - Assess for changes in respiratory status  - Assess for changes in mentation and behavior  - Position to facilitate oxygenation and minimize respiratory effort  - Oxygen administered by appropriate delivery if ordered  - Initiate smoking cessation education as indicated  - Encourage broncho-pulmonary hygiene including cough, deep breathe, Incentive Spirometry  - Assess the need for suctioning and aspirate as needed  - Assess and instruct to report SOB or any respiratory difficulty  - Respiratory Therapy support as indicated  Outcome: Progressing    bell  - Apply yellow socks and bracelet for high fall risk patients  - Consider moving patient to room near nurses station  Outcome: Progressing

## 2024-11-11 NOTE — PROGRESS NOTES
Progress Note - Hospitalist   Name: Eryn Morocho 84 y.o. adult I MRN: [id number]  Unit/Bed#: [address] [place], [place] [id number] I Date of Admission: 11/10/2024   Date of Service: 11/11/2024 I Hospital Day: 1    Assessment & Plan  Acute on chronic respiratory failure with hypoxia and hypercapnia (HCC)  History of HFpEF severe COPD (recently discharged on 9 L) NS CLC status post wedge resection anxiety depression PAD paroxysmal atrial fibrillation and CKD who presents back to the hospital for worsening shortness of breath  Patient reports 2 days of worsening dyspnea with cough/mucus production  In the ED required BiPAP  Has been transitioned to mid flow 15 L saturating well comfortable  Acute decompensation secondary to influenza B.  She was vaccinated this year for influenza.  Continue oseltamivir  Hypercapnia/JULIO.  Continue BiPAP at bedtime.  Appreciated pulmonary input.   Influenza B  Oseltamivir as above  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)  NSCLC status post wedge resection and chemo.  Recent CT scan with suspicion for new neoplasm  COPD, very severe (HCC)  Follows with Dr. Moreno.  No clear exacerbation.  Pulmonology to evaluate.  Continue Perforomist and Pulmicort  HTN (hypertension)  Monitor on diltiazem  Anxiety and depression  Mood stable continue citalopram  Stage 3a chronic kidney disease (HCC)  Lab Results   Component Value Date    EGFR 80 06/19/2024    EGFR 81 06/18/2024    EGFR 81 06/17/2024    CREATININE 0.68 11/11/2024    CREATININE 0.73 11/10/2024    CREATININE 0.60 10/23/2024     Renal function at baseline  Paroxysmal atrial fibrillation (HCC)  Continue diltiazem  Anticoagulation: Continue rivaroxaban  Chronic heart failure with preserved ejection fraction (HFpEF) (HCC)  Wt Readings from Last 3 Encounters:   11/10/24 72.9 kg (160 lb 11.5 oz)   10/23/24 79.1 kg (174 lb 4.8 oz)   09/16/24 74.8 kg (165 lb)     Compensated continue torsemide 10 mg.  Follows with Dr. Agarwal    VTE Pharmacologic Prophylaxis: VTE Score:  5  On Xarelto    Mobility:   Basic Mobility Inpatient Raw Score: 10  JH-HLM Goal: 4: Move to chair/commode  JH-HLM Achieved: 5: Stand (1 or more minutes)  JH-HLM Goal NOT achieved. Continue with multidisciplinary rounding and encourage appropriate mobility to improve upon JH-HLM goals.    Patient Centered Rounds: I performed bedside rounds with nursing staff today.   Discussions with Specialists or Other Care Team Provider: Pulm    Education and Discussions with Family / Patient: Updated  (daughter) at bedside.    Current Length of Stay: 1 day(s)  Current Patient Status: Inpatient   Certification Statement: The patient will continue to require additional inpatient hospital stay due to titrating oxygen  Discharge Plan: Anticipate discharge in 24-48 hrs     Code Status: Level 3 - DNAR and DNI    Subjective   Pt felt her breathing was at her baseline this morning.  She became more short of breath after using commode.  VBG actually showed improvement compared to prior.  O2 sat 97% on 15 L    Objective :  Temp:  [97.4 °F (36.3 °C)-98 °F (36.7 °C)] 97.4 °F (36.3 °C)  HR:  [59-86] 84  BP: (134-167)/(51-69) 164/69  Resp:  [16-20] 20  SpO2:  [61 %-97 %] 97 %  O2 Device: BiPAP  Nasal Cannula O2 Flow Rate (L/min):  [10 L/min-15 L/min] 15 L/min  FiO2 (%):  [50] 50    Body mass index is 28.47 kg/m².     Input and Output Summary (last 24 hours):     Intake/Output Summary (Last 24 hours) at 11/11/2024 1618  Last data filed at 11/11/2024 1557  Gross per 24 hour   Intake --   Output 1400 ml   Net -1400 ml       Physical Exam  Constitutional:       General: She is not in acute distress.     Appearance: She is ill-appearing. She is not toxic-appearing or diaphoretic.   Eyes:      General:         Right eye: No discharge.         Left eye: No discharge.   Cardiovascular:      Rate and Rhythm: Normal rate. Rhythm irregular.   Pulmonary:      Effort: Pulmonary effort is normal.      Breath sounds: Rales present.    Abdominal:      General: There is no distension.      Palpations: Abdomen is soft.      Tenderness: There is no abdominal tenderness.   Musculoskeletal:         General: No swelling.   Skin:     General: Skin is warm.   Neurological:      Mental Status: Mental status is at baseline.   Psychiatric:         Behavior: Behavior normal.           Lines/Drains:              Lab Results: I have reviewed the following results:   Results from last 7 days   Lab Units 11/11/24  0513 11/10/24  0455   WBC Thousand/uL 6.68 8.38   HEMOGLOBIN g/dL 11.1* 12.6   HEMATOCRIT % 35.2* 41.3   PLATELETS Thousands/uL 194 206   SEGS PCT %  --  82*   LYMPHO PCT %  --  10*   MONO PCT %  --  6   EOS PCT %  --  1     Results from last 7 days   Lab Units 11/11/24  0513   SODIUM mmol/L 138   POTASSIUM mmol/L 4.6   CHLORIDE mmol/L 100   CO2 mmol/L 33*   BUN mg/dL 23   CREATININE mg/dL 0.68   ANION GAP mmol/L 5   CALCIUM mg/dL 9.0   ALBUMIN g/dL 3.5   TOTAL BILIRUBIN mg/dL 0.36   ALK PHOS U/L 35   ALT U/L 5*   AST U/L 6   GLUCOSE RANDOM mg/dL 160*                 Results from last 7 days   Lab Units 11/11/24  0513 11/10/24  0455   LACTIC ACID mmol/L  --  0.6   PROCALCITONIN ng/ml 0.07 <0.05       Recent Cultures (last 7 days):   Results from last 7 days   Lab Units 11/10/24  0510 11/10/24  0455   BLOOD CULTURE  No Growth at 24 hrs. No Growth at 24 hrs.             Last 24 Hours Medication List:     Current Facility-Administered Medications:     acetaminophen (TYLENOL) tablet 650 mg, Q4H PRN    Artificial Tears Op Soln 1 drop, Q4H PRN    atorvastatin (LIPITOR) tablet 10 mg, Daily With Dinner    budesonide (PULMICORT) inhalation solution 0.5 mg, BID    calcium carbonate-vitamin D 500 mg-5 mcg tablet 2 tablet, Daily With Breakfast    citalopram (CeleXA) tablet 20 mg, Daily    clopidogrel (PLAVIX) tablet 75 mg, Daily    co-enzyme Q-10 capsule 200 mg, QAM    cyanocobalamin (VITAMIN B-12) tablet 500 mcg, Daily    diltiazem (CARDIZEM) tablet 30 mg, Q8H  ANNE    docusate sodium (COLACE) capsule 100 mg, BID PRN    fish oil capsule 1,000 mg, BID    guaiFENesin (MUCINEX) 12 hr tablet 1,200 mg, Q12H ANNE    ipratropium (ATROVENT) 0.02 % inhalation solution 0.5 mg, TID    levalbuterol (XOPENEX) inhalation solution 1.25 mg, TID **AND** [DISCONTINUED] sodium chloride 0.9 % inhalation solution 3 mL, TID    lidocaine (LIDODERM) 5 % patch 1 patch, Daily    loratadine (CLARITIN) tablet 10 mg, Daily    montelukast (SINGULAIR) tablet 10 mg, HS    ondansetron (ZOFRAN) injection 4 mg, Q4H PRN    oseltamivir (TAMIFLU) capsule 75 mg, Q12H ANNE    rivaroxaban (XARELTO) tablet 20 mg, Daily With Breakfast    timolol (TIMOPTIC) 0.5 % ophthalmic solution 1 drop, BID    torsemide (DEMADEX) tablet 10 mg, Daily    traZODone (DESYREL) tablet 100 mg, HS    Administrative Statements   Today, Patient Was Seen By: Cecille Newman MD      **Please Note: This note may have been constructed using a voice recognition system.**

## 2024-11-11 NOTE — ASSESSMENT & PLAN NOTE
At last discharge, she was to wear 9 L of oxygen at rest and 12 L with exertion.  She has been doing well with 8 L at rest at home per her daughter.  Titrate supplemental oxygen to maintain saturations greater than or equal to 89%.  Continue BiPAP at bedtime.  She prefers her home nasal pillows and her daughter will bring in her machine to use tonight.

## 2024-11-11 NOTE — ASSESSMENT & PLAN NOTE
History of HFpEF severe COPD (recently discharged on 9 L) NS CLC status post wedge resection anxiety depression PAD paroxysmal atrial fibrillation and CKD who presents back to the hospital for worsening shortness of breath  Patient reports 2 days of worsening dyspnea with cough/mucus production  In the ED required BiPAP  Has been transitioned to mid flow 15 L saturating well comfortable  Acute decompensation secondary to influenza B.  She was vaccinated this year for influenza.  Continue oseltamivir  Hypercapnia/JULIO.  Continue BiPAP at bedtime.  Appreciated pulmonary input.

## 2024-11-12 ENCOUNTER — APPOINTMENT (INPATIENT)
Dept: RADIOLOGY | Facility: HOSPITAL | Age: 84
DRG: 193 | End: 2024-11-12
Payer: MEDICARE

## 2024-11-12 PROCEDURE — 94760 N-INVAS EAR/PLS OXIMETRY 1: CPT

## 2024-11-12 PROCEDURE — 71045 X-RAY EXAM CHEST 1 VIEW: CPT

## 2024-11-12 PROCEDURE — 94640 AIRWAY INHALATION TREATMENT: CPT

## 2024-11-12 PROCEDURE — 99232 SBSQ HOSP IP/OBS MODERATE 35: CPT | Performed by: STUDENT IN AN ORGANIZED HEALTH CARE EDUCATION/TRAINING PROGRAM

## 2024-11-12 PROCEDURE — 99232 SBSQ HOSP IP/OBS MODERATE 35: CPT | Performed by: INTERNAL MEDICINE

## 2024-11-12 PROCEDURE — 94664 DEMO&/EVAL PT USE INHALER: CPT

## 2024-11-12 RX ORDER — FUROSEMIDE 10 MG/ML
40 INJECTION INTRAMUSCULAR; INTRAVENOUS ONCE
Status: COMPLETED | OUTPATIENT
Start: 2024-11-12 | End: 2024-11-12

## 2024-11-12 RX ADMIN — GUAIFENESIN 1200 MG: 600 TABLET, EXTENDED RELEASE ORAL at 09:37

## 2024-11-12 RX ADMIN — IPRATROPIUM BROMIDE 0.5 MG: 0.5 SOLUTION RESPIRATORY (INHALATION) at 07:40

## 2024-11-12 RX ADMIN — RIVAROXABAN 20 MG: 20 TABLET, FILM COATED ORAL at 09:36

## 2024-11-12 RX ADMIN — OMEGA-3 FATTY ACIDS CAP 1000 MG 1000 MG: 1000 CAP at 17:31

## 2024-11-12 RX ADMIN — BUDESONIDE 0.5 MG: 0.5 INHALANT RESPIRATORY (INHALATION) at 19:56

## 2024-11-12 RX ADMIN — MONTELUKAST 10 MG: 10 TABLET, FILM COATED ORAL at 21:58

## 2024-11-12 RX ADMIN — TIMOLOL MALEATE 1 DROP: 5 SOLUTION OPHTHALMIC at 22:02

## 2024-11-12 RX ADMIN — TIMOLOL MALEATE 1 DROP: 5 SOLUTION OPHTHALMIC at 09:38

## 2024-11-12 RX ADMIN — DILTIAZEM HYDROCHLORIDE 30 MG: 30 TABLET, FILM COATED ORAL at 21:57

## 2024-11-12 RX ADMIN — CYANOCOBALAMIN TAB 500 MCG 500 MCG: 500 TAB at 09:36

## 2024-11-12 RX ADMIN — TORSEMIDE 10 MG: 10 TABLET ORAL at 09:37

## 2024-11-12 RX ADMIN — DILTIAZEM HYDROCHLORIDE 30 MG: 30 TABLET, FILM COATED ORAL at 13:40

## 2024-11-12 RX ADMIN — Medication 200 MG: at 09:36

## 2024-11-12 RX ADMIN — Medication 2 TABLET: at 09:37

## 2024-11-12 RX ADMIN — IPRATROPIUM BROMIDE 0.5 MG: 0.5 SOLUTION RESPIRATORY (INHALATION) at 19:52

## 2024-11-12 RX ADMIN — CITALOPRAM HYDROBROMIDE 20 MG: 20 TABLET ORAL at 09:37

## 2024-11-12 RX ADMIN — OSELTAMIVIR PHOSPHATE 75 MG: 75 CAPSULE ORAL at 22:08

## 2024-11-12 RX ADMIN — DILTIAZEM HYDROCHLORIDE 30 MG: 30 TABLET, FILM COATED ORAL at 05:43

## 2024-11-12 RX ADMIN — CLOPIDOGREL 75 MG: 75 TABLET ORAL at 09:36

## 2024-11-12 RX ADMIN — BUDESONIDE 0.5 MG: 0.5 INHALANT RESPIRATORY (INHALATION) at 07:39

## 2024-11-12 RX ADMIN — ACETAMINOPHEN 650 MG: 325 TABLET ORAL at 22:01

## 2024-11-12 RX ADMIN — LORATADINE 10 MG: 10 TABLET ORAL at 09:36

## 2024-11-12 RX ADMIN — OMEGA-3 FATTY ACIDS CAP 1000 MG 1000 MG: 1000 CAP at 09:36

## 2024-11-12 RX ADMIN — GUAIFENESIN 1200 MG: 600 TABLET, EXTENDED RELEASE ORAL at 21:57

## 2024-11-12 RX ADMIN — LIDOCAINE 1 PATCH: 50 PATCH CUTANEOUS at 09:38

## 2024-11-12 RX ADMIN — TRAZODONE HYDROCHLORIDE 100 MG: 100 TABLET ORAL at 21:58

## 2024-11-12 RX ADMIN — LEVALBUTEROL HYDROCHLORIDE 1.25 MG: 1.25 SOLUTION RESPIRATORY (INHALATION) at 13:21

## 2024-11-12 RX ADMIN — LEVALBUTEROL HYDROCHLORIDE 1.25 MG: 1.25 SOLUTION RESPIRATORY (INHALATION) at 07:35

## 2024-11-12 RX ADMIN — FUROSEMIDE 40 MG: 10 INJECTION, SOLUTION INTRAMUSCULAR; INTRAVENOUS at 13:37

## 2024-11-12 RX ADMIN — OSELTAMIVIR PHOSPHATE 75 MG: 75 CAPSULE ORAL at 09:36

## 2024-11-12 RX ADMIN — ONDANSETRON 4 MG: 2 INJECTION INTRAMUSCULAR; INTRAVENOUS at 21:31

## 2024-11-12 RX ADMIN — ATORVASTATIN CALCIUM 10 MG: 10 TABLET, FILM COATED ORAL at 16:39

## 2024-11-12 RX ADMIN — IPRATROPIUM BROMIDE 0.5 MG: 0.5 SOLUTION RESPIRATORY (INHALATION) at 13:29

## 2024-11-12 RX ADMIN — LEVALBUTEROL HYDROCHLORIDE 1.25 MG: 1.25 SOLUTION RESPIRATORY (INHALATION) at 19:48

## 2024-11-12 NOTE — ASSESSMENT & PLAN NOTE
History of HFpEF severe COPD (recently discharged on 9 L) NSCLC status post wedge resection anxiety depression PAD paroxysmal atrial fibrillation and CKD who presents back to the hospital for worsening shortness of breath  Patient reports 2 days of worsening dyspnea with cough/mucus production  In the ED required BiPAP  Patient has been on 15 L oxygen via mid flow which is being titrated down  Patient was given a dose of Lasix 40 mg IV chest x-ray showed some pulmonary vascular congestion  Acute decompensation secondary to influenza B.  She was vaccinated this year for influenza.  Continue oseltamivir  Hypercapnia/JULIO.  Continue BiPAP at bedtime.  Appreciated pulmonary input.   After Lasix patient was weaned down to 7 L oxygen via mid flow  Patient could not tolerate hospital BiPAP-family brought home BiPAP which will be tried today.

## 2024-11-12 NOTE — ASSESSMENT & PLAN NOTE
Follows with Dr. Moreno.  No evidence of exacerbation exacerbation.  Pulmonology input appreciated  Continue Perforomist and Pulmicort

## 2024-11-12 NOTE — ASSESSMENT & PLAN NOTE
Wt Readings from Last 3 Encounters:   11/10/24 72.9 kg (160 lb 11.5 oz)   10/23/24 79.1 kg (174 lb 4.8 oz)   09/16/24 74.8 kg (165 lb)     Compensated continue torsemide 10 mg.  Follows with Dr. Agarwal  Patient was given a dose of Lasix 40 mg IV as chest x-ray showed some pulmonary vascular congestion to improve oxygen requirement  Echo from 524 showed EF of 70% with grade 2 diastolic dysfunction

## 2024-11-12 NOTE — TELEPHONE ENCOUNTER
I spoke with Karen. All questions answered and needs address with DEBBIE SMITH, and Dr. Dsouza. See daily hospital progress note.

## 2024-11-12 NOTE — PROGRESS NOTES
Progress Note - Hospitalist   Name: Eryn Morocho 84 y.o. adult I MRN: [id number]  Unit/Bed#: [address] [place] [address] I Date of Admission: 11/10/2024   Date of Service: 11/12/2024 I Hospital Day: 2    Assessment & Plan  Acute on chronic respiratory failure with hypoxia and hypercapnia (HCC)  History of HFpEF severe COPD (recently discharged on 9 L) NSCLC status post wedge resection anxiety depression PAD paroxysmal atrial fibrillation and CKD who presents back to the hospital for worsening shortness of breath  Patient reports 2 days of worsening dyspnea with cough/mucus production  In the ED required BiPAP  Patient has been on 15 L oxygen via mid flow which is being titrated down  Patient was given a dose of Lasix 40 mg IV chest x-ray showed some pulmonary vascular congestion  Acute decompensation secondary to influenza B.  She was vaccinated this year for influenza.  Continue oseltamivir  Hypercapnia/JULIO.  Continue BiPAP at bedtime.  Appreciated pulmonary input.   After Lasix patient was weaned down to 7 L oxygen via mid flow  Patient could not tolerate hospital BiPAP-family brought home BiPAP which will be tried today.  Influenza B  Oseltamivir as above day #3 of 5  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)  NSCLC status post wedge resection and chemo.  Recent CT scan with enlarging lung nodule  Follows up with   Patient has outpatient PET scan ordered  COPD, very severe (HCC)  Follows with Dr. Moreno.  No evidence of exacerbation exacerbation.  Pulmonology input appreciated  Continue Perforomist and Pulmicort  HTN (hypertension)  Monitor on diltiazem  Anxiety and depression  Mood stable continue citalopram  Stage 3a chronic kidney disease (HCC)  Lab Results   Component Value Date    EGFR 80 06/19/2024    EGFR 81 06/18/2024    EGFR 81 06/17/2024    CREATININE 0.68 11/11/2024    CREATININE 0.73 11/10/2024    CREATININE 0.60 10/23/2024     Renal function at baseline  Paroxysmal atrial fibrillation (HCC)  Continue  diltiazem  Anticoagulation: Continue rivaroxaban  Chronic heart failure with preserved ejection fraction (HFpEF) ([facility])  Wt Readings from Last 3 Encounters:   11/10/24 72.9 kg (160 lb 11.5 oz)   10/23/24 79.1 kg (174 lb 4.8 oz)   09/16/24 74.8 kg (165 lb)     Compensated continue torsemide 10 mg.  Follows with Dr. Agarwal  Patient was given a dose of Lasix 40 mg IV as chest x-ray showed some pulmonary vascular congestion to improve oxygen requirement  Echo from 524 showed EF of 70% with grade 2 diastolic dysfunction  PAD (peripheral artery disease) ([facility])  On Plavix and statin    VTE Pharmacologic Prophylaxis: VTE Score: 5 High Risk (Score >/= 5) - Pharmacological DVT Prophylaxis Ordered: rivaroxaban (Xarelto). Sequential Compression Devices Ordered.    Mobility:   Basic Mobility Inpatient Raw Score: 13  JH-HLM Goal: 4: Move to chair/commode  JH-HLM Achieved: 4: Move to chair/commode  JH-HLM Goal NOT achieved. Continue with multidisciplinary rounding and encourage appropriate mobility to improve upon JH-HLM goals.    Patient Centered Rounds: I performed bedside rounds with nursing staff today.   Discussions with Specialists or Other Care Team Provider: Pulmonology    Education and Discussions with Family / Patient: Updated  (daughter) at bedside.    Current Length of Stay: 2 day(s)  Current Patient Status: Inpatient   Certification Statement: The patient will continue to require additional inpatient hospital stay due to acute respiratory failure  Discharge Plan: Anticipate discharge in 24-48 hrs to pending course    Code Status: Level 3 - DNAR and DNI    Subjective   Patient still complaining of some shortness of breath.  Denies any chest pain or abdominal pain.  Patient was very anxious on hospital BiPAP and wanted to take it off.    Objective :  Temp:  [97.4 °F (36.3 °C)-98.8 °F (37.1 °C)] 98.8 °F (37.1 °C)  HR:  [70-82] 81  BP: (129-182)/(49-75) 143/62  Resp:  [18-25] 20  SpO2:  [88 %-98 %] 91 %  O2  Device: Mid flow nasal cannula  Nasal Cannula O2 Flow Rate (L/min):  [12 L/min-15 L/min] 12 L/min  FiO2 (%):  [50] 50    Body mass index is 28.47 kg/m².     Input and Output Summary (last 24 hours):     Intake/Output Summary (Last 24 hours) at 11/12/2024 1840  Last data filed at 11/12/2024 0401  Gross per 24 hour   Intake --   Output 320 ml   Net -320 ml       Physical Exam  Constitutional:       Appearance: Normal appearance.   HENT:      Head: Normocephalic and atraumatic.   Eyes:      Extraocular Movements: Extraocular movements intact.      Pupils: Pupils are equal, round, and reactive to light.   Cardiovascular:      Rate and Rhythm: Normal rate and regular rhythm.      Heart sounds: No murmur heard.     No gallop.   Pulmonary:      Effort: Pulmonary effort is normal.      Comments: Decreased breath sounds bilaterally  Abdominal:      General: Bowel sounds are normal.      Palpations: Abdomen is soft.      Tenderness: There is no abdominal tenderness.   Musculoskeletal:         General: No swelling or deformity. Normal range of motion.      Cervical back: Normal range of motion and neck supple.   Skin:     General: Skin is warm and dry.   Neurological:      General: No focal deficit present.      Mental Status: She is alert.           Lines/Drains:              Lab Results: I have reviewed the following results:   Results from last 7 days   Lab Units 11/11/24  0513 11/10/24  0455   WBC Thousand/uL 6.68 8.38   HEMOGLOBIN g/dL 11.1* 12.6   HEMATOCRIT % 35.2* 41.3   PLATELETS Thousands/uL 194 206   SEGS PCT %  --  82*   LYMPHO PCT %  --  10*   MONO PCT %  --  6   EOS PCT %  --  1     Results from last 7 days   Lab Units 11/11/24  0513   SODIUM mmol/L 138   POTASSIUM mmol/L 4.6   CHLORIDE mmol/L 100   CO2 mmol/L 33*   BUN mg/dL 23   CREATININE mg/dL 0.68   ANION GAP mmol/L 5   CALCIUM mg/dL 9.0   ALBUMIN g/dL 3.5   TOTAL BILIRUBIN mg/dL 0.36   ALK PHOS U/L 35   ALT U/L 5*   AST U/L 6   GLUCOSE RANDOM mg/dL 160*                  Results from last 7 days   Lab Units 11/11/24  0513 11/10/24  0455   LACTIC ACID mmol/L  --  0.6   PROCALCITONIN ng/ml 0.07 <0.05       Recent Cultures (last 7 days):   Results from last 7 days   Lab Units 11/10/24  0510 11/10/24  0455   BLOOD CULTURE  No Growth at 48 hrs. No Growth at 48 hrs.       Imaging Results Review: I reviewed radiology reports from this admission including: chest xray.      Last 24 Hours Medication List:     Current Facility-Administered Medications:     acetaminophen (TYLENOL) tablet 650 mg, Q4H PRN    Artificial Tears Op Soln 1 drop, Q4H PRN    atorvastatin (LIPITOR) tablet 10 mg, Daily With Dinner    budesonide (PULMICORT) inhalation solution 0.5 mg, BID    calcium carbonate-vitamin D 500 mg-5 mcg tablet 2 tablet, Daily With Breakfast    citalopram (CeleXA) tablet 20 mg, Daily    clopidogrel (PLAVIX) tablet 75 mg, Daily    co-enzyme Q-10 capsule 200 mg, QAM    cyanocobalamin (VITAMIN B-12) tablet 500 mcg, Daily    diltiazem (CARDIZEM) tablet 30 mg, Q8H ANNE    docusate sodium (COLACE) capsule 100 mg, BID PRN    fish oil capsule 1,000 mg, BID    guaiFENesin (MUCINEX) 12 hr tablet 1,200 mg, Q12H ANNE    ipratropium (ATROVENT) 0.02 % inhalation solution 0.5 mg, TID    levalbuterol (XOPENEX) inhalation solution 1.25 mg, TID **AND** [DISCONTINUED] sodium chloride 0.9 % inhalation solution 3 mL, TID    lidocaine (LIDODERM) 5 % patch 1 patch, Daily    loratadine (CLARITIN) tablet 10 mg, Daily    montelukast (SINGULAIR) tablet 10 mg, HS    ondansetron (ZOFRAN) injection 4 mg, Q4H PRN    oseltamivir (TAMIFLU) capsule 75 mg, Q12H ANNE    rivaroxaban (XARELTO) tablet 20 mg, Daily With Breakfast    timolol (TIMOPTIC) 0.5 % ophthalmic solution 1 drop, BID    torsemide (DEMADEX) tablet 10 mg, Daily    traZODone (DESYREL) tablet 100 mg, HS    Administrative Statements   Today, Patient Was Seen By: Tawny Ramírez MD      **Please Note: This note may have been constructed using a voice  recognition system.**

## 2024-11-12 NOTE — TELEPHONE ENCOUNTER
Patient's daughter calling upset stating the advice given by pulm is not being followed in regards to BIPAP machine. Stated patient is throwing up once BIPAP is placed on her face and she is stating in the 70's. Requesting pulm to go to her room and assist.

## 2024-11-12 NOTE — ASSESSMENT & PLAN NOTE
NSCLC status post wedge resection and chemo.  Recent CT scan with enlarging lung nodule  Follows up with   Patient has outpatient PET scan ordered

## 2024-11-12 NOTE — PHYSICAL THERAPY NOTE
PHYSICAL THERAPY     11/12/24 1140   Note Type   Note type Cancelled Session   Cancel Reasons Medical status  (Patient with shortness of breath at this time, increased anxiety looking for assistance from respiratory status for reconnection of CPAP machine.  PT deferred at this time will reattempt at a later time as appropriate)   Licensure   [place] License Number  Radha Armas, PT 4 0 QA [id number]

## 2024-11-13 ENCOUNTER — HOME CARE VISIT (OUTPATIENT)
Dept: HOME HEALTH SERVICES | Facility: HOME HEALTHCARE | Age: 84
End: 2024-11-13

## 2024-11-13 PROBLEM — Z71.89 GOALS OF CARE, COUNSELING/DISCUSSION: Status: ACTIVE | Noted: 2024-11-13

## 2024-11-13 LAB
ANION GAP SERPL CALCULATED.3IONS-SCNC: 5 MMOL/L (ref 4–13)
ARTERIAL PATENCY WRIST A: YES
BASE EXCESS BLDA CALC-SCNC: 11.5 MMOL/L
BODY TEMPERATURE: 99.2 DEGREES FEHRENHEIT
BUN SERPL-MCNC: 17 MG/DL (ref 5–25)
CALCIUM SERPL-MCNC: 9.1 MG/DL (ref 8.4–10.2)
CHLORIDE SERPL-SCNC: 92 MMOL/L (ref 96–108)
CO2 SERPL-SCNC: 37 MMOL/L (ref 21–32)
CREAT SERPL-MCNC: 0.73 MG/DL (ref 0.6–1.3)
ERYTHROCYTE [DISTWIDTH] IN BLOOD BY AUTOMATED COUNT: 14.6 % (ref 11.6–15.1)
GLUCOSE SERPL-MCNC: 152 MG/DL (ref 65–140)
HCO3 BLDA-SCNC: 40.2 MMOL/L (ref 22–28)
HCT VFR BLD AUTO: 39.9 % (ref 36.5–46.1)
HGB BLD-MCNC: 12.2 G/DL (ref 12–15.4)
MCH RBC QN AUTO: 33.3 PG (ref 26.8–34.3)
MCHC RBC AUTO-ENTMCNC: 30.6 G/DL (ref 31.4–37.4)
MCV RBC AUTO: 109 FL (ref 82–98)
NASAL CANNULA: 12
O2 CT BLDA-SCNC: 16.1 ML/DL (ref 16–23)
OXYHGB MFR BLDA: 82 % (ref 94–97)
PCO2 BLDA: 72.6 MM HG (ref 36–44)
PCO2 TEMP ADJ BLDA: 73.6 MM HG (ref 36–44)
PH BLD: 7.36 [PH] (ref 7.35–7.45)
PH BLDA: 7.36 [PH] (ref 7.35–7.45)
PLATELET # BLD AUTO: 185 THOUSANDS/UL (ref 149–390)
PMV BLD AUTO: 9.4 FL (ref 8.9–12.7)
PO2 BLD: 48.4 MM HG (ref 75–129)
PO2 BLDA: 47.4 MM HG (ref 75–129)
POTASSIUM SERPL-SCNC: 4.3 MMOL/L (ref 3.5–5.3)
PROCALCITONIN SERPL-MCNC: <0.05 NG/ML
RBC # BLD AUTO: 3.66 MILLION/UL (ref 3.88–5.12)
SODIUM SERPL-SCNC: 134 MMOL/L (ref 135–147)
SPECIMEN SOURCE: ABNORMAL
WBC # BLD AUTO: 5.87 THOUSAND/UL (ref 4.31–10.16)

## 2024-11-13 PROCEDURE — 99232 SBSQ HOSP IP/OBS MODERATE 35: CPT | Performed by: INTERNAL MEDICINE

## 2024-11-13 PROCEDURE — 94664 DEMO&/EVAL PT USE INHALER: CPT

## 2024-11-13 PROCEDURE — 36600 WITHDRAWAL OF ARTERIAL BLOOD: CPT

## 2024-11-13 PROCEDURE — 85027 COMPLETE CBC AUTOMATED: CPT | Performed by: INTERNAL MEDICINE

## 2024-11-13 PROCEDURE — 99232 SBSQ HOSP IP/OBS MODERATE 35: CPT | Performed by: NURSE PRACTITIONER

## 2024-11-13 PROCEDURE — 82805 BLOOD GASES W/O2 SATURATION: CPT | Performed by: NURSE PRACTITIONER

## 2024-11-13 PROCEDURE — 94760 N-INVAS EAR/PLS OXIMETRY 1: CPT

## 2024-11-13 PROCEDURE — 84145 PROCALCITONIN (PCT): CPT | Performed by: NURSE PRACTITIONER

## 2024-11-13 PROCEDURE — 80048 BASIC METABOLIC PNL TOTAL CA: CPT | Performed by: INTERNAL MEDICINE

## 2024-11-13 PROCEDURE — 94640 AIRWAY INHALATION TREATMENT: CPT

## 2024-11-13 PROCEDURE — 94668 MNPJ CHEST WALL SBSQ: CPT

## 2024-11-13 RX ORDER — TRAZODONE HYDROCHLORIDE 100 MG/1
100 TABLET ORAL
Status: DISCONTINUED | OUTPATIENT
Start: 2024-11-13 | End: 2024-11-14 | Stop reason: HOSPADM

## 2024-11-13 RX ORDER — LORAZEPAM 0.5 MG/1
0.5 TABLET ORAL EVERY 8 HOURS PRN
Status: DISCONTINUED | OUTPATIENT
Start: 2024-11-13 | End: 2024-11-14 | Stop reason: HOSPADM

## 2024-11-13 RX ORDER — MORPHINE SULFATE 20 MG/ML
5 SOLUTION ORAL EVERY 4 HOURS PRN
Qty: 30 ML | Refills: 0 | Status: SHIPPED | OUTPATIENT
Start: 2024-11-13

## 2024-11-13 RX ORDER — OSELTAMIVIR PHOSPHATE 30 MG/1
30 CAPSULE ORAL EVERY 12 HOURS SCHEDULED
Status: DISCONTINUED | OUTPATIENT
Start: 2024-11-13 | End: 2024-11-14 | Stop reason: HOSPADM

## 2024-11-13 RX ORDER — METHYLPREDNISOLONE SODIUM SUCCINATE 40 MG/ML
40 INJECTION, POWDER, LYOPHILIZED, FOR SOLUTION INTRAMUSCULAR; INTRAVENOUS DAILY
Status: DISCONTINUED | OUTPATIENT
Start: 2024-11-13 | End: 2024-11-14 | Stop reason: HOSPADM

## 2024-11-13 RX ORDER — LORAZEPAM 2 MG/ML
1 CONCENTRATE ORAL EVERY 4 HOURS PRN
Qty: 30 ML | Refills: 0 | Status: SHIPPED | OUTPATIENT
Start: 2024-11-13 | End: 2024-11-14

## 2024-11-13 RX ORDER — FUROSEMIDE 10 MG/ML
20 INJECTION INTRAMUSCULAR; INTRAVENOUS ONCE
Status: COMPLETED | OUTPATIENT
Start: 2024-11-13 | End: 2024-11-13

## 2024-11-13 RX ADMIN — Medication 200 MG: at 08:31

## 2024-11-13 RX ADMIN — CLOPIDOGREL 75 MG: 75 TABLET ORAL at 08:31

## 2024-11-13 RX ADMIN — FUROSEMIDE 20 MG: 10 INJECTION, SOLUTION INTRAMUSCULAR; INTRAVENOUS at 12:14

## 2024-11-13 RX ADMIN — GUAIFENESIN 1200 MG: 600 TABLET, EXTENDED RELEASE ORAL at 08:31

## 2024-11-13 RX ADMIN — TIMOLOL MALEATE 1 DROP: 5 SOLUTION OPHTHALMIC at 08:47

## 2024-11-13 RX ADMIN — OSELTAMIVIR PHOSPHATE 75 MG: 75 CAPSULE ORAL at 08:31

## 2024-11-13 RX ADMIN — DILTIAZEM HYDROCHLORIDE 30 MG: 30 TABLET, FILM COATED ORAL at 06:03

## 2024-11-13 RX ADMIN — CYANOCOBALAMIN TAB 500 MCG 500 MCG: 500 TAB at 08:31

## 2024-11-13 RX ADMIN — IPRATROPIUM BROMIDE 0.5 MG: 0.5 SOLUTION RESPIRATORY (INHALATION) at 19:47

## 2024-11-13 RX ADMIN — OMEGA-3 FATTY ACIDS CAP 1000 MG 1000 MG: 1000 CAP at 08:31

## 2024-11-13 RX ADMIN — IPRATROPIUM BROMIDE 0.5 MG: 0.5 SOLUTION RESPIRATORY (INHALATION) at 14:21

## 2024-11-13 RX ADMIN — LEVALBUTEROL HYDROCHLORIDE 1.25 MG: 1.25 SOLUTION RESPIRATORY (INHALATION) at 07:41

## 2024-11-13 RX ADMIN — LIDOCAINE 1 PATCH: 50 PATCH CUTANEOUS at 08:31

## 2024-11-13 RX ADMIN — Medication 2 TABLET: at 08:31

## 2024-11-13 RX ADMIN — TORSEMIDE 10 MG: 10 TABLET ORAL at 08:31

## 2024-11-13 RX ADMIN — BUDESONIDE 0.5 MG: 0.5 INHALANT RESPIRATORY (INHALATION) at 19:47

## 2024-11-13 RX ADMIN — ONDANSETRON 4 MG: 2 INJECTION INTRAMUSCULAR; INTRAVENOUS at 05:55

## 2024-11-13 RX ADMIN — RIVAROXABAN 20 MG: 20 TABLET, FILM COATED ORAL at 08:31

## 2024-11-13 RX ADMIN — TIMOLOL MALEATE 1 DROP: 5 SOLUTION OPHTHALMIC at 22:15

## 2024-11-13 RX ADMIN — LORAZEPAM 0.5 MG: 0.5 TABLET ORAL at 17:56

## 2024-11-13 RX ADMIN — METHYLPREDNISOLONE SODIUM SUCCINATE 40 MG: 40 INJECTION, POWDER, FOR SOLUTION INTRAMUSCULAR; INTRAVENOUS at 13:52

## 2024-11-13 RX ADMIN — LEVALBUTEROL HYDROCHLORIDE 1.25 MG: 1.25 SOLUTION RESPIRATORY (INHALATION) at 19:47

## 2024-11-13 RX ADMIN — LORATADINE 10 MG: 10 TABLET ORAL at 08:31

## 2024-11-13 RX ADMIN — CITALOPRAM HYDROBROMIDE 20 MG: 20 TABLET ORAL at 08:31

## 2024-11-13 RX ADMIN — DILTIAZEM HYDROCHLORIDE 30 MG: 30 TABLET, FILM COATED ORAL at 13:53

## 2024-11-13 RX ADMIN — IPRATROPIUM BROMIDE 0.5 MG: 0.5 SOLUTION RESPIRATORY (INHALATION) at 07:41

## 2024-11-13 RX ADMIN — BUDESONIDE 0.5 MG: 0.5 INHALANT RESPIRATORY (INHALATION) at 07:41

## 2024-11-13 RX ADMIN — LEVALBUTEROL HYDROCHLORIDE 1.25 MG: 1.25 SOLUTION RESPIRATORY (INHALATION) at 14:21

## 2024-11-13 NOTE — ASSESSMENT & PLAN NOTE
Lab Results   Component Value Date    EGFR 80 06/19/2024    EGFR 81 06/18/2024    EGFR 81 06/17/2024    CREATININE 0.73 11/13/2024    CREATININE 0.68 11/11/2024    CREATININE 0.73 11/10/2024     Renal function at baseline

## 2024-11-13 NOTE — ASSESSMENT & PLAN NOTE
At last discharge, she was to wear 9 L of oxygen at rest and 12 L with exertion.   Titrate supplemental oxygen to maintain saturations greater than or equal to 89%.  She did not tolerate home BiPAP last night due to desaturation and work of breathing per RT team.  I did discuss institution of hospital BiPAP moving forward.  The patient is unable to remove the mask on her own and cannot cough up her secretions.  Her daughter is concerned about this and the patient refuses the BiPAP.  They are both preferring to hold off on BiPAP for now and recognize the consequences of same.

## 2024-11-13 NOTE — ASSESSMENT & PLAN NOTE
Follows with Dr. Moreno.  No evidence of exacerbation exacerbation.    Pulmonology input appreciated  Continue Xopenex, ipratropium and Pulmicort  Hospice evaluation

## 2024-11-13 NOTE — CASE MANAGEMENT
Case Management Discharge Planning Note    Patient name Eryn Morocho  Location [address] [facility] [facility] [place] [facility]-* MRN [id number]  : 1940 Date 2024       Current Admission Date: 11/10/2024  Current Admission Diagnosis:Acute on chronic respiratory failure with hypoxia and hypercapnia (HCC)   Patient Active Problem List    Diagnosis Date Noted Date Diagnosed    Goals of care, counseling/discussion 2024     Chronic heart failure with preserved ejection fraction (HFpEF) (HCC) 11/10/2024     Influenza B 11/10/2024     Pleural effusion 10/23/2024     Paroxysmal atrial fibrillation (HCC) 10/16/2024     JULIO (obstructive sleep apnea) 2024     Chronic respiratory failure with hypoxia and hypercapnia ([facility]) 2024     COPD, very severe ([facility]) 2024     Multiple lung nodules 2022     Class 1 obesity due to excess calories with body mass index (BMI) of 34.0 to 34.9 in adult 2022     Recurrent falls 2022     Stage 3a chronic kidney disease (HCC) 2021     OAB (overactive bladder) 2021     Osteoporosis 2021     Acute on chronic diastolic congestive heart failure (HCC) 2021     PAD (peripheral artery disease) (HCC)      Acute on chronic respiratory failure with hypoxia and hypercapnia (HCC) 2020     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) 2020     Anxiety and depression 2020     HTN (hypertension)      Hyperlipidemia        LOS (days): 3  Geometric Mean LOS (GMLOS) (days): 4  Days to GMLOS:0.7     OBJECTIVE:  Risk of Unplanned Readmission Score: 20.47         Current admission status: Inpatient   Preferred Pharmacy:   [id number] - [place] - [address]   [place] [address]  Phone: [phone] Fax: [phone]    [facility]  [address] [address]  Phone: [phone]     Primary Care Provider: Ari Mendiola MD    Primary Insurance: MEDICARE  Secondary Insurance:  [employer]    DISCHARGE DETAILS:    Discharge planning discussed with:: Patient, Dtr Duriman  Freedom of Choice: Yes  Comments - [place] of Choice: [facility] unable to accomodate patient's oxygen needs at home. Patient consenting for referrals to other agencies. [facility] able to accept patient home up to 17L oxygen. Choice is to use [facility].  CM contacted family/caregiver?: Yes  Were Treatment Team discharge recommendations reviewed with patient/caregiver?: Yes  Did patient/caregiver verbalize understanding of patient care needs?: Yes  Were patient/caregiver advised of the risks associated with not following Treatment Team discharge recommendations?: Yes    Contacts  Patient Contacts: Karen Jenkins (daughter)  Relationship to Patient:: Family  Contact Method: Phone  Phone Number: [phone]  Reason/Outcome: Emergency Contact, Discharge Planning, Continuity of Care    Other Referral/Resources/Interventions Provided:  Interventions: Hospice  Referral Comments: [facility] reserved in [place]. Liaison Blank confirmed she spoke with dtr Cassyiman and scheduled for DME delivery tomorrow AM. She requests for transport any time after 12 noon. BLS transport scheduled for 1300. Blank, patient, dtr, and care team aware. OOH DNR form and PMN placed in label book for nursing.     Treatment Team Recommendation: Home, Hospice  Discharge Destination Plan:: Home, Hospice  Transport at Discharge : [facility]     Number/Name of Dispatcher: [facility]  Transported by (Company and Unit #): [facility]  ETA of Transport (Date): 11/14/24  ETA of Transport (Time): 1200     IMM Given (Date):: 11/13/24  IMM Given to:: Family  IMM reviewed with patient and caregiver, patient and caregiver agrees with discharge determination.

## 2024-11-13 NOTE — PLAN OF CARE
Problem: PAIN - ADULT  Goal: Verbalizes/displays adequate comfort level or baseline comfort level  Description: Interventions:  - Encourage patient to monitor pain and request assistance  - Assess pain using appropriate pain scale  - Administer analgesics based on type and severity of pain and evaluate response  - Implement non-pharmacological measures as appropriate and evaluate response  - Consider cultural and social influences on pain and pain management  - Notify physician/advanced practitioner if interventions unsuccessful or patient reports new pain  Outcome: Progressing     Problem: INFECTION - ADULT  Goal: Absence or prevention of progression during hospitalization  Description: INTERVENTIONS:  - Assess and monitor for signs and symptoms of infection  - Monitor lab/diagnostic results  - Monitor all insertion sites, i.e. indwelling lines, tubes, and drains  - Monitor endotracheal if appropriate and nasal secretions for changes in amount and color  - [place] appropriate cooling/warming therapies per order  - Administer medications as ordered  - Instruct and encourage patient and family to use good hand hygiene technique  - Identify and instruct in appropriate isolation precautions for identified infection/condition  Outcome: Progressing  Goal: Absence of fever/infection during neutropenic period  Description: INTERVENTIONS:  - Monitor WBC    Outcome: Progressing     Problem: SAFETY ADULT  Goal: Patient will remain free of falls  Description: INTERVENTIONS:  - Educate patient/family on patient safety including physical limitations  - Instruct patient to call for assistance with activity   - Consult OT/PT to assist with strengthening/mobility   - Keep Call bell within reach  - Keep bed low and locked with side rails adjusted as appropriate  - Keep care items and personal belongings within reach  - Initiate and maintain comfort rounds  - Make Fall Risk Sign visible to staff  - Offer Toileting every 2 Hours,  in advance of need  - Initiate/Maintain bed alarm  - Obtain necessary fall risk management equipment: yellow socks  - Apply yellow socks and bracelet for high fall risk patients  - Consider moving patient to room near nurses station  Outcome: Progressing  Goal: Maintain or return to baseline ADL function  Description: INTERVENTIONS:  -  Assess patient's ability to carry out ADLs; assess patient's baseline for ADL function and identify physical deficits which impact ability to perform ADLs (bathing, care of mouth/teeth, toileting, grooming, dressing, etc.)  - Assess/evaluate cause of self-care deficits   - Assess range of motion  - Assess patient's mobility; develop plan if impaired  - Assess patient's need for assistive devices and provide as appropriate  - Encourage maximum independence but intervene and supervise when necessary  - Involve family in performance of ADLs  - Assess for home care needs following discharge   - Consider OT consult to assist with ADL evaluation and planning for discharge  - Provide patient education as appropriate  Outcome: Progressing  Goal: Maintains/Returns to pre admission functional level  Description: INTERVENTIONS:  - Perform AM-PAC 6 Click Basic Mobility/ Daily Activity assessment daily.  - Set and communicate daily mobility goal to care team and patient/family/caregiver.   - Collaborate with rehabilitation services on mobility goals if consulted  - Perform Range of Motion 3 times a day.  - Reposition patient every 2 hours.  - Dangle patient 3 times a day  - Stand patient 3 times a day  - Ambulate patient 3 times a day  - Out of bed to chair 3 times a day   - Out of bed for meals 3 times a day  - Out of bed for toileting  - Record patient progress and toleration of activity level   Outcome: Progressing     Problem: DISCHARGE PLANNING  Goal: Discharge to home or other facility with appropriate resources  Description: INTERVENTIONS:  - Identify barriers to discharge w/patient and  caregiver  - Arrange for needed discharge resources and transportation as appropriate  - Identify discharge learning needs (meds, wound care, etc.)  - Arrange for interpretive services to assist at discharge as needed  - Refer to Case Management Department for coordinating discharge planning if the patient needs post-hospital services based on physician/advanced practitioner order or complex needs related to functional status, cognitive ability, or social support system  Outcome: Progressing     Problem: Knowledge Deficit  Goal: Patient/family/caregiver demonstrates understanding of disease process, treatment plan, medications, and discharge instructions  Description: Complete learning assessment and assess knowledge base.  Interventions:  - Provide teaching at level of understanding  - Provide teaching via preferred learning methods  Outcome: Progressing     Problem: RESPIRATORY - ADULT  Goal: Achieves optimal ventilation and oxygenation  Description: INTERVENTIONS:  - Assess for changes in respiratory status  - Assess for changes in mentation and behavior  - Position to facilitate oxygenation and minimize respiratory effort  - Oxygen administered by appropriate delivery if ordered  - Initiate smoking cessation education as indicated  - Encourage broncho-pulmonary hygiene including cough, deep breathe, Incentive Spirometry  - Assess the need for suctioning and aspirate as needed  - Assess and instruct to report SOB or any respiratory difficulty  - Respiratory Therapy support as indicated  Outcome: Progressing

## 2024-11-13 NOTE — PROGRESS NOTES
Patient was unable to tolerate 8L bleed in to own CPAP; patient remained desating into low 80s. Patient was immediately placed on 12L mid flow nasal cannula. O2sat recovered to 92%. Explained to patient, she can be placed on hospital V60 CPAP and so she can receive same CPAP pressure and enough O2 to keep her O2sat within adequate range. Patient declined and requested to stay on MidFLow for the remainder of night. RN aware. Care ongoing

## 2024-11-13 NOTE — ASSESSMENT & PLAN NOTE
Wt Readings from Last 3 Encounters:   11/10/24 72.9 kg (160 lb 11.5 oz)   10/23/24 79.1 kg (174 lb 4.8 oz)   09/16/24 74.8 kg (165 lb)   Management and diuresis per primary team.  Monitor I's and O's Daily weights.

## 2024-11-13 NOTE — ASSESSMENT & PLAN NOTE
History of HFpEF severe COPD (recently discharged on 9 L) NSCLC status post wedge resection anxiety depression PAD paroxysmal atrial fibrillation and CKD who presents back to the hospital for worsening shortness of breath  Patient reported 2 days of worsening dyspnea with cough/mucus production  In the ED required BiPAP  Acute decompensation secondary to influenza B.    Oxygen requirement again escalated to >12 L, unable to tolerate BiPAP well  Noted to be disoriented, not in distress  Pulmonary following, input appreciated  Continue oseltamivir  Continue bronchodilators as below  Pulmonary tolerating room/chest PT  Continue Mucinex  IV Lasix x 1 today  Ongoing goals of care discussion as patient does not want to go through this again and again and wants to go home.  Pulmonary discussed with patient's daughter, agreeable for hospice evaluation  Hospice evaluation

## 2024-11-13 NOTE — CASE COMMUNICATION
For RLOC Eryn Morocho  [birth year] pt is  84 y.o. adult PMH acute on chronic hypoxic respiratory failure, very severe COPD, NSC lung cancer left lung; HTN, anxiety and depression, CKD stage IIIA, pafib, HFpEF, who came in with  acute on chronic hypoxia due to Influenza B. Placed on CPAP pre-hospital. Received IV steroids and nebs.Baseline 02 is 6 to 10 L oxymizer at home. Currently on 15 L midflow. .She was  admitted 10/16 to 10/23 for ac steff on chronic respiratory failure and found to have exacerbation of heart failure.  She had a thoracentesis on 10/31/2024.  Fluid transudative, likely secondary to heart failure. Daughter wishes to take her home. They are working on getting her down to 10  L of 02. PPS 30 Approve RLOC?    Approved for RLOC by Dr Ruiz 24  Dx Decompensate Heart Failure

## 2024-11-13 NOTE — ASSESSMENT & PLAN NOTE
S/p left thoracentesis as outpatient on 10/31, cytology negative for malignancy.  Culture negative.  Repeat chest x-ray 11/12-small left basilar effusion.  No pneumothorax

## 2024-11-13 NOTE — ASSESSMENT & PLAN NOTE
Continue oseltamivir, dose adjusted to creatinine clearance , day #4 of 5  vaccinated this year for influenza.

## 2024-11-13 NOTE — PROGRESS NOTES
Progress Note - Pulmonology   Name: Eryn Morocho 84 y.o. adult I MRN: [id number]  Unit/Bed#: [address] [place] [address] I Date of Admission: 11/10/2024   Date of Service: 11/13/2024 I Hospital Day: 3    Assessment & Plan  Acute on chronic respiratory failure with hypoxia and hypercapnia (HCC)  At last discharge, she was to wear 9 L of oxygen at rest and 12 L with exertion.   Titrate supplemental oxygen to maintain saturations greater than or equal to 89%.  She did not tolerate home BiPAP last night due to desaturation and work of breathing per RT team.  I did discuss institution of hospital BiPAP moving forward.  The patient is unable to remove the mask on her own and cannot cough up her secretions.  Her daughter is concerned about this and the patient refuses the BiPAP.  They are both preferring to hold off on BiPAP for now and recognize the consequences of same.  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)  Diagnosed with non-small cell lung cancer underwent wedge resection.  Treated with chemotherapy.  Follows with Dr. Esquivel from oncology.  She has an enlarging lung nodule and has deferred further workup of this.  COPD, very severe (HCC)  Trial Solu-Medrol 40 mg IV daily and evaluate for overall improvement.  Check procalcitonin given green sputum production.  Home regimen is DuoNebs, formoterol, and budesonide.  Continue budesonide twice daily with Xopenex/ipratropium 3 times daily while inpatient.  Chronic heart failure with preserved ejection fraction (HFpEF) ([facility])  Wt Readings from Last 3 Encounters:   11/10/24 72.9 kg (160 lb 11.5 oz)   10/23/24 79.1 kg (174 lb 4.8 oz)   09/16/24 74.8 kg (165 lb)   Management and diuresis per primary team.  Monitor I's and O's Daily weights.  Influenza B  Tamiflu per primary team.  Pleural effusion  Pleural effusion was drained as an outpatient.  Culture and cytology negative.  Goals of care, counseling/discussion  I met with Eryn and her daughter Karen at bedside today.  Eryn reports  that she just wants to go home and be with her family.  She does not want to be in the hospital any longer.  Rani agrees that this would be her mother's wish and would like to take her home if at all possible.  We did discuss her severe lung disease and overall severity of illness.  All questions answered.  She would like to discuss with case management possible hospice evaluation.    Discussed with primary team and case management.  Discussed with patient's daughter at bedside and all questions answered.    24 Hour Events : Did not tolerate home BiPAP overnight  Subjective : Eryn is sitting up in bed upon my visit.  She is oriented to person and place only.  Her conversation is confused at times.  She reports she feels tired, but cannot specifically state symptoms.  She repeatedly states that she wants to go home and be with her dogs and her family.  Her daughter arrived to bedside shortly after my arrival.  At bedside, she did have thick green secretions in her emesis basin.    Objective :  Temp:  [97.8 °F (36.6 °C)-99.5 °F (37.5 °C)] 99.5 °F (37.5 °C)  HR:  [60-81] 74  BP: (108-162)/(48-66) 162/61  Resp:  [16-20] 16  SpO2:  [88 %-98 %] 88 %  O2 Device: Mid flow nasal cannula  Nasal Cannula O2 Flow Rate (L/min):  [12 L/min-15 L/min] 15 L/min    Physical Exam  Vitals reviewed.   Constitutional:       General: She is not in acute distress.     Appearance: She is well-developed. She is ill-appearing. She is not toxic-appearing or diaphoretic.      Interventions: Nasal cannula in place.   HENT:      Head: Normocephalic and atraumatic.   Eyes:      General: No scleral icterus.  Neck:      Trachea: No tracheal deviation.   Cardiovascular:      Rate and Rhythm: Normal rate and regular rhythm.      Heart sounds: S1 normal and S2 normal. No murmur heard.     No friction rub. No gallop.   Pulmonary:      Effort: Pulmonary effort is normal. No tachypnea, accessory muscle usage or respiratory distress.      Breath sounds:  No stridor. Decreased breath sounds present. No wheezing, rhonchi or rales.   Chest:      Chest wall: No tenderness.   Abdominal:      General: Bowel sounds are normal. There is no distension.      Palpations: Abdomen is soft.      Tenderness: There is no abdominal tenderness.   Musculoskeletal:      Cervical back: Neck supple.      Right lower leg: No edema.      Left lower leg: No edema.   Skin:     General: Skin is warm and dry.      Findings: No rash.   Neurological:      Mental Status: She is alert. She is disoriented.      GCS: GCS eye subscore is 4. GCS verbal subscore is 4. GCS motor subscore is 6.   Psychiatric:         Behavior: Behavior is cooperative.         Lab Results: I have reviewed the following results:   .     11/13/24  0847   WBC 5.87   HGB 12.2   HCT 39.9      SODIUM 134*   K 4.3   CL 92*   CO2 37*   BUN 17   CREATININE 0.73   GLUC 152*     ABG:   .     11/13/24  1127   PHART 7.361   [id number] 72.6*   PO2ART 47.4*   YLF6HPP 40.2*   BEART 11.5

## 2024-11-13 NOTE — PROGRESS NOTES
Progress Note - Hospitalist   Name: Eryn Morocho 84 y.o. adult I MRN: [id number]  Unit/Bed#: [address] [place], [place] [id number] I Date of Admission: 11/10/2024   Date of Service: 11/13/2024 I Hospital Day: 3    Assessment & Plan  Acute on chronic respiratory failure with hypoxia and hypercapnia (HCC)  History of HFpEF severe COPD (recently discharged on 9 L) NSCLC status post wedge resection anxiety depression PAD paroxysmal atrial fibrillation and CKD who presents back to the hospital for worsening shortness of breath  Patient reported 2 days of worsening dyspnea with cough/mucus production  In the ED required BiPAP  Acute decompensation secondary to influenza B.    Oxygen requirement again escalated to >12 L, unable to tolerate BiPAP well  Noted to be disoriented, not in distress  Pulmonary following, input appreciated  Continue oseltamivir  Continue bronchodilators as below  Pulmonary tolerating room/chest PT  Continue Mucinex  IV Lasix x 1 today  Ongoing goals of care discussion as patient does not want to go through this again and again and wants to go home.  Pulmonary discussed with patient's daughter, agreeable for hospice evaluation  Hospice evaluation  Influenza B  Continue oseltamivir, dose adjusted to creatinine clearance , day #4 of 5  vaccinated this year for influenza.  COPD, very severe (HCC)  Follows with Dr. Moreno.  No evidence of exacerbation exacerbation.    Pulmonology input appreciated  Continue Xopenex, ipratropium and Pulmicort  Hospice evaluation  Chronic heart failure with preserved ejection fraction (HFpEF) (HCC)  Wt Readings from Last 3 Encounters:   11/10/24 72.9 kg (160 lb 11.5 oz)   10/23/24 79.1 kg (174 lb 4.8 oz)   09/16/24 74.8 kg (165 lb)   Echo from 524 showed EF of 70% with grade 2 diastolic dysfunction.  Follows up with Dr. Agarwal   continue torsemide 10 mg.    Patient was given a dose of Lasix 40 mg IV on 11/12 as chest x-ray showed some pulmonary vascular congestion to improve oxygen  requirement.  Will give additional Lasix 20 mg IV x 1 today  Paroxysmal atrial fibrillation (HCC)  Continue diltiazem  Anticoagulation: Continue rivaroxaban  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)  NSCLC status post wedge resection and chemo.  Recent CT scan with enlarging lung nodule  Follows up with   Patient has outpatient PET scan ordered  HTN (hypertension)  Monitor on diltiazem  Anxiety and depression  Mood stable, continue citalopram.  Change trazodone to as needed  Stage 3a chronic kidney disease (HCC)  Lab Results   Component Value Date    EGFR 80 06/19/2024    EGFR 81 06/18/2024    EGFR 81 06/17/2024    CREATININE 0.73 11/13/2024    CREATININE 0.68 11/11/2024    CREATININE 0.73 11/10/2024     Renal function at baseline  PAD (peripheral artery disease) (HCC)  On Plavix and statin  Pleural effusion  S/p left thoracentesis as outpatient on 10/31, cytology negative for malignancy.  Culture negative.  Repeat chest x-ray 11/12-small left basilar effusion.  No pneumothorax    VTE Pharmacologic Prophylaxis: VTE Score: 5 Moderate Risk (Score 3-4) - Pharmacological DVT Prophylaxis Ordered: rivaroxaban (Xarelto).    Mobility:   Basic Mobility Inpatient Raw Score: 13  JH-HLM Goal: 4: Move to chair/commode  JH-HLM Achieved: 4: Move to chair/commode  JH-HLM Goal NOT achieved. Continue with multidisciplinary rounding and encourage appropriate mobility to improve upon JH-HLM goals.    Patient Centered Rounds: I performed bedside rounds with nursing staff today.   Discussions with Specialists or Other Care Team Provider: Pulmonary    Education and Discussions with Family / Patient: Updated  (daughter) at bedside.    Current Length of Stay: 3 day(s)  Current Patient Status: Inpatient   Certification Statement: The patient will continue to require additional inpatient hospital stay due to respiratory failure, hospice evaluation  Discharge Plan: Anticipate discharge in 24-48 hrs to pending clinical  progress and hospice evaluation    Code Status: Level 3 - DNAR and DNI    Subjective   Noted to be sleeping, easily awakened  Reports feeling unwell but unable to describe    Oriented to place, disoriented to time    Unable to tolerate BiPAP    Reports that she cannot go through this again and again in wants to go home    Seen by pulmonary at bedside, input appreciated.  Pulmonary discussed with patient and daughter who are agreeable for hospice evaluation    Objective :  Temp:  [97.8 °F (36.6 °C)-99.5 °F (37.5 °C)] 99.5 °F (37.5 °C)  HR:  [60-82] 74  BP: (108-182)/(48-75) 162/61  Resp:  [16-22] 16  SpO2:  [88 %-98 %] 95 %  O2 Device: Mid flow nasal cannula  Nasal Cannula O2 Flow Rate (L/min):  [12 L/min] 12 L/min    Body mass index is 28.47 kg/m².     Input and Output Summary (last 24 hours):     Intake/Output Summary (Last 24 hours) at 11/13/2024 0756  Last data filed at 11/13/2024 0601  Gross per 24 hour   Intake 200 ml   Output 700 ml   Net -500 ml       Physical Exam  Constitutional:       General: She is not in acute distress.     Appearance: She is obese.      Comments: Chronically ill   HENT:      Head: Normocephalic and atraumatic.   Cardiovascular:      Rate and Rhythm: Normal rate.   Pulmonary:      Effort: Pulmonary effort is normal. No respiratory distress.      Breath sounds: Decreased air movement present. Decreased breath sounds and rhonchi present.   Abdominal:      General: Bowel sounds are normal. There is no distension.      Palpations: Abdomen is soft.      Tenderness: There is no abdominal tenderness. There is no guarding or rebound.   Musculoskeletal:      Right lower leg: No edema.      Left lower leg: No edema.   Skin:     General: Skin is warm and dry.      Findings: No rash.   Neurological:      Mental Status: She is alert. She is disoriented.      Cranial Nerves: No cranial nerve deficit.   Psychiatric:         Behavior: Behavior is cooperative.         Cognition and Memory: Cognition is  impaired.           Lines/Drains:  Lines/Drains/Airways       Active Status       Name Placement date Placement time Site Days    External Urinary Catheter 11/13/24  0606  -- less than 1                            Lab Results: I have reviewed the following results:   Results from last 7 days   Lab Units 11/11/24  0513 11/10/24  0455   WBC Thousand/uL 6.68 8.38   HEMOGLOBIN g/dL 11.1* 12.6   HEMATOCRIT % 35.2* 41.3   PLATELETS Thousands/uL 194 206   SEGS PCT %  --  82*   LYMPHO PCT %  --  10*   MONO PCT %  --  6   EOS PCT %  --  1     Results from last 7 days   Lab Units 11/11/24  0513   SODIUM mmol/L 138   POTASSIUM mmol/L 4.6   CHLORIDE mmol/L 100   CO2 mmol/L 33*   BUN mg/dL 23   CREATININE mg/dL 0.68   ANION GAP mmol/L 5   CALCIUM mg/dL 9.0   ALBUMIN g/dL 3.5   TOTAL BILIRUBIN mg/dL 0.36   ALK PHOS U/L 35   ALT U/L 5*   AST U/L 6   GLUCOSE RANDOM mg/dL 160*                 Results from last 7 days   Lab Units 11/11/24  0513 11/10/24  0455   LACTIC ACID mmol/L  --  0.6   PROCALCITONIN ng/ml 0.07 <0.05       Recent Cultures (last 7 days):   Results from last 7 days   Lab Units 11/10/24  0510 11/10/24  0455   BLOOD CULTURE  No Growth at 48 hrs. No Growth at 48 hrs.       Imaging Results Review: I reviewed radiology reports from this admission including: chest xray.  Other Study Results Review: EKG was reviewed.  Sinus rhythm at 88 bpm, QTc 418    Last 24 Hours Medication List:     Current Facility-Administered Medications:     acetaminophen (TYLENOL) tablet 650 mg, Q4H PRN    Artificial Tears Op Soln 1 drop, Q4H PRN    atorvastatin (LIPITOR) tablet 10 mg, Daily With Dinner    budesonide (PULMICORT) inhalation solution 0.5 mg, BID    calcium carbonate-vitamin D 500 mg-5 mcg tablet 2 tablet, Daily With Breakfast    citalopram (CeleXA) tablet 20 mg, Daily    clopidogrel (PLAVIX) tablet 75 mg, Daily    co-enzyme Q-10 capsule 200 mg, QAM    cyanocobalamin (VITAMIN B-12) tablet 500 mcg, Daily    diltiazem (CARDIZEM)  tablet 30 mg, Q8H ANNE    docusate sodium (COLACE) capsule 100 mg, BID PRN    fish oil capsule 1,000 mg, BID    guaiFENesin (MUCINEX) 12 hr tablet 1,200 mg, Q12H ANNE    ipratropium (ATROVENT) 0.02 % inhalation solution 0.5 mg, TID    levalbuterol (XOPENEX) inhalation solution 1.25 mg, TID **AND** [DISCONTINUED] sodium chloride 0.9 % inhalation solution 3 mL, TID    lidocaine (LIDODERM) 5 % patch 1 patch, Daily    loratadine (CLARITIN) tablet 10 mg, Daily    montelukast (SINGULAIR) tablet 10 mg, HS    ondansetron (ZOFRAN) injection 4 mg, Q4H PRN    oseltamivir (TAMIFLU) capsule 75 mg, Q12H ANNE    rivaroxaban (XARELTO) tablet 20 mg, Daily With Breakfast    timolol (TIMOPTIC) 0.5 % ophthalmic solution 1 drop, BID    torsemide (DEMADEX) tablet 10 mg, Daily    traZODone (DESYREL) tablet 100 mg, HS    Administrative Statements   Today, Patient Was Seen By: Yovani Em MD  I have spent a total time of 45 minutes in caring for this patient on the day of the visit/encounter including Prognosis, Patient and family education, Counseling / Coordination of care, Documenting in the medical record, Obtaining or reviewing history  , and Communicating with other healthcare professionals .    **Please Note: This note may have been constructed using a voice recognition system.**

## 2024-11-13 NOTE — PHYSICAL THERAPY NOTE
PT Cancellation Note       11/13/24 1382   Note Type   Note type Cancelled Session   Cancel Reasons Medical status  (PT orders received and chart reviewed. Per discussion with RN pt continues to have SOB; currently On 12-15 L. Possible d/c home with hospice. Will hold PT evaluation at this time and follow-up as appropriate.)   Licensure   [place] License Number  Kateryna Kothari [id number]

## 2024-11-13 NOTE — ASSESSMENT & PLAN NOTE
Trial Solu-Medrol 40 mg IV daily and evaluate for overall improvement.  Check procalcitonin given green sputum production.  Home regimen is DuoNebs, formoterol, and budesonide.  Continue budesonide twice daily with Xopenex/ipratropium 3 times daily while inpatient.

## 2024-11-13 NOTE — CASE MANAGEMENT
Case Management Discharge Planning Note    Patient name Eryn Morocho  Location [address] [place] [address] [place] [address]-* MRN [id number]  : 1940 Date 2024       Current Admission Date: 11/10/2024  Current Admission Diagnosis:Acute on chronic respiratory failure with hypoxia and hypercapnia (HCC)   Patient Active Problem List    Diagnosis Date Noted Date Diagnosed    Chronic heart failure with preserved ejection fraction (HFpEF) (HCC) 11/10/2024     Influenza B 11/10/2024     Pleural effusion 10/23/2024     Paroxysmal atrial fibrillation (HCC) 10/16/2024     JULIO (obstructive sleep apnea) 2024     Chronic respiratory failure with hypoxia and hypercapnia (HCC) 2024     COPD, very severe (HCC) 2024     Multiple lung nodules 2022     Class 1 obesity due to excess calories with body mass index (BMI) of 34.0 to 34.9 in adult 2022     Recurrent falls 2022     Stage 3a chronic kidney disease (HCC) 2021     OAB (overactive bladder) 2021     Osteoporosis 2021     Acute on chronic diastolic congestive heart failure (HCC) 2021     PAD (peripheral artery disease) (HCC)      Acute on chronic respiratory failure with hypoxia and hypercapnia (HCC) 2020     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) 2020     Anxiety and depression 2020     HTN (hypertension)      Hyperlipidemia        LOS (days): 3  Geometric Mean LOS (GMLOS) (days): 4  Days to GMLOS:0.8     OBJECTIVE:  Risk of Unplanned Readmission Score: 22.03      Current admission status: Inpatient   Preferred Pharmacy:   [id number] - [place] - [address]   [place] [address]  Phone: [phone] Fax: [phone]    [facility]  [address] [facility] [address] [address]  Phone: [phone]     Primary Care Provider: Ari Mendiola MD    Primary Insurance: MEDICARE  Secondary Insurance: AARP    DISCHARGE DETAILS:    Discharge planning discussed  with:: Patient, Dtr  Freedom of Choice: Yes  Comments - Freedom of Choice: Choice is for referral to [facility]. Preference is for patient to discharge home w/ home hospice if able.  CM contacted family/caregiver?: Yes  Were Treatment Team discharge recommendations reviewed with patient/caregiver?: Yes  Did patient/caregiver verbalize understanding of patient care needs?: Yes  Were patient/caregiver advised of the risks associated with not following Treatment Team discharge recommendations?: Yes    Contacts  Patient Contacts: Karen Jenkins (daughter)  Relationship to Patient:: Family  Reason/Outcome: Emergency Contact, Discharge Planning, Continuity of Care    Other Referral/Resources/Interventions Provided:  Interventions: Hospice  Referral Comments: [facility] referral sent to  Hospice via [facility].     Treatment Team Recommendation: Hospice  Discharge Destination Plan:: Hospice

## 2024-11-13 NOTE — PLAN OF CARE
Problem: PAIN - ADULT  Goal: Verbalizes/displays adequate comfort level or baseline comfort level  Description: Interventions:  - Encourage patient to monitor pain and request assistance  - Assess pain using appropriate pain scale  - Administer analgesics based on type and severity of pain and evaluate response  - Implement non-pharmacological measures as appropriate and evaluate response  - Consider cultural and social influences on pain and pain management  - Notify physician/advanced practitioner if interventions unsuccessful or patient reports new pain  Outcome: Progressing     Problem: INFECTION - ADULT  Goal: Absence or prevention of progression during hospitalization  Description: INTERVENTIONS:  - Assess and monitor for signs and symptoms of infection  - Monitor lab/diagnostic results  - Monitor all insertion sites, i.e. indwelling lines, tubes, and drains  - Monitor endotracheal if appropriate and nasal secretions for changes in amount and color  - [place] appropriate cooling/warming therapies per order  - Administer medications as ordered  - Instruct and encourage patient and family to use good hand hygiene technique  - Identify and instruct in appropriate isolation precautions for identified infection/condition  Outcome: Progressing  Goal: Absence of fever/infection during neutropenic period  Description: INTERVENTIONS:  - Monitor WBC    Outcome: Progressing     Problem: SAFETY ADULT  Goal: Patient will remain free of falls  Description: INTERVENTIONS:  - Educate patient/family on patient safety including physical limitations  - Instruct patient to call for assistance with activity   - Consult OT/PT to assist with strengthening/mobility   - Keep Call bell within reach  - Keep bed low and locked with side rails adjusted as appropriate  - Keep care items and personal belongings within reach  - Initiate and maintain comfort rounds  - Make Fall Risk Sign visible to staff  - Offer Toileting every 2 Hours,  in advance of need  - Initiate/Maintain bed alarm  - Obtain necessary fall risk management equipment: fall risk sign on door  - Apply yellow socks and bracelet for high fall risk patients  - Consider moving patient to room near nurses station  Outcome: Progressing  Goal: Maintain or return to baseline ADL function  Description: INTERVENTIONS:  -  Assess patient's ability to carry out ADLs; assess patient's baseline for ADL function and identify physical deficits which impact ability to perform ADLs (bathing, care of mouth/teeth, toileting, grooming, dressing, etc.)  - Assess/evaluate cause of self-care deficits   - Assess range of motion  - Assess patient's mobility; develop plan if impaired  - Assess patient's need for assistive devices and provide as appropriate  - Encourage maximum independence but intervene and supervise when necessary  - Involve family in performance of ADLs  - Assess for home care needs following discharge   - Consider OT consult to assist with ADL evaluation and planning for discharge  - Provide patient education as appropriate  Outcome: Progressing  Goal: Maintains/Returns to pre admission functional level  Description: INTERVENTIONS:  - Perform AM-PAC 6 Click Basic Mobility/ Daily Activity assessment daily.  - Set and communicate daily mobility goal to care team and patient/family/caregiver.   - Collaborate with rehabilitation services on mobility goals if consulted  - Perform Range of Motion 2 times a day.  - Reposition patient every 2 hours.  - Dangle patient 2 times a day  - Stand patient 2 times a day  - Ambulate patient 3 times a day  - Out of bed to chair 3 times a day   - Out of bed for meals 3 times a day  - Out of bed for toileting  - Record patient progress and toleration of activity level   Outcome: Progressing     Problem: DISCHARGE PLANNING  Goal: Discharge to home or other facility with appropriate resources  Description: INTERVENTIONS:  - Identify barriers to discharge  w/patient and caregiver  - Arrange for needed discharge resources and transportation as appropriate  - Identify discharge learning needs (meds, wound care, etc.)  - Arrange for interpretive services to assist at discharge as needed  - Refer to Case Management Department for coordinating discharge planning if the patient needs post-hospital services based on physician/advanced practitioner order or complex needs related to functional status, cognitive ability, or social support system  Outcome: Progressing     Problem: Knowledge Deficit  Goal: Patient/family/caregiver demonstrates understanding of disease process, treatment plan, medications, and discharge instructions  Description: Complete learning assessment and assess knowledge base.  Interventions:  - Provide teaching at level of understanding  - Provide teaching via preferred learning methods  Outcome: Progressing     Problem: RESPIRATORY - ADULT  Goal: Achieves optimal ventilation and oxygenation  Description: INTERVENTIONS:  - Assess for changes in respiratory status  - Assess for changes in mentation and behavior  - Position to facilitate oxygenation and minimize respiratory effort  - Oxygen administered by appropriate delivery if ordered  - Initiate smoking cessation education as indicated  - Encourage broncho-pulmonary hygiene including cough, deep breathe, Incentive Spirometry  - Assess the need for suctioning and aspirate as needed  - Assess and instruct to report SOB or any respiratory difficulty  - Respiratory Therapy support as indicated  Outcome: Progressing

## 2024-11-13 NOTE — ASSESSMENT & PLAN NOTE
Wt Readings from Last 3 Encounters:   11/10/24 72.9 kg (160 lb 11.5 oz)   10/23/24 79.1 kg (174 lb 4.8 oz)   09/16/24 74.8 kg (165 lb)   Echo from 524 showed EF of 70% with grade 2 diastolic dysfunction.  Follows up with Dr. Agarwal   continue torsemide 10 mg.    Patient was given a dose of Lasix 40 mg IV on 11/12 as chest x-ray showed some pulmonary vascular congestion to improve oxygen requirement.  Will give additional Lasix 20 mg IV x 1 today

## 2024-11-13 NOTE — HOSPICE NOTE
Hospice referral received. Pt is on 15L of [place] at present. The max hospice can do at home or the hospice house is 10 L. Updated Kateryna Myrick CM and Dr Em of the same. Hospice will continue to follow.

## 2024-11-13 NOTE — ASSESSMENT & PLAN NOTE
I met with Eryn and her daughter Karen at bedside today.  Eryn reports that she just wants to go home and be with her family.  She does not want to be in the hospital any longer.  Rani agrees that this would be her mother's wish and would like to take her home if at all possible.  We did discuss her severe lung disease and overall severity of illness.  All questions answered.  She would like to discuss with case management possible hospice evaluation.

## 2024-11-14 VITALS
TEMPERATURE: 97.3 F | HEIGHT: 63 IN | OXYGEN SATURATION: 95 % | BODY MASS INDEX: 29.16 KG/M2 | HEART RATE: 55 BPM | RESPIRATION RATE: 18 BRPM | WEIGHT: 164.6 LBS | SYSTOLIC BLOOD PRESSURE: 129 MMHG | DIASTOLIC BLOOD PRESSURE: 103 MMHG

## 2024-11-14 LAB
ANION GAP SERPL CALCULATED.3IONS-SCNC: 5 MMOL/L (ref 4–13)
BUN SERPL-MCNC: 19 MG/DL (ref 5–25)
CALCIUM SERPL-MCNC: 9 MG/DL (ref 8.4–10.2)
CHLORIDE SERPL-SCNC: 90 MMOL/L (ref 96–108)
CO2 SERPL-SCNC: 39 MMOL/L (ref 21–32)
CREAT SERPL-MCNC: 0.68 MG/DL (ref 0.6–1.3)
ERYTHROCYTE [DISTWIDTH] IN BLOOD BY AUTOMATED COUNT: 14 % (ref 11.6–15.1)
GLUCOSE SERPL-MCNC: 157 MG/DL (ref 65–140)
HCT VFR BLD AUTO: 34.6 % (ref 36.5–46.1)
HGB BLD-MCNC: 11.1 G/DL (ref 12–15.4)
MCH RBC QN AUTO: 32.7 PG (ref 26.8–34.3)
MCHC RBC AUTO-ENTMCNC: 32.1 G/DL (ref 31.4–37.4)
MCV RBC AUTO: 102 FL (ref 82–98)
PLATELET # BLD AUTO: 228 THOUSANDS/UL (ref 149–390)
PMV BLD AUTO: 8.7 FL (ref 8.9–12.7)
POTASSIUM SERPL-SCNC: 4.4 MMOL/L (ref 3.5–5.3)
RBC # BLD AUTO: 3.39 MILLION/UL (ref 3.88–5.12)
SODIUM SERPL-SCNC: 134 MMOL/L (ref 135–147)
WBC # BLD AUTO: 5.41 THOUSAND/UL (ref 4.31–10.16)

## 2024-11-14 PROCEDURE — 85027 COMPLETE CBC AUTOMATED: CPT | Performed by: INTERNAL MEDICINE

## 2024-11-14 PROCEDURE — 99239 HOSP IP/OBS DSCHRG MGMT >30: CPT | Performed by: INTERNAL MEDICINE

## 2024-11-14 PROCEDURE — 94760 N-INVAS EAR/PLS OXIMETRY 1: CPT

## 2024-11-14 PROCEDURE — 94640 AIRWAY INHALATION TREATMENT: CPT

## 2024-11-14 PROCEDURE — 80048 BASIC METABOLIC PNL TOTAL CA: CPT | Performed by: INTERNAL MEDICINE

## 2024-11-14 PROCEDURE — 99232 SBSQ HOSP IP/OBS MODERATE 35: CPT | Performed by: NURSE PRACTITIONER

## 2024-11-14 RX ORDER — PREDNISONE 20 MG/1
40 TABLET ORAL DAILY
Qty: 10 TABLET | Refills: 0 | Status: SHIPPED | OUTPATIENT
Start: 2024-11-14 | End: 2024-11-19

## 2024-11-14 RX ORDER — OSELTAMIVIR PHOSPHATE 30 MG/1
30 CAPSULE ORAL EVERY 12 HOURS SCHEDULED
Qty: 2 CAPSULE | Refills: 0 | Status: SHIPPED | OUTPATIENT
Start: 2024-11-14 | End: 2024-11-15

## 2024-11-14 RX ORDER — LORAZEPAM 2 MG/ML
1 CONCENTRATE ORAL EVERY 4 HOURS PRN
Qty: 30 ML | Refills: 0 | Status: SHIPPED | OUTPATIENT
Start: 2024-11-14 | End: 2024-11-24

## 2024-11-14 RX ORDER — ACETAMINOPHEN 325 MG/1
650 TABLET ORAL EVERY 4 HOURS PRN
Start: 2024-11-14

## 2024-11-14 RX ORDER — DOCUSATE SODIUM 100 MG/1
100 CAPSULE, LIQUID FILLED ORAL 2 TIMES DAILY PRN
Qty: 60 CAPSULE | Refills: 0 | Status: SHIPPED | OUTPATIENT
Start: 2024-11-14

## 2024-11-14 RX ORDER — IPRATROPIUM BROMIDE AND ALBUTEROL SULFATE 2.5; .5 MG/3ML; MG/3ML
3 SOLUTION RESPIRATORY (INHALATION) 4 TIMES DAILY
Qty: 360 ML | Refills: 0 | Status: SHIPPED | OUTPATIENT
Start: 2024-11-14

## 2024-11-14 RX ADMIN — METHYLPREDNISOLONE SODIUM SUCCINATE 40 MG: 40 INJECTION, POWDER, FOR SOLUTION INTRAMUSCULAR; INTRAVENOUS at 08:37

## 2024-11-14 RX ADMIN — Medication 200 MG: at 08:37

## 2024-11-14 RX ADMIN — LORATADINE 10 MG: 10 TABLET ORAL at 08:38

## 2024-11-14 RX ADMIN — RIVAROXABAN 20 MG: 20 TABLET, FILM COATED ORAL at 08:37

## 2024-11-14 RX ADMIN — Medication 2 TABLET: at 08:37

## 2024-11-14 RX ADMIN — ACETAMINOPHEN 650 MG: 325 TABLET ORAL at 02:28

## 2024-11-14 RX ADMIN — IPRATROPIUM BROMIDE 0.5 MG: 0.5 SOLUTION RESPIRATORY (INHALATION) at 07:36

## 2024-11-14 RX ADMIN — OSELTAMAVIR PHOSPHATE 30 MG: 30 CAPSULE ORAL at 08:51

## 2024-11-14 RX ADMIN — CLOPIDOGREL 75 MG: 75 TABLET ORAL at 08:37

## 2024-11-14 RX ADMIN — CITALOPRAM HYDROBROMIDE 20 MG: 20 TABLET ORAL at 08:37

## 2024-11-14 RX ADMIN — ONDANSETRON 4 MG: 2 INJECTION INTRAMUSCULAR; INTRAVENOUS at 02:09

## 2024-11-14 RX ADMIN — DILTIAZEM HYDROCHLORIDE 30 MG: 30 TABLET, FILM COATED ORAL at 07:03

## 2024-11-14 RX ADMIN — TORSEMIDE 10 MG: 10 TABLET ORAL at 08:38

## 2024-11-14 RX ADMIN — GUAIFENESIN 1200 MG: 600 TABLET, EXTENDED RELEASE ORAL at 08:37

## 2024-11-14 RX ADMIN — CYANOCOBALAMIN TAB 500 MCG 500 MCG: 500 TAB at 08:37

## 2024-11-14 RX ADMIN — LIDOCAINE 1 PATCH: 50 PATCH CUTANEOUS at 08:38

## 2024-11-14 RX ADMIN — OMEGA-3 FATTY ACIDS CAP 1000 MG 1000 MG: 1000 CAP at 08:40

## 2024-11-14 RX ADMIN — BUDESONIDE 0.5 MG: 0.5 INHALANT RESPIRATORY (INHALATION) at 07:36

## 2024-11-14 RX ADMIN — TIMOLOL MALEATE 1 DROP: 5 SOLUTION OPHTHALMIC at 08:38

## 2024-11-14 RX ADMIN — LEVALBUTEROL HYDROCHLORIDE 1.25 MG: 1.25 SOLUTION RESPIRATORY (INHALATION) at 07:36

## 2024-11-14 RX ADMIN — LORAZEPAM 0.5 MG: 0.5 TABLET ORAL at 02:28

## 2024-11-14 NOTE — ASSESSMENT & PLAN NOTE
Follows with Dr. Moreno.  No evidence of exacerbation exacerbation.    Pulmonology input appreciated  Continue home regimen on discharge, prednisone for 5 days  Hospice evaluation noted, patient is being discharged on hospice status

## 2024-11-14 NOTE — ASSESSMENT & PLAN NOTE
At discharge, can continue nebulized regimen as previously delineated.  Can also complete prednisone 40 mg daily for 5 days.

## 2024-11-14 NOTE — ASSESSMENT & PLAN NOTE
Lab Results   Component Value Date    EGFR 80 06/19/2024    EGFR 81 06/18/2024    EGFR 81 06/17/2024    CREATININE 0.68 11/14/2024    CREATININE 0.73 11/13/2024    CREATININE 0.68 11/11/2024     Renal function at baseline

## 2024-11-14 NOTE — PLAN OF CARE
Problem: PAIN - ADULT  Goal: Verbalizes/displays adequate comfort level or baseline comfort level  Description: Interventions:  - Encourage patient to monitor pain and request assistance  - Assess pain using appropriate pain scale  - Administer analgesics based on type and severity of pain and evaluate response  - Implement non-pharmacological measures as appropriate and evaluate response  - Consider cultural and social influences on pain and pain management  - Notify physician/advanced practitioner if interventions unsuccessful or patient reports new pain  Outcome: Progressing     Problem: INFECTION - ADULT  Goal: Absence or prevention of progression during hospitalization  Description: INTERVENTIONS:  - Assess and monitor for signs and symptoms of infection  - Monitor lab/diagnostic results  - Monitor all insertion sites, i.e. indwelling lines, tubes, and drains  - Monitor endotracheal if appropriate and nasal secretions for changes in amount and color  - [place] appropriate cooling/warming therapies per order  - Administer medications as ordered  - Instruct and encourage patient and family to use good hand hygiene technique  - Identify and instruct in appropriate isolation precautions for identified infection/condition  Outcome: Progressing  Goal: Absence of fever/infection during neutropenic period  Description: INTERVENTIONS:  - Monitor WBC    Outcome: Progressing     Problem: SAFETY ADULT  Goal: Patient will remain free of falls  Description: INTERVENTIONS:  - Educate patient/family on patient safety including physical limitations  - Instruct patient to call for assistance with activity   - Consult OT/PT to assist with strengthening/mobility   - Keep Call bell within reach  - Keep bed low and locked with side rails adjusted as appropriate  - Keep care items and personal belongings within reach  - Initiate and maintain comfort rounds  - Make Fall Risk Sign visible to staff  - Offer Toileting every 2 Hours,  in advance of need  - Initiate/Maintain bed alarm  - Obtain necessary fall risk management equipment: fall risk sign on door  - Apply yellow socks and bracelet for high fall risk patients  - Consider moving patient to room near nurses station  Outcome: Progressing  Goal: Maintain or return to baseline ADL function  Description: INTERVENTIONS:  -  Assess patient's ability to carry out ADLs; assess patient's baseline for ADL function and identify physical deficits which impact ability to perform ADLs (bathing, care of mouth/teeth, toileting, grooming, dressing, etc.)  - Assess/evaluate cause of self-care deficits   - Assess range of motion  - Assess patient's mobility; develop plan if impaired  - Assess patient's need for assistive devices and provide as appropriate  - Encourage maximum independence but intervene and supervise when necessary  - Involve family in performance of ADLs  - Assess for home care needs following discharge   - Consider OT consult to assist with ADL evaluation and planning for discharge  - Provide patient education as appropriate  Outcome: Progressing  Goal: Maintains/Returns to pre admission functional level  Description: INTERVENTIONS:  - Perform AM-PAC 6 Click Basic Mobility/ Daily Activity assessment daily.  - Set and communicate daily mobility goal to care team and patient/family/caregiver.   - Collaborate with rehabilitation services on mobility goals if consulted  - Perform Range of Motion 2 times a day.  - Reposition patient every 2 hours.  - Dangle patient 2 times a day  - Stand patient 2 times a day  - Ambulate patient 3 times a day  - Out of bed to chair 3 times a day   - Out of bed for meals 3 times a day  - Out of bed for toileting  - Record patient progress and toleration of activity level   Outcome: Progressing     Problem: DISCHARGE PLANNING  Goal: Discharge to home or other facility with appropriate resources  Description: INTERVENTIONS:  - Identify barriers to discharge  w/patient and caregiver  - Arrange for needed discharge resources and transportation as appropriate  - Identify discharge learning needs (meds, wound care, etc.)  - Arrange for interpretive services to assist at discharge as needed  - Refer to Case Management Department for coordinating discharge planning if the patient needs post-hospital services based on physician/advanced practitioner order or complex needs related to functional status, cognitive ability, or social support system  Outcome: Progressing     Problem: Knowledge Deficit  Goal: Patient/family/caregiver demonstrates understanding of disease process, treatment plan, medications, and discharge instructions  Description: Complete learning assessment and assess knowledge base.  Interventions:  - Provide teaching at level of understanding  - Provide teaching via preferred learning methods  Outcome: Progressing     Problem: RESPIRATORY - ADULT  Goal: Achieves optimal ventilation and oxygenation  Description: INTERVENTIONS:  - Assess for changes in respiratory status  - Assess for changes in mentation and behavior  - Position to facilitate oxygenation and minimize respiratory effort  - Oxygen administered by appropriate delivery if ordered  - Initiate smoking cessation education as indicated  - Encourage broncho-pulmonary hygiene including cough, deep breathe, Incentive Spirometry  - Assess the need for suctioning and aspirate as needed  - Assess and instruct to report SOB or any respiratory difficulty  - Respiratory Therapy support as indicated  Outcome: Progressing     Problem: Prexisting or High Potential for Compromised Skin Integrity  Goal: Skin integrity is maintained or improved  Description: INTERVENTIONS:  - Identify patients at risk for skin breakdown  - Assess and monitor skin integrity  - Assess and monitor nutrition and hydration status  - Monitor labs   - Assess for incontinence   - Turn and reposition patient  - Assist with  mobility/ambulation  - Relieve pressure over bony prominences  - Avoid friction and shearing  - Provide appropriate hygiene as needed including keeping skin clean and dry  - Evaluate need for skin moisturizer/barrier cream  - Collaborate with interdisciplinary team   - Patient/family teaching  - Consider wound care consult   Outcome: Progressing

## 2024-11-14 NOTE — PROGRESS NOTES
Progress Note - Pulmonology   Name: Eryn Morocho 84 y.o. adult I MRN: [id number]  Unit/Bed#: [address] [place] [address] I Date of Admission: 11/10/2024   Date of Service: 11/14/2024 I Hospital Day: 4    Assessment & Plan  Acute on chronic respiratory failure with hypoxia and hypercapnia (HCC)  Discharged home with supplemental oxygen for comfort.  She is currently on 15 L saturating 89%.  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)  Diagnosed with non-small cell lung cancer underwent wedge resection.  Treated with chemotherapy.  Follows with Dr. Esquivel from oncology.  She has an enlarging lung nodule and has deferred further workup of this.  COPD, very severe (HCC)  At discharge, can continue nebulized regimen as previously delineated.  Can also complete prednisone 40 mg daily for 5 days.  Chronic heart failure with preserved ejection fraction (HFpEF) (HCC)  Wt Readings from Last 3 Encounters:   11/14/24 74.7 kg (164 lb 9.6 oz)   10/23/24 79.1 kg (174 lb 4.8 oz)   09/16/24 74.8 kg (165 lb)   Management and diuresis per primary team.  Influenza B  Given Tamiflu while inpatient.  Pleural effusion  Pleural effusion was drained as an outpatient.  Culture and cytology negative.  Goals of care, counseling/discussion  Plan is for discharge home with hospice today at 1 PM.    Discussed with patient's daughter via telephone, case management, and primary team.    24 Hour Events : None per nursing  Subjective : Eryn is sitting up in bed today.  She is happy to be going home today.  Plans are for discharge to hospice at home with her family.    Objective :  Temp:  [97.2 °F (36.2 °C)-98.2 °F (36.8 °C)] 97.3 °F (36.3 °C)  HR:  [55-77] 55  BP: (129-143)/() 129/103  Resp:  [18-20] 18  SpO2:  [86 %-100 %] 95 %  O2 Device: Mid flow nasal cannula  Nasal Cannula O2 Flow Rate (L/min):  [12 L/min-15 L/min] 14 L/min    Physical Exam  Vitals reviewed.   Constitutional:       General: She is not in acute distress.     Appearance: She is  well-developed. She is not toxic-appearing or diaphoretic.      Interventions: Nasal cannula in place.   HENT:      Head: Normocephalic and atraumatic.   Eyes:      General: No scleral icterus.     Extraocular Movements: Extraocular movements intact.   Neck:      Trachea: No tracheal deviation.   Cardiovascular:      Rate and Rhythm: Normal rate and regular rhythm.      Heart sounds: S1 normal and S2 normal. No murmur heard.     No friction rub. No gallop.   Pulmonary:      Effort: Pulmonary effort is normal. No tachypnea, accessory muscle usage or respiratory distress.      Breath sounds: Normal breath sounds. No stridor. No decreased breath sounds, wheezing, rhonchi or rales.   Chest:      Chest wall: No tenderness.   Abdominal:      General: Bowel sounds are normal. There is no distension.      Palpations: Abdomen is soft.      Tenderness: There is no abdominal tenderness.   Musculoskeletal:      Cervical back: Neck supple.      Right lower leg: No edema.      Left lower leg: No edema.   Skin:     General: Skin is warm and dry.      Findings: No rash.   Neurological:      Mental Status: She is alert and oriented to person, place, and time.      GCS: GCS eye subscore is 4. GCS verbal subscore is 5. GCS motor subscore is 6.   Psychiatric:         Speech: Speech normal.         Behavior: Behavior normal. Behavior is cooperative.         Lab Results: I have reviewed the following results:   .     11/14/24  0448   WBC 5.41   HGB 11.1*   HCT 34.6*      SODIUM 134*   K 4.4   CL 90*   CO2 39*   BUN 19   CREATININE 0.68   GLUC 157*     ABG:   .     11/13/24  1127   PHART 7.361   ILT9EMC 72.6*   PO2ART 47.4*   HNF4GEI 40.2*   BEART 11.5     Procalcitonin less than 0.05

## 2024-11-14 NOTE — ASSESSMENT & PLAN NOTE
NSCLC status post wedge resection and chemo.  Recent CT scan with enlarging lung nodule  Follows up with

## 2024-11-14 NOTE — DISCHARGE SUMMARY
Discharge Summary - Hospitalist   Name: Eryn Morocho 84 y.o. adult I MRN: [id number]  Unit/Bed#: 4 [place] [address] I Date of Admission: 11/10/2024   Date of Service: 11/14/2024 I Hospital Day: 4     Assessment & Plan  Acute on chronic respiratory failure with hypoxia and hypercapnia (HCC)  History of HFpEF severe COPD (recently discharged on 9 L) NSCLC status post wedge resection anxiety depression PAD paroxysmal atrial fibrillation and CKD who presents back to the hospital for worsening shortness of breath  Patient reported 2 days of worsening dyspnea with cough/mucus production  In the ED required BiPAP  Acute decompensation secondary to influenza B.    Oxygen requirement again escalated to >12 L, unable to tolerate BiPAP well.  Reports that she does not want to use BiPAP  Appears comfortable, not in distress  Pulmonary following, input appreciated  Continue oseltamivir  Continue bronchodilators as below  Ongoing goals of care discussion as patient reported that she does not want to go through this again and again and just wants to be at home pulmonary discussed with patient's daughter, agreeable for hospice evaluation  Evaluated by home hospice, patient was accepted to hospice.  Continue chronic meds  Comfort meds as needed as needed morphine, lorazepam.  Continue care per hospice  Influenza B  Continue oseltamivir, dose adjusted to creatinine clearance , for 5 days  vaccinated this year for influenza.  COPD, very severe (HCC)  Follows with Dr. Moreno.  No evidence of exacerbation exacerbation.    Pulmonology input appreciated  Continue home regimen on discharge, prednisone for 5 days  Hospice evaluation noted, patient is being discharged on hospice status  Chronic heart failure with preserved ejection fraction (HFpEF) (HCC)  Wt Readings from Last 3 Encounters:   11/14/24 74.7 kg (164 lb 9.6 oz)   10/23/24 79.1 kg (174 lb 4.8 oz)   09/16/24 74.8 kg (165 lb)   Echo from 524 showed EF of 70% with grade 2 diastolic  dysfunction.  Follows up with Dr. Agarwal   continue torsemide 10 mg.    Paroxysmal atrial fibrillation (HCC)  Continue diltiazem  Anticoagulation: Continue rivaroxaban  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)  NSCLC status post wedge resection and chemo.  Recent CT scan with enlarging lung nodule  Follows up with   HTN (hypertension)  Monitor on diltiazem  Anxiety and depression  Mood stable, continue citalopram.  Change trazodone to as needed  Stage 3a chronic kidney disease (HCC)  Lab Results   Component Value Date    EGFR 80 06/19/2024    EGFR 81 06/18/2024    EGFR 81 06/17/2024    CREATININE 0.68 11/14/2024    CREATININE 0.73 11/13/2024    CREATININE 0.68 11/11/2024     Renal function at baseline  PAD (peripheral artery disease) (HCC)  On Plavix and statin  Pleural effusion  S/p left thoracentesis as outpatient on 10/31, cytology negative for malignancy.  Culture negative.  Repeat chest x-ray 11/12-small left basilar effusion.  No pneumothorax  Goals of care, counseling/discussion       Medical Problems       Resolved Problems  Date Reviewed: 11/10/2024   None       Discharging Physician / Practitioner: Yovani Em MD  PCP: Ari Mendiola MD  Admission Date:   Admission Orders (From admission, onward)       Ordered        11/10/24 0758  INPATIENT ADMISSION  Once                          Discharge Date: 11/14/24    Consultations During Hospital Stay:  Pulmonary    Procedures Performed:   None    Significant Findings / Test Results:   Influenza B positive  ABG with evidence of respiratory acidosis/hypercapnia    Incidental Findings:   Not applicable     Test Results Pending at Discharge (will require follow up):   Not applicable     Outpatient Tests Requested:  Not applicable    Complications: None    Reason for Admission: Shortness of breath    Hospital Course:   Eryn Morocho is a 84 y.o. adult patient with history of severe COPD, respiratory failure, A-fib, HFpEF who originally presented to the  hospital on 11/10/2024 due to worsening of shortness of breath.  Patient was recently hospitalized with CHF which subsequently improved , doing well 2 days ago prior to admission.  In ED patient was noted to influenza B, patient initially required CPAP received nebulizer IV steroid and subsequently was admitted.  Patient was requiring 9 L of supplemental oxygen in setting of very severe COPD on recent discharge.  Patient was admitted, started on bronchodilators, Tamiflu.  Respiratory rate was monitored.  Patient was seen and followed by pulmonary during hospitalization with the history of very severe COPD.  Patient was continued on supplemental oxygen, BiPAP was advised while asleep.  Patient was continued on nebulized bronchodilators, other home medications were continued.  Patient remained afebrile but noted to with persistently elevated oxygen requirement, patient remained poorly compliant with BiPAP.  Patient was closely followed by pulmonary during hospitalization, goals of care discussion were held as patient did not want to continue BiPAP.  Patient reported that she does not want to continue doing this and she wants to be just at home.  Goals of care with discussion were held with patient's daughter who was in agreement with patient's wishes.  Hospice evaluation requested hospice evaluation, patient was admitted by home hospice and accepted now being discharged home with home hospice.          Please see above list of diagnoses and related plan for additional information.     Condition at Discharge: fair    Discharge Day Visit / Exam:   Subjective:    Sitting up in bed, reports that she wants to go home  She keeps on saying that she is repeating all this again and again  Reports that her breathing is okay  Denies any pain      Vitals: Blood Pressure: (!) 129/103 (11/14/24 0805)  Pulse: 55 (11/14/24 0805)  Temperature: (!) 97.3 °F (36.3 °C) (11/14/24 0805)  Temp Source: Oral (11/12/24 0934)  Respirations: 18  "(11/13/24 2225)  Height: 5' 3\" (160 cm) (11/10/24 0914)  Weight - Scale: 74.7 kg (164 lb 9.6 oz) (11/14/24 0600)  SpO2: 95 % (11/14/24 0805) on 15 L  Physical Exam  Constitutional:       General: She is not in acute distress.     Appearance: She is obese.   HENT:      Head: Normocephalic and atraumatic.   Cardiovascular:      Rate and Rhythm: Normal rate.   Pulmonary:      Effort: Pulmonary effort is normal. No accessory muscle usage or respiratory distress.      Breath sounds: Decreased air movement present. Decreased breath sounds present. No rhonchi.   Abdominal:      General: Bowel sounds are normal. There is no distension.      Palpations: Abdomen is soft.      Tenderness: There is no abdominal tenderness. There is no guarding or rebound.   Musculoskeletal:      Right lower leg: No edema.      Left lower leg: No edema.   Skin:     General: Skin is warm and dry.      Findings: No rash.   Neurological:      Mental Status: She is alert. She is disoriented.      Cranial Nerves: No cranial nerve deficit.            Discharge instructions/Information to patient and family:   See after visit summary for information provided to patient and family.      Provisions for Follow-Up Care:  See after visit summary for information related to follow-up care and any pertinent home health orders.      Mobility at time of Discharge:   Basic Mobility Inpatient Raw Score: 13  JH-HLM Goal: 4: Move to chair/commode  JH-HLM Achieved: 2: Bed activities/Dependent transfer  HLM Goal NOT achieved. Continue to encourage mobility in post discharge setting.     Disposition:   Home with hospice    Planned Readmission: No    Discharge Medications:  See after visit summary for reconciled discharge medications provided to patient and/or family.      Administrative Statements   Discharge Statement:  I have spent a total time of 40 minutes in caring for this patient on the day of the visit/encounter. >30 minutes of time was spent on: Prognosis, " Patient and family education, and Communicating with other healthcare professionals .    **Please Note: This note may have been constructed using a voice recognition system**

## 2024-11-14 NOTE — ASSESSMENT & PLAN NOTE
Continue oseltamivir, dose adjusted to creatinine clearance , for 5 days  vaccinated this year for influenza.

## 2024-11-14 NOTE — PLAN OF CARE
Problem: PAIN - ADULT  Goal: Verbalizes/displays adequate comfort level or baseline comfort level  Description: Interventions:  - Encourage patient to monitor pain and request assistance  - Assess pain using appropriate pain scale  - Administer analgesics based on type and severity of pain and evaluate response  - Implement non-pharmacological measures as appropriate and evaluate response  - Consider cultural and social influences on pain and pain management  - Notify physician/advanced practitioner if interventions unsuccessful or patient reports new pain  Outcome: Progressing     Problem: INFECTION - ADULT  Goal: Absence or prevention of progression during hospitalization  Description: INTERVENTIONS:  - Assess and monitor for signs and symptoms of infection  - Monitor lab/diagnostic results  - Monitor all insertion sites, i.e. indwelling lines, tubes, and drains  - Monitor endotracheal if appropriate and nasal secretions for changes in amount and color  - [place] appropriate cooling/warming therapies per order  - Administer medications as ordered  - Instruct and encourage patient and family to use good hand hygiene technique  - Identify and instruct in appropriate isolation precautions for identified infection/condition  Outcome: Progressing  Goal: Absence of fever/infection during neutropenic period  Description: INTERVENTIONS:  - Monitor WBC    Outcome: Progressing     Problem: SAFETY ADULT  Goal: Patient will remain free of falls  Description: INTERVENTIONS:  - Educate patient/family on patient safety including physical limitations  - Instruct patient to call for assistance with activity   - Consult OT/PT to assist with strengthening/mobility   - Keep Call bell within reach  - Keep bed low and locked with side rails adjusted as appropriate  - Keep care items and personal belongings within reach  - Initiate and maintain comfort rounds  - Make Fall Risk Sign visible to staff  - Offer Toileting every 2 Hours,  in advance of need  - Initiate/Maintain bed alarm  - Obtain necessary fall risk management equipment: yellow socks  - Apply yellow socks and bracelet for high fall risk patients  - Consider moving patient to room near nurses station  Outcome: Progressing  Goal: Maintain or return to baseline ADL function  Description: INTERVENTIONS:  -  Assess patient's ability to carry out ADLs; assess patient's baseline for ADL function and identify physical deficits which impact ability to perform ADLs (bathing, care of mouth/teeth, toileting, grooming, dressing, etc.)  - Assess/evaluate cause of self-care deficits   - Assess range of motion  - Assess patient's mobility; develop plan if impaired  - Assess patient's need for assistive devices and provide as appropriate  - Encourage maximum independence but intervene and supervise when necessary  - Involve family in performance of ADLs  - Assess for home care needs following discharge   - Consider OT consult to assist with ADL evaluation and planning for discharge  - Provide patient education as appropriate  Outcome: Progressing  Goal: Maintains/Returns to pre admission functional level  Description: INTERVENTIONS:  - Perform AM-PAC 6 Click Basic Mobility/ Daily Activity assessment daily.  - Set and communicate daily mobility goal to care team and patient/family/caregiver.   - Collaborate with rehabilitation services on mobility goals if consulted  - Perform Range of Motion 3 times a day.  - Reposition patient every 2 hours.  - Dangle patient 3 times a day  - Stand patient  times a day  - Ambulate patient 3 times a day  - Out of bed to chair 3 times a day   - Out of bed for meals 3 times a day  - Out of bed for toileting  - Record patient progress and toleration of activity level   Outcome: Progressing     Problem: DISCHARGE PLANNING  Goal: Discharge to home or other facility with appropriate resources  Description: INTERVENTIONS:  - Identify barriers to discharge w/patient and  caregiver  - Arrange for needed discharge resources and transportation as appropriate  - Identify discharge learning needs (meds, wound care, etc.)  - Arrange for interpretive services to assist at discharge as needed  - Refer to Case Management Department for coordinating discharge planning if the patient needs post-hospital services based on physician/advanced practitioner order or complex needs related to functional status, cognitive ability, or social support system  Outcome: Progressing     Problem: Knowledge Deficit  Goal: Patient/family/caregiver demonstrates understanding of disease process, treatment plan, medications, and discharge instructions  Description: Complete learning assessment and assess knowledge base.  Interventions:  - Provide teaching at level of understanding  - Provide teaching via preferred learning methods  Outcome: Progressing     Problem: RESPIRATORY - ADULT  Goal: Achieves optimal ventilation and oxygenation  Description: INTERVENTIONS:  - Assess for changes in respiratory status  - Assess for changes in mentation and behavior  - Position to facilitate oxygenation and minimize respiratory effort  - Oxygen administered by appropriate delivery if ordered  - Initiate smoking cessation education as indicated  - Encourage broncho-pulmonary hygiene including cough, deep breathe, Incentive Spirometry  - Assess the need for suctioning and aspirate as needed  - Assess and instruct to report SOB or any respiratory difficulty  - Respiratory Therapy support as indicated  Outcome: Progressing

## 2024-11-14 NOTE — ASSESSMENT & PLAN NOTE
History of HFpEF severe COPD (recently discharged on 9 L) NSCLC status post wedge resection anxiety depression PAD paroxysmal atrial fibrillation and CKD who presents back to the hospital for worsening shortness of breath  Patient reported 2 days of worsening dyspnea with cough/mucus production  In the ED required BiPAP  Acute decompensation secondary to influenza B.    Oxygen requirement again escalated to >12 L, unable to tolerate BiPAP well.  Reports that she does not want to use BiPAP  Appears comfortable, not in distress  Pulmonary following, input appreciated  Continue oseltamivir  Continue bronchodilators as below  Ongoing goals of care discussion as patient reported that she does not want to go through this again and again and just wants to be at home pulmonary discussed with patient's daughter, agreeable for hospice evaluation  Evaluated by home hospice, patient was accepted to hospice.  Continue chronic meds  Comfort meds as needed as needed morphine, lorazepam.  Continue care per hospice

## 2024-11-14 NOTE — ASSESSMENT & PLAN NOTE
Wt Readings from Last 3 Encounters:   11/14/24 74.7 kg (164 lb 9.6 oz)   10/23/24 79.1 kg (174 lb 4.8 oz)   09/16/24 74.8 kg (165 lb)   Echo from 524 showed EF of 70% with grade 2 diastolic dysfunction.  Follows up with Dr. Agarwal   continue torsemide 10 mg.

## 2024-11-14 NOTE — ASSESSMENT & PLAN NOTE
Wt Readings from Last 3 Encounters:   11/14/24 74.7 kg (164 lb 9.6 oz)   10/23/24 79.1 kg (174 lb 4.8 oz)   09/16/24 74.8 kg (165 lb)   Management and diuresis per primary team.

## 2024-11-15 LAB
BACTERIA BLD CULT: NORMAL
BACTERIA BLD CULT: NORMAL

## 2024-12-10 PROBLEM — J10.1 INFLUENZA B: Status: RESOLVED | Noted: 2024-11-10 | Resolved: 2024-12-10

## 2024-12-19 NOTE — PROGRESS NOTES
Progress Note - Pulmonology   Name: Eryn Morocho 84 y.o. adult I MRN: [id number]  Unit/Bed#: 4 [address] I Date of Admission: 11/10/2024   Date of Service: 11/12/2024 I Hospital Day: 2    Assessment & Plan  Acute on chronic respiratory failure with hypoxia and hypercapnia (HCC)  At last discharge, she was to wear 9 L of oxygen at rest and 12 L with exertion.  She has been doing well with 8 L at rest at home per her daughter.  Titrate supplemental oxygen to maintain saturations greater than or equal to 89%.  Continue BiPAP at bedtime.  She prefers her home nasal pillows and per policy, order reflects using home machine.  Discussed with respiratory therapy.  Patient is able to apply the mask and turn it on herself per her and her daughter.  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)  Diagnosed with non-small cell lung cancer underwent wedge resection.  Treated with chemotherapy.  Follows with Dr. Esquivel from oncology.  She has an enlarging lung nodule and has deferred further workup of this.  COPD, very severe (HCC)  No evidence of acute exacerbation.  Home regimen is DuoNebs, formoterol, and budesonide.  Continue budesonide twice daily with Xopenex/ipratropium 3 times daily while inpatient.  Chronic heart failure with preserved ejection fraction (HFpEF) ([facility])  Wt Readings from Last 3 Encounters:   11/10/24 72.9 kg (160 lb 11.5 oz)   10/23/24 79.1 kg (174 lb 4.8 oz)   09/16/24 74.8 kg (165 lb)   Management per primary team.  Monitor I's and O's Daily weights.  Influenza B  Tamiflu per primary team.  Pleural effusion  Pleural effusion was drained as an outpatient.  Culture and cytology negative.    Discussed with primary team, nursing, and respiratory therapy.    24 Hour Events : Cough with clear mucus  Subjective : Eryn is sitting up in bed with her daughter at bedside.  She reports that she is coughing a bit more today.  Mucus is clear.  She wore the hospital BiPAP last night, but cannot tolerate the mask.  Her home  machine is at the bedside.    Objective :  Temp:  [97.3 °F (36.3 °C)-97.7 °F (36.5 °C)] 97.4 °F (36.3 °C)  HR:  [70-89] 70  BP: (129-166)/(49-69) 149/52  Resp:  [12-25] 22  SpO2:  [61 %-98 %] 93 %  O2 Device: Mid flow nasal cannula  Nasal Cannula O2 Flow Rate (L/min):  [12 L/min-15 L/min] 12 L/min  FiO2 (%):  [50] 50    Physical Exam  Vitals reviewed.   Constitutional:       General: She is not in acute distress.     Appearance: She is well-developed. She is not toxic-appearing or diaphoretic.      Interventions: Nasal cannula in place.   HENT:      Head: Normocephalic and atraumatic.   Eyes:      General: No scleral icterus.     Extraocular Movements: Extraocular movements intact.   Neck:      Trachea: No tracheal deviation.   Cardiovascular:      Rate and Rhythm: Normal rate and regular rhythm.      Heart sounds: S1 normal and S2 normal. No murmur heard.     No friction rub. No gallop.   Pulmonary:      Effort: Pulmonary effort is normal. No tachypnea, accessory muscle usage or respiratory distress.      Breath sounds: Normal breath sounds. No stridor. No decreased breath sounds, wheezing, rhonchi or rales.   Chest:      Chest wall: No tenderness.   Abdominal:      General: Bowel sounds are normal. There is no distension.      Palpations: Abdomen is soft.      Tenderness: There is no abdominal tenderness.   Musculoskeletal:      Cervical back: Neck supple.      Right lower leg: No edema.      Left lower leg: No edema.   Skin:     General: Skin is warm and dry.      Findings: No rash.   Neurological:      Mental Status: She is alert and oriented to person, place, and time.      GCS: GCS eye subscore is 4. GCS verbal subscore is 5. GCS motor subscore is 6.   Psychiatric:         Speech: Speech normal.         Behavior: Behavior normal. Behavior is cooperative.         Lab Results: I have reviewed the following results:   No new results in last 24 hours.     no

## 2025-01-21 LAB
DME PARACHUTE DELIVERY DATE ACTUAL: NORMAL
DME PARACHUTE DELIVERY DATE REQUESTED: NORMAL
DME PARACHUTE DELIVERY NOTE: NORMAL
DME PARACHUTE ITEM DESCRIPTION: NORMAL
DME PARACHUTE ORDER STATUS: NORMAL
DME PARACHUTE SUPPLIER NAME: NORMAL
DME PARACHUTE SUPPLIER PHONE: NORMAL

## 2025-01-25 NOTE — ASSESSMENT & PLAN NOTE
Cont O2 supplementation stable at 2 5 L  Bibasilar crackles heard on ausculation  Ordered CXR
Diagnosed 7 years ago  S/p wedge resection by Dr Peter Goodman   Received chemo therapy and follows with Dr Maria De Jesus Brian
Has been compliant with using CPAP everynight  CPAP pressure requirement of 9cm  She is comfortable with nasal pillows  No significant daytime sleepiness or morning headaches  Sleep isn't significantly disturbed although she wakes up to urinate 3 times a night  Patient states she takes tylenol PM or nyquil everynight to sleep as she will be unable to stay asleep
Stable  Takes Amlodipine 5mg daily
Uses albuterol once in the afternoon daily  Uses Budesonide, Formoterol, Ipratropium-albuterol NEB's twice a day  Patient states her breathing has been stable and she has had no recent exacerbations  Last hospitalization was in September 2020
73

## 2025-04-16 ENCOUNTER — DOCUMENTATION (OUTPATIENT)
Dept: ADMINISTRATIVE | Facility: OTHER | Age: 85
End: 2025-04-16

## 2025-04-16 NOTE — PROGRESS NOTES
04/16/25 10:23 AM    Annual Wellness Visit outreach is not required, patient is on hospice.     Thank you.  Britni Davis MA  [facility]

## 2025-07-24 ENCOUNTER — DOCUMENTATION (OUTPATIENT)
Dept: ADMINISTRATIVE | Facility: OTHER | Age: 85
End: 2025-07-24

## 2025-07-24 NOTE — PROGRESS NOTES
07/24/25 2:15 PM    Annual Wellness Visit outreach is not required, patient is on hospice.     Thank you.  Jerry Ruth MA  [facility]  
Breath sounds clear and equal bilaterally.